# Patient Record
Sex: MALE | Race: WHITE | NOT HISPANIC OR LATINO | Employment: OTHER | URBAN - METROPOLITAN AREA
[De-identification: names, ages, dates, MRNs, and addresses within clinical notes are randomized per-mention and may not be internally consistent; named-entity substitution may affect disease eponyms.]

---

## 2017-01-03 ENCOUNTER — TELEPHONE (OUTPATIENT)
Dept: FAMILY MEDICINE | Facility: CLINIC | Age: 62
End: 2017-01-03

## 2017-01-03 RX ORDER — AMOXICILLIN AND CLAVULANATE POTASSIUM 875; 125 MG/1; MG/1
1 TABLET, FILM COATED ORAL 2 TIMES DAILY
Qty: 20 TABLET | Refills: 0 | Status: SHIPPED | OUTPATIENT
Start: 2017-01-03 | End: 2017-07-31 | Stop reason: SDUPTHER

## 2017-01-03 NOTE — TELEPHONE ENCOUNTER
----- Message from Marilee Zaragoza sent at 1/3/2017  9:09 AM CST -----  Contact: self  Patient called regarding an appt he had on 12/29/16. Stating was diagnosed with bronchitis. Was told to buy over the counter meds and to f/u if not better by today to be prescribed antibiotics. Stating cannot sleep and sinus issues. Please contact [phone] (home)       [id number] - [place], [place] - [address] [address]  Phone: [phone] Fax: [phone]

## 2017-02-07 ENCOUNTER — DOCUMENTATION ONLY (OUTPATIENT)
Dept: FAMILY MEDICINE | Facility: CLINIC | Age: 62
End: 2017-02-07

## 2017-02-07 NOTE — PROGRESS NOTES
Health Maintenance Due   Topic Date Due    Hepatitis C Screening  1955    TETANUS VACCINE  10/18/1972    Zoster Vaccine  10/18/2014

## 2017-02-13 ENCOUNTER — CLINICAL SUPPORT (OUTPATIENT)
Dept: FAMILY MEDICINE | Facility: CLINIC | Age: 62
End: 2017-02-13
Payer: MEDICARE

## 2017-02-13 ENCOUNTER — APPOINTMENT (OUTPATIENT)
Dept: LAB | Facility: HOSPITAL | Age: 62
End: 2017-02-13
Attending: INTERNAL MEDICINE
Payer: MEDICARE

## 2017-02-13 DIAGNOSIS — E78.5 HYPERLIPIDEMIA, UNSPECIFIED HYPERLIPIDEMIA TYPE: ICD-10-CM

## 2017-02-13 DIAGNOSIS — Z11.59 NEED FOR HEPATITIS C SCREENING TEST: ICD-10-CM

## 2017-02-13 LAB
ALBUMIN SERPL BCP-MCNC: 3.8 G/DL
ALP SERPL-CCNC: 61 U/L
ALT SERPL W/O P-5'-P-CCNC: 19 U/L
ANION GAP SERPL CALC-SCNC: 8 MMOL/L
AST SERPL-CCNC: 23 U/L
BILIRUB SERPL-MCNC: 0.6 MG/DL
BUN SERPL-MCNC: 19 MG/DL
CALCIUM SERPL-MCNC: 9.2 MG/DL
CHLORIDE SERPL-SCNC: 107 MMOL/L
CHOLEST/HDLC SERPL: 4.6 {RATIO}
CO2 SERPL-SCNC: 25 MMOL/L
CREAT SERPL-MCNC: 1.1 MG/DL
EST. GFR  (AFRICAN AMERICAN): >60 ML/MIN/1.73 M^2
EST. GFR  (NON AFRICAN AMERICAN): >60 ML/MIN/1.73 M^2
GLUCOSE SERPL-MCNC: 88 MG/DL
HDL/CHOLESTEROL RATIO: 21.6 %
HDLC SERPL-MCNC: 167 MG/DL
HDLC SERPL-MCNC: 36 MG/DL
LDLC SERPL CALC-MCNC: 96.4 MG/DL
NONHDLC SERPL-MCNC: 131 MG/DL
POTASSIUM SERPL-SCNC: 3.9 MMOL/L
PROT SERPL-MCNC: 6.7 G/DL
SODIUM SERPL-SCNC: 140 MMOL/L
TRIGL SERPL-MCNC: 173 MG/DL

## 2017-02-13 PROCEDURE — 86803 HEPATITIS C AB TEST: CPT

## 2017-02-13 PROCEDURE — 80053 COMPREHEN METABOLIC PANEL: CPT

## 2017-02-13 PROCEDURE — 80061 LIPID PANEL: CPT

## 2017-02-14 LAB — HCV AB SERPL QL IA: NEGATIVE

## 2017-03-10 RX ORDER — LEVOTHYROXINE SODIUM 100 UG/1
100 TABLET ORAL DAILY
Qty: 30 TABLET | Refills: 11 | Status: SHIPPED | OUTPATIENT
Start: 2017-03-10 | End: 2018-04-16 | Stop reason: SDUPTHER

## 2017-07-31 ENCOUNTER — DOCUMENTATION ONLY (OUTPATIENT)
Dept: FAMILY MEDICINE | Facility: CLINIC | Age: 62
End: 2017-07-31

## 2017-07-31 ENCOUNTER — OFFICE VISIT (OUTPATIENT)
Dept: FAMILY MEDICINE | Facility: CLINIC | Age: 62
End: 2017-07-31
Payer: MEDICARE

## 2017-07-31 VITALS
WEIGHT: 219.56 LBS | OXYGEN SATURATION: 94 % | DIASTOLIC BLOOD PRESSURE: 79 MMHG | HEART RATE: 127 BPM | TEMPERATURE: 102 F | BODY MASS INDEX: 32.52 KG/M2 | HEIGHT: 69 IN | RESPIRATION RATE: 24 BRPM | SYSTOLIC BLOOD PRESSURE: 133 MMHG

## 2017-07-31 DIAGNOSIS — J01.00 ACUTE NON-RECURRENT MAXILLARY SINUSITIS: ICD-10-CM

## 2017-07-31 DIAGNOSIS — J20.9 ACUTE BRONCHITIS, UNSPECIFIED ORGANISM: ICD-10-CM

## 2017-07-31 DIAGNOSIS — R11.2 NAUSEA AND VOMITING, INTRACTABILITY OF VOMITING NOT SPECIFIED, UNSPECIFIED VOMITING TYPE: Primary | ICD-10-CM

## 2017-07-31 DIAGNOSIS — M79.10 MYALGIA: ICD-10-CM

## 2017-07-31 DIAGNOSIS — I48.91 ATRIAL FIBRILLATION WITH RAPID VENTRICULAR RESPONSE: ICD-10-CM

## 2017-07-31 PROCEDURE — 99213 OFFICE O/P EST LOW 20 MIN: CPT | Mod: 25,S$GLB,, | Performed by: NURSE PRACTITIONER

## 2017-07-31 PROCEDURE — 96372 THER/PROPH/DIAG INJ SC/IM: CPT | Mod: S$GLB,,, | Performed by: NURSE PRACTITIONER

## 2017-07-31 RX ORDER — AMOXICILLIN AND CLAVULANATE POTASSIUM 875; 125 MG/1; MG/1
1 TABLET, FILM COATED ORAL 2 TIMES DAILY
Qty: 20 TABLET | Refills: 0 | Status: SHIPPED | OUTPATIENT
Start: 2017-07-31 | End: 2017-08-21 | Stop reason: ALTCHOICE

## 2017-07-31 RX ORDER — BENZONATATE 200 MG/1
200 CAPSULE ORAL 3 TIMES DAILY PRN
Qty: 30 CAPSULE | Refills: 0 | Status: SHIPPED | OUTPATIENT
Start: 2017-07-31 | End: 2017-10-13

## 2017-07-31 RX ORDER — ONDANSETRON 4 MG/1
4 TABLET, ORALLY DISINTEGRATING ORAL EVERY 8 HOURS PRN
Qty: 30 TABLET | Refills: 0 | Status: SHIPPED | OUTPATIENT
Start: 2017-07-31 | End: 2018-05-28

## 2017-07-31 RX ORDER — PREDNISONE 5 MG/1
TABLET ORAL
Qty: 21 TABLET | Refills: 0 | Status: SHIPPED | OUTPATIENT
Start: 2017-07-31 | End: 2017-08-21 | Stop reason: ALTCHOICE

## 2017-07-31 RX ORDER — PROMETHAZINE HYDROCHLORIDE AND DEXTROMETHORPHAN HYDROBROMIDE 6.25; 15 MG/5ML; MG/5ML
5 SYRUP ORAL EVERY 6 HOURS PRN
Qty: 118 ML | Refills: 0 | Status: SHIPPED | OUTPATIENT
Start: 2017-07-31 | End: 2017-08-10

## 2017-07-31 RX ORDER — METOPROLOL SUCCINATE 50 MG/1
50 TABLET, EXTENDED RELEASE ORAL DAILY
Qty: 30 TABLET | Refills: 11 | Status: SHIPPED | OUTPATIENT
Start: 2017-07-31 | End: 2018-10-30 | Stop reason: SDUPTHER

## 2017-07-31 RX ORDER — DEXAMETHASONE SODIUM PHOSPHATE 4 MG/ML
4 INJECTION, SOLUTION INTRA-ARTICULAR; INTRALESIONAL; INTRAMUSCULAR; INTRAVENOUS; SOFT TISSUE
Status: COMPLETED | OUTPATIENT
Start: 2017-07-31 | End: 2017-07-31

## 2017-07-31 RX ADMIN — DEXAMETHASONE SODIUM PHOSPHATE 4 MG: 4 INJECTION, SOLUTION INTRA-ARTICULAR; INTRALESIONAL; INTRAMUSCULAR; INTRAVENOUS; SOFT TISSUE at 09:07

## 2017-07-31 NOTE — PROGRESS NOTES
Subjective:       Patient ID: Albin Fernandez is a 61 y.o. male.    Chief Complaint: Cough and Fever    Cough   This is a new problem. The current episode started in the past 7 days (started about 4 days ago). The problem has been rapidly worsening. The cough is productive of sputum (thick yellow). Associated symptoms include chills, ear congestion, a fever, myalgias, nasal congestion, shortness of breath and sweats. Pertinent negatives include no ear pain. Associated symptoms comments: Cheeks are very puffy. Vomiting. . The symptoms are aggravated by lying down (Has not been able to sleep for several nights due to coughing. ). Treatments tried: tylenol, mucinex D, vicks formula 44 cough medicine.  The treatment provided no relief.     Has A fib, chronic,  Dr. Santo manages his warfarin. He will have it checked next week. A fib is chronic and much worse, now has rapid ventricular response with tachycardia. Denies chest pain or leg edema.   Review of Systems   Constitutional: Positive for chills and fever.   HENT: Negative for ear pain.    Respiratory: Positive for cough and shortness of breath.    Musculoskeletal: Positive for myalgias.       Objective:      Physical Exam   Constitutional: He appears well-developed and well-nourished. No distress.   HENT:   Head: Normocephalic and atraumatic.   Right Ear: External ear normal.   Left Ear: External ear normal.   Nose: Nose normal.   Mouth/Throat: Oropharynx is clear and moist. No oropharyngeal exudate.   Eyes: Conjunctivae are normal. Right eye exhibits no discharge. Left eye exhibits no discharge. No scleral icterus.   Neck: Normal range of motion. Neck supple.   Cardiovascular: Normal rate, regular rhythm and normal heart sounds.  Exam reveals no gallop and no friction rub.    No murmur heard.  Pulmonary/Chest: Effort normal and breath sounds normal. No respiratory distress. He has no wheezes. He has no rales.   Lymphadenopathy:     He has no cervical adenopathy.    Skin: Skin is warm and dry. He is not diaphoretic.   Psychiatric: He has a normal mood and affect. His behavior is normal.       Assessment:       1. Nausea and vomiting, intractability of vomiting not specified, unspecified vomiting type    2. Atrial fibrillation with rapid ventricular response    3. Acute non-recurrent maxillary sinusitis    4. Acute bronchitis, unspecified organism    5. Myalgia        Plan:     Nausea and vomiting, intractability of vomiting not specified, unspecified vomiting type  -     ondansetron (ZOFRAN-ODT) 4 MG TbDL; Take 1 tablet (4 mg total) by mouth every 8 (eight) hours as needed.  Dispense: 30 tablet; Refill: 0    Atrial fibrillation with rapid ventricular response  -     metoprolol succinate (TOPROL-XL) 50 MG 24 hr tablet; Take 1 tablet (50 mg total) by mouth once daily.  Dispense: 30 tablet; Refill: 11    Acute non-recurrent maxillary sinusitis  -     amoxicillin-clavulanate 875-125mg (AUGMENTIN) 875-125 mg per tablet; Take 1 tablet by mouth 2 (two) times daily.  Dispense: 20 tablet; Refill: 0    Acute bronchitis, unspecified organism  -     benzonatate (TESSALON) 200 MG capsule; Take 1 capsule (200 mg total) by mouth 3 (three) times daily as needed for Cough.  Dispense: 30 capsule; Refill: 0  -     predniSONE (DELTASONE) 5 MG tablet; 6 tabs the first day, then 5 the next, then 4, 3, 2, 1  Dispense: 21 tablet; Refill: 0  -     promethazine-dextromethorphan (PROMETHAZINE-DM) 6.25-15 mg/5 mL Syrp; Take 5 mLs by mouth every 6 (six) hours as needed.  Dispense: 118 mL; Refill: 0    Myalgia  -     dexamethasone injection 4 mg; Inject 1 mL (4 mg total) into the muscle one time.        Cut jantoven back by 1 mg. A day while on augmentin (antibiotic) and contact Dr. Santo and tell him you are on an antibiotic and ask him if that is a sufficient reduction in your  Jantoven dose.    Follow up in 2-3 days.  2 weeks if not better

## 2017-07-31 NOTE — PROGRESS NOTES
Health Maintenance Due   Topic Date Due    TETANUS VACCINE  10/18/1972    Zoster Vaccine  10/18/2014

## 2017-07-31 NOTE — PATIENT INSTRUCTIONS
Cut jantoven back by 1 mg. A day while on augmentin (antibiotid) and contact Dr. Santo and tell him you are on an antibiotic and ask him if that is a sufficient reduction in your  Jantoven dose. Take 1 tylenol every 4 hours around the clock for the fever. Take the antibiotic with food.

## 2017-08-03 ENCOUNTER — OFFICE VISIT (OUTPATIENT)
Dept: FAMILY MEDICINE | Facility: CLINIC | Age: 62
End: 2017-08-03
Payer: MEDICARE

## 2017-08-03 ENCOUNTER — HOSPITAL ENCOUNTER (OUTPATIENT)
Dept: RADIOLOGY | Facility: HOSPITAL | Age: 62
Discharge: HOME | End: 2017-08-03
Attending: NURSE PRACTITIONER
Payer: MEDICARE

## 2017-08-03 ENCOUNTER — DOCUMENTATION ONLY (OUTPATIENT)
Dept: FAMILY MEDICINE | Facility: CLINIC | Age: 62
End: 2017-08-03

## 2017-08-03 VITALS
BODY MASS INDEX: 32.09 KG/M2 | DIASTOLIC BLOOD PRESSURE: 87 MMHG | HEART RATE: 92 BPM | OXYGEN SATURATION: 97 % | HEIGHT: 69 IN | SYSTOLIC BLOOD PRESSURE: 138 MMHG | WEIGHT: 216.69 LBS | TEMPERATURE: 98 F

## 2017-08-03 DIAGNOSIS — R50.9 FEVER, UNSPECIFIED FEVER CAUSE: ICD-10-CM

## 2017-08-03 DIAGNOSIS — R05.9 COUGH: Primary | ICD-10-CM

## 2017-08-03 DIAGNOSIS — R05.9 COUGH: ICD-10-CM

## 2017-08-03 PROCEDURE — 3008F BODY MASS INDEX DOCD: CPT | Mod: S$GLB,,, | Performed by: NURSE PRACTITIONER

## 2017-08-03 PROCEDURE — 71020 XR CHEST PA AND LATERAL: CPT | Mod: TC

## 2017-08-03 PROCEDURE — 99213 OFFICE O/P EST LOW 20 MIN: CPT | Mod: S$GLB,,, | Performed by: NURSE PRACTITIONER

## 2017-08-03 PROCEDURE — 71020 XR CHEST PA AND LATERAL: CPT | Mod: 26,,, | Performed by: RADIOLOGY

## 2017-08-03 NOTE — PROGRESS NOTES
Subjective:       Patient ID: Albin Fernandez is a 61 y.o. male.    Chief Complaint: Cough and Chest Congestion    Cough   This is a new problem. The current episode started 1 to 4 weeks ago. Progression since onset: slightly improved, but still can't lay down with out coughing so much he has to get up.  The problem occurs every few minutes. The cough is productive of sputum (green ). Associated symptoms include shortness of breath. He has tried oral steroids and prescription cough suppressant (antibiotic) for the symptoms. His past medical history is significant for bronchitis.     Review of Systems   Respiratory: Positive for cough and shortness of breath.        Objective:      Physical Exam   Constitutional: He is oriented to person, place, and time. He appears well-developed and well-nourished. No distress.   HENT:   Head: Normocephalic and atraumatic.   Eyes: Conjunctivae are normal. Right eye exhibits no discharge. Left eye exhibits no discharge. No scleral icterus.   Cardiovascular: Normal rate, regular rhythm and normal heart sounds.  Exam reveals no gallop and no friction rub.    No murmur heard.  Pulmonary/Chest: Effort normal and breath sounds normal. No respiratory distress. He has no wheezes. He has no rales.   Neurological: He is alert and oriented to person, place, and time.   Skin: Skin is warm and dry. He is not diaphoretic.   Psychiatric: He has a normal mood and affect. His behavior is normal.   Nursing note and vitals reviewed.      Assessment:       1. Cough    2. Fever, unspecified fever cause        Plan:       Cough  -     X-Ray Chest PA And Lateral; Future; Expected date: 08/03/2017    Fever, unspecified fever cause  -     X-Ray Chest PA And Lateral; Future; Expected date: 08/03/2017  -     CBC auto differential; Future; Expected date: 08/03/2017  -     Comprehensive metabolic panel; Future; Expected date: 08/03/2017         Not improved enough with 3 days of antibiotics, ?pneumonia, will  send for stat labs and C xray.

## 2017-08-03 NOTE — PROGRESS NOTES
Health Maintenance Due   Topic Date Due    TETANUS VACCINE  10/18/1972    Zoster Vaccine  10/18/2014    Influenza Vaccine  08/01/2017

## 2017-08-16 DIAGNOSIS — E78.5 HYPERLIPIDEMIA, UNSPECIFIED HYPERLIPIDEMIA TYPE: ICD-10-CM

## 2017-08-16 RX ORDER — SIMVASTATIN 40 MG/1
40 TABLET, FILM COATED ORAL NIGHTLY
Qty: 90 TABLET | Refills: 2 | Status: SHIPPED | OUTPATIENT
Start: 2017-08-16 | End: 2018-07-02 | Stop reason: SDUPTHER

## 2017-08-21 ENCOUNTER — DOCUMENTATION ONLY (OUTPATIENT)
Dept: FAMILY MEDICINE | Facility: CLINIC | Age: 62
End: 2017-08-21

## 2017-08-21 ENCOUNTER — OFFICE VISIT (OUTPATIENT)
Dept: FAMILY MEDICINE | Facility: CLINIC | Age: 62
End: 2017-08-21
Payer: MEDICARE

## 2017-08-21 VITALS
BODY MASS INDEX: 32.78 KG/M2 | DIASTOLIC BLOOD PRESSURE: 68 MMHG | HEIGHT: 69 IN | WEIGHT: 221.31 LBS | SYSTOLIC BLOOD PRESSURE: 117 MMHG | HEART RATE: 98 BPM | TEMPERATURE: 98 F

## 2017-08-21 DIAGNOSIS — L73.9 FOLLICULITIS: ICD-10-CM

## 2017-08-21 DIAGNOSIS — K21.9 LARYNGOPHARYNGEAL REFLUX (LPR): ICD-10-CM

## 2017-08-21 DIAGNOSIS — J30.1 ACUTE SEASONAL ALLERGIC RHINITIS DUE TO POLLEN: Primary | ICD-10-CM

## 2017-08-21 DIAGNOSIS — R05.3 CHRONIC COUGH: ICD-10-CM

## 2017-08-21 DIAGNOSIS — B35.0 TINEA CAPITIS: ICD-10-CM

## 2017-08-21 PROCEDURE — 3008F BODY MASS INDEX DOCD: CPT | Mod: S$GLB,,, | Performed by: NURSE PRACTITIONER

## 2017-08-21 PROCEDURE — 99213 OFFICE O/P EST LOW 20 MIN: CPT | Mod: S$GLB,,, | Performed by: NURSE PRACTITIONER

## 2017-08-21 RX ORDER — KETOCONAZOLE 20 MG/ML
SHAMPOO, SUSPENSION TOPICAL
Qty: 120 ML | Refills: 1 | Status: SHIPPED | OUTPATIENT
Start: 2017-08-21 | End: 2018-10-15

## 2017-08-21 RX ORDER — CEPHALEXIN 500 MG/1
500 CAPSULE ORAL EVERY 6 HOURS
Qty: 28 CAPSULE | Refills: 0 | Status: SHIPPED | OUTPATIENT
Start: 2017-08-21 | End: 2017-08-28

## 2017-08-21 RX ORDER — OMEPRAZOLE 40 MG/1
40 CAPSULE, DELAYED RELEASE ORAL
Qty: 60 CAPSULE | Refills: 2 | Status: SHIPPED | OUTPATIENT
Start: 2017-08-21 | End: 2017-09-05

## 2017-08-21 RX ORDER — FLUTICASONE PROPIONATE 50 MCG
2 SPRAY, SUSPENSION (ML) NASAL DAILY
Qty: 16 G | Refills: 11 | Status: ON HOLD | OUTPATIENT
Start: 2017-08-21 | End: 2020-11-23

## 2017-08-21 NOTE — PROGRESS NOTES
Pre-Visit Chart Review  For Appointment Scheduled on 8-    Health Maintenance Due   Topic Date Due    TETANUS VACCINE  10/18/1972    Zoster Vaccine  10/18/2014    Influenza Vaccine  08/01/2017

## 2017-08-21 NOTE — PATIENT INSTRUCTIONS
Weight Management: Getting Started  Healthy bodies come in all shapes and sizes. Not all bodies are made to be thin. For some people, a healthy weight is higher than the average weight listed on weight charts. Your healthcare provider can help you decide on a healthy weight for you.    Reasons to lose weight  Losing weight can help with some health problems, such as high blood pressure, heart disease, diabetes, sleep apnea, and arthritis. You may also feel more energy.  Set your long-term goal  Your goal doesn't even have to be a specific weight. You may decide on a fitness goal (such as being able to walk 10 miles a week), or a health goal (such as lowering your blood pressure). Choose a goal that is measurable and reasonable, so you know when you've reached it. A goal of reaching a BMI of less than 25 is not always reasonable (or possible).   Make an action plan  Habits dont change overnight. Setting your goals too high can leave you feeling discouraged if you cant reach them. Be realistic. Choose one or two small changes you can make now. Set an action plan for how you are going to make these changes. When you can stick to this plan, keep making a few more small changes. Taking small steps will help you stay on the path to success.  Track your progress  Write down your goals. Then, keep a daily record of your progress. Write down what you eat and how active you are. This record lets you look back on how much youve done. It may also help when youre feeling frustrated. Reward yourself for success. Even if you dont reach every goal, give yourself credit for what you do get done.  Get support  Encouragement from others can help make losing weight easier. Ask your family members and friends for support. They may even want to join you. Also look to your healthcare provider, registered dietitian, and  for help. Your local hospital can give you more information about nutrition, exercise, and  weight loss.  Date Last Reviewed: 1/31/2016 © 2000-2016 [employer]. [address] [place] [address], [place] [address]. All rights reserved. This information is not intended as a substitute for professional medical care. Always follow your healthcare professional's instructions.        Walking for Fitness  Fitness walking has something for everyone, even people who are already fit. Walking is one of the safest ways to condition your body aerobically. It can boost energy, help you lose weight, and reduce stress.    Physical benefits  · Walking strengthens your heart and lungs, and tones your muscles.  · When walking, your feet land with less impact than in other sports. This reduces chances of muscle, bone, and joint injury.  · Regular walking improves your cholesterol levels and lowers your risk of heart disease. And it helps you control your blood sugar if you have diabetes.  · Walking is a weight-bearing activity, which helps maintain bone density. This can help prevent osteoporosis.  Personal rewards  · Taking walks can help you relax and manage stress. And fitness walking may make you feel better about yourself.  · Walking can help you sleep better at night and make you less likely to be depressed.  · Regular walking may help maintain your memory as you get older.  · Walking is a great way to spend extra time with friends and family members. Be sure to invite your dog along!  Q&A about fitness walking  Q: Will walking keep me fit?  A: Yes. Regular walking at the right pace gives you all the benefits of other aerobic activities, such as jogging and swimming.  Q: Will walking help me lose weight and keep it off?  A: Yes. Per mile, walking can burn as many calories as jogging. Your health care provider can help work walking into your weight-loss plan.  Q: Is walking safe for my health?  A: Yes. Walking is safe if you have high blood pressure, diabetes, heart disease, or other conditions. Talk to your health  care provider before you start.  Date Last Reviewed: 5/9/2015  © 8247-3612 [employer]. [address] [place] [address], [place] [address]. All rights reserved. This information is not intended as a substitute for professional medical care. Always follow your healthcare professional's instructions.         Call Dr. Santo and let him know you are starting keflex for 1 week and see if he wants your Jantoven dose decreased.

## 2017-08-21 NOTE — PROGRESS NOTES
Subjective:       Patient ID: Albin Fernandez is a 61 y.o. male.    Chief Complaint: URI    URI    This is a recurrent problem. The current episode started more than 1 month ago. The problem has been waxing and waning (felt like it was getting a little better, then got worse again. ). There has been no fever. Associated symptoms include coughing, rhinorrhea and a sore throat. Treatments tried: had a course of antibiotics, steroids and rx cough meds. Improvement on treatment: improved for a little while, then cough got worse again.    Noticed he has bumps on his head which are painful, saw a show about people with head wounds caused by fly bites and is concerned.     Has Afib, taking Jantovan 4 mg. Everyday except 2 mg. On Wednesday and Friday.       Review of Systems   HENT: Positive for rhinorrhea and sore throat.    Respiratory: Positive for cough.        Objective:      Physical Exam   Constitutional: He appears well-developed and well-nourished. No distress.   HENT:   Head: Normocephalic and atraumatic.   Right Ear: External ear normal.   Left Ear: External ear normal.   Nose: Nose normal.   Mouth/Throat: Oropharynx is clear and moist. No oropharyngeal exudate.   Eyes: Conjunctivae are normal. Right eye exhibits no discharge. Left eye exhibits no discharge. No scleral icterus.   Neck: Normal range of motion. Neck supple.   Cardiovascular: Normal rate, regular rhythm and normal heart sounds.  Exam reveals no gallop and no friction rub.    No murmur heard.  Pulmonary/Chest: Effort normal and breath sounds normal. No respiratory distress. He has no wheezes. He has no rales.   Lymphadenopathy:     He has no cervical adenopathy.   Skin: Skin is warm and dry. He is not diaphoretic.   Psychiatric: He has a normal mood and affect. His behavior is normal.   Nursing note and vitals reviewed.      Assessment:       1. Acute seasonal allergic rhinitis due to pollen    2. Chronic cough    3. Laryngopharyngeal reflux (LPR)     4. Folliculitis    5. Tinea capitis        Plan:       Acute seasonal allergic rhinitis due to pollen  -     fluticasone (FLONASE) 50 mcg/actuation nasal spray; 2 sprays by Each Nare route once daily.  Dispense: 16 g; Refill: 11    Chronic cough  -     Ambulatory consult to ENT    Laryngopharyngeal reflux (LPR)  -     omeprazole (PRILOSEC) 40 MG capsule; Take 1 capsule (40 mg total) by mouth 2 (two) times daily before meals.  Dispense: 60 capsule; Refill: 2    Folliculitis  -     cephALEXin (KEFLEX) 500 MG capsule; Take 1 capsule (500 mg total) by mouth every 6 (six) hours.  Dispense: 28 capsule; Refill: 0    Tinea capitis  -     ketoconazole (NIZORAL) 2 % shampoo; Apply topically twice a week.  Dispense: 120 mL; Refill: 1         Call Dr. Santo and let him know you are starting keflex for 1 week and see if he wants your Jantoven dose decreased.

## 2017-08-28 ENCOUNTER — TELEPHONE (OUTPATIENT)
Dept: FAMILY MEDICINE | Facility: CLINIC | Age: 62
End: 2017-08-28

## 2017-08-28 NOTE — TELEPHONE ENCOUNTER
----- Message from Erica Nam sent at 8/25/2017  2:46 PM CDT -----  [employer] / [place] at  [phone]  / ext [phone] about a request sent

## 2017-09-05 ENCOUNTER — TELEPHONE (OUTPATIENT)
Dept: FAMILY MEDICINE | Facility: CLINIC | Age: 62
End: 2017-09-05

## 2017-09-05 DIAGNOSIS — K21.9 GASTROESOPHAGEAL REFLUX DISEASE, ESOPHAGITIS PRESENCE NOT SPECIFIED: Primary | ICD-10-CM

## 2017-09-05 RX ORDER — PANTOPRAZOLE SODIUM 40 MG/1
40 TABLET, DELAYED RELEASE ORAL DAILY
Qty: 30 TABLET | Refills: 11 | Status: SHIPPED | OUTPATIENT
Start: 2017-09-05 | End: 2018-10-15

## 2017-09-11 ENCOUNTER — DOCUMENTATION ONLY (OUTPATIENT)
Dept: FAMILY MEDICINE | Facility: CLINIC | Age: 62
End: 2017-09-11

## 2017-09-12 ENCOUNTER — TELEPHONE (OUTPATIENT)
Dept: FAMILY MEDICINE | Facility: CLINIC | Age: 62
End: 2017-09-12

## 2017-09-12 DIAGNOSIS — Z00.8 EVALUATION BY PSYCHIATRIC SERVICE REQUIRED: Primary | ICD-10-CM

## 2017-09-14 ENCOUNTER — DOCUMENTATION ONLY (OUTPATIENT)
Dept: FAMILY MEDICINE | Facility: CLINIC | Age: 62
End: 2017-09-14

## 2017-09-15 ENCOUNTER — OFFICE VISIT (OUTPATIENT)
Dept: FAMILY MEDICINE | Facility: CLINIC | Age: 62
End: 2017-09-15
Payer: MEDICARE

## 2017-09-15 VITALS
SYSTOLIC BLOOD PRESSURE: 140 MMHG | OXYGEN SATURATION: 97 % | WEIGHT: 217.81 LBS | BODY MASS INDEX: 32.26 KG/M2 | RESPIRATION RATE: 16 BRPM | DIASTOLIC BLOOD PRESSURE: 80 MMHG | HEIGHT: 69 IN | TEMPERATURE: 98 F | HEART RATE: 120 BPM

## 2017-09-15 DIAGNOSIS — Z23 NEEDS FLU SHOT: ICD-10-CM

## 2017-09-15 DIAGNOSIS — J41.0 SIMPLE CHRONIC BRONCHITIS: Primary | ICD-10-CM

## 2017-09-15 DIAGNOSIS — I48.19 PERSISTENT ATRIAL FIBRILLATION: ICD-10-CM

## 2017-09-15 PROCEDURE — 90686 IIV4 VACC NO PRSV 0.5 ML IM: CPT | Mod: S$GLB,,, | Performed by: INTERNAL MEDICINE

## 2017-09-15 PROCEDURE — G0008 ADMIN INFLUENZA VIRUS VAC: HCPCS | Mod: S$GLB,,, | Performed by: INTERNAL MEDICINE

## 2017-09-15 PROCEDURE — 99213 OFFICE O/P EST LOW 20 MIN: CPT | Mod: S$GLB,,, | Performed by: INTERNAL MEDICINE

## 2017-09-15 PROCEDURE — 3008F BODY MASS INDEX DOCD: CPT | Mod: S$GLB,,, | Performed by: INTERNAL MEDICINE

## 2017-09-15 PROCEDURE — 99499 UNLISTED E&M SERVICE: CPT | Mod: S$GLB,,, | Performed by: INTERNAL MEDICINE

## 2017-09-15 NOTE — PROGRESS NOTES
"Subjective:       Patient ID: Albin Fernandez is a 61 y.o. male.    Chief Complaint: Follow-up (paper work for conceal carry)    HPI     Coumadin monitoring:  No results found for: INR, PROTIME    ANTICOAGULATION DX: (The following diagnoses are positive if BOLD, negative otherwise.)  Atrial_fibirillation.  . DVT. Pulmonary_embolism.  Aortic_valve_replacement.  TIA/CVA.         Current Dosage: . 4 except 2     on  Th, Fri  Compliance with medication - good  Diet changes: no.     watches diet: yes.  .  Pending medical/dental procedure: no  Concomitant medication changes (including over the counter/herbs): no  Alcohol use:  no        The following symptoms are positive if BOLD, negative otherwise.    Bleeding episodes:    Gingival_bleeding., epistaxis ., ecchymoses, hematuria . , melena, blood_per_rectum  Thrombotic event:    shortness_of_breath .  pain/swelling_of_extremity . , numbness  , tingling . , headache .   Intolerance of drug:  nausea/vomiting/diarrhea . , rash . , skin necrosis .       Cough is better since he stopped using an over-the-counter nasal spray.  The rash in his scalp has resolved.  Review of Systems    Objective:      Vitals:    09/15/17 0804 09/15/17 0829   BP: (!) 157/92 (!) 140/80   Pulse: (!) 120    Resp: 16    Temp: 98 °F (36.7 °C)    TempSrc: Oral    SpO2: 97%    Weight: 98.8 kg (217 lb 13 oz)    Height: 5' 9" (1.753 m)    PainSc: 0-No pain      Physical Exam   Constitutional: He appears well-developed and well-nourished.   Eyes: Pupils are equal, round, and reactive to light.   Cardiovascular: Normal rate and normal heart sounds.    Irregularly irregular   Pulmonary/Chest: Effort normal and breath sounds normal.   Abdominal: Soft. There is no tenderness.   Neurological: He is alert.   Psychiatric: He has a normal mood and affect. His behavior is normal. Thought content normal.   Nursing note and vitals reviewed.        Assessment:       1. Simple chronic bronchitis    2. Persistent atrial " fibrillation          Plan:   Paperwork for conceal carry was done.  I stated that I could not comment on any psychiatric issues as we have never cover this.  Simple chronic bronchitis    Persistent atrial fibrillation      Return in about 1 month (around 10/15/2017).

## 2017-10-13 ENCOUNTER — OFFICE VISIT (OUTPATIENT)
Dept: FAMILY MEDICINE | Facility: CLINIC | Age: 62
End: 2017-10-13
Payer: MEDICARE

## 2017-10-13 VITALS
DIASTOLIC BLOOD PRESSURE: 72 MMHG | OXYGEN SATURATION: 96 % | HEART RATE: 98 BPM | HEIGHT: 69 IN | SYSTOLIC BLOOD PRESSURE: 118 MMHG | RESPIRATION RATE: 16 BRPM | TEMPERATURE: 98 F | BODY MASS INDEX: 32.95 KG/M2 | WEIGHT: 222.44 LBS

## 2017-10-13 DIAGNOSIS — Z51.81 ENCOUNTER FOR MONITORING COUMADIN THERAPY: ICD-10-CM

## 2017-10-13 DIAGNOSIS — R35.1 NOCTURIA: ICD-10-CM

## 2017-10-13 DIAGNOSIS — N40.1 BENIGN LOCALIZED PROSTATIC HYPERPLASIA WITH LOWER URINARY TRACT SYMPTOMS (LUTS): ICD-10-CM

## 2017-10-13 DIAGNOSIS — I48.91 ATRIAL FIBRILLATION, UNSPECIFIED TYPE: Primary | ICD-10-CM

## 2017-10-13 DIAGNOSIS — Z79.01 ENCOUNTER FOR MONITORING COUMADIN THERAPY: ICD-10-CM

## 2017-10-13 LAB — INR PPP: 3.1 (ref 2–3)

## 2017-10-13 PROCEDURE — 99499 UNLISTED E&M SERVICE: CPT | Mod: S$GLB,,, | Performed by: INTERNAL MEDICINE

## 2017-10-13 PROCEDURE — 85610 PROTHROMBIN TIME: CPT | Mod: ,,, | Performed by: INTERNAL MEDICINE

## 2017-10-13 PROCEDURE — 81002 URINALYSIS NONAUTO W/O SCOPE: CPT | Mod: S$GLB,,, | Performed by: INTERNAL MEDICINE

## 2017-10-13 PROCEDURE — 99213 OFFICE O/P EST LOW 20 MIN: CPT | Mod: S$GLB,,, | Performed by: INTERNAL MEDICINE

## 2017-10-13 RX ORDER — TAMSULOSIN HYDROCHLORIDE 0.4 MG/1
0.8 CAPSULE ORAL NIGHTLY
Qty: 180 CAPSULE | Refills: 1 | Status: SHIPPED | OUTPATIENT
Start: 2017-10-13 | End: 2019-02-20 | Stop reason: SDUPTHER

## 2017-10-13 NOTE — PATIENT INSTRUCTIONS
Eat a salad once a week, continue present coumadin. 4  except    2  on  Wed, Fri      No water except for meds for 2 hrs before bedtime.

## 2017-10-13 NOTE — PROGRESS NOTES
"Subjective:       Patient ID: Albin Fernandez is a 61 y.o. male.    Chief Complaint: coumadin monitoring    HPI         Coumadin monitoring:  Lab Results   Component Value Date    INR 3.1 10/13/2017       ANTICOAGULATION DX: (The following diagnoses are positive if BOLD, negative otherwise.)  Atrial_fibirillation.  . DVT. Pulmonary_embolism.  Aortic_valve_replacement.  TIA/CVA.         Current Dosage: 4  except    2  on  Wed, Fri  Compliance with medication - good  Diet changes: no.     watches diet: yes.  .  Pending medical/dental procedure: no  Concomitant medication changes (including over the counter/herbs): no  Alcohol use:  no        The following symptoms are positive if BOLD, negative otherwise.    Bleeding episodes:    Gingival_bleeding., epistaxis ., ecchymoses, hematuria . , melena, blood_per_rectum  Thrombotic event:    shortness_of_breath .  pain/swelling_of_extremity . , numbness  , tingling . , headache .   Intolerance of drug:  nausea/vomiting/diarrhea . , rash . , skin necrosis .     Nocturia 4x/night.     Review of Systems    Objective:      Vitals:    10/13/17 0830   BP: (!) 135/91   Pulse: 98   Resp: 16   Temp: 97.7 °F (36.5 °C)   TempSrc: Oral   SpO2: 96%   Weight: 100.9 kg (222 lb 7.1 oz)   Height: 5' 9" (1.753 m)   PainSc:   7   PainLoc: Back     Physical Exam   Constitutional: He appears well-developed and well-nourished.   Eyes: Pupils are equal, round, and reactive to light.   Cardiovascular: Normal rate, regular rhythm and normal heart sounds.    Pulmonary/Chest: Effort normal and breath sounds normal.   Abdominal: Soft. There is no tenderness.   Neurological: He is alert.   Psychiatric: He has a normal mood and affect. His behavior is normal. Thought content normal.   Nursing note and vitals reviewed.  u/a trace blood and trace leucocytes.       Assessment:       1. Atrial fibrillation, unspecified type    2. Encounter for monitoring coumadin therapy    3. Nocturia    4. Benign localized " prostatic hyperplasia with lower urinary tract symptoms (LUTS)          Plan:   Has appt with urologist later this month.   Atrial fibrillation, unspecified type  -     POCT INR    Encounter for monitoring coumadin therapy    Nocturia  -     POCT urine dipstick without microscope    Benign localized prostatic hyperplasia with lower urinary tract symptoms (LUTS)    Other orders  -     tamsulosin (FLOMAX) 0.4 mg Cp24; Take 2 capsules (0.8 mg total) by mouth every evening.  Dispense: 180 capsule; Refill: 1      Return in about 1 month (around 11/13/2017).

## 2017-10-16 LAB
BILIRUB SERPL-MCNC: NORMAL MG/DL
BLOOD URINE, POC: NORMAL
COLOR, POC UA: YELLOW
GLUCOSE UR QL STRIP: NORMAL
KETONES UR QL STRIP: NORMAL
LEUKOCYTE ESTERASE URINE, POC: NORMAL
NITRITE, POC UA: NORMAL
PH, POC UA: 5
PROTEIN, POC: NORMAL
SPECIFIC GRAVITY, POC UA: 1.02
UROBILINOGEN, POC UA: NORMAL

## 2017-10-31 ENCOUNTER — TELEPHONE (OUTPATIENT)
Dept: FAMILY MEDICINE | Facility: CLINIC | Age: 62
End: 2017-10-31

## 2017-10-31 NOTE — TELEPHONE ENCOUNTER
----- Message from Torrie Ceballos sent at 10/31/2017  9:42 AM CDT -----  Contact: Albin Russo is calling as needs a copy of list of medication for pre-op/hospital stay at [facility]. Asking to pickup today. Please call [phone] when ready. Thanks!

## 2017-11-13 ENCOUNTER — DOCUMENTATION ONLY (OUTPATIENT)
Dept: FAMILY MEDICINE | Facility: CLINIC | Age: 62
End: 2017-11-13

## 2017-11-13 ENCOUNTER — OFFICE VISIT (OUTPATIENT)
Dept: FAMILY MEDICINE | Facility: CLINIC | Age: 62
End: 2017-11-13
Payer: MEDICARE

## 2017-11-13 VITALS
BODY MASS INDEX: 31.12 KG/M2 | DIASTOLIC BLOOD PRESSURE: 83 MMHG | TEMPERATURE: 98 F | WEIGHT: 210.13 LBS | RESPIRATION RATE: 16 BRPM | HEART RATE: 98 BPM | HEIGHT: 69 IN | SYSTOLIC BLOOD PRESSURE: 114 MMHG | OXYGEN SATURATION: 99 %

## 2017-11-13 DIAGNOSIS — Z23 NEED FOR VACCINATION WITH COMBINED DIPHTHERIA-TETANUS-PERTUSSIS (DTAP): ICD-10-CM

## 2017-11-13 DIAGNOSIS — Z90.79 S/P TURP (STATUS POST TRANSURETHRAL RESECTION OF PROSTATE): ICD-10-CM

## 2017-11-13 DIAGNOSIS — K52.9 COLITIS: ICD-10-CM

## 2017-11-13 DIAGNOSIS — N30.91 HEMORRHAGIC CYSTITIS: Primary | ICD-10-CM

## 2017-11-13 PROCEDURE — 81002 URINALYSIS NONAUTO W/O SCOPE: CPT | Mod: S$GLB,,, | Performed by: INTERNAL MEDICINE

## 2017-11-13 PROCEDURE — 99214 OFFICE O/P EST MOD 30 MIN: CPT | Mod: 25,S$GLB,, | Performed by: INTERNAL MEDICINE

## 2017-11-13 PROCEDURE — 90471 IMMUNIZATION ADMIN: CPT | Mod: S$GLB,,, | Performed by: INTERNAL MEDICINE

## 2017-11-13 PROCEDURE — 87086 URINE CULTURE/COLONY COUNT: CPT

## 2017-11-13 PROCEDURE — 90715 TDAP VACCINE 7 YRS/> IM: CPT | Mod: S$GLB,,, | Performed by: INTERNAL MEDICINE

## 2017-11-13 RX ORDER — METRONIDAZOLE 500 MG/1
TABLET ORAL
COMMUNITY
Start: 2017-11-08 | End: 2018-10-15

## 2017-11-13 RX ORDER — SULFAMETHOXAZOLE AND TRIMETHOPRIM 800; 160 MG/1; MG/1
1 TABLET ORAL 2 TIMES DAILY
Qty: 20 TABLET | Refills: 0 | Status: SHIPPED | OUTPATIENT
Start: 2017-11-13 | End: 2017-11-23

## 2017-11-13 RX ORDER — LEVOFLOXACIN 500 MG/1
TABLET, FILM COATED ORAL
COMMUNITY
Start: 2017-11-08 | End: 2017-11-13

## 2017-11-13 NOTE — PROGRESS NOTES
Subjective:       Patient ID: Albin Fernandez is a 62 y.o. male.    Chief Complaint: Hospital Follow Up (discuss medications)    HPI       Abdominal Pain.   2 weeks ago the patient had a TURP.  He went home and had severe abdominal pain and vomiting and came back.  He was readmitted with colitis and possible enteritis after presenting with left upper and left lower abdominal pain.  He areas of colitis or more on the right side of the colon however.  Since being discharged from the hospital week ago the patient continues to have hematuria.  He is off Coumadin for atrial fibrillation.  His urine is not clearing.  He's been on a mostly clear liquid diet for a week.   ONSET:   10 d  ago.    DURATION:  10 days    QUALITY/COURSE: .  Improving    LOCATION:   luq and llq  .--Radiation:      INTENSITY/SEVERITY: .Severity is #   3   (10 point scale).  Character:    CONTEXT/WHEN: .--Similar problems: no. Trauma: no.    The following symptoms/statements  are positive if BOLD, negative otherwise.    AGGRAVATING FACTORS: food . medications. alcohol. movement. position . bowel movements . emotional stress .  RELIEF WITH: food or milk, antacids . medications .  position . bowel movements . Eructation.  passing gas .  PAST TREATMENT OR EVALUATION: barium+_enema . Upper_gastrointestinal_series . CT_scans . sonograms . Endoscopic_procedures .  ASSOCIATED SYMPTOMS:      Weight_loss . jaundice .     HISTORY OF: Diabetes. CAD. prior abdomina surgery. Kidney_stones.  Gallbladder_disease. Hiatal_hernia. Peptic_ulcer. colitis. Liver_disease.  FH  Colitis . . enteritis .     Last labs:  Lab Results   Component Value Date    WBC 4.40 08/03/2017    HGB 14.0 08/03/2017    HCT 41.0 08/03/2017     08/03/2017    CHOL 167 02/13/2017    TRIG 173 (H) 02/13/2017    HDL 36 (L) 02/13/2017    ALT 26 08/03/2017    AST 25 08/03/2017     08/03/2017    K 4.4 08/03/2017     08/03/2017    CREATININE 1.0 08/03/2017    BUN 22 08/03/2017    CO2  "27 08/03/2017    TSH 3.749 11/02/2016    INR 3.1 10/13/2017                     Review of Systems    Objective:      Vitals:    11/13/17 1301   BP: 114/83   Pulse: 98   Resp: 16   Temp: 97.6 °F (36.4 °C)   TempSrc: Oral   SpO2: 99%   Weight: 95.3 kg (210 lb 1.6 oz)   Height: 5' 9" (1.753 m)   PainSc:   5   PainLoc: Neck    12 pounds  Physical Exam   Constitutional: He appears well-developed and well-nourished.   Eyes: Pupils are equal, round, and reactive to light.   Cardiovascular: Normal rate, regular rhythm and normal heart sounds.    Pulmonary/Chest: Effort normal and breath sounds normal.   Abdominal: Soft. There is tenderness (left lower quadrant).   Neurological: He is alert.   Psychiatric: He has a normal mood and affect. His behavior is normal. Thought content normal.   Nursing note and vitals reviewed.      UA shows positive nitrites, 2+ blood and is grossly bloody without clots  Assessment:       1. Hemorrhagic cystitis    2. Colitis    3. S/P TURP (status post transurethral resection of prostate)    4. Need for vaccination with combined diphtheria-tetanus-pertussis (DTaP)          Plan:     Hemorrhagic cystitis  -     POCT urine dipstick without microscope  -     Urine culture  -     sulfamethoxazole-trimethoprim 800-160mg (BACTRIM DS) 800-160 mg Tab; Take 1 tablet by mouth 2 (two) times daily.  Dispense: 20 tablet; Refill: 0    Colitis    S/P TURP (status post transurethral resection of prostate)    Need for vaccination with combined diphtheria-tetanus-pertussis (DTaP)  -     Tdap Vaccine      Return in about 2 weeks (around 11/27/2017) for if you are not better return in one week.      "

## 2017-11-14 LAB
BILIRUB SERPL-MCNC: NORMAL MG/DL
BLOOD URINE, POC: 250
COLOR, POC UA: NORMAL
GLUCOSE UR QL STRIP: NORMAL
KETONES UR QL STRIP: NORMAL
LEUKOCYTE ESTERASE URINE, POC: NORMAL
NITRITE, POC UA: NORMAL
PH, POC UA: 5
PROTEIN, POC: NORMAL
SPECIFIC GRAVITY, POC UA: 1.02
UROBILINOGEN, POC UA: NORMAL

## 2017-11-15 LAB — BACTERIA UR CULT: NO GROWTH

## 2017-12-15 ENCOUNTER — TELEPHONE (OUTPATIENT)
Dept: FAMILY MEDICINE | Facility: CLINIC | Age: 62
End: 2017-12-15

## 2017-12-15 NOTE — TELEPHONE ENCOUNTER
He can start the Coumadin immediately after the bleeding from his bladder stops.  Left a message on his answering machine

## 2017-12-15 NOTE — TELEPHONE ENCOUNTER
----- Message from Karen Campbell sent at 12/15/2017 10:19 AM CST -----  Contact patient to advise when he should start taking his coumadin again at [phone].       Thank you

## 2017-12-20 ENCOUNTER — TELEPHONE (OUTPATIENT)
Dept: FAMILY MEDICINE | Facility: CLINIC | Age: 62
End: 2017-12-20

## 2017-12-20 NOTE — TELEPHONE ENCOUNTER
Patient said he got Dr Spicer message and he started his coumadin after the bleeding had stopped.He is out of town and will contact us when he comes back.

## 2018-02-19 ENCOUNTER — TELEPHONE (OUTPATIENT)
Dept: FAMILY MEDICINE | Facility: CLINIC | Age: 63
End: 2018-02-19

## 2018-02-19 DIAGNOSIS — E03.9 HYPOTHYROIDISM, UNSPECIFIED TYPE: ICD-10-CM

## 2018-02-19 DIAGNOSIS — E78.5 HYPERLIPIDEMIA, UNSPECIFIED HYPERLIPIDEMIA TYPE: Primary | ICD-10-CM

## 2018-02-19 NOTE — TELEPHONE ENCOUNTER
----- Message from David Sullivan sent at 2/19/2018  9:11 AM CST -----  Contact: same  Patient called in and stated he is overdue to come in for his lab work?  Patient stated he missed last month due to death in family.  Patient call back number is [phone] (no orders in system for us to book).

## 2018-02-26 ENCOUNTER — OFFICE VISIT (OUTPATIENT)
Dept: FAMILY MEDICINE | Facility: CLINIC | Age: 63
End: 2018-02-26
Payer: MEDICARE

## 2018-02-26 ENCOUNTER — DOCUMENTATION ONLY (OUTPATIENT)
Dept: FAMILY MEDICINE | Facility: CLINIC | Age: 63
End: 2018-02-26

## 2018-02-26 VITALS
TEMPERATURE: 99 F | RESPIRATION RATE: 16 BRPM | SYSTOLIC BLOOD PRESSURE: 128 MMHG | OXYGEN SATURATION: 98 % | BODY MASS INDEX: 32 KG/M2 | HEART RATE: 99 BPM | HEIGHT: 69 IN | WEIGHT: 216.06 LBS | DIASTOLIC BLOOD PRESSURE: 78 MMHG

## 2018-02-26 DIAGNOSIS — Z77.110 EXACERBATION OF COPD ASSOCIATED WITH VOLCANIC SMOG EXPOSURE: Primary | ICD-10-CM

## 2018-02-26 DIAGNOSIS — Z79.01 ENCOUNTER FOR MONITORING COUMADIN THERAPY: ICD-10-CM

## 2018-02-26 DIAGNOSIS — E78.5 HYPERLIPIDEMIA, UNSPECIFIED HYPERLIPIDEMIA TYPE: ICD-10-CM

## 2018-02-26 DIAGNOSIS — I48.91 ATRIAL FIBRILLATION, UNSPECIFIED TYPE: ICD-10-CM

## 2018-02-26 DIAGNOSIS — J44.1 EXACERBATION OF COPD ASSOCIATED WITH VOLCANIC SMOG EXPOSURE: Primary | ICD-10-CM

## 2018-02-26 DIAGNOSIS — Z51.81 ENCOUNTER FOR MONITORING COUMADIN THERAPY: ICD-10-CM

## 2018-02-26 PROCEDURE — 99499 UNLISTED E&M SERVICE: CPT | Mod: S$GLB,,, | Performed by: INTERNAL MEDICINE

## 2018-02-26 PROCEDURE — 85610 PROTHROMBIN TIME: CPT | Mod: QW,,, | Performed by: INTERNAL MEDICINE

## 2018-02-26 PROCEDURE — 3008F BODY MASS INDEX DOCD: CPT | Mod: S$GLB,,, | Performed by: INTERNAL MEDICINE

## 2018-02-26 PROCEDURE — 99214 OFFICE O/P EST MOD 30 MIN: CPT | Mod: S$GLB,,, | Performed by: INTERNAL MEDICINE

## 2018-02-26 RX ORDER — DOXYCYCLINE 100 MG/1
100 CAPSULE ORAL EVERY 12 HOURS
Qty: 10 CAPSULE | Refills: 0 | Status: SHIPPED | OUTPATIENT
Start: 2018-02-26 | End: 2018-03-03

## 2018-02-26 RX ORDER — VALACYCLOVIR HYDROCHLORIDE 500 MG/1
TABLET, FILM COATED ORAL
Refills: 3 | COMMUNITY
Start: 2017-12-04 | End: 2019-12-02 | Stop reason: SDUPTHER

## 2018-02-26 RX ORDER — BENZONATATE 200 MG/1
200 CAPSULE ORAL 3 TIMES DAILY PRN
Qty: 30 CAPSULE | Refills: 1 | Status: SHIPPED | OUTPATIENT
Start: 2018-02-26 | End: 2018-03-08

## 2018-02-26 RX ORDER — PREDNISONE 20 MG/1
40 TABLET ORAL DAILY
Qty: 10 TABLET | Refills: 0 | Status: SHIPPED | OUTPATIENT
Start: 2018-02-26 | End: 2018-03-08
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
## 2018-02-26 NOTE — PATIENT INSTRUCTIONS
Decrease Coumadin to 4 mg Monday Wednesday Friday and 2 mg rest the week.  Restart your present regimen after you're off the doxycycline.    Cool mist vaporizor.  Hot fluids. Hot tea with lemon and honey.    For best results take both the Tessalon Perles and Robitussin-DM

## 2018-02-26 NOTE — PROGRESS NOTES
Health Maintenance Due   Topic Date Due    Zoster Vaccine  10/18/2014    Lipid Panel  02/13/2018

## 2018-04-16 RX ORDER — LEVOTHYROXINE SODIUM 100 UG/1
TABLET ORAL
Qty: 30 TABLET | Refills: 0 | Status: SHIPPED | OUTPATIENT
Start: 2018-04-16 | End: 2018-06-21 | Stop reason: SDUPTHER

## 2018-05-21 ENCOUNTER — CLINICAL SUPPORT (OUTPATIENT)
Dept: FAMILY MEDICINE | Facility: CLINIC | Age: 63
End: 2018-05-21
Payer: MEDICARE

## 2018-05-21 DIAGNOSIS — E78.5 HYPERLIPIDEMIA, UNSPECIFIED HYPERLIPIDEMIA TYPE: ICD-10-CM

## 2018-05-21 LAB
ALBUMIN SERPL BCP-MCNC: 3.9 G/DL
ALP SERPL-CCNC: 69 U/L
ALT SERPL W/O P-5'-P-CCNC: 21 U/L
ANION GAP SERPL CALC-SCNC: 8 MMOL/L
AST SERPL-CCNC: 21 U/L
BILIRUB SERPL-MCNC: 0.7 MG/DL
BUN SERPL-MCNC: 19 MG/DL
CALCIUM SERPL-MCNC: 9.5 MG/DL
CHLORIDE SERPL-SCNC: 105 MMOL/L
CHOLEST SERPL-MCNC: 194 MG/DL
CHOLEST/HDLC SERPL: 4.9 {RATIO}
CO2 SERPL-SCNC: 26 MMOL/L
CREAT SERPL-MCNC: 1.1 MG/DL
EST. GFR  (AFRICAN AMERICAN): >60 ML/MIN/1.73 M^2
EST. GFR  (NON AFRICAN AMERICAN): >60 ML/MIN/1.73 M^2
GLUCOSE SERPL-MCNC: 89 MG/DL
HDLC SERPL-MCNC: 40 MG/DL
HDLC SERPL: 20.6 %
INR PPP: 1.9 (ref 2–3)
LDLC SERPL CALC-MCNC: 129.8 MG/DL
NONHDLC SERPL-MCNC: 154 MG/DL
POTASSIUM SERPL-SCNC: 4.1 MMOL/L
PROT SERPL-MCNC: 7.1 G/DL
SODIUM SERPL-SCNC: 139 MMOL/L
TRIGL SERPL-MCNC: 121 MG/DL

## 2018-05-21 PROCEDURE — 80053 COMPREHEN METABOLIC PANEL: CPT

## 2018-05-21 PROCEDURE — 36415 COLL VENOUS BLD VENIPUNCTURE: CPT

## 2018-05-21 PROCEDURE — 80061 LIPID PANEL: CPT

## 2018-05-28 ENCOUNTER — OFFICE VISIT (OUTPATIENT)
Dept: FAMILY MEDICINE | Facility: CLINIC | Age: 63
End: 2018-05-28
Payer: MEDICARE

## 2018-05-28 VITALS
TEMPERATURE: 98 F | DIASTOLIC BLOOD PRESSURE: 96 MMHG | HEIGHT: 69 IN | OXYGEN SATURATION: 98 % | WEIGHT: 219.38 LBS | HEART RATE: 87 BPM | SYSTOLIC BLOOD PRESSURE: 130 MMHG | RESPIRATION RATE: 16 BRPM | BODY MASS INDEX: 32.49 KG/M2

## 2018-05-28 DIAGNOSIS — Z79.01 ENCOUNTER FOR MONITORING COUMADIN THERAPY: Primary | ICD-10-CM

## 2018-05-28 DIAGNOSIS — Z51.81 ENCOUNTER FOR MONITORING COUMADIN THERAPY: Primary | ICD-10-CM

## 2018-05-28 DIAGNOSIS — I48.20 CHRONIC ATRIAL FIBRILLATION: ICD-10-CM

## 2018-05-28 DIAGNOSIS — M54.2 NECK PAIN: ICD-10-CM

## 2018-05-28 DIAGNOSIS — R60.0 LEG EDEMA: ICD-10-CM

## 2018-05-28 LAB — BNP SERPL-MCNC: 174 PG/ML

## 2018-05-28 PROCEDURE — 3008F BODY MASS INDEX DOCD: CPT | Mod: CPTII,S$GLB,, | Performed by: INTERNAL MEDICINE

## 2018-05-28 PROCEDURE — 83880 ASSAY OF NATRIURETIC PEPTIDE: CPT

## 2018-05-28 PROCEDURE — 99499 UNLISTED E&M SERVICE: CPT | Mod: S$GLB,,, | Performed by: INTERNAL MEDICINE

## 2018-05-28 PROCEDURE — 99213 OFFICE O/P EST LOW 20 MIN: CPT | Mod: S$GLB,,, | Performed by: INTERNAL MEDICINE

## 2018-05-28 RX ORDER — POTASSIUM CHLORIDE 750 MG/1
10 TABLET, EXTENDED RELEASE ORAL DAILY PRN
Qty: 1 TABLET | Refills: 11 | Status: SHIPPED | OUTPATIENT
Start: 2018-05-28 | End: 2018-10-15

## 2018-05-28 RX ORDER — FUROSEMIDE 20 MG/1
20 TABLET ORAL DAILY PRN
Qty: 30 TABLET | Refills: 11 | Status: SHIPPED | OUTPATIENT
Start: 2018-05-28 | End: 2018-10-15

## 2018-05-28 RX ORDER — HYDROCODONE BITARTRATE AND ACETAMINOPHEN 10; 325 MG/1; MG/1
1 TABLET ORAL 2 TIMES DAILY
COMMUNITY

## 2018-05-28 NOTE — PROGRESS NOTES
Subjective:       Patient ID: Albin Fernandez is a 62 y.o. male.    Chief Complaint: Hyperlipidemia (labs)    HPI         Coumadin monitoring:  Lab Results   Component Value Date    INR 1.9 05/21/2018    INR 3.1 10/13/2017       ANTICOAGULATION DX: (The following diagnoses are positive if BOLD, negative otherwise.)  Atrial_fibirillation.  . DVT. Pulmonary_embolism.  Aortic_valve_replacement.  TIA/CVA.         Current Dosage: . 2  except   4   on Wed, Fri   Compliance with medication - good  Diet changes: no.     watches diet: yes.  .  Pending medical/dental procedure: no  Concomitant medication changes (including over the counter/herbs): no  Alcohol use:  no        The following symptoms are positive if BOLD, negative otherwise.    Bleeding episodes:    Gingival_bleeding., epistaxis ., ecchymoses, hematuria . , melena, blood_per_rectum  Thrombotic event:    shortness_of_breath .  pain/swelling_of_extremity . , numbness  , tingling . , headache .   Intolerance of drug:  nausea/vomiting/diarrhea . , rash . , skin necrosis .              CHIEF COMPLAINT: Hyperlipidemia. cholesterol screening: no.   HPI:     ONSET:    MODIFIERS/TREATMENTS: . Taking medications: yes. . Non-compliance with following diet: no. .     SYMPTOMS/RELATED:Possible medication side effects include:   Myalgia: no.  .     REVIEW OF SYMPTOMS: past weights:   Wt Readings from Last 1 Encounters:   05/28/18 0852 99.5 kg (219 lb 5.7 oz)                                                     Last lipids: total   Lab Results   Component Value Date    CHOL 194 05/21/2018    CHOL 167 02/13/2017    CHOL 228 (H) 11/02/2016                                                                     HDL   Lab Results   Component Value Date    HDL 40 05/21/2018    HDL 36 (L) 02/13/2017    HDL 36 (L) 11/02/2016                                                                     LDL   Lab Results   Component Value Date    LDLCALC 129.8 05/21/2018    LDLCALC 96.4 02/13/2017     "LDLCALC 158.6 11/02/2016                                                                     TRIG   Lab Results   Component Value Date    TRIG 121 05/21/2018    TRIG 173 (H) 02/13/2017    TRIG 167 (H) 11/02/2016                                                                         CHIEF COMPLAINT: Neck Pain(+).  HPI:     ONSET/TIMING: Trauma: no. . Onset    3 y     ago. . Work related: no .    Similar problems  in past: no .    DURATION:      QUALITY/COURSE:    unchanged .       LOCATION:   bilat . Radiation: no.     INTENSITY/SEVERITY: Severity is #6 (10 point scale).      SYMPTOMS/RELATED: .-Possible medication side effects include:     The following symptoms are positive if BOLD, negative otherwise.      CONTEXT/WHEN: --Activity. Coughing. Sneeze.. Working_[employer].  . Repetitive_work . Hx of CA. history of IV drug abuse.. Similar_problems.     MODIFIERS/TREATMENTS: . Taking medications.    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 c-spine_x-rays. CT. MRI. Surgery.     REVIEW OF SYMPTOMS:  . Arm_Pain_to_below _elbow . .Weight_loss.            Review of Systems    Objective:      Vitals:    05/28/18 0852   BP: (!) 130/96   Pulse: 87   Resp: 16   Temp: 97.7 °F (36.5 °C)   TempSrc: Oral   SpO2: 98%   Weight: 99.5 kg (219 lb 5.7 oz)   Height: 5' 9" (1.753 m)   PainSc:   6   PainLoc: Neck     Physical Exam   Constitutional: He appears well-developed and well-nourished.   Cardiovascular: Normal rate, regular rhythm and normal heart sounds.    Pulmonary/Chest: Effort normal and breath sounds normal.   Abdominal: Soft. There is no tenderness.   Musculoskeletal: He exhibits edema (2+ bilaterally).   Neurological: He is alert.   Psychiatric: He has a normal mood and affect. His behavior is normal. Thought content normal.   Nursing note and vitals reviewed.        Assessment:       1. Encounter for monitoring coumadin therapy    2. Leg edema    3. Chronic atrial fibrillation    4. Neck pain          Plan:     Encounter for " monitoring coumadin therapy    Leg edema  -     Brain natriuretic peptide; Future; Expected date: 05/28/2018  -     furosemide (LASIX) 20 MG tablet; Take 1 tablet (20 mg total) by mouth daily as needed.  Dispense: 30 tablet; Refill: 11  -     potassium chloride (KLOR-CON) 10 MEQ TbSR; Take 1 tablet (10 mEq total) by mouth daily as needed.  Dispense: 1 tablet; Refill: 11    Chronic atrial fibrillation    Neck pain      Follow-up in about 6 months (around 11/28/2018).

## 2018-05-28 NOTE — PATIENT INSTRUCTIONS
Coumadin 3 mg daily except 2 on Wed, Fri     Take Lasix after you're done being outside.  Only take it if you are swollen    NECK PAIN EXERCISES:  Walk for 5 mins.  The 'clean the window' in a 7 or reverse 7 pattern till hit  Hurts, then do 20 more.  Next use elastic band around a table leg to pull backward, again do it till it hurts then 20 more.   Then stretch the neck gently. Finally, apply a cold pack to neck.  Do this daily till pain is gone.     Get a back buddy Mj        What to Know When Taking Warfarin  Warfarin (brand name Coumadin) is medicine that controls how your blood clots. It is used to help prevent blood clots that may cause serious health problems. These problems include heart attack (myocardial infarction), stroke, a blockage in an artery or vein (thrombus), or a blood clot that travels to the lungs (pulmonary embolism).    Before you start taking this medicine, be sure your doctor knows if you have any of these conditions:  · Stomach ulcer now or in the past  · Vomited blood or had bloody stools (black or red color)  · Aneurysm, pericarditis, or pericardial effusion  · Blood disorder  · Recent surgery, stroke, mini-stroke, or spinal puncture  · Kidney or liver disease, uncontrolled high blood pressure, diabetes, vasculitis, heart failure, lupus or other collagen-vascular disease, or high cholesterol  · You are pregnant or breastfeeding  · You are younger than 18 years old  · Recent or planned dental procedure  Although warfarin reduces the risk for blood clots, it increases your risk of bleeding. Because of this, you must take the medicine exactly as you are told by your healthcare provider.   You will have blood tests often to check the level of warfarin in your blood. It is important to have just the right amount to balance preventing blood clots and causing excessive bleeding. If the level is too low, you are at risk for developing a blood clot. If it's too high, you are at risk for  bleeding. Make sure you keep all scheduled appointments for blood testing. If you dont, you will be at risk for bleeding problems. You also need to protect yourself from injury that may cause bleeding such as a fall or hitting your head.  Follow these tips  · Take this medicine at the same time each day. Take it with a full glass of water, with or without food.  · Make sure you know what to do if you miss a dose. If you are not sure what to do, call your healthcare provider or pharmacist. Do not take more warfarin than you were told to.  · Be sure to tell all of your providers including your dentist that you take warfarin. If you will be taking warfarin for quite a while, carry an ID card or get a Medic-Alert bracelet. This will alert medical staff in case you arent able to do so yourself. Also carry with you the name and number of the person to contact in case of an emergency.  · Keep your appointments for regular blood tests to measure the effects of warfarin. Your healthcare provider may need to change your dose based on the results of your blood test. Follow your doctors advice exactly about how to take this medicine.  · Do not stop taking the medicine without talking with your doctor.     Monitoring your PT/INR blood levels  You will need to have a blood test called a PT/INR regularly. PT stands for pro thrombin. INR stands for international normalized ratio. The PT/INR blood test is done to make sure you are getting the right dose of this medicine. The PT/INR test tells your healthcare provider how your blood is clotting. Prothrombin time, commonly referred to as pro time or PT, measures the time it takes for blood to clot. International normalized ratio or INR is a way to compare results of the PT tests done at different labs.  · Go for your blood (PT/INR) tests as often as directed. Note that diet and medicines can affect your PT/INR level.  · Your INR was between _____ and _____ .  · Ask your healthcare  provider what your goal INR is. Your goal INR is between _____ and _____.  · Your next PT/INR blood draw is due on _________________ (date) at ________________ (time) by ____________________ (name of healthcare provider or clinic).  · The name of the healthcare provider who is monitoring your anticoagulation therapy is ______________________ and the phone number is ___________________.  · Follow up with your healthcare provider or as advised by his or her staff. It usually takes a few hours for your doctor to get the results of your clotting tests. Call to get your lab results and find out if your doctor needs to make further changes to your warfarin dose.  · If your labs (PT/INR) are drawn at a location other than your doctor's office, remember to tell your doctor as soon as you get your lab results.      What to do at home  1. Adjust your warfarin dose as directed by your healthcare provider, ____________________ (name of healthcare provider).  2. Have your blood clotting test done every _______ days at _____________________ (name of clinic) by ____________________ (name of healthcare provider).  3. Prevent injuries and bleeding:  ¨ Do not go barefoot. Always wear shoes.  ¨ Do not trim corns or calluses yourself.  ¨ Use an electric razor to shave instead of a manual one.  ¨ Use a soft-bristled toothbrush and waxed dental floss.  4. Some medicines can affect your blood clotting. Always talk with your healthcare provider before stopping, starting, or changing any prescription or over-the-counter (OTC) medicines. This includes herbal medicines and vitamins. Talk with your healthcare provider about the following medicines:  ¨ Some antibiotics. This is critical because some common antibiotics can cause severe bleeding if you take them along with warfarin. These antibiotics include ciprofloxacin and trimethoprim-sulfamethoxazole.   ¨ Some heart medicines  ¨ Cimetidine  ¨ Aspirin or other anti-inflammatory drugs, such as  ibuprofen, naproxen, ketoprofen, or other arthritis medicines  ¨ Some drugs for depression, cancer, HIV (protease inhibitors), diabetes, seizures, gout, high cholesterol, or thyroid replacement  ¨ Vitamins containing Vitamin K  ¨ Herbal products such as ginkgo, Q10, garlic, or Janet's wort  5. Avoid major changes in your diet. Consuming high amounts of foods containing vitamin K can reduce the effectiveness of your warfarin.      Keep your diet steady  Keep your diet pretty much the same each day. Thats because many foods contain vitamin K. Vitamin K helps your blood clot. So eating disproportionately high amounts of foods that contain vitamin K can affect the way warfarin works. You dont need to avoid foods that have vitamin K. But you do need to keep the amount of them you eat steady (about the same day to day). If you change your diet for any reason, such as for illness or to lose weight, be sure to tell your doctor.  · Examples of foods containing vitamin K are asparagus, avocado, broccoli, cabbage, kale, spinach, and some other leafy green vegetables. Oils such as soybean, canola, and olive oils also contain vitamin K.  · Other food products can affect the way warfarin works in your body:  ¨ Food products that may affect blood clotting include cranberries and cranberry juice, fish oil supplements, garlic, momo, licorice, and turmeric.  ¨ Herbs used in herbal teas or supplements can also affect blood clotting. Keep the amount of herbal teas and supplements you use steady.  ¨ Alcohol can increase the effect of warfarin in your body.  Talk with your healthcare provider if you have concerns about these or other food products and their effects on warfarin.  When to call your healthcare provider  Warfarin increases your risk of bleeding. Call your healthcare provider right away before you take your next dose of warfarin if you have any of these problems:  · Bleeding that doesnt stop in 10 minutes. This might  be from a bloody nose or a cut, for example.  · A heavier-than-normal period or bleeding between periods  · Coughing or throwing up blood or something that looks like coffee grounds  · Nausea, bloating, or diarrhea  · Bleeding hemorrhoids  · Dark red or brown urine  · Red or black tarry stools  · Red or black-and-blue marks on the skin that get larger  · A fever or an illness that gets worse  · Dizziness, headache, weakness, or fatigue  · Chest pain or trouble breathing  · A serious fall or a blow to the head  · Swelling or pain after an injury or at an injection site  · Bleeding gums after brushing your teeth  What to watch for  If you have any of the following allergic reactions, call your doctor right away or go to the hospital.  · Rash  · Itching  · Swelling  · Trouble swallowing or breathing  [NOTE: This information topic may not include all directions, precautions, medical conditions, drug/food interactions, and warnings for this drug. Check with your doctor, nurse, or pharmacist for any questions that you may have.]  Date Last Reviewed: 6/1/2016 © 2000-2017 [employer]. [address] [place] [address]. All rights reserved. This information is not intended as a substitute for professional medical care. Always follow your healthcare professional's instructions.

## 2018-05-29 ENCOUNTER — TELEPHONE (OUTPATIENT)
Dept: FAMILY MEDICINE | Facility: CLINIC | Age: 63
End: 2018-05-29

## 2018-05-29 NOTE — TELEPHONE ENCOUNTER
----- Message from Shante Cook sent at 5/29/2018  9:41 AM CDT -----  Contact: wife- Bonny- [phone]  Type: Needs Medical Advice    Who Called:  Wife-Bonny  Symptoms (please be specific):  NA   How long has patient had these symptoms:  NA   Pharmacy name and phone #:  NA   Best Call Back Number: Bonny [phone]  Additional Information: Patient's wife called stating the patient did not hear exactly what it was the doctor was explaining to the patient regarding heart disease.  The wife asking to discuss with the nurse.

## 2018-06-01 ENCOUNTER — TELEPHONE (OUTPATIENT)
Dept: FAMILY MEDICINE | Facility: CLINIC | Age: 63
End: 2018-06-01
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
## 2018-06-01 NOTE — TELEPHONE ENCOUNTER
----- Message from Kassandra Fofana sent at 6/1/2018 10:09 AM CDT -----  Contact: patient   Patient returning a missed call. Please advise. Call to pod. No answer. He also states that he gives his wife permission to receive information on his behalf.   Call back   Thanks!

## 2018-06-05 ENCOUNTER — TELEPHONE (OUTPATIENT)
Dept: FAMILY MEDICINE | Facility: CLINIC | Age: 63
End: 2018-06-05

## 2018-06-05 NOTE — TELEPHONE ENCOUNTER
----- Message from Srinath Castro sent at 6/5/2018 10:38 AM CDT -----  Contact: WIFE  PT IS HAVING ISSUE WITH HIS HEARING AIDS AND IS STILL WAITING FOR A RESPONSE TO THE QUESTION CAN HE TAKE LASAIK EVEN THOUGH HE HAS JUST ONE KIDNEY.    PLEASE CALL [phone] TO ADVISE.

## 2018-06-21 DIAGNOSIS — E03.9 ACQUIRED HYPOTHYROIDISM: Primary | ICD-10-CM

## 2018-06-21 RX ORDER — LEVOTHYROXINE SODIUM 100 UG/1
TABLET ORAL
Qty: 30 TABLET | Refills: 0 | Status: SHIPPED | OUTPATIENT
Start: 2018-06-21 | End: 2018-07-26 | Stop reason: DRUGHIGH

## 2018-07-02 DIAGNOSIS — E78.5 HYPERLIPIDEMIA, UNSPECIFIED HYPERLIPIDEMIA TYPE: ICD-10-CM

## 2018-07-02 RX ORDER — SIMVASTATIN 40 MG/1
TABLET, FILM COATED ORAL
Qty: 90 TABLET | Refills: 0 | Status: SHIPPED | OUTPATIENT
Start: 2018-07-02 | End: 2018-10-15 | Stop reason: SDUPTHER

## 2018-07-24 RX ORDER — LEVOTHYROXINE SODIUM 100 UG/1
TABLET ORAL
Qty: 30 TABLET | Refills: 0 | OUTPATIENT
Start: 2018-07-24

## 2018-07-25 RX ORDER — LEVOTHYROXINE SODIUM 100 UG/1
TABLET ORAL
Qty: 30 TABLET | Refills: 0 | OUTPATIENT
Start: 2018-07-25

## 2018-07-26 ENCOUNTER — CLINICAL SUPPORT (OUTPATIENT)
Dept: FAMILY MEDICINE | Facility: CLINIC | Age: 63
End: 2018-07-26
Payer: MEDICARE

## 2018-07-26 DIAGNOSIS — E03.9 ACQUIRED HYPOTHYROIDISM: ICD-10-CM

## 2018-07-26 DIAGNOSIS — E03.9 HYPOTHYROIDISM, UNSPECIFIED TYPE: ICD-10-CM

## 2018-07-26 DIAGNOSIS — E03.9 ACQUIRED HYPOTHYROIDISM: Primary | ICD-10-CM

## 2018-07-26 LAB
ALBUMIN SERPL BCP-MCNC: 3.7 G/DL
ALP SERPL-CCNC: 68 U/L
ALT SERPL W/O P-5'-P-CCNC: 15 U/L
ANION GAP SERPL CALC-SCNC: 10 MMOL/L
AST SERPL-CCNC: 21 U/L
BILIRUB SERPL-MCNC: 0.4 MG/DL
BUN SERPL-MCNC: 16 MG/DL
CALCIUM SERPL-MCNC: 9.1 MG/DL
CHLORIDE SERPL-SCNC: 106 MMOL/L
CHOLEST SERPL-MCNC: 181 MG/DL
CHOLEST/HDLC SERPL: 4.8 {RATIO}
CO2 SERPL-SCNC: 25 MMOL/L
CREAT SERPL-MCNC: 1 MG/DL
EST. GFR  (AFRICAN AMERICAN): >60 ML/MIN/1.73 M^2
EST. GFR  (NON AFRICAN AMERICAN): >60 ML/MIN/1.73 M^2
GLUCOSE SERPL-MCNC: 83 MG/DL
HDLC SERPL-MCNC: 38 MG/DL
HDLC SERPL: 21 %
LDLC SERPL CALC-MCNC: 102.8 MG/DL
NONHDLC SERPL-MCNC: 143 MG/DL
POTASSIUM SERPL-SCNC: 3.9 MMOL/L
PROT SERPL-MCNC: 6.6 G/DL
SODIUM SERPL-SCNC: 141 MMOL/L
T4 FREE SERPL-MCNC: 1.02 NG/DL
TRIGL SERPL-MCNC: 201 MG/DL
TSH SERPL DL<=0.005 MIU/L-ACNC: 5.98 UIU/ML

## 2018-07-26 PROCEDURE — 84443 ASSAY THYROID STIM HORMONE: CPT

## 2018-07-26 PROCEDURE — 84439 ASSAY OF FREE THYROXINE: CPT

## 2018-07-26 PROCEDURE — 80053 COMPREHEN METABOLIC PANEL: CPT

## 2018-07-26 PROCEDURE — 80061 LIPID PANEL: CPT

## 2018-07-26 RX ORDER — LEVOTHYROXINE SODIUM 112 UG/1
112 TABLET ORAL DAILY
Qty: 30 TABLET | Refills: 1 | Status: SHIPPED | OUTPATIENT
Start: 2018-07-26 | End: 2018-10-15 | Stop reason: SDUPTHER

## 2018-07-31 ENCOUNTER — OFFICE VISIT (OUTPATIENT)
Dept: FAMILY MEDICINE | Facility: CLINIC | Age: 63
End: 2018-07-31
Payer: MEDICARE

## 2018-07-31 VITALS
SYSTOLIC BLOOD PRESSURE: 148 MMHG | HEART RATE: 86 BPM | TEMPERATURE: 97 F | DIASTOLIC BLOOD PRESSURE: 88 MMHG | WEIGHT: 212.75 LBS | HEIGHT: 69 IN | OXYGEN SATURATION: 99 % | RESPIRATION RATE: 16 BRPM | BODY MASS INDEX: 31.51 KG/M2

## 2018-07-31 DIAGNOSIS — T15.91XA FOREIGN BODY OF RIGHT EYE, INITIAL ENCOUNTER: ICD-10-CM

## 2018-07-31 DIAGNOSIS — S05.01XA ABRASION OF RIGHT CORNEA, INITIAL ENCOUNTER: Primary | ICD-10-CM

## 2018-07-31 PROCEDURE — 99213 OFFICE O/P EST LOW 20 MIN: CPT | Mod: S$GLB,,, | Performed by: INTERNAL MEDICINE

## 2018-07-31 PROCEDURE — 3008F BODY MASS INDEX DOCD: CPT | Mod: CPTII,S$GLB,, | Performed by: INTERNAL MEDICINE

## 2018-07-31 NOTE — PROGRESS NOTES
"Subjective:       Patient ID: Albin Fernandez is a 62 y.o. male.    Chief Complaint: something in eye (right)    HPI           CHIEF COMPLAINT:   Eye problem:  Eye pain.      HPI: was mowing and something got in the eye    ONSET/TIMING: . Trauma: ? . Sudden: no . .     1 d  ago.    Similar problems in past: no .    DURATION:  intermittant      QUALITY/COURSE:  . worse    LOCATION: right .   Radiation: no .     INTENSITY/SEVERITY:  .# 5 / 10 (on 1 to 10 scale)      MODIFIERS/TREATMENTS:  .. Known_Irritants: no  .  Taking medications: no Compliant with taking prescribed medications yes. Medication not effective now: no .     SYMPTOMS/RELATED:  .. Possible medication side effects include .     The following symptoms are positive if BOLD, negative otherwise.        CONTEXT/WHEN:  .Similar_problems . Past_treatments . Clinic_visits . Tobacco_use . Smokers_in_home . Seasonal_pattern . Allergies/Hayfever . Irritant_Exposure(smoke/dust/fumes) . Exposure_to_others_with_similar_symptoms .     REVIEW OF SYMPTOMS:  . Eye_Irritation . Clear_Eye_Discharge .Sharp_pain . Dull_pain . Burning_pain .  Discolored_Eye_Discharge . . Diplopia . Vision_loss .    Review of Systems      Objective:      Vitals:    07/31/18 1040   BP: (!) 148/88   Pulse: 86   Resp: 16   Temp: 97.3 °F (36.3 °C)   TempSrc: Oral   SpO2: 99%   Weight: 96.5 kg (212 lb 11.9 oz)   Height: 5' 9" (1.753 m)   PainSc:   8   PainLoc: Eye     Physical Exam   Eyes:        Visual acuity 20/50 on the left in 20/25 on the right    eyelid was everted and a small foreign body was removed but without relief of pain.  Assessment:       1. Abrasion of right cornea, initial encounter    2. Foreign body of right eye, initial encounter          Plan:       Abrasion of right cornea, initial encounter  -     Cancel: Ambulatory referral to Optometry  -     Ambulatory consult to Optometry    Foreign body of right eye, initial encounter    Other orders  -     tobramycin-dexamethasone " 0.3-0.1% (TOBRADEX) 0.3-0.1 % Oint; Place into the right eye 4 (four) times daily.  Dispense: 3.5 g; Refill: 0      Follow-up for if you are not better return in one week.

## 2018-08-01 ENCOUNTER — OFFICE VISIT (OUTPATIENT)
Dept: FAMILY MEDICINE | Facility: CLINIC | Age: 63
End: 2018-08-01
Payer: MEDICARE

## 2018-08-01 ENCOUNTER — DOCUMENTATION ONLY (OUTPATIENT)
Dept: FAMILY MEDICINE | Facility: CLINIC | Age: 63
End: 2018-08-01

## 2018-08-01 VITALS
BODY MASS INDEX: 32.26 KG/M2 | WEIGHT: 217.81 LBS | HEIGHT: 69 IN | RESPIRATION RATE: 16 BRPM | SYSTOLIC BLOOD PRESSURE: 104 MMHG | OXYGEN SATURATION: 97 % | TEMPERATURE: 98 F | HEART RATE: 84 BPM | DIASTOLIC BLOOD PRESSURE: 74 MMHG

## 2018-08-01 DIAGNOSIS — Z79.01 ENCOUNTER FOR MONITORING COUMADIN THERAPY: Primary | ICD-10-CM

## 2018-08-01 DIAGNOSIS — Z51.81 ENCOUNTER FOR MONITORING COUMADIN THERAPY: Primary | ICD-10-CM

## 2018-08-01 DIAGNOSIS — E78.5 HYPERLIPIDEMIA, UNSPECIFIED HYPERLIPIDEMIA TYPE: ICD-10-CM

## 2018-08-01 DIAGNOSIS — I48.91 ATRIAL FIBRILLATION, UNSPECIFIED TYPE: ICD-10-CM

## 2018-08-01 DIAGNOSIS — S05.01XD ABRASION OF RIGHT CORNEA, SUBSEQUENT ENCOUNTER: ICD-10-CM

## 2018-08-01 PROCEDURE — 99214 OFFICE O/P EST MOD 30 MIN: CPT | Mod: S$GLB,,, | Performed by: INTERNAL MEDICINE

## 2018-08-01 PROCEDURE — 3008F BODY MASS INDEX DOCD: CPT | Mod: CPTII,S$GLB,, | Performed by: INTERNAL MEDICINE

## 2018-08-01 PROCEDURE — 99499 UNLISTED E&M SERVICE: CPT | Mod: S$GLB,,, | Performed by: INTERNAL MEDICINE

## 2018-08-01 NOTE — PROGRESS NOTES
Subjective:       Patient ID: Albin Fernandez is a 62 y.o. male.    Chief Complaint: coumadin monitoring and Hyperlipidemia (labs)    HPI     R eye is much better, had corneal abrasion. 6/10 pain      CHIEF COMPLAINT: Hyperlipidemia. cholesterol screening: no.   HPI:     ONSET:    MODIFIERS/TREATMENTS: . Taking medications: yes. . Non-compliance with following diet: no. .     SYMPTOMS/RELATED:Possible medication side effects include:   Myalgia: no.  .     REVIEW OF SYMPTOMS: past weights:   Wt Readings from Last 1 Encounters:   08/01/18 1001 98.8 kg (217 lb 13 oz)                                                     Last lipids: total   Lab Results   Component Value Date    CHOL 181 07/26/2018    CHOL 194 05/21/2018    CHOL 167 02/13/2017                                                                     HDL   Lab Results   Component Value Date    HDL 38 (L) 07/26/2018    HDL 40 05/21/2018    HDL 36 (L) 02/13/2017                                                                     LDL   Lab Results   Component Value Date    LDLCALC 102.8 07/26/2018    LDLCALC 129.8 05/21/2018    LDLCALC 96.4 02/13/2017                TRIG   Lab Results   Component Value Date    TRIG 201 (H) 07/26/2018    TRIG 121 05/21/2018    TRIG 173 (H) 02/13/2017                   Coumadin monitoring:  Lab Results   Component Value Date    INR 1.9 05/21/2018    INR 3.1 10/13/2017       ANTICOAGULATION DX: (The following diagnoses are positive if BOLD, negative otherwise.)  Atrial_fibirillation.  . DVT. Pulmonary_embolism.  Aortic_valve_replacement.  TIA/CVA.         Current Dosage:  4 except  2    on W, F.  Compliance with medication - good  Diet changes: no.     watches diet: yes.  .  Pending medical/dental procedure: no  Concomitant medication changes (including over the counter/herbs): no  Alcohol use:  no        The following symptoms are positive if BOLD, negative otherwise.    Bleeding episodes:    Gingival_bleeding., epistaxis .,  "ecchymoses, hematuria . , melena, blood_per_rectum  Thrombotic event:    shortness_of_breath .  pain/swelling_of_extremity . , numbness  , tingling . , headache .   Intolerance of drug:  nausea/vomiting/diarrhea . , rash . , skin necrosis .                                                               Review of Systems   Eyes: Positive for pain. Negative for visual disturbance.   Respiratory: Negative for chest tightness and shortness of breath.    Cardiovascular: Negative for chest pain and leg swelling.   Neurological: Negative for dizziness, syncope, weakness and headaches.   Psychiatric/Behavioral: Negative for dysphoric mood. The patient is not nervous/anxious.          Objective:      Vitals:    08/01/18 1001   BP: 104/74   Pulse: 84   Resp: 16   Temp: 98.1 °F (36.7 °C)   TempSrc: Oral   SpO2: 97%   Weight: 98.8 kg (217 lb 13 oz)   Height: 5' 9" (1.753 m)   PainSc: 0-No pain     Physical Exam   Constitutional: He appears well-developed and well-nourished.   Eyes:   Still has conjunctivitis on the right eye.  Fluorescein exam not repeated.   Cardiovascular: Normal rate, regular rhythm and normal heart sounds.    Pulmonary/Chest: Effort normal and breath sounds normal.   Abdominal: Soft. There is no tenderness.   Neurological: He is alert.   Psychiatric: He has a normal mood and affect. His behavior is normal. Thought content normal.   Nursing note and vitals reviewed.  inr 5.2      Assessment:       1. Encounter for monitoring coumadin therapy    2. Atrial fibrillation, unspecified type    3. Hyperlipidemia, unspecified hyperlipidemia type    4. Abrasion of right cornea, subsequent encounter          Plan:       Encounter for monitoring coumadin therapy  -     POCT INR    Atrial fibrillation, unspecified type  -     POCT INR    Hyperlipidemia, unspecified hyperlipidemia type    Abrasion of right cornea, subsequent encounter      Follow-up in about 2 weeks (around 8/15/2018).      "

## 2018-08-01 NOTE — PATIENT INSTRUCTIONS
No Coumadin for 2 days.  Then dropped Coumadin dosage to 4 mg daily except 2 mg on Monday Wednesday Friday

## 2018-08-01 NOTE — PROGRESS NOTES
Health Maintenance Due   Topic Date Due    Zoster Vaccine  10/18/2014    Influenza Vaccine  08/01/2018

## 2018-08-01 NOTE — PROGRESS NOTES
"Subjective:       Patient ID: Albin Fernandez is a 62 y.o. male.    Chief Complaint: coumadin monitoring and Hyperlipidemia (labs)    HPI  Review of Systems      Objective:      Vitals:    08/01/18 1001   BP: 104/74   Pulse: 84   Resp: 16   Temp: 98.1 °F (36.7 °C)   TempSrc: Oral   SpO2: 97%   Weight: 98.8 kg (217 lb 13 oz)   Height: 5' 9" (1.753 m)   PainSc: 0-No pain     Physical Exam      Assessment:       No diagnosis found.      Plan:       There are no diagnoses linked to this encounter.  No Follow-up on file.      "

## 2018-08-14 ENCOUNTER — PATIENT OUTREACH (OUTPATIENT)
Dept: ADMINISTRATIVE | Facility: HOSPITAL | Age: 63
End: 2018-08-14

## 2018-08-15 ENCOUNTER — DOCUMENTATION ONLY (OUTPATIENT)
Dept: FAMILY MEDICINE | Facility: CLINIC | Age: 63
End: 2018-08-15

## 2018-08-27 ENCOUNTER — TELEPHONE (OUTPATIENT)
Dept: FAMILY MEDICINE | Facility: CLINIC | Age: 63
End: 2018-08-27

## 2018-08-27 NOTE — TELEPHONE ENCOUNTER
----- Message from Kirstin Funk sent at 8/27/2018  8:36 AM CDT -----  Type: Needs Medical Advice    Who Called:  Patient  Best Call Back Number: [phone]  Additional Information: Patient wants to get his Coumadin rechecked since it was high last time he came. Please call to advise.

## 2018-08-28 ENCOUNTER — DOCUMENTATION ONLY (OUTPATIENT)
Dept: FAMILY MEDICINE | Facility: CLINIC | Age: 63
End: 2018-08-28

## 2018-08-28 ENCOUNTER — OFFICE VISIT (OUTPATIENT)
Dept: FAMILY MEDICINE | Facility: CLINIC | Age: 63
End: 2018-08-28
Payer: MEDICARE

## 2018-08-28 VITALS
DIASTOLIC BLOOD PRESSURE: 80 MMHG | HEART RATE: 86 BPM | WEIGHT: 220.69 LBS | OXYGEN SATURATION: 100 % | SYSTOLIC BLOOD PRESSURE: 126 MMHG | TEMPERATURE: 98 F | HEIGHT: 69 IN | BODY MASS INDEX: 32.69 KG/M2 | RESPIRATION RATE: 16 BRPM

## 2018-08-28 DIAGNOSIS — I48.20 CHRONIC ATRIAL FIBRILLATION: ICD-10-CM

## 2018-08-28 DIAGNOSIS — Z51.81 ENCOUNTER FOR MONITORING COUMADIN THERAPY: Primary | ICD-10-CM

## 2018-08-28 DIAGNOSIS — Z79.01 ENCOUNTER FOR MONITORING COUMADIN THERAPY: Primary | ICD-10-CM

## 2018-08-28 PROCEDURE — 99499 UNLISTED E&M SERVICE: CPT | Mod: S$GLB,,, | Performed by: INTERNAL MEDICINE

## 2018-08-28 PROCEDURE — 3008F BODY MASS INDEX DOCD: CPT | Mod: CPTII,S$GLB,, | Performed by: INTERNAL MEDICINE

## 2018-08-28 PROCEDURE — 99213 OFFICE O/P EST LOW 20 MIN: CPT | Mod: S$GLB,,, | Performed by: INTERNAL MEDICINE

## 2018-08-28 NOTE — PROGRESS NOTES
"Subjective:       Patient ID: Albin Fernandez is a 62 y.o. male.    Chief Complaint: coumadin monitoring    HPI         Coumadin monitoring:  Lab Results   Component Value Date    INR 1.9 05/21/2018    INR 3.1 10/13/2017       ANTICOAGULATION DX: (The following diagnoses are positive if BOLD, negative otherwise.)  Atrial_fibirillation.  . DVT. Pulmonary_embolism.  Aortic_valve_replacement.  TIA/CVA.         Current Dosage:   4 except  2   on WF.  Compliance with medication - good  Diet changes: no.     watches diet: yes.  .  Pending medical/dental procedure: no  Concomitant medication changes (including over the counter/herbs): no  Alcohol use:  no        The following symptoms are positive if BOLD, negative otherwise.    Bleeding episodes:    Gingival_bleeding., epistaxis ., ecchymoses, hematuria . , melena, blood_per_rectum  Thrombotic event:    shortness_of_breath .  pain/swelling_of_extremity . , numbness  , tingling . , headache .   Intolerance of drug:  nausea/vomiting/diarrhea . , rash . , skin necrosis .     Review of Systems      Objective:      Vitals:    08/28/18 1304   BP: 126/80   Pulse: 86   Resp: 16   Temp: 97.6 °F (36.4 °C)   TempSrc: Oral   SpO2: 100%   Weight: 100.1 kg (220 lb 10.9 oz)   Height: 5' 9" (1.753 m)   PainSc:   7   PainLoc: Neck     Physical Exam   Constitutional: He appears well-developed and well-nourished.   Cardiovascular: Normal rate and normal heart sounds.   Irregularly irregular rhythm   Pulmonary/Chest: Effort normal and breath sounds normal.   Abdominal: Soft. There is no tenderness.   Neurological: He is alert.   Psychiatric: He has a normal mood and affect. His behavior is normal. Thought content normal.   Nursing note and vitals reviewed.      Inr 3.8  Assessment:       1. Encounter for monitoring coumadin therapy    2. Chronic atrial fibrillation          Plan:     Coumadin 4 mg but 2 on MWF  Encounter for monitoring coumadin therapy    Chronic atrial " fibrillation      Follow-up in about 2 weeks (around 9/11/2018).

## 2018-09-11 ENCOUNTER — TELEPHONE (OUTPATIENT)
Dept: FAMILY MEDICINE | Facility: CLINIC | Age: 63
End: 2018-09-11

## 2018-09-11 NOTE — TELEPHONE ENCOUNTER
----- Message from Giabrain Grovesjames sent at 9/11/2018  2:23 PM CDT -----  Contact: Self  Type:  Same Day Appointment Request    Caller is requesting a same day appointment.  Caller declined first available appointment listed below.      Name of Caller:  patient  When is the first available appointment?  9/21  Symptoms:  Severe head cold, coughing for much can't sleep  Best Call Back Number:  [phone] (home)  Additional Information:  Needs something to stop coughing and being able to breath.  Feels like heart races when coughing so much.

## 2018-09-12 ENCOUNTER — DOCUMENTATION ONLY (OUTPATIENT)
Dept: FAMILY MEDICINE | Facility: CLINIC | Age: 63
End: 2018-09-12

## 2018-09-12 ENCOUNTER — OFFICE VISIT (OUTPATIENT)
Dept: FAMILY MEDICINE | Facility: CLINIC | Age: 63
End: 2018-09-12
Payer: MEDICARE

## 2018-09-12 VITALS
HEIGHT: 69 IN | HEART RATE: 100 BPM | SYSTOLIC BLOOD PRESSURE: 132 MMHG | TEMPERATURE: 98 F | WEIGHT: 220.88 LBS | DIASTOLIC BLOOD PRESSURE: 84 MMHG | OXYGEN SATURATION: 99 % | BODY MASS INDEX: 32.72 KG/M2 | RESPIRATION RATE: 16 BRPM

## 2018-09-12 DIAGNOSIS — I48.0 PAROXYSMAL ATRIAL FIBRILLATION: ICD-10-CM

## 2018-09-12 DIAGNOSIS — Z51.81 ENCOUNTER FOR MONITORING COUMADIN THERAPY: Primary | ICD-10-CM

## 2018-09-12 DIAGNOSIS — Z23 NEEDS FLU SHOT: ICD-10-CM

## 2018-09-12 DIAGNOSIS — J20.9 ACUTE BRONCHITIS, UNSPECIFIED ORGANISM: ICD-10-CM

## 2018-09-12 DIAGNOSIS — Z79.01 ENCOUNTER FOR MONITORING COUMADIN THERAPY: Primary | ICD-10-CM

## 2018-09-12 LAB — INR PPP: 2.6 (ref 2–3)

## 2018-09-12 PROCEDURE — 90686 IIV4 VACC NO PRSV 0.5 ML IM: CPT | Mod: S$GLB,,, | Performed by: INTERNAL MEDICINE

## 2018-09-12 PROCEDURE — 99214 OFFICE O/P EST MOD 30 MIN: CPT | Mod: 25,S$GLB,, | Performed by: INTERNAL MEDICINE

## 2018-09-12 PROCEDURE — G0008 ADMIN INFLUENZA VIRUS VAC: HCPCS | Mod: S$GLB,,, | Performed by: INTERNAL MEDICINE

## 2018-09-12 PROCEDURE — 85610 PROTHROMBIN TIME: CPT | Mod: QW,,, | Performed by: INTERNAL MEDICINE

## 2018-09-12 PROCEDURE — 99499 UNLISTED E&M SERVICE: CPT | Mod: S$GLB,,, | Performed by: INTERNAL MEDICINE

## 2018-09-12 PROCEDURE — 3008F BODY MASS INDEX DOCD: CPT | Mod: CPTII,S$GLB,, | Performed by: INTERNAL MEDICINE

## 2018-09-12 RX ORDER — PREDNISONE 20 MG/1
40 TABLET ORAL DAILY
Qty: 10 TABLET | Refills: 0 | Status: SHIPPED | OUTPATIENT
Start: 2018-09-12 | End: 2018-09-17

## 2018-09-12 RX ORDER — BENZONATATE 200 MG/1
200 CAPSULE ORAL 3 TIMES DAILY PRN
Qty: 30 CAPSULE | Refills: 1 | Status: SHIPPED | OUTPATIENT
Start: 2018-09-12 | End: 2018-09-22

## 2018-09-12 NOTE — PROGRESS NOTES
Subjective:       Patient ID: Albin Fernandez is a 62 y.o. male.    Chief Complaint: Cough; Sore Throat; and Sinus Problem    HPI         Coumadin monitoring:  Lab Results   Component Value Date    INR 1.9 2018    INR 3.1 10/13/2017       ANTICOAGULATION DX: (The following diagnoses are positive if BOLD, negative otherwise.)  Atrial_fibirillation.  . DVT. Pulmonary_embolism.  Aortic_valve_replacement.  TIA/CVA.         Current Dosage:   4 mg alternating with 2 mg  Compliance with medication - good  Diet changes: no.     watches diet: yes.  .  Pending medical/dental procedure: no  Concomitant medication changes (including over the counter/herbs): no  Alcohol use:  no        The following symptoms are positive if BOLD, negative otherwise.    Bleeding episodes:    Gingival_bleeding., epistaxis ., ecchymoses, hematuria . , melena, blood_per_rectum  Thrombotic event:    shortness_of_breath .  pain/swelling_of_extremity . , numbness  , tingling . , headache .   Intolerance of drug:  nausea/vomiting/diarrhea . , rash . , skin necrosis .         CHIEF COMPLAINT: Cough(+).  HPI:     ONSET/TIMIN days ago    DURATION:               Paroxysmal: no .    QUALITY/COURSE:.  Worse    INTENSITY/SEVERITY: Severity is #  8 (10 point scale)      The following symptoms/statements are positive if BOLD, negative otherwise.      CONTEXT/WHEN:  Tobacco_use. Smokers_in_home. Seasonal_pattern. Allergies/Hayfever. Sinusitis. Irritant_Exposure(smoke/dust/fumes). Exposure_to_others_with_similar_symptoms.        Similar_problems_in_past.   PAST TREATMENT OR EVALUATION:   previous PPD. Recent_previous_chest_x-ray. Recent_antibiotics.  Associated Symptoms:     sputum production: scant. copious. Hemoptysis.  Medical History: Past_pulmonary_infections.  Cardiovascular_disease.chronic_lung_disease.  tuberculosis. Asthma. AIDS. Gastroesophageal_reflux_disease .      Review of Systems   Constitutional: Positive for chills and fever. Negative  "for diaphoresis and unexpected weight change.   HENT: Positive for rhinorrhea, sinus pressure, sinus pain and sore throat.    Respiratory: Positive for cough. Negative for shortness of breath and wheezing.    Cardiovascular: Negative for chest pain.   Musculoskeletal: Negative for myalgias.         Objective:      Vitals:    09/12/18 1503   BP: 132/84   Pulse: 100   Resp: 16   Temp: 98 °F (36.7 °C)   TempSrc: Oral   SpO2: 99%   Weight: 100.2 kg (220 lb 14.4 oz)   Height: 5' 9" (1.753 m)   PainSc:   7   PainLoc: Back     Physical Exam   Constitutional: He appears well-developed and well-nourished.   HENT:   Right Ear: External ear normal.   Left Ear: External ear normal.   Mouth/Throat: Oropharynx is clear and moist. No oropharyngeal exudate.   Bilateral maxillary sinus tenderness   Eyes: Pupils are equal, round, and reactive to light.   Cardiovascular: Normal rate, regular rhythm and normal heart sounds. Exam reveals no gallop.   No murmur heard.  Pulmonary/Chest: Effort normal and breath sounds normal. He has no wheezes. He has no rales. He exhibits no tenderness.   Abdominal: Soft. There is no tenderness.   Musculoskeletal: He exhibits no edema.   Lymphadenopathy:     He has no cervical adenopathy.   Vitals reviewed.   The flu swab negative, INR 2.6      Assessment:       1. Encounter for monitoring coumadin therapy    2. Paroxysmal atrial fibrillation    3. Acute bronchitis, unspecified organism    4. Needs flu shot          Plan:       Encounter for monitoring coumadin therapy  -     POCT INR    Paroxysmal atrial fibrillation  -     POCT INR    Acute bronchitis, unspecified organism  -     POCT Influenza A/B  -     predniSONE (DELTASONE) 20 MG tablet; Take 2 tablets (40 mg total) by mouth once daily. for 5 days  Dispense: 10 tablet; Refill: 0  -     benzonatate (TESSALON) 200 MG capsule; Take 1 capsule (200 mg total) by mouth 3 (three) times daily as needed for Cough.  Dispense: 30 capsule; Refill: 1    Needs " flu shot  -     Influenza - Quadrivalent (3 years & older) (PF)      Follow-up in about 1 month (around 10/12/2018).

## 2018-09-12 NOTE — PATIENT INSTRUCTIONS
Continue Coumadin 4 mg alternating with 2 mg.    Cool mist vaporizor.  Hot fluids. Hot tea with lemon and honey.    For best results take both the Tessalon Perles and Robitussin-DM

## 2018-09-28 DIAGNOSIS — E03.9 ACQUIRED HYPOTHYROIDISM: ICD-10-CM

## 2018-09-28 RX ORDER — LEVOTHYROXINE SODIUM 112 UG/1
TABLET ORAL
Qty: 30 TABLET | Refills: 1 | OUTPATIENT
Start: 2018-09-28

## 2018-09-28 NOTE — TELEPHONE ENCOUNTER
Patient needs to have TSH rechecked before we refill meds, TSH was too high (he never followed up with the labs). If he has appt. For lab, will give enough to get to lab appt.

## 2018-10-01 NOTE — TELEPHONE ENCOUNTER
Spoke with patient, I let him know that he needed to have his labs done. He states he was not aware of his labs or his follow up appt. I made him aware of both. He states he will go to [facility] one day and get his labs done.

## 2018-10-08 ENCOUNTER — TELEPHONE (OUTPATIENT)
Dept: FAMILY MEDICINE | Facility: CLINIC | Age: 63
End: 2018-10-08

## 2018-10-08 NOTE — TELEPHONE ENCOUNTER
----- Message from RT Marielos sent at 10/8/2018  2:17 PM CDT -----  Contact: Bonny,wife,[phone]   Bonny,wife,[phone], requesting a call back soon concerning the proper paper work for the pt's labs, thanks.

## 2018-10-09 ENCOUNTER — LAB VISIT (OUTPATIENT)
Dept: LAB | Facility: HOSPITAL | Age: 63
End: 2018-10-09
Attending: NURSE PRACTITIONER
Payer: MEDICARE

## 2018-10-09 DIAGNOSIS — E03.9 ACQUIRED HYPOTHYROIDISM: ICD-10-CM

## 2018-10-09 LAB
T4 FREE SERPL-MCNC: 0.88 NG/DL
TSH SERPL DL<=0.005 MIU/L-ACNC: 6.02 UIU/ML

## 2018-10-09 PROCEDURE — 84443 ASSAY THYROID STIM HORMONE: CPT

## 2018-10-09 PROCEDURE — 36415 COLL VENOUS BLD VENIPUNCTURE: CPT | Mod: PO

## 2018-10-09 PROCEDURE — 84439 ASSAY OF FREE THYROXINE: CPT

## 2018-10-15 ENCOUNTER — OFFICE VISIT (OUTPATIENT)
Dept: FAMILY MEDICINE | Facility: CLINIC | Age: 63
End: 2018-10-15
Payer: MEDICARE

## 2018-10-15 VITALS
WEIGHT: 210.13 LBS | OXYGEN SATURATION: 98 % | BODY MASS INDEX: 31.12 KG/M2 | SYSTOLIC BLOOD PRESSURE: 124 MMHG | DIASTOLIC BLOOD PRESSURE: 70 MMHG | TEMPERATURE: 98 F | HEIGHT: 69 IN | HEART RATE: 99 BPM

## 2018-10-15 DIAGNOSIS — E78.5 HYPERLIPIDEMIA, UNSPECIFIED HYPERLIPIDEMIA TYPE: ICD-10-CM

## 2018-10-15 DIAGNOSIS — E03.9 ACQUIRED HYPOTHYROIDISM: ICD-10-CM

## 2018-10-15 DIAGNOSIS — I48.0 PAROXYSMAL ATRIAL FIBRILLATION: Primary | ICD-10-CM

## 2018-10-15 DIAGNOSIS — Z79.01 ANTICOAGULATED ON COUMADIN: ICD-10-CM

## 2018-10-15 LAB — INR PPP: 1.8 (ref 2–3)

## 2018-10-15 PROCEDURE — 99214 OFFICE O/P EST MOD 30 MIN: CPT | Mod: S$GLB,,, | Performed by: NURSE PRACTITIONER

## 2018-10-15 PROCEDURE — 85610 PROTHROMBIN TIME: CPT | Mod: QW,,, | Performed by: NURSE PRACTITIONER

## 2018-10-15 RX ORDER — LEVOTHYROXINE SODIUM 112 UG/1
112 TABLET ORAL DAILY
Qty: 30 TABLET | Refills: 1 | Status: SHIPPED | OUTPATIENT
Start: 2018-10-15 | End: 2018-11-26 | Stop reason: SINTOL

## 2018-10-15 RX ORDER — WARFARIN 1 MG/1
1 TABLET ORAL DAILY
Qty: 30 TABLET | Refills: 11 | Status: SHIPPED | OUTPATIENT
Start: 2018-10-15 | End: 2019-10-08 | Stop reason: SDUPTHER

## 2018-10-15 RX ORDER — SIMVASTATIN 40 MG/1
TABLET, FILM COATED ORAL
Qty: 30 TABLET | Refills: 11 | Status: SHIPPED | OUTPATIENT
Start: 2018-10-15 | End: 2019-11-04 | Stop reason: SDUPTHER

## 2018-10-15 NOTE — PROGRESS NOTES
Subjective:       Patient ID: Albin Fernandez is a 62 y.o. male.    Chief Complaint: Coagulation Disorder    Coumadin monitoring:  Lab Results   Component Value Date    INR 1.8 10/15/2018    INR 2.6 09/12/2018    INR 1.9 05/21/2018       ANTICOAGULATION DX:   Atrial_fibirillation  SUBJECTIVE:  Current Dosage: 2 mg. Alternating with 4 mg. Every other day.   Compliance with medication - good Yes  Diet changes: No watches diet: Yes  Pending medical/dental procedure: no  Concomitant medication changes (including over the counter/herbs): No   Alcohol use: No     Bleeding episodes:    gingival bleeding (-), epistaxis (-), ecchymoses (-), hematuria (-) , melena (-) , rectal bleeding(-)     Thrombotic event:    Shortness of breath (-) ,   Pain/swelling of extremity (-) , numbness  and/or tingling (-) , severe, new onset headache (-).   Intolerance of drug:  Nausea/vomiting (-), diarrhea (-) , rash (-) , skin necrosis (-)     Thyroid Problem   Presents for follow-up visit. Patient reports no cold intolerance, fatigue or heat intolerance. Symptom course: ran out of levothroxine, but was not taking it by itself in the morning.  His past medical history is significant for hyperlipidemia.   Hyperlipidemia   This is a chronic problem. The current episode started more than 1 year ago. The problem is controlled. Recent lipid tests were reviewed and are normal. Exacerbating diseases include hypothyroidism. Factors aggravating his hyperlipidemia include beta blockers. Pertinent negatives include no chest pain or myalgias. Current antihyperlipidemic treatment includes statins. The current treatment provides significant improvement of lipids.     Review of Systems   Constitutional: Negative for fatigue.   Cardiovascular: Negative for chest pain.   Endocrine: Negative for cold intolerance and heat intolerance.   Musculoskeletal: Negative for myalgias.       Objective:       Lab Results   Component Value Date    TSH 6.023 (H) 10/09/2018      Lab Results   Component Value Date    CHOL 181 07/26/2018    CHOL 194 05/21/2018    CHOL 167 02/13/2017     Lab Results   Component Value Date    HDL 38 (L) 07/26/2018    HDL 40 05/21/2018    HDL 36 (L) 02/13/2017     Lab Results   Component Value Date    LDLCALC 102.8 07/26/2018    LDLCALC 129.8 05/21/2018    LDLCALC 96.4 02/13/2017     Lab Results   Component Value Date    TRIG 201 (H) 07/26/2018    TRIG 121 05/21/2018    TRIG 173 (H) 02/13/2017     Lab Results   Component Value Date    CHOLHDL 21.0 07/26/2018    CHOLHDL 20.6 05/21/2018    CHOLHDL 21.6 02/13/2017       Physical Exam   Constitutional: He is oriented to person, place, and time. He appears well-developed and well-nourished. No distress.   HENT:   Head: Normocephalic and atraumatic.   Eyes: Conjunctivae are normal. Right eye exhibits no discharge. Left eye exhibits no discharge. No scleral icterus.   Cardiovascular: Normal rate, regular rhythm and normal heart sounds. Exam reveals no gallop and no friction rub.   No murmur heard.  Pulmonary/Chest: Effort normal and breath sounds normal. No respiratory distress. He has no wheezes. He has no rales.   Neurological: He is alert and oriented to person, place, and time.   Skin: Skin is warm and dry. He is not diaphoretic.   Psychiatric: He has a normal mood and affect. His behavior is normal.   Nursing note and vitals reviewed.      Assessment:       1. Paroxysmal atrial fibrillation    2. Anticoagulated on Coumadin    3. Acquired hypothyroidism    4. Hyperlipidemia, unspecified hyperlipidemia type        Plan:     Paroxysmal atrial fibrillation    Anticoagulated on Coumadin  -     POCT INR  -     warfarin (COUMADIN) 1 MG tablet; Take 1 tablet (1 mg total) by mouth Daily. Take with 2 mg. Tab daily.  Dispense: 30 tablet; Refill: 11    Acquired hypothyroidism  -     levothyroxine (SYNTHROID) 112 MCG tablet; Take 1 tablet (112 mcg total) by mouth once daily.  Dispense: 30 tablet; Refill:  1    Hyperlipidemia, unspecified hyperlipidemia type  -     simvastatin (ZOCOR) 40 MG tablet; TAKE ONE TABLET BY MOUTH ONCE DAILY IN THE EVENING  Dispense: 30 tablet; Refill: 11      Take 3 mg. (1 mg. And 2 mg. ) daily in the evening and continue your diet.    1 month with labs prior

## 2018-10-15 NOTE — PATIENT INSTRUCTIONS
Take 3 mg. (1 mg. And 2 mg. ) daily in the evening and continue your diet. Take your levothyroxine first thing in the morning with water and then wait 1/2 hour before eating, drinking or taking medication or anything else.

## 2018-10-30 ENCOUNTER — OFFICE VISIT (OUTPATIENT)
Dept: FAMILY MEDICINE | Facility: CLINIC | Age: 63
End: 2018-10-30
Payer: MEDICARE

## 2018-10-30 ENCOUNTER — DOCUMENTATION ONLY (OUTPATIENT)
Dept: FAMILY MEDICINE | Facility: CLINIC | Age: 63
End: 2018-10-30

## 2018-10-30 VITALS
SYSTOLIC BLOOD PRESSURE: 116 MMHG | WEIGHT: 211 LBS | OXYGEN SATURATION: 97 % | HEART RATE: 87 BPM | BODY MASS INDEX: 31.25 KG/M2 | DIASTOLIC BLOOD PRESSURE: 74 MMHG | RESPIRATION RATE: 16 BRPM | TEMPERATURE: 99 F | HEIGHT: 69 IN

## 2018-10-30 DIAGNOSIS — I48.91 ATRIAL FIBRILLATION WITH RAPID VENTRICULAR RESPONSE: ICD-10-CM

## 2018-10-30 DIAGNOSIS — Z79.01 ANTICOAGULATED ON COUMADIN: Primary | ICD-10-CM

## 2018-10-30 LAB — INR PPP: 2.3 (ref 2–3)

## 2018-10-30 PROCEDURE — 99499 UNLISTED E&M SERVICE: CPT | Mod: S$GLB,,, | Performed by: INTERNAL MEDICINE

## 2018-10-30 PROCEDURE — 99213 OFFICE O/P EST LOW 20 MIN: CPT | Mod: S$GLB,,, | Performed by: INTERNAL MEDICINE

## 2018-10-30 PROCEDURE — 85610 PROTHROMBIN TIME: CPT | Mod: QW,,, | Performed by: INTERNAL MEDICINE

## 2018-10-30 PROCEDURE — 3008F BODY MASS INDEX DOCD: CPT | Mod: CPTII,S$GLB,, | Performed by: INTERNAL MEDICINE

## 2018-10-30 RX ORDER — METOPROLOL SUCCINATE 50 MG/1
50 TABLET, EXTENDED RELEASE ORAL DAILY
Qty: 30 TABLET | Refills: 11 | Status: SHIPPED | OUTPATIENT
Start: 2018-10-30 | End: 2020-02-24 | Stop reason: SDUPTHER

## 2018-10-30 NOTE — PROGRESS NOTES
"Subjective:       Patient ID: Albin Fernandez is a 63 y.o. male.    Chief Complaint: coumadin monitoring    HPI         Coumadin monitoring:  Lab Results   Component Value Date    INR 2.3 10/30/2018    INR 1.8 10/15/2018    INR 2.6 09/12/2018       ANTICOAGULATION DX: (The following diagnoses are positive if BOLD, negative otherwise.)  Atrial_fibirillation.  . DVT. Pulmonary_embolism.  Aortic_valve_replacement.  TIA/CVA.         Current Dosage: 3     Compliance with medication - good  Diet changes: no.     watches diet: yes.  .  Pending medical/dental procedure: no  Concomitant medication changes (including over the counter/herbs): no  Alcohol use:  no        The following symptoms are positive if BOLD, negative otherwise.    Bleeding episodes:    Gingival_bleeding., epistaxis ., ecchymoses, hematuria . , melena, blood_per_rectum  Thrombotic event:    shortness_of_breath .  pain/swelling_of_extremity . , numbness  , tingling . , headache .   Intolerance of drug:  nausea/vomiting/diarrhea . , rash . , skin necrosis .     Review of Systems      Objective:      Vitals:    10/30/18 1110   BP: 116/74   Pulse: 87   Resp: 16   Temp: 98.7 °F (37.1 °C)   TempSrc: Oral   SpO2: 97%   Weight: 95.7 kg (210 lb 15.7 oz)   Height: 5' 9" (1.753 m)   PainSc:   7   PainLoc: Back     Physical Exam   Constitutional: He appears well-developed and well-nourished.   Cardiovascular: Normal rate, regular rhythm and normal heart sounds.   Pulmonary/Chest: Effort normal and breath sounds normal.   Abdominal: Soft. There is no tenderness.   Neurological: He is alert.   Psychiatric: He has a normal mood and affect. His behavior is normal. Thought content normal.   Nursing note and vitals reviewed.        Assessment:       1. Anticoagulated on Coumadin    2. Atrial fibrillation with rapid ventricular response          Plan:       Anticoagulated on Coumadin  -     POCT INR    Atrial fibrillation with rapid ventricular response      No Follow-up on " file.

## 2018-11-23 ENCOUNTER — DOCUMENTATION ONLY (OUTPATIENT)
Dept: FAMILY MEDICINE | Facility: CLINIC | Age: 63
End: 2018-11-23

## 2018-11-26 ENCOUNTER — OFFICE VISIT (OUTPATIENT)
Dept: FAMILY MEDICINE | Facility: CLINIC | Age: 63
End: 2018-11-26
Payer: MEDICARE

## 2018-11-26 VITALS
WEIGHT: 221.63 LBS | HEIGHT: 69 IN | DIASTOLIC BLOOD PRESSURE: 80 MMHG | BODY MASS INDEX: 32.83 KG/M2 | RESPIRATION RATE: 16 BRPM | HEART RATE: 99 BPM | OXYGEN SATURATION: 97 % | SYSTOLIC BLOOD PRESSURE: 126 MMHG

## 2018-11-26 DIAGNOSIS — E03.8 SUBCLINICAL HYPOTHYROIDISM: ICD-10-CM

## 2018-11-26 DIAGNOSIS — Z79.01 ENCOUNTER FOR MONITORING COUMADIN THERAPY: ICD-10-CM

## 2018-11-26 DIAGNOSIS — K59.03 DRUG-INDUCED CONSTIPATION: ICD-10-CM

## 2018-11-26 DIAGNOSIS — R60.0 BILATERAL LEG EDEMA: ICD-10-CM

## 2018-11-26 DIAGNOSIS — R35.1 NOCTURIA MORE THAN TWICE PER NIGHT: Primary | ICD-10-CM

## 2018-11-26 DIAGNOSIS — I48.0 PAROXYSMAL ATRIAL FIBRILLATION: ICD-10-CM

## 2018-11-26 DIAGNOSIS — Z51.81 ENCOUNTER FOR MONITORING COUMADIN THERAPY: ICD-10-CM

## 2018-11-26 PROCEDURE — 3008F BODY MASS INDEX DOCD: CPT | Mod: CPTII,S$GLB,, | Performed by: INTERNAL MEDICINE

## 2018-11-26 PROCEDURE — 99499 UNLISTED E&M SERVICE: CPT | Mod: S$GLB,,, | Performed by: INTERNAL MEDICINE

## 2018-11-26 PROCEDURE — 99214 OFFICE O/P EST MOD 30 MIN: CPT | Mod: S$GLB,,, | Performed by: INTERNAL MEDICINE

## 2018-11-26 RX ORDER — FUROSEMIDE 20 MG/1
20 TABLET ORAL DAILY PRN
Qty: 90 TABLET | Refills: 3 | Status: SHIPPED | OUTPATIENT
Start: 2018-11-26 | End: 2023-09-25

## 2018-11-26 RX ORDER — AMOXICILLIN 250 MG
2 CAPSULE ORAL 2 TIMES DAILY
Qty: 120 TABLET | Refills: 5 | COMMUNITY
Start: 2018-11-26

## 2018-11-26 RX ORDER — POTASSIUM CHLORIDE 750 MG/1
10 TABLET, EXTENDED RELEASE ORAL DAILY
Qty: 90 TABLET | Refills: 1 | Status: SHIPPED | OUTPATIENT
Start: 2018-11-26 | End: 2019-02-24

## 2018-11-26 NOTE — PROGRESS NOTES
"Subjective:       Patient ID: Albin Fernandez is a 63 y.o. male.    Chief Complaint: coumadin monitoring (discuss thyroid medication)    HPI     Nocturia 6x/night. Saw urologist and put on daily cialis. No uti.    Severe constipation this week. On norco.     The patient was put on levothyroxine for a TSH of 6.  He did not feel like it was helping and?  Making his constipation were so he stopped it.  No change in his energy levels.    Coumadin monitoring:  Lab Results   Component Value Date    INR 2.3 10/30/2018    INR 1.8 10/15/2018    INR 2.6 09/12/2018       ANTICOAGULATION DX: (The following diagnoses are positive if BOLD, negative otherwise.)  Atrial_fibirillation.  . DVT. Pulmonary_embolism.  Aortic_valve_replacement.  TIA/CVA.         Current Dosage:   3 qd  Compliance with medication - good  Diet changes: no.     watches diet: yes.  .  Pending medical/dental procedure: no  Concomitant medication changes (including over the counter/herbs): no  Alcohol use:  no        The following symptoms are positive if BOLD, negative otherwise.    Bleeding episodes:    Gingival_bleeding., epistaxis ., ecchymoses, hematuria . , melena, blood_per_rectum  Thrombotic event:    shortness_of_breath .  Pain/swelling_of_extremity old . , numbness  , tingling . , headache .   Intolerance of drug:  nausea/vomiting/diarrhea . , rash . , skin necrosis .     Review of Systems      Objective:      Vitals:    11/26/18 0859   BP: 126/80   Pulse: 99   Resp: 16   SpO2: 97%   Weight: 100.5 kg (221 lb 9.6 oz)   Height: 5' 9" (1.753 m)   PainSc:   7   PainLoc: Neck     Physical Exam   Constitutional: He appears well-developed and well-nourished.   Cardiovascular: Normal rate, regular rhythm and normal heart sounds.   Pulmonary/Chest: Effort normal and breath sounds normal.   Abdominal: Soft. There is no tenderness.   Neurological: He is alert.   Psychiatric: He has a normal mood and affect. His behavior is normal. Thought content normal. "   Nursing note and vitals reviewed.        Assessment:       1. Nocturia more than twice per night    2. Bilateral leg edema    3. Encounter for monitoring Coumadin therapy    4. Paroxysmal atrial fibrillation    5. Drug-induced constipation    6. Urinary retention          Plan:     Coumadin 3 mg a day.   Nocturia more than twice per night    Bilateral leg edema  -     Comprehensive metabolic panel; Future; Expected date: 11/26/2018    Encounter for monitoring Coumadin therapy  -     POCT INR    Paroxysmal atrial fibrillation  -     POCT INR    Drug-induced constipation  -     senna-docusate 8.6-50 mg (SENNA WITH DOCUSATE SODIUM) 8.6-50 mg per tablet; Take 2 tablets by mouth 2 (two) times daily.  Dispense: 120 tablet; Refill: 5    Urinary retention    Other orders  -     furosemide (LASIX) 20 MG tablet; Take 1 tablet (20 mg total) by mouth daily as needed.  Dispense: 90 tablet; Refill: 3  -     potassium chloride (KLOR-CON) 10 MEQ TbSR; Take 1 tablet (10 mEq total) by mouth once daily.  Dispense: 90 tablet; Refill: 1      No Follow-up on file.

## 2019-02-05 ENCOUNTER — OFFICE VISIT (OUTPATIENT)
Dept: FAMILY MEDICINE | Facility: CLINIC | Age: 64
End: 2019-02-05
Payer: MEDICARE

## 2019-02-05 ENCOUNTER — DOCUMENTATION ONLY (OUTPATIENT)
Dept: FAMILY MEDICINE | Facility: CLINIC | Age: 64
End: 2019-02-05

## 2019-02-05 VITALS
WEIGHT: 222.44 LBS | OXYGEN SATURATION: 97 % | DIASTOLIC BLOOD PRESSURE: 70 MMHG | TEMPERATURE: 98 F | SYSTOLIC BLOOD PRESSURE: 124 MMHG | BODY MASS INDEX: 32.95 KG/M2 | HEART RATE: 87 BPM | HEIGHT: 69 IN

## 2019-02-05 DIAGNOSIS — I48.91 ATRIAL FIBRILLATION, UNSPECIFIED TYPE: Primary | ICD-10-CM

## 2019-02-05 DIAGNOSIS — N40.1 BENIGN PROSTATIC HYPERPLASIA WITH NOCTURIA: ICD-10-CM

## 2019-02-05 DIAGNOSIS — Z79.01 ANTICOAGULATED ON COUMADIN: ICD-10-CM

## 2019-02-05 DIAGNOSIS — R35.1 BENIGN PROSTATIC HYPERPLASIA WITH NOCTURIA: ICD-10-CM

## 2019-02-05 LAB — INR PPP: 2.3 (ref 2–3)

## 2019-02-05 PROCEDURE — 99499 UNLISTED E&M SERVICE: CPT | Mod: S$GLB,,, | Performed by: NURSE PRACTITIONER

## 2019-02-05 PROCEDURE — 85610 POCT INR: ICD-10-PCS | Mod: QW,,, | Performed by: NURSE PRACTITIONER

## 2019-02-05 PROCEDURE — 99213 OFFICE O/P EST LOW 20 MIN: CPT | Mod: S$GLB,,, | Performed by: NURSE PRACTITIONER

## 2019-02-05 PROCEDURE — 85610 PROTHROMBIN TIME: CPT | Mod: QW,,, | Performed by: NURSE PRACTITIONER

## 2019-02-05 PROCEDURE — 99499 RISK ADDL DX/OHS AUDIT: ICD-10-PCS | Mod: S$GLB,,, | Performed by: NURSE PRACTITIONER

## 2019-02-05 PROCEDURE — 99213 PR OFFICE/OUTPT VISIT, EST, LEVL III, 20-29 MIN: ICD-10-PCS | Mod: S$GLB,,, | Performed by: NURSE PRACTITIONER

## 2019-02-05 RX ORDER — FINASTERIDE 5 MG/1
5 TABLET, FILM COATED ORAL NIGHTLY
Qty: 30 TABLET | Refills: 1 | Status: SHIPPED | OUTPATIENT
Start: 2019-02-05 | End: 2019-06-25 | Stop reason: ALTCHOICE

## 2019-02-05 NOTE — PROGRESS NOTES
Subjective:       Patient ID: Albin Fernandez is a 63 y.o. male.    Chief Complaint: Coagulation Disorder and Urinary Frequency    Coumadin monitoring:  Lab Results   Component Value Date    INR 2.3 02/05/2019    INR 2.3 10/30/2018    INR 1.8 10/15/2018       ANTICOAGULATION DX:   Atrial_fibirillation   SUBJECTIVE:  Current Dosage: 3.0 mg.  daily  Compliance with medication - good Yes  Diet changes: No watches diet: Yes  Pending medical/dental procedure: no  Concomitant medication changes (including over the counter/herbs): No   Alcohol use: No     Bleeding episodes:    gingival bleeding (-), epistaxis (-), ecchymoses (-), hematuria (-) , melena (-) , rectal bleeding(-)     Thrombotic event:    Shortness of breath (-) ,   Pain/swelling of extremity (-) , numbness  and/or tingling (-) , severe, new onset headache (-).   Intolerance of drug:  Nausea/vomiting (-), diarrhea (-) , rash (-) , skin necrosis (-)     Has BPH, sees Dr. Meek, had TURP, but has been up urinating several times a night. Is taking flomax in the afternoon. He stops drinking fluids around 6-7 pm.     Has gained weight, 12 pounds in the past 3 months. Thinks it is because he is not as active in the winter. Has mild lower extremity edema, denies any shortness of breath or coughing.   HPI  Review of Systems    Objective:     INR 2.3  Physical Exam   Constitutional: He is oriented to person, place, and time. He appears well-developed and well-nourished. No distress.   HENT:   Head: Normocephalic and atraumatic.   Eyes: Conjunctivae are normal. Right eye exhibits no discharge. Left eye exhibits no discharge. No scleral icterus.   Cardiovascular: Normal rate, regular rhythm and normal heart sounds. Exam reveals no gallop and no friction rub.   No murmur heard.  Pulmonary/Chest: Effort normal and breath sounds normal. No stridor. No respiratory distress. He has no wheezes. He has no rales.   Musculoskeletal: He exhibits edema.   1+ non pitting lower  extremity edema.    Neurological: He is alert and oriented to person, place, and time.   Skin: Skin is warm and dry. No rash noted. He is not diaphoretic. No pallor.   Psychiatric: He has a normal mood and affect. His behavior is normal.   Nursing note and vitals reviewed.      Assessment:       1. Atrial fibrillation, unspecified type    2. Anticoagulated on Coumadin    3. Benign prostatic hyperplasia with nocturia        Plan:       Atrial fibrillation, unspecified type    Anticoagulated on Coumadin  -     POCT INR    Benign prostatic hyperplasia with nocturia  -     finasteride (PROSCAR) 5 mg tablet; Take 1 tablet (5 mg total) by mouth every evening.  Dispense: 30 tablet; Refill: 1    No change in coumadin dose, continue 3 mg. A day.  1 month with INR at office visit.

## 2019-02-08 ENCOUNTER — LAB VISIT (OUTPATIENT)
Dept: LAB | Facility: HOSPITAL | Age: 64
End: 2019-02-08
Attending: SPECIALIST
Payer: MEDICARE

## 2019-02-08 DIAGNOSIS — E03.9 MYXEDEMA HEART DISEASE: ICD-10-CM

## 2019-02-08 DIAGNOSIS — I51.9 MYXEDEMA HEART DISEASE: ICD-10-CM

## 2019-02-08 DIAGNOSIS — I48.20 CHRONIC ATRIAL FIBRILLATION: Primary | ICD-10-CM

## 2019-02-08 LAB
T4 FREE SERPL-MCNC: 0.9 NG/DL
TSH SERPL DL<=0.005 MIU/L-ACNC: 8.01 UIU/ML

## 2019-02-08 PROCEDURE — 84439 ASSAY OF FREE THYROXINE: CPT

## 2019-02-08 PROCEDURE — 84443 ASSAY THYROID STIM HORMONE: CPT

## 2019-02-08 PROCEDURE — 36415 COLL VENOUS BLD VENIPUNCTURE: CPT | Mod: PO

## 2019-02-20 RX ORDER — TAMSULOSIN HYDROCHLORIDE 0.4 MG/1
CAPSULE ORAL
Qty: 180 CAPSULE | Refills: 1 | Status: SHIPPED | OUTPATIENT
Start: 2019-02-20 | End: 2019-09-04 | Stop reason: SDUPTHER

## 2019-03-04 ENCOUNTER — DOCUMENTATION ONLY (OUTPATIENT)
Dept: FAMILY MEDICINE | Facility: CLINIC | Age: 64
End: 2019-03-04

## 2019-03-07 ENCOUNTER — TELEPHONE (OUTPATIENT)
Dept: FAMILY MEDICINE | Facility: CLINIC | Age: 64
End: 2019-03-07

## 2019-03-07 DIAGNOSIS — E03.9 HYPOTHYROIDISM, UNSPECIFIED TYPE: Primary | ICD-10-CM

## 2019-03-07 NOTE — TELEPHONE ENCOUNTER
----- Message from Adeola Gerardo sent at 3/7/2019  2:39 PM CST -----  The patient needs a refill of levothyroxin 112mg tab take 1 tablet by mouth once daily first thing in th morning on an empty stomach with a full glass of water. Please call it into [facility] on [facility]. Please call the patient at [phone] if there are any questions.

## 2019-03-09 NOTE — TELEPHONE ENCOUNTER
8 this medication was discontinued in the past because the patient stopped it on his own was noncompliant home.  He when he was taking he did not feel like it helped.  Before restarting it I would like to get a TSH.  I will reorder it.

## 2019-03-11 ENCOUNTER — OFFICE VISIT (OUTPATIENT)
Dept: FAMILY MEDICINE | Facility: CLINIC | Age: 64
End: 2019-03-11
Payer: MEDICARE

## 2019-03-11 VITALS
HEIGHT: 69 IN | DIASTOLIC BLOOD PRESSURE: 70 MMHG | OXYGEN SATURATION: 96 % | RESPIRATION RATE: 16 BRPM | HEART RATE: 79 BPM | WEIGHT: 221.13 LBS | SYSTOLIC BLOOD PRESSURE: 114 MMHG | BODY MASS INDEX: 32.75 KG/M2

## 2019-03-11 DIAGNOSIS — E03.9 HYPOTHYROIDISM, UNSPECIFIED TYPE: ICD-10-CM

## 2019-03-11 DIAGNOSIS — N40.1 BENIGN PROSTATIC HYPERPLASIA WITH URINARY FREQUENCY: ICD-10-CM

## 2019-03-11 DIAGNOSIS — R35.0 BENIGN PROSTATIC HYPERPLASIA WITH URINARY FREQUENCY: ICD-10-CM

## 2019-03-11 DIAGNOSIS — I48.20 CHRONIC ATRIAL FIBRILLATION: ICD-10-CM

## 2019-03-11 DIAGNOSIS — Z79.01 ANTICOAGULATED ON COUMADIN: Primary | ICD-10-CM

## 2019-03-11 LAB — INR PPP: 2.3 (ref 2–3)

## 2019-03-11 PROCEDURE — 85610 PROTHROMBIN TIME: CPT | Mod: QW,,, | Performed by: INTERNAL MEDICINE

## 2019-03-11 PROCEDURE — 99213 PR OFFICE/OUTPT VISIT, EST, LEVL III, 20-29 MIN: ICD-10-PCS | Mod: S$GLB,,, | Performed by: INTERNAL MEDICINE

## 2019-03-11 PROCEDURE — 99499 UNLISTED E&M SERVICE: CPT | Mod: S$GLB,,, | Performed by: INTERNAL MEDICINE

## 2019-03-11 PROCEDURE — 3008F BODY MASS INDEX DOCD: CPT | Mod: CPTII,S$GLB,, | Performed by: INTERNAL MEDICINE

## 2019-03-11 PROCEDURE — 3008F PR BODY MASS INDEX (BMI) DOCUMENTED: ICD-10-PCS | Mod: CPTII,S$GLB,, | Performed by: INTERNAL MEDICINE

## 2019-03-11 PROCEDURE — 99213 OFFICE O/P EST LOW 20 MIN: CPT | Mod: S$GLB,,, | Performed by: INTERNAL MEDICINE

## 2019-03-11 PROCEDURE — 99499 RISK ADDL DX/OHS AUDIT: ICD-10-PCS | Mod: S$GLB,,, | Performed by: INTERNAL MEDICINE

## 2019-03-11 PROCEDURE — 85610 POCT INR: ICD-10-PCS | Mod: QW,,, | Performed by: INTERNAL MEDICINE

## 2019-03-11 RX ORDER — LEVOTHYROXINE SODIUM 112 UG/1
112 TABLET ORAL DAILY
Qty: 90 TABLET | Refills: 1 | Status: SHIPPED | OUTPATIENT
Start: 2019-03-11 | End: 2019-09-07 | Stop reason: SDUPTHER

## 2019-03-11 RX ORDER — LEVOTHYROXINE SODIUM 112 UG/1
112 TABLET ORAL DAILY
COMMUNITY
End: 2019-03-11 | Stop reason: SDUPTHER

## 2019-03-11 RX ORDER — TADALAFIL 5 MG/1
5 TABLET ORAL DAILY PRN
Qty: 90 TABLET | Refills: 1 | Status: SHIPPED | OUTPATIENT
Start: 2019-03-11 | End: 2019-06-25 | Stop reason: ALTCHOICE

## 2019-03-11 NOTE — PROGRESS NOTES
"Subjective:       Patient ID: Albin Fernandez is a 63 y.o. male.    Chief Complaint: coumadin monitoring    HPI     CHIEF :COMPLAINT: hypothyroidism    HPI:   Was off levothyroxine but restarted it for last 2 wks after not taking it 3 wks.   ONSET:           ago.   DURATION: . continuous  QUALITY/COURSE: .  unchanged  INTENSITY/SEVERITY: # 1   /10 (on 1 to 10 scale)  MODIFIERS/TREATMENTS: Taking Medications: yes.     Prior Labs:   Lab Results   Component Value Date    TSH 8.009 (H) 02/08/2019     Thyroid scans:  no}  Ultrasound: .no      The below section is positive for the patient ONLY if BOLDED:      SYMPTOMS/RELATED:  Decrease in energy, diarrhea, constipation,  heat_intolerance,  Cold_intolerance, Nervousness, Palpitations. Hair_loss. Myalgias. Weight_loss, Weight_gain .            Coumadin monitoring:  Lab Results   Component Value Date    INR 2.3 03/11/2019    INR 2.3 02/05/2019    INR 2.3 10/30/2018       ANTICOAGULATION DX: (The following diagnoses are positive if BOLD, negative otherwise.)  Atrial_fibirillation.  . DVT. Pulmonary_embolism.  Aortic_valve_replacement.  TIA/CVA.         Current Dosage:  3 mg   Compliance with medication - good  Diet changes: no.     watches diet: yes.  .  Pending medical/dental procedure: no  Concomitant medication changes (including over the counter/herbs): no  Alcohol use:  no        The following symptoms are positive if BOLD, negative otherwise.    Bleeding episodes:    Gingival_bleeding., epistaxis ., ecchymoses, hematuria . , melena, blood_per_rectum  Thrombotic event:    shortness_of_breath .  pain/swelling_of_extremity . , numbness  , tingling . , headache .   Intolerance of drug:  nausea/vomiting/diarrhea . , rash . , skin necrosis .     Review of Systems      Objective:      Vitals:    03/11/19 1021   BP: 114/70   Pulse: 79   Resp: 16   SpO2: 96%   Weight: 100.3 kg (221 lb 1.9 oz)   Height: 5' 9" (1.753 m)   PainSc: 0-No pain     Physical Exam   Constitutional: He " appears well-developed and well-nourished.   Cardiovascular: Normal rate, regular rhythm and normal heart sounds.   Pulmonary/Chest: Effort normal and breath sounds normal.   Abdominal: Soft. There is no tenderness.   Neurological: He is alert.   Psychiatric: He has a normal mood and affect. His behavior is normal. Thought content normal.   Nursing note and vitals reviewed.      inr 2.3  Assessment:       1. Anticoagulated on Coumadin    2. Chronic atrial fibrillation    3. Hypothyroidism, unspecified type    4. Benign prostatic hyperplasia with urinary frequency          Plan:       Anticoagulated on Coumadin  -     POCT INR  -     POCT INR    Chronic atrial fibrillation  -     POCT INR    Hypothyroidism, unspecified type  -     levothyroxine (SYNTHROID) 112 MCG tablet; Take 1 tablet (112 mcg total) by mouth once daily.  Dispense: 90 tablet; Refill: 1  -     TSH; Future; Expected date: 03/11/2019    Benign prostatic hyperplasia with urinary frequency  -     tadalafil (CIALIS) 5 MG tablet; Take 1 tablet (5 mg total) by mouth daily as needed for Erectile Dysfunction.  Dispense: 90 tablet; Refill: 1      Follow-up in about 1 month (around 4/11/2019).

## 2019-03-11 NOTE — PATIENT INSTRUCTIONS
Lose 5 pounds.  Suggest [facility]    Take the levothyroxine on an empty stomach

## 2019-03-28 ENCOUNTER — PATIENT OUTREACH (OUTPATIENT)
Dept: ADMINISTRATIVE | Facility: HOSPITAL | Age: 64
End: 2019-03-28

## 2019-04-04 ENCOUNTER — CLINICAL SUPPORT (OUTPATIENT)
Dept: FAMILY MEDICINE | Facility: CLINIC | Age: 64
End: 2019-04-04
Payer: MEDICARE

## 2019-04-04 DIAGNOSIS — E03.9 HYPOTHYROIDISM, UNSPECIFIED TYPE: ICD-10-CM

## 2019-04-04 DIAGNOSIS — R60.0 BILATERAL LEG EDEMA: ICD-10-CM

## 2019-04-04 LAB
ALBUMIN SERPL BCP-MCNC: 4 G/DL (ref 3.5–5.2)
ALP SERPL-CCNC: 69 U/L (ref 55–135)
ALT SERPL W/O P-5'-P-CCNC: 21 U/L (ref 10–44)
ANION GAP SERPL CALC-SCNC: 7 MMOL/L (ref 8–16)
AST SERPL-CCNC: 26 U/L (ref 10–40)
BILIRUB SERPL-MCNC: 0.7 MG/DL (ref 0.1–1)
BUN SERPL-MCNC: 22 MG/DL (ref 8–23)
CALCIUM SERPL-MCNC: 9.8 MG/DL (ref 8.7–10.5)
CHLORIDE SERPL-SCNC: 104 MMOL/L (ref 95–110)
CO2 SERPL-SCNC: 29 MMOL/L (ref 23–29)
CREAT SERPL-MCNC: 1.1 MG/DL (ref 0.5–1.4)
EST. GFR  (AFRICAN AMERICAN): >60 ML/MIN/1.73 M^2
EST. GFR  (NON AFRICAN AMERICAN): >60 ML/MIN/1.73 M^2
GLUCOSE SERPL-MCNC: 118 MG/DL (ref 70–110)
POTASSIUM SERPL-SCNC: 4.3 MMOL/L (ref 3.5–5.1)
PROT SERPL-MCNC: 6.8 G/DL (ref 6–8.4)
SODIUM SERPL-SCNC: 140 MMOL/L (ref 136–145)
TSH SERPL DL<=0.005 MIU/L-ACNC: 2.79 UIU/ML (ref 0.4–4)

## 2019-04-04 PROCEDURE — 84443 ASSAY THYROID STIM HORMONE: CPT

## 2019-04-04 PROCEDURE — 80053 COMPREHEN METABOLIC PANEL: CPT

## 2019-04-04 NOTE — PROGRESS NOTES
ID patient by name and date of birth.  Specimen collected with 21g using aseptic technique. Patient tolerated well.

## 2019-04-11 ENCOUNTER — OFFICE VISIT (OUTPATIENT)
Dept: FAMILY MEDICINE | Facility: CLINIC | Age: 64
End: 2019-04-11
Payer: MEDICARE

## 2019-04-11 ENCOUNTER — DOCUMENTATION ONLY (OUTPATIENT)
Dept: FAMILY MEDICINE | Facility: CLINIC | Age: 64
End: 2019-04-11

## 2019-04-11 VITALS
HEIGHT: 69 IN | RESPIRATION RATE: 16 BRPM | BODY MASS INDEX: 32.62 KG/M2 | HEART RATE: 85 BPM | WEIGHT: 220.25 LBS | DIASTOLIC BLOOD PRESSURE: 82 MMHG | SYSTOLIC BLOOD PRESSURE: 130 MMHG | OXYGEN SATURATION: 97 %

## 2019-04-11 DIAGNOSIS — I10 ESSENTIAL HYPERTENSION: ICD-10-CM

## 2019-04-11 DIAGNOSIS — I48.20 CHRONIC ATRIAL FIBRILLATION: Primary | ICD-10-CM

## 2019-04-11 DIAGNOSIS — Z79.01 ENCOUNTER FOR MONITORING COUMADIN THERAPY: ICD-10-CM

## 2019-04-11 DIAGNOSIS — Z51.81 ENCOUNTER FOR MONITORING COUMADIN THERAPY: ICD-10-CM

## 2019-04-11 DIAGNOSIS — E78.5 HYPERLIPIDEMIA, UNSPECIFIED HYPERLIPIDEMIA TYPE: ICD-10-CM

## 2019-04-11 LAB — INR PPP: 1.8 (ref 2–3)

## 2019-04-11 PROCEDURE — 99213 PR OFFICE/OUTPT VISIT, EST, LEVL III, 20-29 MIN: ICD-10-PCS | Mod: S$GLB,,, | Performed by: INTERNAL MEDICINE

## 2019-04-11 PROCEDURE — 99499 UNLISTED E&M SERVICE: CPT | Mod: S$GLB,,, | Performed by: INTERNAL MEDICINE

## 2019-04-11 PROCEDURE — 3008F PR BODY MASS INDEX (BMI) DOCUMENTED: ICD-10-PCS | Mod: CPTII,S$GLB,, | Performed by: INTERNAL MEDICINE

## 2019-04-11 PROCEDURE — 3075F PR MOST RECENT SYSTOLIC BLOOD PRESS GE 130-139MM HG: ICD-10-PCS | Mod: CPTII,S$GLB,, | Performed by: INTERNAL MEDICINE

## 2019-04-11 PROCEDURE — 85610 PROTHROMBIN TIME: CPT | Mod: QW,,, | Performed by: INTERNAL MEDICINE

## 2019-04-11 PROCEDURE — 99499 RISK ADDL DX/OHS AUDIT: ICD-10-PCS | Mod: S$GLB,,, | Performed by: INTERNAL MEDICINE

## 2019-04-11 PROCEDURE — 3079F DIAST BP 80-89 MM HG: CPT | Mod: CPTII,S$GLB,, | Performed by: INTERNAL MEDICINE

## 2019-04-11 PROCEDURE — 3008F BODY MASS INDEX DOCD: CPT | Mod: CPTII,S$GLB,, | Performed by: INTERNAL MEDICINE

## 2019-04-11 PROCEDURE — 3079F PR MOST RECENT DIASTOLIC BLOOD PRESSURE 80-89 MM HG: ICD-10-PCS | Mod: CPTII,S$GLB,, | Performed by: INTERNAL MEDICINE

## 2019-04-11 PROCEDURE — 85610 POCT INR: ICD-10-PCS | Mod: QW,,, | Performed by: INTERNAL MEDICINE

## 2019-04-11 PROCEDURE — 3075F SYST BP GE 130 - 139MM HG: CPT | Mod: CPTII,S$GLB,, | Performed by: INTERNAL MEDICINE

## 2019-04-11 PROCEDURE — 99213 OFFICE O/P EST LOW 20 MIN: CPT | Mod: S$GLB,,, | Performed by: INTERNAL MEDICINE

## 2019-04-11 NOTE — PATIENT INSTRUCTIONS
Coumadin 3 mg a day except 4 mg on Sunday.      Lose 5 pounds.  Suggest [employer]    Warfarin is a blood-thinning medication that helps treat and prevent blood clots. There is no specific warfarin diet. However, certain foods and beverages can make warfarin less effective in preventing blood clots. It's important to pay attention to what you eat while taking warfarin.    One nutrient that can lessen warfarin's effectiveness is vitamin K. It's important to be consistent in how much vitamin K you get daily. The adequate intake level of vitamin K for adult men is 120 micrograms (mcg). For adult women, it's 90 mcg. While eating small amounts of foods that are rich in vitamin K shouldn't cause a problem, avoid consuming large amounts of certain foods or drinks, including:    Kale  Spinach  Hibernia sprouts  Collards  Mustard greens  Chard  Broccoli  Asparagus  Green tea  Certain drinks can increase the effect of warfarin, leading to bleeding problems. Avoid or consume only small amounts of these drinks when taking warfarin:    Cranberry juice  Alcohol

## 2019-04-11 NOTE — PROGRESS NOTES
Subjective:       Patient ID: Albin Fernandez is a 63 y.o. male.    Chief Complaint: Hypothyroidism (labs)    HPI       Coumadin monitoring:  Lab Results   Component Value Date    INR 2.3 03/11/2019    INR 2.3 02/05/2019    INR 2.3 10/30/2018       ANTICOAGULATION DX: (The following diagnoses are positive if BOLD, negative otherwise.)  Atrial_fibirillation.  . DVT. Pulmonary_embolism.  Aortic_valve_replacement.  TIA/CVA.     Ran out of omega-3 fatty acids supplements Shira was taking    Current Dosage:  3 mg    Compliance with medication - good  Diet changes: no.     watches diet: yes.  .  Pending medical/dental procedure: no  Concomitant medication changes (including over the counter/herbs): no  Alcohol use:  no        The following symptoms are positive if BOLD, negative otherwise.    Bleeding episodes:    Gingival_bleeding., epistaxis ., ecchymoses, hematuria . , melena, blood_per_rectum  Thrombotic event:    shortness_of_breath .  pain/swelling_of_extremity . , numbness  , tingling . , headache .   Intolerance of drug:  nausea/vomiting/diarrhea . , rash . , skin necrosis .           CHIEF COMPLAINT: Hyperlipidemia. cholesterol screening: no.   HPI:     ONSET:    MODIFIERS/TREATMENTS: . Taking medications: yes Zocor. . Non-compliance with following diet: no. .     SYMPTOMS/RELATED:Possible medication side effects include:   Myalgia: no.  .     REVIEW OF SYMPTOMS: past weights:   Wt Readings from Last 1 Encounters:   04/11/19 0840 99.9 kg (220 lb 3.8 oz)                                                     Last lipids: total   Lab Results   Component Value Date    CHOL 181 07/26/2018    CHOL 194 05/21/2018    CHOL 167 02/13/2017                                                                     HDL   Lab Results   Component Value Date    HDL 38 (L) 07/26/2018    HDL 40 05/21/2018    HDL 36 (L) 02/13/2017                                                                     LDL   Lab Results   Component Value Date     "LDLCALC 102.8 07/26/2018    LDLCALC 129.8 05/21/2018    LDLCALC 96.4 02/13/2017                                                                     TRIG   Lab Results   Component Value Date    TRIG 201 (H) 07/26/2018    TRIG 121 05/21/2018    TRIG 173 (H) 02/13/2017                                                                         CHIEF :COMPLAINT: hypothyroidism    HPI:     ONSET:           ago.   DURATION: . continuous  QUALITY/COURSE: .  unchanged  INTENSITY/SEVERITY: # 1   /10 (on 1 to 10 scale)  MODIFIERS/TREATMENTS: Taking Medications: yes.     Prior Labs:   Lab Results   Component Value Date    TSH 2.788 04/04/2019     Thyroid scans:  no}  Ultrasound: .no      The below section is positive for the patient ONLY if BOLDED:      SYMPTOMS/RELATED:  Decrease in energy, diarrhea, constipation,  heat_intolerance,  Cold_intolerance, Nervousness, Palpitations. Hair_loss. Myalgias. Weight_loss, Weight_gain .          Review of Systems      Objective:      Vitals:    04/11/19 0840   BP: 130/82   Pulse: 85   Resp: 16   SpO2: 97%   Weight: 99.9 kg (220 lb 3.8 oz)   Height: 5' 9" (1.753 m)   PainSc:   7   PainLoc: Neck     Physical Exam   Constitutional: He appears well-developed and well-nourished.   Cardiovascular: Normal rate, regular rhythm and normal heart sounds.   Pulmonary/Chest: Effort normal and breath sounds normal.   Abdominal: Soft. There is no tenderness.   Neurological: He is alert.   Psychiatric: He has a normal mood and affect. His behavior is normal. Thought content normal.   Nursing note and vitals reviewed.      inr 1.8  Assessment:       1. Chronic atrial fibrillation    2. Encounter for monitoring Coumadin therapy    3. Hyperlipidemia, unspecified hyperlipidemia type    4. Essential hypertension          Plan:   The patient is to restart the omega-3 fatty acids.  He is also to increase the Coumadin to 3 mg except 4 mg on Sunday    Chronic atrial fibrillation  -     POCT INR    Encounter for " monitoring Coumadin therapy  -     POCT INR    Hyperlipidemia, unspecified hyperlipidemia type  -     Lipid panel; Future; Expected date: 04/11/2019    Essential hypertension  -     Comprehensive metabolic panel; Future; Expected date: 04/11/2019      Follow up in about 1 month (around 5/11/2019).

## 2019-04-26 ENCOUNTER — PATIENT OUTREACH (OUTPATIENT)
Dept: ADMINISTRATIVE | Facility: HOSPITAL | Age: 64
End: 2019-04-26

## 2019-05-02 ENCOUNTER — CLINICAL SUPPORT (OUTPATIENT)
Dept: FAMILY MEDICINE | Facility: CLINIC | Age: 64
End: 2019-05-02
Payer: MEDICARE

## 2019-05-02 DIAGNOSIS — E78.5 HYPERLIPIDEMIA, UNSPECIFIED HYPERLIPIDEMIA TYPE: ICD-10-CM

## 2019-05-02 DIAGNOSIS — I10 ESSENTIAL HYPERTENSION: ICD-10-CM

## 2019-05-02 LAB
ALBUMIN SERPL BCP-MCNC: 3.7 G/DL (ref 3.5–5.2)
ALP SERPL-CCNC: 70 U/L (ref 55–135)
ALT SERPL W/O P-5'-P-CCNC: 16 U/L (ref 10–44)
ANION GAP SERPL CALC-SCNC: 7 MMOL/L (ref 8–16)
AST SERPL-CCNC: 18 U/L (ref 10–40)
BILIRUB SERPL-MCNC: 0.4 MG/DL (ref 0.1–1)
BUN SERPL-MCNC: 20 MG/DL (ref 8–23)
CALCIUM SERPL-MCNC: 9.3 MG/DL (ref 8.7–10.5)
CHLORIDE SERPL-SCNC: 106 MMOL/L (ref 95–110)
CHOLEST SERPL-MCNC: 161 MG/DL (ref 120–199)
CHOLEST/HDLC SERPL: 4.6 {RATIO} (ref 2–5)
CO2 SERPL-SCNC: 28 MMOL/L (ref 23–29)
CREAT SERPL-MCNC: 1 MG/DL (ref 0.5–1.4)
EST. GFR  (AFRICAN AMERICAN): >60 ML/MIN/1.73 M^2
EST. GFR  (NON AFRICAN AMERICAN): >60 ML/MIN/1.73 M^2
GLUCOSE SERPL-MCNC: 92 MG/DL (ref 70–110)
HDLC SERPL-MCNC: 35 MG/DL (ref 40–75)
HDLC SERPL: 21.7 % (ref 20–50)
LDLC SERPL CALC-MCNC: 102.4 MG/DL (ref 63–159)
NONHDLC SERPL-MCNC: 126 MG/DL
POTASSIUM SERPL-SCNC: 4.4 MMOL/L (ref 3.5–5.1)
PROT SERPL-MCNC: 6.5 G/DL (ref 6–8.4)
SODIUM SERPL-SCNC: 141 MMOL/L (ref 136–145)
TRIGL SERPL-MCNC: 118 MG/DL (ref 30–150)

## 2019-05-02 PROCEDURE — 80061 LIPID PANEL: CPT

## 2019-05-02 PROCEDURE — 80053 COMPREHEN METABOLIC PANEL: CPT

## 2019-05-10 ENCOUNTER — OFFICE VISIT (OUTPATIENT)
Dept: FAMILY MEDICINE | Facility: CLINIC | Age: 64
End: 2019-05-10
Payer: MEDICARE

## 2019-05-10 VITALS
DIASTOLIC BLOOD PRESSURE: 70 MMHG | WEIGHT: 220 LBS | SYSTOLIC BLOOD PRESSURE: 128 MMHG | RESPIRATION RATE: 17 BRPM | TEMPERATURE: 98 F | HEIGHT: 69 IN | BODY MASS INDEX: 32.58 KG/M2 | HEART RATE: 84 BPM | OXYGEN SATURATION: 98 %

## 2019-05-10 DIAGNOSIS — H69.92 DYSFUNCTION OF LEFT EUSTACHIAN TUBE: ICD-10-CM

## 2019-05-10 DIAGNOSIS — Z51.81 ENCOUNTER FOR MONITORING COUMADIN THERAPY: ICD-10-CM

## 2019-05-10 DIAGNOSIS — H91.92 HEARING LOSS OF LEFT EAR, UNSPECIFIED HEARING LOSS TYPE: ICD-10-CM

## 2019-05-10 DIAGNOSIS — G89.29 CHRONIC NECK PAIN: ICD-10-CM

## 2019-05-10 DIAGNOSIS — I48.91 ATRIAL FIBRILLATION, UNSPECIFIED TYPE: ICD-10-CM

## 2019-05-10 DIAGNOSIS — Z79.01 ENCOUNTER FOR MONITORING COUMADIN THERAPY: ICD-10-CM

## 2019-05-10 DIAGNOSIS — M54.2 CHRONIC NECK PAIN: ICD-10-CM

## 2019-05-10 DIAGNOSIS — E78.5 HYPERLIPIDEMIA, UNSPECIFIED HYPERLIPIDEMIA TYPE: Primary | ICD-10-CM

## 2019-05-10 LAB — INR PPP: 2.5 (ref 2–3)

## 2019-05-10 PROCEDURE — 85610 POCT INR: ICD-10-PCS | Mod: QW,,, | Performed by: INTERNAL MEDICINE

## 2019-05-10 PROCEDURE — 3074F PR MOST RECENT SYSTOLIC BLOOD PRESSURE < 130 MM HG: ICD-10-PCS | Mod: CPTII,S$GLB,, | Performed by: INTERNAL MEDICINE

## 2019-05-10 PROCEDURE — 3078F PR MOST RECENT DIASTOLIC BLOOD PRESSURE < 80 MM HG: ICD-10-PCS | Mod: CPTII,S$GLB,, | Performed by: INTERNAL MEDICINE

## 2019-05-10 PROCEDURE — 99499 RISK ADDL DX/OHS AUDIT: ICD-10-PCS | Mod: S$GLB,,, | Performed by: INTERNAL MEDICINE

## 2019-05-10 PROCEDURE — 99499 UNLISTED E&M SERVICE: CPT | Mod: S$GLB,,, | Performed by: INTERNAL MEDICINE

## 2019-05-10 PROCEDURE — 3008F BODY MASS INDEX DOCD: CPT | Mod: CPTII,S$GLB,, | Performed by: INTERNAL MEDICINE

## 2019-05-10 PROCEDURE — 85610 PROTHROMBIN TIME: CPT | Mod: QW,,, | Performed by: INTERNAL MEDICINE

## 2019-05-10 PROCEDURE — 99214 PR OFFICE/OUTPT VISIT, EST, LEVL IV, 30-39 MIN: ICD-10-PCS | Mod: S$GLB,,, | Performed by: INTERNAL MEDICINE

## 2019-05-10 PROCEDURE — 3008F PR BODY MASS INDEX (BMI) DOCUMENTED: ICD-10-PCS | Mod: CPTII,S$GLB,, | Performed by: INTERNAL MEDICINE

## 2019-05-10 PROCEDURE — 3078F DIAST BP <80 MM HG: CPT | Mod: CPTII,S$GLB,, | Performed by: INTERNAL MEDICINE

## 2019-05-10 PROCEDURE — 99214 OFFICE O/P EST MOD 30 MIN: CPT | Mod: S$GLB,,, | Performed by: INTERNAL MEDICINE

## 2019-05-10 PROCEDURE — 3074F SYST BP LT 130 MM HG: CPT | Mod: CPTII,S$GLB,, | Performed by: INTERNAL MEDICINE

## 2019-05-10 NOTE — PATIENT INSTRUCTIONS
Coumadin 3 mg a day except 4 mg on Sunday.    Afrin nasal spray for maximum of 3 days.    Steam treatments, sterile saline nasal spray

## 2019-05-10 NOTE — PROGRESS NOTES
Subjective:       Patient ID: Albin Fernandez is a 63 y.o. male.    Chief Complaint: Test results (No refills needed. )    HPI       Coumadin monitoring:  Lab Results   Component Value Date    INR 2.5 05/10/2019    INR 1.8 04/11/2019    INR 2.3 03/11/2019       ANTICOAGULATION DX: (The following diagnoses are positive if BOLD, negative otherwise.)  Atrial_fibirillation.  . DVT. Pulmonary_embolism.  Aortic_valve_replacement.  TIA/CVA.         Current Dosage:    3 mg a day except 4 mg on Sunday.  Compliance with medication - good  Diet changes: no.     watches diet: yes.  .  Pending medical/dental procedure: no  Concomitant medication changes (including over the counter/herbs): no  Alcohol use:  no        The following symptoms are positive if BOLD, negative otherwise.    Bleeding episodes:    Gingival_bleeding., epistaxis ., ecchymoses, hematuria . , melena, blood_per_rectum  Thrombotic event:    shortness_of_breath .  pain/swelling_of_extremity . , numbness  , tingling . , headache .   Intolerance of drug:  nausea/vomiting/diarrhea . , rash . , skin necrosis .         CHIEF COMPLAINT: Hyperlipidemia. cholesterol screening: no.   HPI:     ONSET:    MODIFIERS/TREATMENTS: . Taking medications: yes. . Non-compliance with following diet: no. .     SYMPTOMS/RELATED:Possible medication side effects include:   Myalgia: no.  .     REVIEW OF SYMPTOMS: past weights:   Wt Readings from Last 1 Encounters:   05/10/19 0825 99.8 kg (220 lb 0.3 oz)                                                     Last lipids: total   Lab Results   Component Value Date    CHOL 161 05/02/2019    CHOL 181 07/26/2018    CHOL 194 05/21/2018                                                                     HDL   Lab Results   Component Value Date    HDL 35 (L) 05/02/2019    HDL 38 (L) 07/26/2018    HDL 40 05/21/2018                                                                     LDL   Lab Results   Component Value Date    LDLCALC 102.4 05/02/2019  "   LDLCALC 102.8 07/26/2018    LDLCALC 129.8 05/21/2018                                                                     TRIG   Lab Results   Component Value Date    TRIG 118 05/02/2019    TRIG 201 (H) 07/26/2018    TRIG 121 05/21/2018                                                                         CHIEF COMPLAINT: Neck Pain(+).  HPI:     ONSET/TIMING: Trauma: no. . Onset   10 years     ago. . Work related: no .    Similar problems  in past: no .    DURATION:      QUALITY/COURSE:    unchanged .       LOCATION:   Bilateral. Radiation: no.     INTENSITY/SEVERITY: Severity is # 8 (10 point scale).      SYMPTOMS/RELATED: .-Possible medication side effects include:     The following symptoms are positive if BOLD, negative otherwise.      CONTEXT/WHEN: --Activity. Coughing. Sneeze.. Working_[employer].  . Repetitive_work . Hx of CA. history of IV drug abuse.. Similar_problems.     MODIFIERS/TREATMENTS: . Taking medications.    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 c-spine_x-rays. CT. MRI. Surgery.     REVIEW OF SYMPTOMS:  . Arm_Pain_to_below _elbow . .Weight_loss.      The patient is had decreased hearing from left ear for 2 weeks.  He tried to decongestant which helped but caused him urinary retention.      Review of Systems      Objective:      Vitals:    05/10/19 0825   BP: 128/70   Pulse: 84   Resp: 17   Temp: 97.8 °F (36.6 °C)   TempSrc: Oral   SpO2: 98%   Weight: 99.8 kg (220 lb 0.3 oz)   Height: 5' 9" (1.753 m)   PainSc:   8   PainLoc: Neck     Physical Exam   Constitutional: He appears well-developed and well-nourished.   HENT:   Right Ear: External ear normal.   Left Ear: External ear normal.   Tympanic membranes normal   Cardiovascular: Normal rate, regular rhythm and normal heart sounds.   Pulmonary/Chest: Effort normal and breath sounds normal.   Abdominal: Soft. There is no tenderness.   Neurological: He is alert.   Psychiatric: He has a normal mood and affect. His behavior is normal. Thought content " normal.   Nursing note and vitals reviewed.      inr 2.5 c-spine x-ray show multilevel arthritic changes  Assessment:       1. Hyperlipidemia, unspecified hyperlipidemia type    2. Encounter for monitoring Coumadin therapy    3. Atrial fibrillation, unspecified type    4. Hearing loss of left ear, unspecified hearing loss type    5. Dysfunction of left eustachian tube    6. Chronic neck pain          Plan:   Patient refusing physical therapy or dry needling    Hyperlipidemia, unspecified hyperlipidemia type    Encounter for monitoring Coumadin therapy  -     POCT INR    Atrial fibrillation, unspecified type    Hearing loss of left ear, unspecified hearing loss type  -     Ambulatory referral to ENT    Dysfunction of left eustachian tube    Chronic neck pain      Follow up in about 1 month (around 6/10/2019).

## 2019-06-01 ENCOUNTER — HOSPITAL ENCOUNTER (EMERGENCY)
Facility: HOSPITAL | Age: 64
Discharge: HOME | End: 2019-06-01
Attending: EMERGENCY MEDICINE
Payer: MEDICARE

## 2019-06-01 VITALS
DIASTOLIC BLOOD PRESSURE: 84 MMHG | RESPIRATION RATE: 20 BRPM | HEART RATE: 98 BPM | TEMPERATURE: 98 F | BODY MASS INDEX: 32.58 KG/M2 | WEIGHT: 220 LBS | HEIGHT: 69 IN | OXYGEN SATURATION: 96 % | SYSTOLIC BLOOD PRESSURE: 140 MMHG

## 2019-06-01 DIAGNOSIS — S42.402A CLOSED FRACTURE OF LEFT ELBOW, INITIAL ENCOUNTER: Primary | ICD-10-CM
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
## 2019-06-01 PROCEDURE — 25000003 PHARM REV CODE 250: Performed by: EMERGENCY MEDICINE

## 2019-06-01 PROCEDURE — 99284 EMERGENCY DEPT VISIT MOD MDM: CPT | Mod: 25

## 2019-06-01 PROCEDURE — 29105 APPLICATION LONG ARM SPLINT: CPT | Mod: LT

## 2019-06-01 PROCEDURE — 96372 THER/PROPH/DIAG INJ SC/IM: CPT | Mod: 59

## 2019-06-01 PROCEDURE — 63600175 PHARM REV CODE 636 W HCPCS: Performed by: EMERGENCY MEDICINE

## 2019-06-01 RX ORDER — KETOROLAC TROMETHAMINE 30 MG/ML
15 INJECTION, SOLUTION INTRAMUSCULAR; INTRAVENOUS
Status: COMPLETED | OUTPATIENT
Start: 2019-06-01 | End: 2019-06-01

## 2019-06-01 RX ORDER — HYDROCODONE BITARTRATE AND ACETAMINOPHEN 5; 325 MG/1; MG/1
1 TABLET ORAL
Status: COMPLETED | OUTPATIENT
Start: 2019-06-01 | End: 2019-06-01

## 2019-06-01 RX ORDER — ONDANSETRON 4 MG/1
4 TABLET, FILM COATED ORAL EVERY 6 HOURS
Qty: 12 TABLET | Refills: 0 | Status: SHIPPED | OUTPATIENT
Start: 2019-06-01 | End: 2019-06-01 | Stop reason: SDUPTHER

## 2019-06-01 RX ORDER — ONDANSETRON 4 MG/1
4 TABLET, FILM COATED ORAL EVERY 6 HOURS
Qty: 12 TABLET | Refills: 0 | Status: SHIPPED | OUTPATIENT
Start: 2019-06-01 | End: 2019-07-16

## 2019-06-01 RX ORDER — OXYCODONE HYDROCHLORIDE 5 MG/1
5 TABLET ORAL EVERY 4 HOURS PRN
Qty: 10 TABLET | Refills: 0 | Status: SHIPPED | OUTPATIENT
Start: 2019-06-01 | End: 2019-07-30

## 2019-06-01 RX ADMIN — KETOROLAC TROMETHAMINE 15 MG: 30 INJECTION, SOLUTION INTRAMUSCULAR at 05:06

## 2019-06-01 RX ADMIN — HYDROCODONE BITARTRATE AND ACETAMINOPHEN 1 TABLET: 5; 325 TABLET ORAL at 07:06
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
## 2019-06-02 NOTE — ED NOTES
Discharge instructions, diagnosis, medications, and follow up discussed with patient. Patient verbalized understanding. All questions and concerns answered. No needs expressed at the time. Pt is awake, alert and oriented with no acute distress noted. Respirations even and unlabored. Ambulatory out of ed with spouse.

## 2019-06-03 ENCOUNTER — OFFICE VISIT (OUTPATIENT)
Dept: ORTHOPEDICS | Facility: CLINIC | Age: 64
End: 2019-06-03
Payer: MEDICARE

## 2019-06-03 VITALS
BODY MASS INDEX: 32.58 KG/M2 | WEIGHT: 220 LBS | HEART RATE: 79 BPM | HEIGHT: 69 IN | SYSTOLIC BLOOD PRESSURE: 127 MMHG | DIASTOLIC BLOOD PRESSURE: 76 MMHG

## 2019-06-03 DIAGNOSIS — S52.042A: Primary | ICD-10-CM

## 2019-06-03 PROCEDURE — 99204 OFFICE O/P NEW MOD 45 MIN: CPT | Mod: 57,S$GLB,, | Performed by: ORTHOPAEDIC SURGERY

## 2019-06-03 PROCEDURE — 3008F BODY MASS INDEX DOCD: CPT | Mod: CPTII,S$GLB,, | Performed by: ORTHOPAEDIC SURGERY

## 2019-06-03 PROCEDURE — 99999 PR PBB SHADOW E&M-EST. PATIENT-LVL III: CPT | Mod: PBBFAC,,, | Performed by: ORTHOPAEDIC SURGERY

## 2019-06-03 PROCEDURE — 3008F PR BODY MASS INDEX (BMI) DOCUMENTED: ICD-10-PCS | Mod: CPTII,S$GLB,, | Performed by: ORTHOPAEDIC SURGERY

## 2019-06-03 PROCEDURE — 3074F SYST BP LT 130 MM HG: CPT | Mod: CPTII,S$GLB,, | Performed by: ORTHOPAEDIC SURGERY

## 2019-06-03 PROCEDURE — 3078F PR MOST RECENT DIASTOLIC BLOOD PRESSURE < 80 MM HG: ICD-10-PCS | Mod: CPTII,S$GLB,, | Performed by: ORTHOPAEDIC SURGERY

## 2019-06-03 PROCEDURE — 3078F DIAST BP <80 MM HG: CPT | Mod: CPTII,S$GLB,, | Performed by: ORTHOPAEDIC SURGERY

## 2019-06-03 PROCEDURE — 24670 PR CLOSED TX ULNAR FRACTURE PROX END W/O MANIPULATE: ICD-10-PCS | Mod: LT,S$GLB,, | Performed by: ORTHOPAEDIC SURGERY

## 2019-06-03 PROCEDURE — 3074F PR MOST RECENT SYSTOLIC BLOOD PRESSURE < 130 MM HG: ICD-10-PCS | Mod: CPTII,S$GLB,, | Performed by: ORTHOPAEDIC SURGERY

## 2019-06-03 PROCEDURE — 99999 PR PBB SHADOW E&M-EST. PATIENT-LVL III: ICD-10-PCS | Mod: PBBFAC,,, | Performed by: ORTHOPAEDIC SURGERY

## 2019-06-03 PROCEDURE — 99204 PR OFFICE/OUTPT VISIT, NEW, LEVL IV, 45-59 MIN: ICD-10-PCS | Mod: 57,S$GLB,, | Performed by: ORTHOPAEDIC SURGERY

## 2019-06-03 PROCEDURE — 24670 CLTX ULNAR FX PROX W/O MNPJ: CPT | Mod: LT,S$GLB,, | Performed by: ORTHOPAEDIC SURGERY

## 2019-06-03 NOTE — LETTER
Belkys 3, 2019      Mckinley Barbosa MD  [address] [place] [address] Dr Raj [place] [id number]           [facility] [place] [address]  [place] [address]  Phone: [phone]          Patient: Albin Fernandez   MR Number: [id number]   YOB: 1955   Date of Visit: 6/3/2019       Dear Dr. Mckinley Barbosa:    Thank you for referring Albin Fernandez to me for evaluation. Attached you will find relevant portions of my assessment and plan of care.    If you have questions, please do not hesitate to call me. I look forward to following Albin Fernandez along with you.    Sincerely,    Giovany Marquez II, MD    Enclosure  CC:  No Recipients    If you would like to receive this communication electronically, please contact [email or web address] or [phone] to request more information on [facility] access.    For providers and/or their staff who would like to refer a patient to [facility], please contact us through our one-stop-shop provider referral line, [facility], at [phone].    If you feel you have received this communication in error or would no longer like to receive these types of communications, please e-mail [email or web address]
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
## 2019-06-04 ENCOUNTER — TELEPHONE (OUTPATIENT)
Dept: ORTHOPEDICS | Facility: CLINIC | Age: 64
End: 2019-06-04

## 2019-06-04 NOTE — TELEPHONE ENCOUNTER
----- Message from Vinayak Moulton sent at 6/4/2019 10:14 AM CDT -----  Contact: Bonny wynne wife  Type: Needs Medical Advice    Patient needs to provide you with [employer] # [id number], Medical team phone number  ext [phone]  Best Call Back Number: [phone]  Additional Information: please acknowledge info

## 2019-06-07 ENCOUNTER — DOCUMENTATION ONLY (OUTPATIENT)
Dept: FAMILY MEDICINE | Facility: CLINIC | Age: 64
End: 2019-06-07

## 2019-06-07 ENCOUNTER — OFFICE VISIT (OUTPATIENT)
Dept: FAMILY MEDICINE | Facility: CLINIC | Age: 64
End: 2019-06-07
Payer: MEDICARE

## 2019-06-07 VITALS
DIASTOLIC BLOOD PRESSURE: 84 MMHG | BODY MASS INDEX: 32.62 KG/M2 | RESPIRATION RATE: 16 BRPM | SYSTOLIC BLOOD PRESSURE: 124 MMHG | HEART RATE: 85 BPM | WEIGHT: 220.25 LBS | TEMPERATURE: 98 F | HEIGHT: 69 IN | OXYGEN SATURATION: 97 %

## 2019-06-07 DIAGNOSIS — R39.198 DIFFICULTY URINATING: ICD-10-CM

## 2019-06-07 DIAGNOSIS — Z79.01 ENCOUNTER FOR MONITORING COUMADIN THERAPY: Primary | ICD-10-CM

## 2019-06-07 DIAGNOSIS — Z51.81 ENCOUNTER FOR MONITORING COUMADIN THERAPY: Primary | ICD-10-CM

## 2019-06-07 DIAGNOSIS — I48.91 ATRIAL FIBRILLATION, UNSPECIFIED TYPE: ICD-10-CM

## 2019-06-07 LAB — INR PPP: 3.1 (ref 2–3)

## 2019-06-07 PROCEDURE — 99499 RISK ADDL DX/OHS AUDIT: ICD-10-PCS | Mod: S$GLB,,, | Performed by: INTERNAL MEDICINE

## 2019-06-07 PROCEDURE — 81002 POCT URINE DIPSTICK WITHOUT MICROSCOPE: ICD-10-PCS | Mod: S$GLB,,, | Performed by: INTERNAL MEDICINE

## 2019-06-07 PROCEDURE — 3079F PR MOST RECENT DIASTOLIC BLOOD PRESSURE 80-89 MM HG: ICD-10-PCS | Mod: CPTII,S$GLB,, | Performed by: INTERNAL MEDICINE

## 2019-06-07 PROCEDURE — 99214 PR OFFICE/OUTPT VISIT, EST, LEVL IV, 30-39 MIN: ICD-10-PCS | Mod: 25,S$GLB,, | Performed by: INTERNAL MEDICINE

## 2019-06-07 PROCEDURE — 3008F PR BODY MASS INDEX (BMI) DOCUMENTED: ICD-10-PCS | Mod: CPTII,S$GLB,, | Performed by: INTERNAL MEDICINE

## 2019-06-07 PROCEDURE — 3074F PR MOST RECENT SYSTOLIC BLOOD PRESSURE < 130 MM HG: ICD-10-PCS | Mod: CPTII,S$GLB,, | Performed by: INTERNAL MEDICINE

## 2019-06-07 PROCEDURE — 3079F DIAST BP 80-89 MM HG: CPT | Mod: CPTII,S$GLB,, | Performed by: INTERNAL MEDICINE

## 2019-06-07 PROCEDURE — 3074F SYST BP LT 130 MM HG: CPT | Mod: CPTII,S$GLB,, | Performed by: INTERNAL MEDICINE

## 2019-06-07 PROCEDURE — 99499 UNLISTED E&M SERVICE: CPT | Mod: S$GLB,,, | Performed by: INTERNAL MEDICINE

## 2019-06-07 PROCEDURE — 85610 POCT INR: ICD-10-PCS | Mod: QW,,, | Performed by: INTERNAL MEDICINE

## 2019-06-07 PROCEDURE — 99214 OFFICE O/P EST MOD 30 MIN: CPT | Mod: 25,S$GLB,, | Performed by: INTERNAL MEDICINE

## 2019-06-07 PROCEDURE — 3008F BODY MASS INDEX DOCD: CPT | Mod: CPTII,S$GLB,, | Performed by: INTERNAL MEDICINE

## 2019-06-07 PROCEDURE — 81002 URINALYSIS NONAUTO W/O SCOPE: CPT | Mod: S$GLB,,, | Performed by: INTERNAL MEDICINE

## 2019-06-07 PROCEDURE — 85610 PROTHROMBIN TIME: CPT | Mod: QW,,, | Performed by: INTERNAL MEDICINE
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
## 2019-06-10 ENCOUNTER — HOSPITAL ENCOUNTER (OUTPATIENT)
Dept: RADIOLOGY | Facility: CLINIC | Age: 64
Discharge: HOME | End: 2019-06-10
Attending: INTERNAL MEDICINE
Payer: MEDICARE

## 2019-06-10 DIAGNOSIS — R39.198 DIFFICULTY URINATING: ICD-10-CM

## 2019-06-10 PROCEDURE — 76857 US BLADDER POST VOID RESIDUAL: ICD-10-PCS | Mod: 26,,, | Performed by: RADIOLOGY

## 2019-06-10 PROCEDURE — 76857 US EXAM PELVIC LIMITED: CPT | Mod: TC,PO

## 2019-06-10 PROCEDURE — 76857 US EXAM PELVIC LIMITED: CPT | Mod: 26,,, | Performed by: RADIOLOGY

## 2019-06-12 ENCOUNTER — PATIENT MESSAGE (OUTPATIENT)
Dept: FAMILY MEDICINE | Facility: CLINIC | Age: 64
End: 2019-06-12

## 2019-06-12 DIAGNOSIS — S52.042A: Primary | ICD-10-CM

## 2019-06-18 ENCOUNTER — OFFICE VISIT (OUTPATIENT)
Dept: ORTHOPEDICS | Facility: CLINIC | Age: 64
End: 2019-06-18
Payer: MEDICARE

## 2019-06-18 ENCOUNTER — HOSPITAL ENCOUNTER (OUTPATIENT)
Dept: RADIOLOGY | Facility: HOSPITAL | Age: 64
Discharge: HOME | End: 2019-06-18
Attending: ORTHOPAEDIC SURGERY
Payer: MEDICARE

## 2019-06-18 VITALS
WEIGHT: 220 LBS | SYSTOLIC BLOOD PRESSURE: 122 MMHG | DIASTOLIC BLOOD PRESSURE: 82 MMHG | BODY MASS INDEX: 32.58 KG/M2 | HEART RATE: 97 BPM | HEIGHT: 69 IN

## 2019-06-18 DIAGNOSIS — S52.042A: ICD-10-CM

## 2019-06-18 DIAGNOSIS — S52.042D CLOSED DISPLACED FRACTURE OF CORONOID PROCESS OF LEFT ULNA WITH ROUTINE HEALING, SUBSEQUENT ENCOUNTER: Primary | ICD-10-CM

## 2019-06-18 PROCEDURE — 99999 PR PBB SHADOW E&M-EST. PATIENT-LVL III: ICD-10-PCS | Mod: PBBFAC,,, | Performed by: ORTHOPAEDIC SURGERY

## 2019-06-18 PROCEDURE — 73080 XR ELBOW COMPLETE 3 VIEW LEFT: ICD-10-PCS | Mod: 26,LT,, | Performed by: RADIOLOGY

## 2019-06-18 PROCEDURE — 99024 PR POST-OP FOLLOW-UP VISIT: ICD-10-PCS | Mod: S$GLB,,, | Performed by: ORTHOPAEDIC SURGERY

## 2019-06-18 PROCEDURE — 73080 X-RAY EXAM OF ELBOW: CPT | Mod: TC,PN,LT

## 2019-06-18 PROCEDURE — 73080 X-RAY EXAM OF ELBOW: CPT | Mod: 26,LT,, | Performed by: RADIOLOGY

## 2019-06-18 PROCEDURE — 99999 PR PBB SHADOW E&M-EST. PATIENT-LVL III: CPT | Mod: PBBFAC,,, | Performed by: ORTHOPAEDIC SURGERY

## 2019-06-18 PROCEDURE — 99024 POSTOP FOLLOW-UP VISIT: CPT | Mod: S$GLB,,, | Performed by: ORTHOPAEDIC SURGERY
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
## 2019-06-25 ENCOUNTER — TELEPHONE (OUTPATIENT)
Dept: FAMILY MEDICINE | Facility: CLINIC | Age: 64
End: 2019-06-25

## 2019-06-25 ENCOUNTER — OFFICE VISIT (OUTPATIENT)
Dept: UROLOGY | Facility: CLINIC | Age: 64
End: 2019-06-25
Payer: MEDICARE

## 2019-06-25 ENCOUNTER — PATIENT OUTREACH (OUTPATIENT)
Dept: ADMINISTRATIVE | Facility: HOSPITAL | Age: 64
End: 2019-06-25

## 2019-06-25 VITALS
BODY MASS INDEX: 32.56 KG/M2 | HEART RATE: 64 BPM | HEIGHT: 69 IN | WEIGHT: 219.81 LBS | DIASTOLIC BLOOD PRESSURE: 85 MMHG | SYSTOLIC BLOOD PRESSURE: 135 MMHG

## 2019-06-25 DIAGNOSIS — N52.9 ERECTILE DYSFUNCTION, UNSPECIFIED ERECTILE DYSFUNCTION TYPE: ICD-10-CM

## 2019-06-25 DIAGNOSIS — N13.8 BPH WITH OBSTRUCTION/LOWER URINARY TRACT SYMPTOMS: ICD-10-CM

## 2019-06-25 DIAGNOSIS — R35.1 NOCTURIA: ICD-10-CM

## 2019-06-25 DIAGNOSIS — Z76.89 ENCOUNTER TO ESTABLISH CARE: Primary | ICD-10-CM

## 2019-06-25 DIAGNOSIS — N40.1 BPH WITH OBSTRUCTION/LOWER URINARY TRACT SYMPTOMS: ICD-10-CM

## 2019-06-25 LAB
BILIRUB SERPL-MCNC: ABNORMAL MG/DL
BLOOD URINE, POC: ABNORMAL
COLOR, POC UA: YELLOW
GLUCOSE UR QL STRIP: ABNORMAL
KETONES UR QL STRIP: ABNORMAL
LEUKOCYTE ESTERASE URINE, POC: ABNORMAL
NITRITE, POC UA: ABNORMAL
PH, POC UA: 5.5
PROTEIN, POC: ABNORMAL
SPECIFIC GRAVITY, POC UA: 1.03
UROBILINOGEN, POC UA: ABNORMAL

## 2019-06-25 PROCEDURE — 99205 PR OFFICE/OUTPT VISIT, NEW, LEVL V, 60-74 MIN: ICD-10-PCS | Mod: 25,S$GLB,, | Performed by: UROLOGY

## 2019-06-25 PROCEDURE — 99499 RISK ADDL DX/OHS AUDIT: ICD-10-PCS | Mod: S$GLB,,, | Performed by: UROLOGY

## 2019-06-25 PROCEDURE — 99205 OFFICE O/P NEW HI 60 MIN: CPT | Mod: 25,S$GLB,, | Performed by: UROLOGY

## 2019-06-25 PROCEDURE — 81002 POCT URINE DIPSTICK WITHOUT MICROSCOPE: ICD-10-PCS | Mod: S$GLB,,, | Performed by: UROLOGY

## 2019-06-25 PROCEDURE — 99999 PR PBB SHADOW E&M-EST. PATIENT-LVL IV: CPT | Mod: PBBFAC,,, | Performed by: UROLOGY

## 2019-06-25 PROCEDURE — 3079F PR MOST RECENT DIASTOLIC BLOOD PRESSURE 80-89 MM HG: ICD-10-PCS | Mod: CPTII,S$GLB,, | Performed by: UROLOGY

## 2019-06-25 PROCEDURE — 3008F BODY MASS INDEX DOCD: CPT | Mod: CPTII,S$GLB,, | Performed by: UROLOGY

## 2019-06-25 PROCEDURE — 3075F SYST BP GE 130 - 139MM HG: CPT | Mod: CPTII,S$GLB,, | Performed by: UROLOGY

## 2019-06-25 PROCEDURE — 81002 URINALYSIS NONAUTO W/O SCOPE: CPT | Mod: S$GLB,,, | Performed by: UROLOGY

## 2019-06-25 PROCEDURE — 99499 UNLISTED E&M SERVICE: CPT | Mod: S$GLB,,, | Performed by: UROLOGY

## 2019-06-25 PROCEDURE — 3008F PR BODY MASS INDEX (BMI) DOCUMENTED: ICD-10-PCS | Mod: CPTII,S$GLB,, | Performed by: UROLOGY

## 2019-06-25 PROCEDURE — 3079F DIAST BP 80-89 MM HG: CPT | Mod: CPTII,S$GLB,, | Performed by: UROLOGY

## 2019-06-25 PROCEDURE — 3075F PR MOST RECENT SYSTOLIC BLOOD PRESS GE 130-139MM HG: ICD-10-PCS | Mod: CPTII,S$GLB,, | Performed by: UROLOGY

## 2019-06-25 PROCEDURE — 51701 PR INSERTION OF NON-INDWELLING BLADDER CATHETERIZATION FOR RESIDUAL UR: ICD-10-PCS | Mod: S$GLB,,, | Performed by: UROLOGY

## 2019-06-25 PROCEDURE — 51701 INSERT BLADDER CATHETER: CPT | Mod: S$GLB,,, | Performed by: UROLOGY

## 2019-06-25 PROCEDURE — 99999 PR PBB SHADOW E&M-EST. PATIENT-LVL IV: ICD-10-PCS | Mod: PBBFAC,,, | Performed by: UROLOGY

## 2019-06-25 RX ORDER — TADALAFIL 5 MG/1
5 TABLET ORAL DAILY
Qty: 30 TABLET | Refills: 3 | Status: SHIPPED | OUTPATIENT
Start: 2019-06-25 | End: 2019-09-05 | Stop reason: SDUPTHER

## 2019-06-25 NOTE — TELEPHONE ENCOUNTER
Would like to discuss with the patient about sleep apnea and see if has a history suggestive of it

## 2019-06-25 NOTE — Clinical Note
He may have nocturia from undiagnosed obstructive sleep apnea.  Do you want me to refer him to a pulmonologist or do you just send him for sleep study and if if he is found have obstructive sleep apnea you start him on CPAP.  Just let me know which when you would like to proceed

## 2019-06-25 NOTE — PATIENT INSTRUCTIONS
Discontinue finasteride/Proscar which helps shrink the prostate.  His exam today shows only a 30 g prostate.  He has already had a TURP.  At this point I do not think he needs to continue this.  I would like him to get a PSA however soon within the next week     Continue Flomax/tamsulosin 2 pills at night.  This helps relax the prostate to allow the urine to drain.  We will continue this for now and then discuss stopping this altogether or at least decreasing once I have him return for uroflow and PVR.    Return with a FULL BLADDER to measure how fast you urinate and what is left in your bladder after you urinate. If you feel like you are going to have a lot or do not have the urge to urinate, do not do the test. It will not be useful. Also you can bring water to drink here. Let the nurse know when you are ready to do the test.     Call the pharmacy then I am going to give information for and give the credit card for these to the tadalafil (like cialis) 5mg daily  [facility]  [address] [place] [address], [address]  [place] [address]  Call [phone] with credit card and they should ship for free    Tadalafil 5mg, 30 tablets $29.50  Tadalafil 20mg, 10 tablets $23.00  Sidlenafil 25mg/50mg/100mg, 6 tablets for $16.27, 10 tablets for $19.53, 35 tablets for 35.80    Call us and let us know if you are on finasteride or not    Call us in a month after trying this to tadalafil 5 mg daily from the new place and if it does not work we can try the 20 mg dose    You may need a sleep study and he will either get a referral to lung doctor or send for sleep study from  but if you have not been given information about this in 2 weeks give us a call.  This is to help figure out why year waking up so much    Return to clinic in 3 months          bph/enlarged prostate and nocturia/waking up at night to urinate  I a.m. still not convinced that his nocturia is completely from his BPH.  I think that he may have some  undiagnosed obstructive sleep apnea.  I have sent a message to his PCP to see if he wants to send him for sleep study or for me for PCP to refer him to a lung doctor for further evaluation for sleep study.  If his nocturia is due to obstructive sleep apnea and his enlarged prostate then treatment of his obstructive sleep apnea with a CPAP machine may help improve some.    We discussed further workup again for his BPH.  He stated when he stopped medications previously he had difficulty urinating despite his TURP.  Today his in-and out catheterization with a 16 French revealed no urethral stricture.  He also empties his bladder. At this point I would recommend stopping the finasteride (his prostate exam revealed less than a 40 g prostate) for now.  We will get the PSA soon so that we can get a good idea what this is before he stop the finasteride since the PSA will double once he is off the finasteride.  We will get a copy of his PSAs from Dr. gonzalez office    And he can continue Flomax 0.8 mg.  Will have him return for a uroflow.  If his uroflow shows he has got a good flow then I will decrease the Flomax to 0.4.  Then ultimately would like to take him off of this.  At some point if he wants to come off of this altogether we will need to evaluate with another cystoscopy and transrectal ultrasound.    Erectile dysfunction  For ED he previously was on Cialis 5 mg which worked then changed to a [facility] from [facility] which no longer works.  We will go ahead and  try another 1 from a different pharmacy since compound informs can be different and he may have a better response from the other place and it is cheaper.  [place]  If he continues to have ED despite using this we can try the 20 mg dose as needed instead of the daily dosing again it would be a compound informed however.  I have given him instructions on how to use both.  And he will notify us after a month if the daily dosing is not working for him  from the new place.    Follow-up in 3 months

## 2019-06-25 NOTE — LETTER
June 25, 2019      West Chang MD  [address]  [place] [address]           [place] [facility]  [address] [place] [address] [address]  [place] [address]  Phone: [phone]  Fax: [phone]          Patient: Albin Fernandez   MR Number: [id number]   YOB: 1955   Date of Visit: 6/25/2019       Dear Dr. West Chang:    Thank you for referring Albin Fernandez to me for evaluation. Attached you will find relevant portions of my assessment and plan of care.    If you have questions, please do not hesitate to call me. I look forward to following Albin Fernandez along with you.    Sincerely,    Roselyn Shrestha MD    Enclosure  CC:  No Recipients    If you would like to receive this communication electronically, please contact [email or web address] or [phone] to request more information on [employer] access.    For providers and/or their staff who would like to refer a patient to [facility], please contact us through our one-stop-shop provider referral line, [facility], at [phone].    If you feel you have received this communication in error or would no longer like to receive these types of communications, please e-mail [email or web address]

## 2019-07-01 ENCOUNTER — CLINICAL SUPPORT (OUTPATIENT)
Dept: UROLOGY | Facility: CLINIC | Age: 64
End: 2019-07-01
Payer: MEDICARE

## 2019-07-01 ENCOUNTER — LAB VISIT (OUTPATIENT)
Dept: LAB | Facility: HOSPITAL | Age: 64
End: 2019-07-01
Attending: UROLOGY
Payer: MEDICARE

## 2019-07-01 DIAGNOSIS — N52.9 ERECTILE DYSFUNCTION, UNSPECIFIED ERECTILE DYSFUNCTION TYPE: ICD-10-CM

## 2019-07-01 DIAGNOSIS — N40.1 ENLARGED PROSTATE WITH URINARY OBSTRUCTION: Primary | ICD-10-CM

## 2019-07-01 DIAGNOSIS — R35.1 NOCTURIA: ICD-10-CM

## 2019-07-01 DIAGNOSIS — Z76.89 ENCOUNTER TO ESTABLISH CARE: ICD-10-CM

## 2019-07-01 DIAGNOSIS — N13.8 ENLARGED PROSTATE WITH URINARY OBSTRUCTION: Primary | ICD-10-CM

## 2019-07-01 LAB
PROSTATE SPECIFIC ANTIGEN, TOTAL: 1.3 NG/ML (ref 0–4)
PSA FREE MFR SERPL: 27.69 %
PSA FREE SERPL-MCNC: 0.36 NG/ML (ref 0.01–1.5)

## 2019-07-01 PROCEDURE — 99499 UNLISTED E&M SERVICE: CPT | Mod: S$GLB,,, | Performed by: UROLOGY

## 2019-07-01 PROCEDURE — 84154 ASSAY OF PSA FREE: CPT

## 2019-07-01 PROCEDURE — 51736 URINE FLOW MEASUREMENT: CPT | Mod: S$GLB,,, | Performed by: UROLOGY

## 2019-07-01 PROCEDURE — 51736 PR SIMPLE UROFLOWMETRY: ICD-10-PCS | Mod: S$GLB,,, | Performed by: UROLOGY

## 2019-07-01 PROCEDURE — 36415 COLL VENOUS BLD VENIPUNCTURE: CPT

## 2019-07-01 PROCEDURE — 99499 NO LOS: ICD-10-PCS | Mod: S$GLB,,, | Performed by: UROLOGY

## 2019-07-01 NOTE — PROGRESS NOTES
Let patient know his PSA is 1.3  This is low  At this point I want him to return for another PVR since it was so elevated on the recent uroflow and PVR  Also find out if he is on finasteride in addition to the Flomax 0.8 mg nightly  If his PVR remains elevated then I am going to schedule him for a cystoscopy and transrectal ultrasound because he may need a procedure for his prostate so that he can empty his bladder better

## 2019-07-01 NOTE — PROGRESS NOTES
Per  patient in clinic today for uroflow and pvr.    Uroflow results (date: 07/01/2019) on  :   Voiding time: 40.7s,   Flow time: 37.4s,   TTP flow: 4.1s,   Peak flowrate: 10.0 mL/s,   Average flowrate: 3.8mL/s,   Intervals: 2,    Voided volume: 142 mL,    Pvr by bladder scan: 103.   Pattern of curve:slow rise and slow empty      Uroflow results reviewed and interpreted by me ()

## 2019-07-08 ENCOUNTER — TELEPHONE (OUTPATIENT)
Dept: UROLOGY | Facility: CLINIC | Age: 64
End: 2019-07-08

## 2019-07-09 NOTE — TELEPHONE ENCOUNTER
----- Message from Kirstin Funk sent at 7/9/2019 10:11 AM CDT -----  Type:  Patient Returning Call    Who Called:  Wife/Bonny  Who Left Message for Patient:  n/a  Does the patient know what this is regarding?:  no  Best Call Back Number:  [phone]

## 2019-07-09 NOTE — TELEPHONE ENCOUNTER
Patient already informed results and recommendations on lab. Please review all questions asked to patient on Friday 7/5

## 2019-07-15 ENCOUNTER — CLINICAL SUPPORT (OUTPATIENT)
Dept: UROLOGY | Facility: CLINIC | Age: 64
End: 2019-07-15
Payer: MEDICARE

## 2019-07-15 DIAGNOSIS — N13.8 BPH WITH URINARY OBSTRUCTION: Primary | ICD-10-CM

## 2019-07-15 DIAGNOSIS — N40.1 BPH WITH URINARY OBSTRUCTION: Primary | ICD-10-CM

## 2019-07-15 LAB — POC RESIDUAL URINE VOLUME: 117 ML (ref 0–100)

## 2019-07-15 PROCEDURE — 51798 POCT BLADDER SCAN: ICD-10-PCS | Mod: S$GLB,,, | Performed by: UROLOGY

## 2019-07-15 PROCEDURE — 99499 UNLISTED E&M SERVICE: CPT | Mod: S$GLB,,, | Performed by: UROLOGY

## 2019-07-15 PROCEDURE — 99499 NO LOS: ICD-10-PCS | Mod: S$GLB,,, | Performed by: UROLOGY

## 2019-07-15 PROCEDURE — 51798 US URINE CAPACITY MEASURE: CPT | Mod: S$GLB,,, | Performed by: UROLOGY

## 2019-07-15 NOTE — PROGRESS NOTES
Per  patient in clinic for a pvr. Patient ambulated to restroom to empty bladder. PVR by bladder scan showed 117 residual.

## 2019-07-15 NOTE — PROGRESS NOTES
Pt pvr by in and out cath at [facility] was 40cc.  Will continue to b[place] and see him back in sept as scheduled

## 2019-07-16 ENCOUNTER — DOCUMENTATION ONLY (OUTPATIENT)
Dept: FAMILY MEDICINE | Facility: CLINIC | Age: 64
End: 2019-07-16

## 2019-07-16 ENCOUNTER — OFFICE VISIT (OUTPATIENT)
Dept: FAMILY MEDICINE | Facility: CLINIC | Age: 64
End: 2019-07-16
Payer: MEDICARE

## 2019-07-16 VITALS
WEIGHT: 219.13 LBS | RESPIRATION RATE: 16 BRPM | DIASTOLIC BLOOD PRESSURE: 76 MMHG | HEIGHT: 69 IN | BODY MASS INDEX: 32.46 KG/M2 | OXYGEN SATURATION: 97 % | HEART RATE: 102 BPM | SYSTOLIC BLOOD PRESSURE: 114 MMHG | TEMPERATURE: 98 F

## 2019-07-16 DIAGNOSIS — Z51.81 ENCOUNTER FOR MONITORING COUMADIN THERAPY: Primary | ICD-10-CM

## 2019-07-16 DIAGNOSIS — I48.91 ATRIAL FIBRILLATION, UNSPECIFIED TYPE: ICD-10-CM

## 2019-07-16 DIAGNOSIS — Z79.01 ENCOUNTER FOR MONITORING COUMADIN THERAPY: Primary | ICD-10-CM

## 2019-07-16 LAB — INR PPP: 2.5 (ref 2–3)

## 2019-07-16 PROCEDURE — 99499 RISK ADDL DX/OHS AUDIT: ICD-10-PCS | Mod: S$GLB,,, | Performed by: INTERNAL MEDICINE

## 2019-07-16 PROCEDURE — 3074F SYST BP LT 130 MM HG: CPT | Mod: CPTII,S$GLB,, | Performed by: INTERNAL MEDICINE

## 2019-07-16 PROCEDURE — 99213 OFFICE O/P EST LOW 20 MIN: CPT | Mod: S$GLB,,, | Performed by: INTERNAL MEDICINE

## 2019-07-16 PROCEDURE — 3078F DIAST BP <80 MM HG: CPT | Mod: CPTII,S$GLB,, | Performed by: INTERNAL MEDICINE

## 2019-07-16 PROCEDURE — 3008F PR BODY MASS INDEX (BMI) DOCUMENTED: ICD-10-PCS | Mod: CPTII,S$GLB,, | Performed by: INTERNAL MEDICINE

## 2019-07-16 PROCEDURE — 3078F PR MOST RECENT DIASTOLIC BLOOD PRESSURE < 80 MM HG: ICD-10-PCS | Mod: CPTII,S$GLB,, | Performed by: INTERNAL MEDICINE

## 2019-07-16 PROCEDURE — 3008F BODY MASS INDEX DOCD: CPT | Mod: CPTII,S$GLB,, | Performed by: INTERNAL MEDICINE

## 2019-07-16 PROCEDURE — 99213 PR OFFICE/OUTPT VISIT, EST, LEVL III, 20-29 MIN: ICD-10-PCS | Mod: S$GLB,,, | Performed by: INTERNAL MEDICINE

## 2019-07-16 PROCEDURE — 99499 UNLISTED E&M SERVICE: CPT | Mod: S$GLB,,, | Performed by: INTERNAL MEDICINE

## 2019-07-16 PROCEDURE — 3074F PR MOST RECENT SYSTOLIC BLOOD PRESSURE < 130 MM HG: ICD-10-PCS | Mod: CPTII,S$GLB,, | Performed by: INTERNAL MEDICINE

## 2019-07-16 NOTE — PROGRESS NOTES
Subjective:       Patient ID: Albin Fernandez is a 63 y.o. male.    Chief Complaint: coumadin monitoring (discuss sleep apnea)    HPI       Coumadin monitoring:  Lab Results   Component Value Date    INR 2.5 07/16/2019    INR 3.1 06/07/2019    INR 2.5 05/10/2019       ANTICOAGULATION DX: (The following diagnoses are positive if BOLD, negative otherwise.)  Atrial_fibirillation.  . DVT. Pulmonary_embolism.  Aortic_valve_replacement.  TIA/CVA.         Current Dosage:  3 mg daily     Compliance with medication - good  Diet changes: no.     watches diet: yes.  .  Pending medical/dental procedure: no  Concomitant medication changes (including over the counter/herbs): no  Alcohol use:  no        The following symptoms are positive if BOLD, negative otherwise.    Bleeding episodes:    Gingival_bleeding., epistaxis ., ecchymoses, hematuria . , melena, blood_per_rectum  Thrombotic event:    shortness_of_breath .  pain/swelling_of_extremity . , numbness  , tingling . , headache .   Intolerance of drug:  nausea/vomiting/diarrhea . , rash . , skin necrosis .     Review of Systems   Constitutional: Negative for activity change and unexpected weight change.   HENT: Positive for hearing loss. Negative for rhinorrhea and trouble swallowing.    Eyes: Negative for discharge and visual disturbance.   Respiratory: Negative for chest tightness and wheezing.    Cardiovascular: Negative for chest pain and palpitations.   Gastrointestinal: Negative for blood in stool, constipation, diarrhea and vomiting.   Endocrine: Negative for polydipsia and polyuria.   Genitourinary: Positive for difficulty urinating. Negative for hematuria and urgency.   Musculoskeletal: Negative for arthralgias, joint swelling and neck pain.   Neurological: Negative for weakness and headaches.   Psychiatric/Behavioral: Negative for confusion and dysphoric mood.         Objective:      Vitals:    07/16/19 1445   BP: 114/76   Pulse: 102   Resp: 16   Temp: 98.2 °F (36.8  "°C)   TempSrc: Oral   SpO2: 97%   Weight: 99.4 kg (219 lb 2.2 oz)   Height: 5' 9" (1.753 m)   PainSc:   7   PainLoc: Neck     Physical Exam   Constitutional: He appears well-developed and well-nourished.   Cardiovascular: Normal rate and normal heart sounds.   Irregularly irregular   Pulmonary/Chest: Effort normal and breath sounds normal.   Abdominal: Soft. There is no tenderness.   Neurological: He is alert.   Psychiatric: He has a normal mood and affect. His behavior is normal. Thought content normal.   Nursing note and vitals reviewed.      inr 2.5  Assessment:       1. Encounter for monitoring Coumadin therapy    2. Atrial fibrillation, unspecified type          Plan:       Encounter for monitoring Coumadin therapy  -     POCT INR    Atrial fibrillation, unspecified type  -     POCT INR      Follow up in about 1 month (around 8/16/2019).      "

## 2019-07-17 DIAGNOSIS — S52.042D CLOSED DISPLACED FRACTURE OF CORONOID PROCESS OF LEFT ULNA WITH ROUTINE HEALING, SUBSEQUENT ENCOUNTER: Primary | ICD-10-CM

## 2019-07-18 ENCOUNTER — PATIENT MESSAGE (OUTPATIENT)
Dept: ORTHOPEDICS | Facility: CLINIC | Age: 64
End: 2019-07-18

## 2019-07-18 ENCOUNTER — OFFICE VISIT (OUTPATIENT)
Dept: ORTHOPEDICS | Facility: CLINIC | Age: 64
End: 2019-07-18
Payer: MEDICARE

## 2019-07-18 ENCOUNTER — HOSPITAL ENCOUNTER (OUTPATIENT)
Dept: RADIOLOGY | Facility: HOSPITAL | Age: 64
Discharge: HOME | End: 2019-07-18
Attending: ORTHOPAEDIC SURGERY
Payer: MEDICARE

## 2019-07-18 VITALS
WEIGHT: 219 LBS | HEART RATE: 95 BPM | DIASTOLIC BLOOD PRESSURE: 82 MMHG | SYSTOLIC BLOOD PRESSURE: 128 MMHG | BODY MASS INDEX: 32.44 KG/M2 | HEIGHT: 69 IN

## 2019-07-18 DIAGNOSIS — S52.042D CLOSED DISPLACED FRACTURE OF CORONOID PROCESS OF LEFT ULNA WITH ROUTINE HEALING, SUBSEQUENT ENCOUNTER: ICD-10-CM

## 2019-07-18 DIAGNOSIS — S52.042D CLOSED DISPLACED FRACTURE OF CORONOID PROCESS OF LEFT ULNA WITH ROUTINE HEALING, SUBSEQUENT ENCOUNTER: Primary | ICD-10-CM

## 2019-07-18 PROCEDURE — 99999 PR PBB SHADOW E&M-EST. PATIENT-LVL III: CPT | Mod: PBBFAC,,, | Performed by: ORTHOPAEDIC SURGERY

## 2019-07-18 PROCEDURE — 99999 PR PBB SHADOW E&M-EST. PATIENT-LVL III: ICD-10-PCS | Mod: PBBFAC,,, | Performed by: ORTHOPAEDIC SURGERY

## 2019-07-18 PROCEDURE — 99024 PR POST-OP FOLLOW-UP VISIT: ICD-10-PCS | Mod: S$GLB,,, | Performed by: ORTHOPAEDIC SURGERY

## 2019-07-18 PROCEDURE — 73080 X-RAY EXAM OF ELBOW: CPT | Mod: TC,PN,LT

## 2019-07-18 PROCEDURE — 73080 X-RAY EXAM OF ELBOW: CPT | Mod: 26,LT,, | Performed by: RADIOLOGY

## 2019-07-18 PROCEDURE — 73080 XR ELBOW COMPLETE 3 VIEW LEFT: ICD-10-PCS | Mod: 26,LT,, | Performed by: RADIOLOGY

## 2019-07-18 PROCEDURE — 99024 POSTOP FOLLOW-UP VISIT: CPT | Mod: S$GLB,,, | Performed by: ORTHOPAEDIC SURGERY

## 2019-07-18 NOTE — PROGRESS NOTES
CC:  63-year-old male follows up with a coronoid fracture of the left ulna.  Date of injury was 06/01/2019.  The patient complains of continued pain in the left elbow.  He states as he ranges the elbow he will intermittently have sharp pains and feel a catch in the elbow.  Most of the pain centers around the antecubital fossa.    ROS:    Constitution: Denies chills, fever, and sweats.  HENT: Denies headaches or blurry vision.  Cardiovascular: Denies chest pain or irregular heart beat.  Respiratory: Denies cough or shortness of breath.  Gastrointestinal: Denies abdominal pain, nausea, or vomiting.  Genitourinary:  Denies urinary incontinence, bladder and kidney issues  Musculoskeletal:  Denies muscle cramps.  Neurological: Denies dizziness or focal weakness.  Psychiatric/Behavioral: Normal mental status.  Hematologic/Lymphatic: Denies bleeding problem or easy bruising/bleeding.  Skin: Denies rash or suspicious lesions.    Physical examination     Gen - No acute distress   Eyes - Extraoccular motions intact, pupils equally round and reactive to light and accommodation   ENT - normocephalic, atruamtic, oropharynx clear   Neck - Supple, no abnormal masses   Cardiovascular - regular rate and rhythm   Pulmonary - clear to auscultation bilaterally   Abdomen - soft, non-tender, non-distended, positive bowel sounds   Psych - The patient is alert and oriented x3 with normal mood and affect    Left Upper Extremity Examination     Skin is intact throughout   Motor is intact distally radial, median, ulnar, AIN, PIN   +2 radial and ulnar pulses   Sensation to light touch is intact distally radial, median, and ulnar     Left Elbow Exam:     ROM - 0-150   Medial tenderness - negative  Lateral tenderness - negative  Distal biceps tenderness - positive  Triceps tenderness - negative   Instability on exam - negative  Tinell's at the elbow - negative  Swelling - positive  Ecchymosis - negative    X-rays were examined and personally  reviewed by me.  There is a fracture of the coronoid process of the left ulna that is slightly displaced resulted in a nonunion.    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  My recommendation is to go in arthroscopically through the elbow and remove the fragment.  Risks and benefits were explained and the patient verbalized understanding.  He does wish to proceed. We will schedule that at the next available date that is convenient for him.    Answers for HPI/ROS submitted by the patient on 7/17/2019   Arm pain  unexpected weight change: No  appetite change : No  sleep disturbance: No  IMMUNOCOMPROMISED: No  nervous/ anxious: No  dysphoric mood: No  rash: No  visual disturbance: No  eye redness: No  eye pain: No  ear pain: No  tinnitus: No  hearing loss: No  sinus pressure : No  nosebleeds: No  enviro allergies: No  food allergies: No  cough: No  shortness of breath: No  sweating: No  frequency: Yes  difficulty urinating: Yes  hematuria: No  chest pain: No  palpitations: No  nausea: No  vomiting: No  diarrhea: No  blood in stool: No  constipation: No  headaches: No  dizziness: No  numbness: No  seizures: No  joint swelling: No  myalgia: No  weakness: No  back pain: No  Pain Chronicity: recurrent  History of trauma: Yes  Onset: more than 1 month ago  Frequency: intermittently  Progression since onset: waxing and waning  Injury mechanism: collision/contact  injury location: at home  pain- numeric: 8/10  pain location: left elbow  pain quality: sharp  Radiating Pain: Yes  If your pain is radiating, to what part of the body?: left forearm  Aggravating factors: activity, bending, bearing weight, extension  fever: No  inability to bear weight: Yes  itching: No  joint locking: Yes  limited range of motion: Yes  stiffness: No  tingling: Yes  Treatments tried: cold,  NSAIDs  physical therapy: not tried  Improvement on treatment: mild

## 2019-07-19 ENCOUNTER — PATIENT MESSAGE (OUTPATIENT)
Dept: ORTHOPEDICS | Facility: CLINIC | Age: 64
End: 2019-07-19

## 2019-07-19 DIAGNOSIS — S52.042D CLOSED DISPLACED FRACTURE OF CORONOID PROCESS OF LEFT ULNA WITH ROUTINE HEALING: ICD-10-CM

## 2019-07-19 DIAGNOSIS — Z01.818 PREOPERATIVE TESTING: Primary | ICD-10-CM

## 2019-07-19 DIAGNOSIS — S52.042D CLOSED DISPLACED FRACTURE OF CORONOID PROCESS OF LEFT ULNA WITH ROUTINE HEALING, SUBSEQUENT ENCOUNTER: ICD-10-CM

## 2019-07-19 RX ORDER — MUPIROCIN 20 MG/G
OINTMENT TOPICAL
Status: CANCELLED | OUTPATIENT
Start: 2019-07-19

## 2019-07-19 RX ORDER — CEFAZOLIN SODIUM 2 G/50ML
2 SOLUTION INTRAVENOUS
Status: CANCELLED | OUTPATIENT
Start: 2019-07-19

## 2019-07-30 ENCOUNTER — HOSPITAL ENCOUNTER (OUTPATIENT)
Dept: PREADMISSION TESTING | Facility: HOSPITAL | Age: 64
Discharge: HOME | End: 2019-07-30
Attending: ORTHOPAEDIC SURGERY
Payer: MEDICARE

## 2019-07-30 DIAGNOSIS — I48.0 PAROXYSMAL ATRIAL FIBRILLATION: ICD-10-CM

## 2019-07-30 DIAGNOSIS — S52.042D CLOSED DISPLACED FRACTURE OF CORONOID PROCESS OF LEFT ULNA WITH ROUTINE HEALING, SUBSEQUENT ENCOUNTER: Primary | ICD-10-CM

## 2019-07-30 DIAGNOSIS — Z01.818 PREOPERATIVE TESTING: ICD-10-CM

## 2019-07-30 LAB
ANION GAP SERPL CALC-SCNC: 6 MMOL/L (ref 8–16)
BASOPHILS # BLD AUTO: 0.03 K/UL (ref 0–0.2)
BASOPHILS NFR BLD: 0.6 % (ref 0–1.9)
BUN SERPL-MCNC: 17 MG/DL (ref 8–23)
CALCIUM SERPL-MCNC: 9.3 MG/DL (ref 8.7–10.5)
CHLORIDE SERPL-SCNC: 105 MMOL/L (ref 95–110)
CO2 SERPL-SCNC: 29 MMOL/L (ref 23–29)
CREAT SERPL-MCNC: 1.1 MG/DL (ref 0.5–1.4)
DIFFERENTIAL METHOD: ABNORMAL
EOSINOPHIL # BLD AUTO: 0.2 K/UL (ref 0–0.5)
EOSINOPHIL NFR BLD: 3.1 % (ref 0–8)
ERYTHROCYTE [DISTWIDTH] IN BLOOD BY AUTOMATED COUNT: 13.1 % (ref 11.5–14.5)
EST. GFR  (AFRICAN AMERICAN): >60 ML/MIN/1.73 M^2
EST. GFR  (NON AFRICAN AMERICAN): >60 ML/MIN/1.73 M^2
GLUCOSE SERPL-MCNC: 96 MG/DL (ref 70–110)
HCT VFR BLD AUTO: 41.5 % (ref 40–54)
HGB BLD-MCNC: 13.9 G/DL (ref 14–18)
IMM GRANULOCYTES # BLD AUTO: 0.03 K/UL (ref 0–0.04)
LYMPHOCYTES # BLD AUTO: 1.1 K/UL (ref 1–4.8)
LYMPHOCYTES NFR BLD: 22 % (ref 18–48)
MCH RBC QN AUTO: 31 PG (ref 27–31)
MCHC RBC AUTO-ENTMCNC: 33.5 G/DL (ref 32–36)
MCV RBC AUTO: 93 FL (ref 82–98)
MONOCYTES # BLD AUTO: 0.7 K/UL (ref 0.3–1)
MONOCYTES NFR BLD: 13.4 % (ref 4–15)
NEUTROPHILS # BLD AUTO: 2.9 K/UL (ref 1.8–7.7)
NEUTROPHILS NFR BLD: 60.3 % (ref 38–73)
NRBC BLD-RTO: 0 /100 WBC
PLATELET # BLD AUTO: 174 K/UL (ref 150–350)
PMV BLD AUTO: 9.5 FL (ref 9.2–12.9)
POTASSIUM SERPL-SCNC: 4 MMOL/L (ref 3.5–5.1)
RBC # BLD AUTO: 4.48 M/UL (ref 4.6–6.2)
SODIUM SERPL-SCNC: 140 MMOL/L (ref 136–145)
WBC # BLD AUTO: 4.86 K/UL (ref 3.9–12.7)

## 2019-07-30 PROCEDURE — 85025 COMPLETE CBC W/AUTO DIFF WBC: CPT

## 2019-07-30 PROCEDURE — 99900103 DSU ONLY-NO CHARGE-INITIAL HR (STAT)

## 2019-07-30 PROCEDURE — 93010 ELECTROCARDIOGRAM REPORT: CPT | Mod: ,,, | Performed by: INTERNAL MEDICINE

## 2019-07-30 PROCEDURE — 93005 ELECTROCARDIOGRAM TRACING: CPT

## 2019-07-30 PROCEDURE — 93010 EKG 12-LEAD: ICD-10-PCS | Mod: ,,, | Performed by: INTERNAL MEDICINE

## 2019-07-30 PROCEDURE — 36415 COLL VENOUS BLD VENIPUNCTURE: CPT

## 2019-07-30 PROCEDURE — 99900104 DSU ONLY-NO CHARGE-EA ADD'L HR (STAT)

## 2019-07-30 PROCEDURE — 80048 BASIC METABOLIC PNL TOTAL CA: CPT

## 2019-08-08 ENCOUNTER — ANESTHESIA EVENT (OUTPATIENT)
Dept: SURGERY | Facility: HOSPITAL | Age: 64
End: 2019-08-08
Payer: MEDICARE

## 2019-08-08 RX ORDER — DIPHENHYDRAMINE HYDROCHLORIDE 50 MG/ML
25 INJECTION INTRAMUSCULAR; INTRAVENOUS EVERY 6 HOURS PRN
Status: CANCELLED | OUTPATIENT
Start: 2019-08-08

## 2019-08-08 RX ORDER — SODIUM CHLORIDE 0.9 % (FLUSH) 0.9 %
3 SYRINGE (ML) INJECTION
Status: CANCELLED | OUTPATIENT
Start: 2019-08-08

## 2019-08-08 RX ORDER — FENTANYL CITRATE 50 UG/ML
25 INJECTION, SOLUTION INTRAMUSCULAR; INTRAVENOUS EVERY 5 MIN PRN
Status: CANCELLED | OUTPATIENT
Start: 2019-08-08

## 2019-08-08 RX ORDER — HYDROMORPHONE HYDROCHLORIDE 2 MG/ML
0.2 INJECTION, SOLUTION INTRAMUSCULAR; INTRAVENOUS; SUBCUTANEOUS EVERY 5 MIN PRN
Status: CANCELLED | OUTPATIENT
Start: 2019-08-08

## 2019-08-08 RX ORDER — MEPERIDINE HYDROCHLORIDE 50 MG/ML
12.5 INJECTION INTRAMUSCULAR; INTRAVENOUS; SUBCUTANEOUS ONCE AS NEEDED
Status: CANCELLED | OUTPATIENT
Start: 2019-08-08 | End: 2019-08-09

## 2019-08-08 RX ORDER — SODIUM CHLORIDE, SODIUM LACTATE, POTASSIUM CHLORIDE, CALCIUM CHLORIDE 600; 310; 30; 20 MG/100ML; MG/100ML; MG/100ML; MG/100ML
75 INJECTION, SOLUTION INTRAVENOUS CONTINUOUS
Status: CANCELLED | OUTPATIENT
Start: 2019-08-08

## 2019-08-08 RX ORDER — OXYCODONE HYDROCHLORIDE 5 MG/1
5 TABLET ORAL
Status: CANCELLED | OUTPATIENT
Start: 2019-08-08

## 2019-08-08 RX ORDER — ONDANSETRON 2 MG/ML
4 INJECTION INTRAMUSCULAR; INTRAVENOUS ONCE
Status: CANCELLED | OUTPATIENT
Start: 2019-08-08 | End: 2019-08-08

## 2019-08-09 ENCOUNTER — HOSPITAL ENCOUNTER (OUTPATIENT)
Facility: HOSPITAL | Age: 64
Discharge: HOME | End: 2019-08-09
Attending: ORTHOPAEDIC SURGERY | Admitting: ORTHOPAEDIC SURGERY
Payer: MEDICARE

## 2019-08-09 ENCOUNTER — ANESTHESIA (OUTPATIENT)
Dept: SURGERY | Facility: HOSPITAL | Age: 64
End: 2019-08-09
Payer: MEDICARE

## 2019-08-09 DIAGNOSIS — Z79.01 ANTICOAGULATED ON COUMADIN: ICD-10-CM

## 2019-08-09 DIAGNOSIS — S52.042D CLOSED DISPLACED FRACTURE OF CORONOID PROCESS OF LEFT ULNA WITH ROUTINE HEALING, SUBSEQUENT ENCOUNTER: Primary | ICD-10-CM

## 2019-08-09 DIAGNOSIS — S52.042D CLOSED DISPLACED FRACTURE OF CORONOID PROCESS OF LEFT ULNA WITH ROUTINE HEALING, SUBSEQUENT ENCOUNTER: ICD-10-CM

## 2019-08-09 DIAGNOSIS — S52.042D CLOSED DISPLACED FRACTURE OF CORONOID PROCESS OF LEFT ULNA WITH ROUTINE HEALING: Primary | ICD-10-CM

## 2019-08-09 DIAGNOSIS — Z01.818 PREOPERATIVE TESTING: ICD-10-CM

## 2019-08-09 DIAGNOSIS — I48.20 CHRONIC ATRIAL FIBRILLATION: ICD-10-CM

## 2019-08-09 LAB
APTT BLDCRRT: 28.5 SEC (ref 21–32)
INR PPP: 1 (ref 0.8–1.2)
PROTHROMBIN TIME: 10.2 SEC (ref 9–12.5)

## 2019-08-09 PROCEDURE — D9220A PRA ANESTHESIA: ICD-10-PCS | Mod: CRNA,,, | Performed by: NURSE ANESTHETIST, CERTIFIED REGISTERED

## 2019-08-09 PROCEDURE — 36000710: Performed by: ORTHOPAEDIC SURGERY

## 2019-08-09 PROCEDURE — D9220A PRA ANESTHESIA: Mod: ANES,,, | Performed by: ANESTHESIOLOGY

## 2019-08-09 PROCEDURE — 25000003 PHARM REV CODE 250: Performed by: ANESTHESIOLOGY

## 2019-08-09 PROCEDURE — D9220A PRA ANESTHESIA: Mod: CRNA,,, | Performed by: NURSE ANESTHETIST, CERTIFIED REGISTERED

## 2019-08-09 PROCEDURE — 29838 PR ELBOW ARTHROSCOP,EXTEN DEBRIDE: ICD-10-PCS | Mod: 78,LT,, | Performed by: ORTHOPAEDIC SURGERY

## 2019-08-09 PROCEDURE — 25000003 PHARM REV CODE 250: Performed by: ORTHOPAEDIC SURGERY

## 2019-08-09 PROCEDURE — 99900104 DSU ONLY-NO CHARGE-EA ADD'L HR (STAT): Performed by: ORTHOPAEDIC SURGERY

## 2019-08-09 PROCEDURE — 29838 ELBOW ARTHROSCOPY/SURGERY: CPT | Mod: 78,LT,, | Performed by: ORTHOPAEDIC SURGERY

## 2019-08-09 PROCEDURE — 63600175 PHARM REV CODE 636 W HCPCS: Performed by: ORTHOPAEDIC SURGERY

## 2019-08-09 PROCEDURE — D9220A PRA ANESTHESIA: ICD-10-PCS | Mod: ANES,,, | Performed by: ANESTHESIOLOGY

## 2019-08-09 PROCEDURE — 25000003 PHARM REV CODE 250: Performed by: NURSE ANESTHETIST, CERTIFIED REGISTERED

## 2019-08-09 PROCEDURE — 99900103 DSU ONLY-NO CHARGE-INITIAL HR (STAT): Performed by: ORTHOPAEDIC SURGERY

## 2019-08-09 PROCEDURE — 71000033 HC RECOVERY, INTIAL HOUR: Performed by: ORTHOPAEDIC SURGERY

## 2019-08-09 PROCEDURE — 27201423 OPTIME MED/SURG SUP & DEVICES STERILE SUPPLY: Performed by: ORTHOPAEDIC SURGERY

## 2019-08-09 PROCEDURE — 63600175 PHARM REV CODE 636 W HCPCS: Performed by: ANESTHESIOLOGY

## 2019-08-09 PROCEDURE — 85730 THROMBOPLASTIN TIME PARTIAL: CPT

## 2019-08-09 PROCEDURE — 71000015 HC POSTOP RECOV 1ST HR: Performed by: ORTHOPAEDIC SURGERY

## 2019-08-09 PROCEDURE — 37000008 HC ANESTHESIA 1ST 15 MINUTES: Performed by: ORTHOPAEDIC SURGERY

## 2019-08-09 PROCEDURE — 36000711: Performed by: ORTHOPAEDIC SURGERY

## 2019-08-09 PROCEDURE — 63600175 PHARM REV CODE 636 W HCPCS: Performed by: NURSE ANESTHETIST, CERTIFIED REGISTERED

## 2019-08-09 PROCEDURE — 85610 PROTHROMBIN TIME: CPT

## 2019-08-09 PROCEDURE — 36415 COLL VENOUS BLD VENIPUNCTURE: CPT

## 2019-08-09 PROCEDURE — 71000039 HC RECOVERY, EACH ADD'L HOUR: Performed by: ORTHOPAEDIC SURGERY

## 2019-08-09 PROCEDURE — 37000009 HC ANESTHESIA EA ADD 15 MINS: Performed by: ORTHOPAEDIC SURGERY

## 2019-08-09 RX ORDER — PROPOFOL 10 MG/ML
VIAL (ML) INTRAVENOUS
Status: DISCONTINUED | OUTPATIENT
Start: 2019-08-09 | End: 2019-08-09

## 2019-08-09 RX ORDER — ONDANSETRON 2 MG/ML
4 INJECTION INTRAMUSCULAR; INTRAVENOUS ONCE AS NEEDED
Status: DISCONTINUED | OUTPATIENT
Start: 2019-08-09 | End: 2019-08-09 | Stop reason: HOSPADM

## 2019-08-09 RX ORDER — ONDANSETRON HYDROCHLORIDE 2 MG/ML
INJECTION, SOLUTION INTRAMUSCULAR; INTRAVENOUS
Status: DISCONTINUED | OUTPATIENT
Start: 2019-08-09 | End: 2019-08-09

## 2019-08-09 RX ORDER — PHENYLEPHRINE HYDROCHLORIDE 10 MG/ML
INJECTION INTRAVENOUS
Status: DISCONTINUED | OUTPATIENT
Start: 2019-08-09 | End: 2019-08-09

## 2019-08-09 RX ORDER — OXYCODONE HYDROCHLORIDE 5 MG/1
5 TABLET ORAL ONCE AS NEEDED
Status: COMPLETED | OUTPATIENT
Start: 2019-08-09 | End: 2019-08-09

## 2019-08-09 RX ORDER — DEXAMETHASONE SODIUM PHOSPHATE 4 MG/ML
INJECTION, SOLUTION INTRA-ARTICULAR; INTRALESIONAL; INTRAMUSCULAR; INTRAVENOUS; SOFT TISSUE
Status: DISCONTINUED | OUTPATIENT
Start: 2019-08-09 | End: 2019-08-09

## 2019-08-09 RX ORDER — LIDOCAINE HCL/PF 100 MG/5ML
SYRINGE (ML) INTRAVENOUS
Status: DISCONTINUED | OUTPATIENT
Start: 2019-08-09 | End: 2019-08-09

## 2019-08-09 RX ORDER — ROCURONIUM BROMIDE 10 MG/ML
INJECTION, SOLUTION INTRAVENOUS
Status: DISCONTINUED | OUTPATIENT
Start: 2019-08-09 | End: 2019-08-09

## 2019-08-09 RX ORDER — BUPIVACAINE HYDROCHLORIDE AND EPINEPHRINE 2.5; 5 MG/ML; UG/ML
INJECTION, SOLUTION EPIDURAL; INFILTRATION; INTRACAUDAL; PERINEURAL
Status: DISCONTINUED | OUTPATIENT
Start: 2019-08-09 | End: 2019-08-09 | Stop reason: HOSPADM

## 2019-08-09 RX ORDER — EPINEPHRINE 1 MG/ML
INJECTION, SOLUTION INTRACARDIAC; INTRAMUSCULAR; INTRAVENOUS; SUBCUTANEOUS
Status: DISCONTINUED | OUTPATIENT
Start: 2019-08-09 | End: 2019-08-09 | Stop reason: HOSPADM

## 2019-08-09 RX ORDER — MIDAZOLAM HYDROCHLORIDE 1 MG/ML
INJECTION, SOLUTION INTRAMUSCULAR; INTRAVENOUS
Status: DISCONTINUED | OUTPATIENT
Start: 2019-08-09 | End: 2019-08-09

## 2019-08-09 RX ORDER — SODIUM CHLORIDE, SODIUM LACTATE, POTASSIUM CHLORIDE, CALCIUM CHLORIDE 600; 310; 30; 20 MG/100ML; MG/100ML; MG/100ML; MG/100ML
INJECTION, SOLUTION INTRAVENOUS CONTINUOUS
Status: DISCONTINUED | OUTPATIENT
Start: 2019-08-09 | End: 2019-08-09 | Stop reason: HOSPADM

## 2019-08-09 RX ORDER — FENTANYL CITRATE 50 UG/ML
25 INJECTION, SOLUTION INTRAMUSCULAR; INTRAVENOUS EVERY 5 MIN PRN
Status: COMPLETED | OUTPATIENT
Start: 2019-08-09 | End: 2019-08-09

## 2019-08-09 RX ORDER — MUPIROCIN 20 MG/G
OINTMENT TOPICAL
Status: DISCONTINUED | OUTPATIENT
Start: 2019-08-09 | End: 2019-08-09 | Stop reason: HOSPADM

## 2019-08-09 RX ORDER — SUCCINYLCHOLINE CHLORIDE 20 MG/ML
INJECTION INTRAMUSCULAR; INTRAVENOUS
Status: DISCONTINUED | OUTPATIENT
Start: 2019-08-09 | End: 2019-08-09

## 2019-08-09 RX ORDER — SODIUM CHLORIDE, SODIUM LACTATE, POTASSIUM CHLORIDE, CALCIUM CHLORIDE 600; 310; 30; 20 MG/100ML; MG/100ML; MG/100ML; MG/100ML
125 INJECTION, SOLUTION INTRAVENOUS CONTINUOUS
Status: DISCONTINUED | OUTPATIENT
Start: 2019-08-09 | End: 2019-08-09 | Stop reason: HOSPADM

## 2019-08-09 RX ORDER — LIDOCAINE HYDROCHLORIDE 10 MG/ML
1 INJECTION, SOLUTION EPIDURAL; INFILTRATION; INTRACAUDAL; PERINEURAL ONCE
Status: COMPLETED | OUTPATIENT
Start: 2019-08-09 | End: 2019-08-09

## 2019-08-09 RX ORDER — CEFAZOLIN SODIUM 2 G/50ML
2 SOLUTION INTRAVENOUS
Status: COMPLETED | OUTPATIENT
Start: 2019-08-09 | End: 2019-08-09

## 2019-08-09 RX ORDER — FENTANYL CITRATE 50 UG/ML
INJECTION, SOLUTION INTRAMUSCULAR; INTRAVENOUS
Status: DISCONTINUED | OUTPATIENT
Start: 2019-08-09 | End: 2019-08-09

## 2019-08-09 RX ORDER — HYDROCODONE BITARTRATE AND ACETAMINOPHEN 7.5; 325 MG/1; MG/1
1 TABLET ORAL EVERY 6 HOURS PRN
Qty: 28 TABLET | Refills: 0 | Status: SHIPPED | OUTPATIENT
Start: 2019-08-09 | End: 2019-08-16

## 2019-08-09 RX ADMIN — FENTANYL CITRATE 100 MCG: 50 INJECTION, SOLUTION INTRAMUSCULAR; INTRAVENOUS at 07:08

## 2019-08-09 RX ADMIN — SUCCINYLCHOLINE CHLORIDE 120 MG: 20 INJECTION, SOLUTION INTRAMUSCULAR; INTRAVENOUS at 07:08

## 2019-08-09 RX ADMIN — LIDOCAINE HYDROCHLORIDE 10 MG: 10 INJECTION, SOLUTION EPIDURAL; INFILTRATION; INTRACAUDAL; PERINEURAL at 06:08

## 2019-08-09 RX ADMIN — MIDAZOLAM 2 MG: 1 INJECTION INTRAMUSCULAR; INTRAVENOUS at 07:08

## 2019-08-09 RX ADMIN — PROPOFOL 200 MG: 10 INJECTION, EMULSION INTRAVENOUS at 07:08

## 2019-08-09 RX ADMIN — LIDOCAINE HYDROCHLORIDE 50 MG: 20 INJECTION, SOLUTION INTRAVENOUS at 07:08

## 2019-08-09 RX ADMIN — DEXAMETHASONE SODIUM PHOSPHATE 8 MG: 4 INJECTION, SOLUTION INTRAMUSCULAR; INTRAVENOUS at 07:08

## 2019-08-09 RX ADMIN — FENTANYL CITRATE 25 MCG: 0.05 INJECTION, SOLUTION INTRAMUSCULAR; INTRAVENOUS at 09:08

## 2019-08-09 RX ADMIN — ROCURONIUM BROMIDE 10 MG: 10 INJECTION, SOLUTION INTRAVENOUS at 07:08

## 2019-08-09 RX ADMIN — OXYCODONE HYDROCHLORIDE 5 MG: 5 TABLET ORAL at 08:08

## 2019-08-09 RX ADMIN — SODIUM CHLORIDE, SODIUM LACTATE, POTASSIUM CHLORIDE, AND CALCIUM CHLORIDE: .6; .31; .03; .02 INJECTION, SOLUTION INTRAVENOUS at 06:08

## 2019-08-09 RX ADMIN — PHENYLEPHRINE HYDROCHLORIDE 200 MCG: 10 INJECTION INTRAVENOUS at 08:08

## 2019-08-09 RX ADMIN — PHENYLEPHRINE HYDROCHLORIDE 100 MCG: 10 INJECTION INTRAVENOUS at 07:08

## 2019-08-09 RX ADMIN — FENTANYL CITRATE 25 MCG: 0.05 INJECTION, SOLUTION INTRAMUSCULAR; INTRAVENOUS at 08:08

## 2019-08-09 RX ADMIN — SODIUM CHLORIDE, SODIUM LACTATE, POTASSIUM CHLORIDE, AND CALCIUM CHLORIDE: .6; .31; .03; .02 INJECTION, SOLUTION INTRAVENOUS at 07:08

## 2019-08-09 RX ADMIN — MUPIROCIN: 20 OINTMENT TOPICAL at 06:08

## 2019-08-09 RX ADMIN — ONDANSETRON 4 MG: 2 INJECTION, SOLUTION INTRAMUSCULAR; INTRAVENOUS at 07:08

## 2019-08-09 RX ADMIN — CEFAZOLIN SODIUM 2 G: 2 SOLUTION INTRAVENOUS at 07:08

## 2019-08-09 NOTE — INTERVAL H&P NOTE
The patient has been examined and the H&P has been reviewed:    I concur with the findings and no changes have occurred since H&P was written.    Anesthesia/Surgery risks, benefits and alternative options discussed and understood by patient/family.          Active Hospital Problems    Diagnosis  POA    Closed displaced fracture of coronoid process of left ulna with routine healing [S52.042D]  Not Applicable      Resolved Hospital Problems   No resolved problems to display.

## 2019-08-09 NOTE — DISCHARGE SUMMARY
[facility]  Department of Orthopedic Surgery  Discharge Note  Short Stay    Admit Date: 8/9/2019    Discharge Date and Time: No discharge date for patient encounter.     Attending Physician: Giovany Marquez II, MD     Discharge Provider: Giovany Marquez II    Diagnoses:  Active Hospital Problems    Diagnosis  POA    Closed displaced fracture of coronoid process of left ulna with routine healing [S52.042D]  Not Applicable      Resolved Hospital Problems   No resolved problems to display.       Discharged Condition: good    Hospital Course: Patient was admitted for an outpatient procedure and tolerated the procedure well with no complications.    Final Diagnoses: Same as principal problem.    Disposition: Home or Self Care    Follow up/Patient Instructions:    Medications:  Reconciled Home Medications:      Medication List      CHANGE how you take these medications    * HYDROcodone-acetaminophen  mg per tablet  Commonly known as:  NORCO  Take 1 tablet by mouth 2 (two) times daily.  What changed:  Another medication with the same name was added. Make sure you understand how and when to take each.         * This list has 1 medication(s) that are the same as other medications prescribed for you. Read the directions carefully, and ask your doctor or other care provider to review them with you.            CONTINUE taking these medications    co-enzyme Q-10 30 mg capsule  Take 30 mg by mouth once daily.     fish oil-omega-3 fatty acids 300-1,000 mg capsule  Take 2 g by mouth once daily.     fluticasone propionate 50 mcg/actuation nasal spray  Commonly known as:  FLONASE  2 sprays by Each Nare route once daily.     furosemide 20 MG tablet  Commonly known as:  LASIX  Take 1 tablet (20 mg total) by mouth daily as needed.     levothyroxine 112 MCG tablet  Commonly known as:  SYNTHROID  Take 1 tablet (112 mcg total) by mouth once daily.     metoprolol succinate 50 MG 24 hr tablet  Commonly known as:   TOPROL-XL  Take 1 tablet (50 mg total) by mouth once daily.     senna-docusate 8.6-50 mg 8.6-50 mg per tablet  Commonly known as:  SENNA WITH DOCUSATE SODIUM  Take 2 tablets by mouth 2 (two) times daily.     simvastatin 40 MG tablet  Commonly known as:  ZOCOR  TAKE ONE TABLET BY MOUTH ONCE DAILY IN THE EVENING     tadalafil 5 MG tablet  Commonly known as:  CIALIS  Take 1 tablet (5 mg total) by mouth once daily.     tamsulosin 0.4 mg Cap  Commonly known as:  FLOMAX  TAKE TWO CAPSULES BY MOUTH ONCE DAILY IN THE EVENING     valACYclovir 500 MG tablet  Commonly known as:  VALTREX     VITAMIN B-12 ORAL  Take 1 mg by mouth once daily.     warfarin 1 MG tablet  Commonly known as:  COUMADIN  Take 1 tablet (1 mg total) by mouth Daily. Take with 2 mg. Tab daily.        ASK your doctor about these medications    * HYDROcodone-acetaminophen 7.5-325 mg per tablet  Commonly known as:  NORCO  Take 1 tablet by mouth every 6 (six) hours as needed.         * This list has 1 medication(s) that are the same as other medications prescribed for you. Read the directions carefully, and ask your doctor or other care provider to review them with you.              Discharge Procedure Orders   Diet Adult Regular     Ice to affected area     Notify your health care provider if you experience any of the following:  temperature >100.4     Notify your health care provider if you experience any of the following:  persistent nausea and vomiting or diarrhea     Notify your health care provider if you experience any of the following:  severe uncontrolled pain     Notify your health care provider if you experience any of the following:  redness, tenderness, or signs of infection (pain, swelling, redness, odor or green/yellow discharge around incision site)     Notify your health care provider if you experience any of the following:  difficulty breathing or increased cough     Notify your health care provider if you experience any of the following:   severe persistent headache     Notify your health care provider if you experience any of the following:  worsening rash     Notify your health care provider if you experience any of the following:  persistent dizziness, light-headedness, or visual disturbances     Notify your health care provider if you experience any of the following:  increased confusion or weakness     Notify your health care provider if you experience any of the following:     Change dressing (specify)   Order Comments: Dressing change: One time per day beginning 72 hours post op.     Follow-up Information     Giovany Marquez II, MD In 1 week.    Specialty:  Orthopedic Surgery  Contact information:  [address] [place] [address] DR Raj [place]ORSE [id number]  [phone]                   Discharge Procedure Orders (must include Diet, Follow-up, Activity):   Discharge Procedure Orders (must include Diet, Follow-up, Activity)   Diet Adult Regular     Ice to affected area     Notify your health care provider if you experience any of the following:  temperature >100.4     Notify your health care provider if you experience any of the following:  persistent nausea and vomiting or diarrhea     Notify your health care provider if you experience any of the following:  severe uncontrolled pain     Notify your health care provider if you experience any of the following:  redness, tenderness, or signs of infection (pain, swelling, redness, odor or green/yellow discharge around incision site)     Notify your health care provider if you experience any of the following:  difficulty breathing or increased cough     Notify your health care provider if you experience any of the following:  severe persistent headache     Notify your health care provider if you experience any of the following:  worsening rash     Notify your health care provider if you experience any of the following:  persistent dizziness, light-headedness, or visual disturbances     Notify your health care  provider if you experience any of the following:  increased confusion or weakness     Notify your health care provider if you experience any of the following:     Change dressing (specify)   Order Comments: Dressing change: One time per day beginning 72 hours post op.

## 2019-08-09 NOTE — ANESTHESIA PREPROCEDURE EVALUATION
08/09/2019  Albin Fernandez is a 63 y.o., male.    Anesthesia Evaluation    I have reviewed the Patient Summary Reports.    I have reviewed the Nursing Notes.   I have reviewed the Medications.     Review of Systems  Anesthesia Hx:  No problems with previous Anesthesia    Social:  Non-Smoker    EENT/Dental:EENT/Dental Normal   Cardiovascular:   Hypertension hyperlipidemia    Pulmonary:  Pulmonary Normal    Renal/:   Chronic Renal Disease BPH    Neurological:   Neuromuscular Disease,    Endocrine:   Hypothyroidism        Physical Exam  General:  Obesity    Airway/Jaw/Neck:  Airway Findings: Mouth Opening: Normal Tongue: Normal  General Airway Assessment: Adult, Possible difficult intubation  Mallampati: III  TM Distance: Normal, at least 6 cm  Jaw/Neck Findings:  Micrognathia  Neck Findings: Normal    Eyes/Ears/Nose:  EYES/EARS/NOSE FINDINGS: Normal   Dental:  DENTAL FINDINGS: Normal   Chest/Lungs:  Chest/Lungs Findings: Clear to auscultation, Normal Respiratory Rate     Heart/Vascular:  Heart Findings: Rate: Normal  Rhythm: Regular Rhythm        Mental Status:  Mental Status Findings:  Cooperative, Alert and Oriented         Anesthesia Plan  Type of Anesthesia, risks & benefits discussed:  Anesthesia Type:  general  Patient's Preference:   Intra-op Monitoring Plan: standard ASA monitors  Intra-op Monitoring Plan Comments:   Post Op Pain Control Plan: IV/PO Opioids PRN and multimodal analgesia  Post Op Pain Control Plan Comments:   Induction:   IV  Beta Blocker:  Patient is on a Beta-Blocker and has received one dose within the past 24 hours (No further documentation required).       Informed Consent: Patient understands risks and agrees with Anesthesia plan.  Questions answered. Anesthesia consent signed with patient.  ASA Score: 3     Day of Surgery Review of History & Physical: I have interviewed and  examined the patient. I have reviewed the patient's H&P dated:  There are no significant changes.  H&P update referred to the surgeon.         Ready For Surgery From Anesthesia Perspective.

## 2019-08-09 NOTE — TRANSFER OF CARE
"Anesthesia Transfer of Care Note    Patient: Albin Fernandez    Procedure(s) Performed: Procedure(s) (LRB):  ARTHROSCOPY, ELBOW (Left)    Patient location: PACU    Anesthesia Type: general    Transport from OR: Transported from OR on 2-3 L/min O2 by NC with adequate spontaneous ventilation    Post pain: adequate analgesia    Post assessment: no apparent anesthetic complications and tolerated procedure well    Post vital signs: stable    Level of consciousness: awake, alert and oriented    Nausea/Vomiting: no nausea/vomiting    Complications: none    Transfer of care protocol was followed      Last vitals:   Visit Vitals  /79 (BP Location: Right arm, Patient Position: Sitting)   Pulse 90   Temp 36.8 °C (98.2 °F) (Skin)   Resp 18   Ht 5' 9" (1.753 m)   Wt 99.8 kg (220 lb)   SpO2 99%   BMI 32.49 kg/m²     "

## 2019-08-09 NOTE — DISCHARGE INSTRUCTIONS
"Discharge Instructions: After Your Surgery/Procedure  Youve just had surgery. During surgery you were given medicine called anesthesia to keep you relaxed and free of pain. After surgery you may have some pain or nausea. This is common. Here are some tips for feeling better and getting well after surgery.     Stay on schedule with your medication.   Going home  Your doctor or nurse will show you how to take care of yourself when you go home. He or she will also answer your questions. Have an adult family member or friend drive you home.      For your safety we recommend these precaution for the first 24 hours after your procedure:  · Do not drive or use heavy equipment.  · Do not make important decisions or sign legal papers.  · Do not drink alcohol.  · Have someone stay with you, if needed. He or she can watch for problems and help keep you safe.  · Your concentration, balance, coordination, and judgement may be impaired for many hours after anesthesia.  Use caution when ambulating or standing up.     · You may feel weak and "washed out" after anesthesia and surgery.      Subtle residual effects of general anesthesia or sedation with regional / local anesthesia can last more than 24 hours.  Rest for the remainder of the day or longer if your Doctor/Surgeon has advised you to do so.  Although you may feel normal within the first 24 hours, your reflexes and mental ability may be impaired without you realizing it.  You may feel dizzy, lightheaded or sleepy for 24 hours or longer.      Be sure to go to all follow-up visits with your doctor. And rest after your surgery for as long as your doctor tells you to.  Coping with pain  If you have pain after surgery, pain medicine will help you feel better. Take it as told, before pain becomes severe. Also, ask your doctor or pharmacist about other ways to control pain. This might be with heat, ice, or relaxation. And follow any other instructions your surgeon or nurse gives " you.  Tips for taking pain medicine  To get the best relief possible, remember these points:  · Pain medicines can upset your stomach. Taking them with a little food may help.  · Most pain relievers taken by mouth need at least 20 to 30 minutes to start to work.  · Taking medicine on a schedule can help you remember to take it. Try to time your medicine so that you can take it before starting an activity. This might be before you get dressed, go for a walk, or sit down for dinner.  · Constipation is a common side effect of pain medicines. Call your doctor before taking any medicines such as laxatives or stool softeners to help ease constipation. Also ask if you should skip any foods. Drinking lots of fluids and eating foods such as fruits and vegetables that are high in fiber can also help. Remember, do not take laxatives unless your surgeon has prescribed them.  · Drinking alcohol and taking pain medicine can cause dizziness and slow your breathing. It can even be deadly. Do not drink alcohol while taking pain medicine.  · Pain medicine can make you react more slowly to things. Do not drive or run machinery while taking pain medicine.  Your health care provider may tell you to take acetaminophen to help ease your pain. Ask him or her how much you are supposed to take each day. Acetaminophen or other pain relievers may interact with your prescription medicines or other over-the-counter (OTC) drugs. Some prescription medicines have acetaminophen and other ingredients. Using both prescription and OTC acetaminophen for pain can cause you to overdose. Read the labels on your OTC medicines with care. This will help you to clearly know the list of ingredients, how much to take, and any warnings. It may also help you not take too much acetaminophen. If you have questions or do not understand the information, ask your pharmacist or health care provider to explain it to you before you take the OTC medicine.  Managing  nausea  Some people have an upset stomach after surgery. This is often because of anesthesia, pain, or pain medicine, or the stress of surgery. These tips will help you handle nausea and eat healthy foods as you get better. If you were on a special food plan before surgery, ask your doctor if you should follow it while you get better. These tips may help:  · Do not push yourself to eat. Your body will tell you when to eat and how much.  · Start off with clear liquids and soup. They are easier to digest.  · Next try semi-solid foods, such as mashed potatoes, applesauce, and gelatin, as you feel ready.  · Slowly move to solid foods. Dont eat fatty, rich, or spicy foods at first.  · Do not force yourself to have 3 large meals a day. Instead eat smaller amounts more often.  · Take pain medicines with a small amount of solid food, such as crackers or toast, to avoid nausea.     Call your surgeon if  · You still have pain an hour after taking medicine. The medicine may not be strong enough.  · You feel too sleepy, dizzy, or groggy. The medicine may be too strong.  · You have side effects like nausea, vomiting, or skin changes, such as rash, itching, or hives.       If you have obstructive sleep apnea  You were given anesthesia medicine during surgery to keep you comfortable and free of pain. After surgery, you may have more apnea spells because of this medicine and other medicines you were given. The spells may last longer than usual.   At home:  · Keep using the continuous positive airway pressure (CPAP) device when you sleep. Unless your health care provider tells you not to, use it when you sleep, day or night. CPAP is a common device used to treat obstructive sleep apnea.  · Talk with your provider before taking any pain medicine, muscle relaxants, or sedatives. Your provider will tell you about the possible dangers of taking these medicines.  © 5556-3016 [employer]. [address] [place] [address]  [place] [address]. All rights reserved. This information is not intended as a substitute for professional medical care. Always follow your healthcare professional's instructions.  Post op instructions for prevention of DVT  What is deep vein thrombosis?  Deep vein thrombosis (DVT) is the medical term for blood clots in the deep veins of the leg.  These blood clots can be dangerous.  A DVT can block a blood vessel and keep blood from getting where it needs to go.  Another problem is that the clot can travel to other parts of the body such as the lungs.  A clot that travels to the lungs is called a pulmonary embolus (PE) and can cause serious problems with breathing which can lead to death.  Am I at risk for DVT/PE?  If you are not very active, you are at risk of DVT.  Anyone confined to bed, sitting for long periods of time, recovering from surgery, etc. increases the risk of DVT.  Other risk factors are cancer diagnosis, certain medications, estrogen replacement in any form,older age, obesity, pregnancy, smoking, history of clotting disorders, and dehydration.  How will I know if I have a DVT?   Swelling in the lower leg   Pain   Warmth, redness, hardness or bulging of the vein  If you have any of these symptoms, call your doctors office right away.  Some people will not have any symptoms until the clot moves to the lungs.  What are the symptoms of a PE?   Panting, shortness of breath, or trouble breathing   Sharp, knife-like chest pain when you breathe   Coughing or coughing up blood   Rapid heartbeat  If you have any of these symptoms or get worse quickly, call 911 for emergency treatment.  How can I prevent a DVT?   Avoid long periods of inactivity and dont cross your legs--get up and walk around every hour or so.   Stay active--walking after surgery is highly encouraged.  This means you should get out of the house and walk in the neighborhood.  Going up and down stairs will not impair healing (unless advised  against such activity by your doctor).     Drink plenty of noncaffeinated, nonalcoholic fluids each day to prevent dehydration.   Wear special support stockings, if they have been advised by your doctor.   If you travel, stop at least once an hour and walk around.   Avoid smoking (assistance with stopping is available through your healthcare provider)  Always notify your doctor if you are not able to follow the post operative instructions that are given to you at the time of discharge.  It may be necessary to prescribe one of the medications available to prevent DVT.To confirm, Your doctor has instructed you that surgery is scheduled for: 8/9/19 Janet  [phone]    Please report to [facility], Registration the morning of surgery. You must check-in and receive a wristband before going to your procedure.    Pre-Op will call the afternoon prior to surgery between 1:00 and 6:00 PM with the final arrival time.  Phone number: [phone]    PLEASE NOTE:  The surgery schedule has many variables which may affect the time of your surgery case.  Family members should be available if your surgery time changes.  Plan to be here the day of your procedure between 4-6 hours.    MEDICATIONS:  TAKE ONLY THESE MEDICATIONS WITH A SMALL SIP OF WATER THE MORNING OF YOUR PROCEDURE:     flonase, pain pill-if needed, levothyroxine    DO NOT TAKE THESE MEDICATIONS 5-7 DAYS PRIOR to your procedure or per your surgeon's request: ASPIRIN, ALEVE, ADVIL, IBUPROFEN, FISH OIL VITAMIN E, HERBALS   COUMADIN LAST DOSE - 8/3/19                            NO LASIX AM OF SURGERY    (May take Tylenol)    ONLY if you are prescribed any types of blood thinners such as:  Aspirin, Coumadin, Plavix, Pradaxa, Xarelto, Aggrenox, Effient, Eliquis, Savasya, Brilinta, or any other, ask your surgeon whether you should stop taking them and how long before surgery you should stop.  You may also need to verify with the prescribing  physician if it is ok to stop your medication.      INSTRUCTIONS IMPORTANT!!  · Do not eat or drink anything between midnight and the time of your procedure- this includes gum, mints, and candy.  · Do not smoke or drink alcoholic beverages 24 hours prior to your procedure.  · Shower the night before AND the morning of your procedure with a Chlorhexidine wash such as Hibiclens or Dial antibacterial soap from the neck down.  Do not get it on your face or in your eyes.  You may use your own shampoo and face wash. This helps your skin to be as bacteria free as possible.    · If you wear contact lenses, dentures, hearing aids or glasses, bring a container to put them in during surgery and give to a family member for safe keeping.  Please leave all jewelry, piercing's and valuables at home.   · DO NOT remove hair from the surgery site.  Do not shave the incision site unless you are given specific instructions to do so.    · ONLY if you have been diagnosed with sleep apnea please bring your C-PAP machine.  · ONLY if you wear home oxygen please bring your portable oxygen tank the day of your procedure.  · ONLY if you have a history of OPEN HEART SURGERY you will need a clearance from your Cardiologist per Anesthesia.      · ONLY for patients requiring bowel prep, written instructions will be given by your doctor's office.  · ONLY if you have a neuro stimulator, please bring the controller with you the morning of surgery  · ONLY if a type and screen test is needed before surgery, please return:  · If your doctor has scheduled you for an overnight stay, bring a small overnight bag with any personal items you need.  · Make arrangements in advance for transportation home by a responsible adult.  It is not safe to drive a vehicle during the 24 hours after anesthesia.      · Visiting hours are 10:00AM to 8:30PM.  For the safety of all patients, visitors under the age of 12 are not allowed above the first floor of the hospital.     · All [facility] facilities and properties are tobacco free.  Smoking is NOT allowed.       If you have any questions about these instructions, call Pre-Op Admit  Nursing at [phone] or the Pre-Op Day Surgery Unit at [phone].

## 2019-08-09 NOTE — ANESTHESIA POSTPROCEDURE EVALUATION
Anesthesia Post Evaluation    Patient: Albin Fernandez    Procedure(s) Performed: Procedure(s) (LRB):  ARTHROSCOPY, ELBOW (Left)    Final Anesthesia Type: general  Patient location during evaluation: PACU  Patient participation: Yes- Able to Participate  Level of consciousness: awake and alert  Post-procedure vital signs: reviewed and stable  Pain management: adequate  Airway patency: patent  PONV status at discharge: No PONV  Anesthetic complications: no      Cardiovascular status: hemodynamically stable  Respiratory status: unassisted and room air  Hydration status: euvolemic  Follow-up not needed.          Vitals Value Taken Time   /90 8/9/2019  9:50 AM   Temp 36.2 °C (97.2 °F) 8/9/2019  9:30 AM   Pulse 86 8/9/2019  9:50 AM   Resp 16 8/9/2019  9:50 AM   SpO2 97 % 8/9/2019  9:50 AM         Event Time     Out of Recovery 09:35:00          Pain/Varghese Score: Pain Rating Prior to Med Admin: 7 (8/9/2019  9:09 AM)  Pain Rating Post Med Admin: 4 (8/9/2019  9:35 AM)  Varghese Score: 10 (8/9/2019  9:35 AM)

## 2019-08-09 NOTE — OP NOTE
[facility]  Orthopedic Surgery Department  Operative Note    SUMMARY     Date of Procedure: 8/9/2019     Procedure: Procedure(s) (LRB):  ARTHROSCOPY, ELBOW (Left)     Surgeon(s) and Role:     * Giovany Marquez II, MD - Primary    Assisting Surgeon: None    First Assist:  NIYAH Pena    Pre-Operative Diagnosis: Preoperative testing [Z01.818]  Closed displaced fracture of coronoid process of left ulna with routine healing, subsequent encounter [S52.042D]    Post-Operative Diagnosis: Post-Op Diagnosis Codes:     * Preoperative testing [Z01.818]     * Closed displaced fracture of coronoid process of left ulna with routine healing, subsequent encounter [S52.042D]    Anesthesia: General    Technical Procedures Used:  Left elbow arthroscopy with debridement    Description of the Findings of the Procedure:  Dictated    Significant Surgical Tasks Conducted by the Assistant(s), if Applicable:  Portal site closure and bandage application    Complications: No    Estimated Blood Loss (EBL): 10 mL           Implants: * No implants in log *    Specimens:   Specimen (12h ago, onward)    None                  Condition: Good    Disposition: PACU - hemodynamically stable.    Attestation: I was present and scrubbed for the entire procedure.    Procedure In Detail:  The patient was brought to the operating room and intubated on the transport gurney.  The patient was then rolled into the prone position on the operative table.  Tourniquet was placed on the left upper extremity and was prepped and draped in the normal sterile fashion. An Esmarch was used to exsanguinate the limb and the tourniquet was taken up to 250 mm of mercury.  A lateral portal to the elbow was created after distending the elbow joint with 10 mL of saline. Upon inserting arthroscopy portal and removing the inner trocar there was a gush of fluid that we previously injected into the joint indicating we were in the elbow joint.  The camera  was introduced.  The distal humerus and coronoid of the proximal ulna were easily identified along with the medial joint capsule.  There was diffuse synovitis and inflammatory reaction.  Under direct arthroscopic visualization the medial portal was created and a shaver was introduced.  Part of the synovium was resected.  We then began debriding along the tip of the coronoid where there was noted to be a small loose bony fragment.  An extensive debridement of the elbow joint was performed.  We resected that fragment and debrided the area. 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 The radial head was identified inspected and noted to be relatively normal. The scope was then removed from the elbow and the portal sites were closed with 3 O nylon.  A sterile intraoperative dressing was placed, and the patient was awakened from anesthesia, and taken recovery where he was noted to be stable postoperatively.  Needle and lap counts were correct at the end of the case.

## 2019-08-09 NOTE — PLAN OF CARE
ptin phaseIIrec. Wife at bedside  Hasl;eft arm wrappedin ace wrapover bandage  Dry and intact  explaineed dc instructions to pt and wife  Both verb understanding willwheel to car viawheelchair when ablre

## 2019-08-12 ENCOUNTER — TELEPHONE (OUTPATIENT)
Dept: FAMILY MEDICINE | Facility: CLINIC | Age: 64
End: 2019-08-12

## 2019-08-12 VITALS
HEART RATE: 86 BPM | DIASTOLIC BLOOD PRESSURE: 90 MMHG | RESPIRATION RATE: 16 BRPM | HEIGHT: 69 IN | OXYGEN SATURATION: 97 % | SYSTOLIC BLOOD PRESSURE: 129 MMHG | WEIGHT: 220 LBS | BODY MASS INDEX: 32.58 KG/M2 | TEMPERATURE: 97 F

## 2019-08-12 NOTE — PHYSICIAN QUERY
PT Name: Albin Fernandez  MR #: [id number]     Physician Query Form - Documentation Clarification      CDS/: Tricia Gill               Contact information: [email or web address]    This form is a permanent document in the medical record.     Query Date: August 12, 2019    By submitting this query, we are merely seeking further clarification of documentation. Please utilize your independent clinical judgment when addressing the question(s) below.    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     Provider Use Only      Painful non-union of a type I coronoid fracture left ulna                                                                                                                           [  ] Clinically Undetermined

## 2019-08-12 NOTE — TELEPHONE ENCOUNTER
I offered patient an appointment with Mr Pedro in [place] since we have nothing available here and Dr Chang is out til Thursday.He said he will call back if he doesn't feel better tomorrow.

## 2019-08-12 NOTE — TELEPHONE ENCOUNTER
----- Message from Kirstin Funk sent at 8/12/2019  2:35 PM CDT -----  Type: Needs Medical Advice    Who Called:  Patient  Symptoms (please be specific):  Congestion, draining, sore throat, cannot sleep  How long has patient had these symptoms:  1 week  Pharmacy name and phone #:    [id number] - [place], [place] - [address]  [address] [address]  Phone: [phone] Fax: [phone]    Best Call Back Number: [phone] (home)     Additional Information: Had surgery on 7/9/19 and thinks it could be from something they put down his throat. Or could have caught something while in the hospital.

## 2019-08-13 DIAGNOSIS — S52.042D CLOSED DISPLACED FRACTURE OF CORONOID PROCESS OF LEFT ULNA WITH ROUTINE HEALING, SUBSEQUENT ENCOUNTER: Primary | ICD-10-CM

## 2019-08-15 ENCOUNTER — OFFICE VISIT (OUTPATIENT)
Dept: ORTHOPEDICS | Facility: CLINIC | Age: 64
End: 2019-08-15
Payer: MEDICARE

## 2019-08-15 ENCOUNTER — HOSPITAL ENCOUNTER (OUTPATIENT)
Dept: RADIOLOGY | Facility: HOSPITAL | Age: 64
Discharge: HOME | End: 2019-08-15
Attending: ORTHOPAEDIC SURGERY
Payer: MEDICARE

## 2019-08-15 VITALS — WEIGHT: 220 LBS | BODY MASS INDEX: 32.58 KG/M2 | HEIGHT: 69 IN

## 2019-08-15 DIAGNOSIS — S52.042D CLOSED DISPLACED FRACTURE OF CORONOID PROCESS OF LEFT ULNA WITH ROUTINE HEALING, SUBSEQUENT ENCOUNTER: ICD-10-CM

## 2019-08-15 DIAGNOSIS — S52.042D CLOSED DISPLACED FRACTURE OF CORONOID PROCESS OF LEFT ULNA WITH ROUTINE HEALING, SUBSEQUENT ENCOUNTER: Primary | ICD-10-CM

## 2019-08-15 PROCEDURE — 73080 XR ELBOW COMPLETE 3 VIEW LEFT: ICD-10-PCS | Mod: 26,LT,, | Performed by: RADIOLOGY

## 2019-08-15 PROCEDURE — 99024 PR POST-OP FOLLOW-UP VISIT: ICD-10-PCS | Mod: S$GLB,,, | Performed by: ORTHOPAEDIC SURGERY

## 2019-08-15 PROCEDURE — 99999 PR PBB SHADOW E&M-EST. PATIENT-LVL III: CPT | Mod: PBBFAC,,, | Performed by: ORTHOPAEDIC SURGERY

## 2019-08-15 PROCEDURE — 99999 PR PBB SHADOW E&M-EST. PATIENT-LVL III: ICD-10-PCS | Mod: PBBFAC,,, | Performed by: ORTHOPAEDIC SURGERY

## 2019-08-15 PROCEDURE — 73080 X-RAY EXAM OF ELBOW: CPT | Mod: TC,PN,LT

## 2019-08-15 PROCEDURE — 73080 X-RAY EXAM OF ELBOW: CPT | Mod: 26,LT,, | Performed by: RADIOLOGY

## 2019-08-15 PROCEDURE — 99024 POSTOP FOLLOW-UP VISIT: CPT | Mod: S$GLB,,, | Performed by: ORTHOPAEDIC SURGERY

## 2019-08-15 NOTE — PROGRESS NOTES
CC:  63-year-old male follows up status post left elbow arthroscopy with excision of a fragment off of his coronoid process. The patient states he is feeling much better.  He states that his elbow grinds but is no longer painful.  The popping sensation and the acute pain associated that he was having has completely resolved since the scope was done.    LUE:   Portal sites are clean, dry, intact with no signs of infection.  Grossly intact motor and sensory function in the left upper extremity.  Range of motion is 0-150, pain-free.    Sutures removed and Steri-Strips applied.    I showed him his arthroscopy pictures when went over the findings.    Progress activity as tolerated.    Follow-up p.r.n.

## 2019-09-04 RX ORDER — TAMSULOSIN HYDROCHLORIDE 0.4 MG/1
CAPSULE ORAL
Qty: 180 CAPSULE | Refills: 1 | Status: SHIPPED | OUTPATIENT
Start: 2019-09-04 | End: 2019-09-26 | Stop reason: ALTCHOICE

## 2019-09-05 RX ORDER — TADALAFIL 5 MG/1
5 TABLET ORAL DAILY
Qty: 30 TABLET | Refills: 3 | Status: SHIPPED | OUTPATIENT
Start: 2019-09-05 | End: 2019-09-26 | Stop reason: SDUPTHER

## 2019-09-07 DIAGNOSIS — E03.9 HYPOTHYROIDISM, UNSPECIFIED TYPE: ICD-10-CM

## 2019-09-09 RX ORDER — LEVOTHYROXINE SODIUM 112 UG/1
112 TABLET ORAL DAILY
Qty: 90 TABLET | Refills: 1 | Status: SHIPPED | OUTPATIENT
Start: 2019-09-09 | End: 2020-03-16

## 2019-09-26 ENCOUNTER — OFFICE VISIT (OUTPATIENT)
Dept: UROLOGY | Facility: CLINIC | Age: 64
End: 2019-09-26
Payer: MEDICARE

## 2019-09-26 VITALS
BODY MASS INDEX: 32.58 KG/M2 | HEART RATE: 81 BPM | WEIGHT: 220 LBS | HEIGHT: 69 IN | DIASTOLIC BLOOD PRESSURE: 72 MMHG | SYSTOLIC BLOOD PRESSURE: 127 MMHG

## 2019-09-26 DIAGNOSIS — N40.1 BPH WITH URINARY OBSTRUCTION: Primary | ICD-10-CM

## 2019-09-26 DIAGNOSIS — N52.9 ERECTILE DYSFUNCTION, UNSPECIFIED ERECTILE DYSFUNCTION TYPE: ICD-10-CM

## 2019-09-26 DIAGNOSIS — N13.8 BPH WITH URINARY OBSTRUCTION: Primary | ICD-10-CM

## 2019-09-26 LAB
BILIRUB SERPL-MCNC: NORMAL MG/DL
BLOOD URINE, POC: NORMAL
COLOR, POC UA: YELLOW
GLUCOSE UR QL STRIP: NORMAL
KETONES UR QL STRIP: NORMAL
LEUKOCYTE ESTERASE URINE, POC: NORMAL
NITRITE, POC UA: NORMAL
PH, POC UA: 5.5
PROTEIN, POC: NORMAL
SPECIFIC GRAVITY, POC UA: 1.03
UROBILINOGEN, POC UA: NORMAL

## 2019-09-26 PROCEDURE — 99214 PR OFFICE/OUTPT VISIT, EST, LEVL IV, 30-39 MIN: ICD-10-PCS | Mod: 25,S$GLB,, | Performed by: UROLOGY

## 2019-09-26 PROCEDURE — 3078F PR MOST RECENT DIASTOLIC BLOOD PRESSURE < 80 MM HG: ICD-10-PCS | Mod: CPTII,S$GLB,, | Performed by: UROLOGY

## 2019-09-26 PROCEDURE — 99499 RISK ADDL DX/OHS AUDIT: ICD-10-PCS | Mod: S$GLB,,, | Performed by: UROLOGY

## 2019-09-26 PROCEDURE — 99214 OFFICE O/P EST MOD 30 MIN: CPT | Mod: 25,S$GLB,, | Performed by: UROLOGY

## 2019-09-26 PROCEDURE — 99499 UNLISTED E&M SERVICE: CPT | Mod: S$GLB,,, | Performed by: UROLOGY

## 2019-09-26 PROCEDURE — 81002 POCT URINE DIPSTICK WITHOUT MICROSCOPE: ICD-10-PCS | Mod: S$GLB,,, | Performed by: UROLOGY

## 2019-09-26 PROCEDURE — 3074F PR MOST RECENT SYSTOLIC BLOOD PRESSURE < 130 MM HG: ICD-10-PCS | Mod: CPTII,S$GLB,, | Performed by: UROLOGY

## 2019-09-26 PROCEDURE — 99999 PR PBB SHADOW E&M-EST. PATIENT-LVL III: ICD-10-PCS | Mod: PBBFAC,,, | Performed by: UROLOGY

## 2019-09-26 PROCEDURE — 3078F DIAST BP <80 MM HG: CPT | Mod: CPTII,S$GLB,, | Performed by: UROLOGY

## 2019-09-26 PROCEDURE — 81002 URINALYSIS NONAUTO W/O SCOPE: CPT | Mod: S$GLB,,, | Performed by: UROLOGY

## 2019-09-26 PROCEDURE — 3008F PR BODY MASS INDEX (BMI) DOCUMENTED: ICD-10-PCS | Mod: CPTII,S$GLB,, | Performed by: UROLOGY

## 2019-09-26 PROCEDURE — 3008F BODY MASS INDEX DOCD: CPT | Mod: CPTII,S$GLB,, | Performed by: UROLOGY

## 2019-09-26 PROCEDURE — 99999 PR PBB SHADOW E&M-EST. PATIENT-LVL III: CPT | Mod: PBBFAC,,, | Performed by: UROLOGY

## 2019-09-26 PROCEDURE — 3074F SYST BP LT 130 MM HG: CPT | Mod: CPTII,S$GLB,, | Performed by: UROLOGY

## 2019-09-26 RX ORDER — TAMSULOSIN HYDROCHLORIDE 0.4 MG/1
0.4 CAPSULE ORAL
Qty: 90 CAPSULE | Refills: 3 | Status: SHIPPED | OUTPATIENT
Start: 2019-09-26 | End: 2020-08-25 | Stop reason: SDUPTHER

## 2019-09-26 RX ORDER — TADALAFIL 5 MG/1
5 TABLET ORAL DAILY
Qty: 30 TABLET | Refills: 11 | Status: SHIPPED | OUTPATIENT
Start: 2019-09-26 | End: 2020-01-13 | Stop reason: SDUPTHER

## 2019-09-26 RX ORDER — TADALAFIL 5 MG/1
5 TABLET ORAL DAILY
Qty: 30 TABLET | Refills: 11 | Status: SHIPPED | OUTPATIENT
Start: 2019-09-26 | End: 2019-09-26 | Stop reason: SDUPTHER

## 2019-09-26 NOTE — PATIENT INSTRUCTIONS
Doing well on tadalafil/cialis 5mg once daily from [facility]. Refill given for a 1 yr. Helping with ED and urination.   Doing well on lower dose of flomax/tamsulosin 0.4mg (instead of 0.8) likely bc of the tadalafil/cialis. Refill given for 1 year.  psa was 2.6 (bc was on finasteride?) . <4 normal. Will trend and recheck psa and PRASANTH in a year.    F/u in 1 year with psa (free and total prior), for PRASANTH and discuss meds (tadalafil and tamsulosin)    Route
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
## 2019-10-08 DIAGNOSIS — Z79.01 ANTICOAGULATED ON COUMADIN: ICD-10-CM

## 2019-10-10 RX ORDER — WARFARIN 1 MG/1
TABLET ORAL
Qty: 30 TABLET | Refills: 0 | Status: SHIPPED | OUTPATIENT
Start: 2019-10-10 | End: 2019-11-05 | Stop reason: SDUPTHER

## 2019-10-18 ENCOUNTER — TELEPHONE (OUTPATIENT)
Dept: FAMILY MEDICINE | Facility: CLINIC | Age: 64
End: 2019-10-18

## 2019-10-18 NOTE — TELEPHONE ENCOUNTER
----- Message from Anahi Hernandez sent at 10/18/2019 10:16 AM CDT -----  Type:  Sooner Apoointment Request    Caller is requesting a sooner appointment.  Caller declined first available appointment listed below.  Caller will not accept being placed on the waitlist and is requesting a message be sent to doctor.    Name of Caller: patient - Albin   When is the first available appointment?  11/5/2019  Symptoms:  INR / flu shot   Best Call Back Number:  [phone]   Additional Information:  Pt would like sooner appt if possible please reach out to him directly to advise of scheduling sooner

## 2019-10-22 ENCOUNTER — OFFICE VISIT (OUTPATIENT)
Dept: FAMILY MEDICINE | Facility: CLINIC | Age: 64
End: 2019-10-22
Payer: MEDICARE

## 2019-10-22 ENCOUNTER — DOCUMENTATION ONLY (OUTPATIENT)
Dept: FAMILY MEDICINE | Facility: CLINIC | Age: 64
End: 2019-10-22

## 2019-10-22 VITALS
HEART RATE: 95 BPM | WEIGHT: 224 LBS | OXYGEN SATURATION: 97 % | RESPIRATION RATE: 16 BRPM | BODY MASS INDEX: 33.18 KG/M2 | SYSTOLIC BLOOD PRESSURE: 122 MMHG | DIASTOLIC BLOOD PRESSURE: 72 MMHG | HEIGHT: 69 IN | TEMPERATURE: 98 F

## 2019-10-22 DIAGNOSIS — Z23 NEED FOR 23-POLYVALENT PNEUMOCOCCAL POLYSACCHARIDE VACCINE: ICD-10-CM

## 2019-10-22 DIAGNOSIS — I48.91 ATRIAL FIBRILLATION, UNSPECIFIED TYPE: ICD-10-CM

## 2019-10-22 DIAGNOSIS — Z79.01 ANTICOAGULATED ON COUMADIN: Primary | ICD-10-CM

## 2019-10-22 DIAGNOSIS — Z79.01 ENCOUNTER FOR MONITORING COUMADIN THERAPY: ICD-10-CM

## 2019-10-22 DIAGNOSIS — Z51.81 ENCOUNTER FOR MONITORING COUMADIN THERAPY: ICD-10-CM

## 2019-10-22 LAB — INR PPP: 3.1 (ref 2–3)

## 2019-10-22 PROCEDURE — 1101F PR PT FALLS ASSESS DOC 0-1 FALLS W/OUT INJ PAST YR: ICD-10-PCS | Mod: CPTII,S$GLB,, | Performed by: INTERNAL MEDICINE

## 2019-10-22 PROCEDURE — 99213 PR OFFICE/OUTPT VISIT, EST, LEVL III, 20-29 MIN: ICD-10-PCS | Mod: S$GLB,,, | Performed by: INTERNAL MEDICINE

## 2019-10-22 PROCEDURE — 1101F PT FALLS ASSESS-DOCD LE1/YR: CPT | Mod: CPTII,S$GLB,, | Performed by: INTERNAL MEDICINE

## 2019-10-22 PROCEDURE — 3008F BODY MASS INDEX DOCD: CPT | Mod: CPTII,S$GLB,, | Performed by: INTERNAL MEDICINE

## 2019-10-22 PROCEDURE — 99499 RISK ADDL DX/OHS AUDIT: ICD-10-PCS | Mod: S$GLB,,, | Performed by: INTERNAL MEDICINE

## 2019-10-22 PROCEDURE — 99499 UNLISTED E&M SERVICE: CPT | Mod: S$GLB,,, | Performed by: INTERNAL MEDICINE

## 2019-10-22 PROCEDURE — 3078F DIAST BP <80 MM HG: CPT | Mod: CPTII,S$GLB,, | Performed by: INTERNAL MEDICINE

## 2019-10-22 PROCEDURE — 99213 OFFICE O/P EST LOW 20 MIN: CPT | Mod: S$GLB,,, | Performed by: INTERNAL MEDICINE

## 2019-10-22 PROCEDURE — 85610 PROTHROMBIN TIME: CPT | Mod: QW,,, | Performed by: INTERNAL MEDICINE

## 2019-10-22 PROCEDURE — 3074F SYST BP LT 130 MM HG: CPT | Mod: CPTII,S$GLB,, | Performed by: INTERNAL MEDICINE

## 2019-10-22 PROCEDURE — 85610 POCT INR: ICD-10-PCS | Mod: QW,,, | Performed by: INTERNAL MEDICINE

## 2019-10-22 PROCEDURE — 3008F PR BODY MASS INDEX (BMI) DOCUMENTED: ICD-10-PCS | Mod: CPTII,S$GLB,, | Performed by: INTERNAL MEDICINE

## 2019-10-22 PROCEDURE — 3078F PR MOST RECENT DIASTOLIC BLOOD PRESSURE < 80 MM HG: ICD-10-PCS | Mod: CPTII,S$GLB,, | Performed by: INTERNAL MEDICINE

## 2019-10-22 PROCEDURE — 3074F PR MOST RECENT SYSTOLIC BLOOD PRESSURE < 130 MM HG: ICD-10-PCS | Mod: CPTII,S$GLB,, | Performed by: INTERNAL MEDICINE

## 2019-10-22 NOTE — PROGRESS NOTES
"Subjective:      10:44 AM     Patient ID: Albin Fernandez is a 64 y.o. male.    Chief Complaint: coumadin monitoring (no refills needed)    HPI       Coumadin monitoring:  Lab Results   Component Value Date    INR 3.1 10/22/2019    INR 1.0 08/09/2019    INR 2.5 07/16/2019       ANTICOAGULATION DX: (The following diagnoses are positive if BOLD, negative otherwise.)  Atrial_fibirillation.  . DVT. Pulmonary_embolism.  Aortic_valve_replacement.  TIA/CVA.         Current Dosage:  3 mg    Compliance with medication - good  Diet changes: no.     watches diet: yes.  .  Pending medical/dental procedure: no  Concomitant medication changes (including over the counter/herbs): no  Alcohol use:  no        The following symptoms are positive if BOLD, negative otherwise.    Bleeding episodes:    Gingival_bleeding., epistaxis ., ecchymoses, hematuria . , melena, blood_per_rectum  Thrombotic event:    shortness_of_breath .  pain/swelling_of_extremity . , numbness  , tingling . , headache .   Intolerance of drug:  nausea/vomiting/diarrhea . , rash . , skin necrosis .     Review of Systems      Objective:      Vitals:    10/22/19 1022   BP: 122/72   Pulse: 95   Resp: 16   Temp: 97.6 °F (36.4 °C)   TempSrc: Oral   SpO2: 97%   Weight: 101.6 kg (223 lb 15.8 oz)   Height: 5' 9" (1.753 m)   PainSc:   7   PainLoc: Neck     Physical Exam   Constitutional: He appears well-developed and well-nourished.   Cardiovascular: Normal rate, regular rhythm and normal heart sounds.   Pulmonary/Chest: Effort normal and breath sounds normal.   Abdominal: Soft. There is no tenderness.   Neurological: He is alert.   Psychiatric: He has a normal mood and affect. His behavior is normal. Thought content normal.   Nursing note and vitals reviewed.      inr 3.1  Assessment:       1. Anticoagulated on Coumadin    2. Encounter for monitoring Coumadin therapy    3. Atrial fibrillation, unspecified type    4. Need for 23-polyvalent pneumococcal polysaccharide vaccine  "         Plan:       Anticoagulated on Coumadin  -     POCT INR    Encounter for monitoring Coumadin therapy    Atrial fibrillation, unspecified type    Need for 23-polyvalent pneumococcal polysaccharide vaccine  -     Pneumococcal Polysaccharide Vaccine (23 Valent) (SQ/IM)    Other orders  -     Influenza - High Dose (65+) (PF) (IM)      Follow up in about 1 month (around 11/22/2019).

## 2019-10-22 NOTE — PROGRESS NOTES
Health Maintenance Due   Topic Date Due    Pneumococcal Vaccine (65+ Low/Medium Risk) (2 of 2 - PPSV23) 10/18/2019

## 2019-10-22 NOTE — PATIENT INSTRUCTIONS
Continue 3 mg per day of Coumadin.  Start centrum Silver.  No Coumadin for 1 day and then you can come back in 2 days for you shots.    Thank you for choosing [facility].     Please fill out the patient experience survey.

## 2019-10-24 ENCOUNTER — CLINICAL SUPPORT (OUTPATIENT)
Dept: FAMILY MEDICINE | Facility: CLINIC | Age: 64
End: 2019-10-24
Payer: MEDICARE

## 2019-10-24 DIAGNOSIS — Z23 NEED FOR IMMUNIZATION AGAINST INFLUENZA: ICD-10-CM

## 2019-10-24 DIAGNOSIS — Z23 NEED FOR 23-POLYVALENT PNEUMOCOCCAL POLYSACCHARIDE VACCINE: Primary | ICD-10-CM

## 2019-10-24 PROCEDURE — 90732 PNEUMOCOCCAL POLYSACCHARIDE VACCINE 23-VALENT =>2YO SQ IM: ICD-10-PCS | Mod: S$GLB,,, | Performed by: INTERNAL MEDICINE

## 2019-10-24 PROCEDURE — 90732 PPSV23 VACC 2 YRS+ SUBQ/IM: CPT | Mod: S$GLB,,, | Performed by: INTERNAL MEDICINE

## 2019-10-24 PROCEDURE — G0008 FLU VACCINE - HIGH DOSE (65+) PRESERVATIVE FREE IM: ICD-10-PCS | Mod: S$GLB,,, | Performed by: INTERNAL MEDICINE

## 2019-10-24 PROCEDURE — G0009 ADMIN PNEUMOCOCCAL VACCINE: HCPCS | Mod: S$GLB,,, | Performed by: INTERNAL MEDICINE

## 2019-10-24 PROCEDURE — 90662 FLU VACCINE - HIGH DOSE (65+) PRESERVATIVE FREE IM: ICD-10-PCS | Mod: S$GLB,,, | Performed by: INTERNAL MEDICINE

## 2019-10-24 PROCEDURE — 90662 IIV NO PRSV INCREASED AG IM: CPT | Mod: S$GLB,,, | Performed by: INTERNAL MEDICINE

## 2019-10-24 PROCEDURE — G0008 ADMIN INFLUENZA VIRUS VAC: HCPCS | Mod: S$GLB,,, | Performed by: INTERNAL MEDICINE

## 2019-10-24 PROCEDURE — G0009 PNEUMOCOCCAL POLYSACCHARIDE VACCINE 23-VALENT =>2YO SQ IM: ICD-10-PCS | Mod: S$GLB,,, | Performed by: INTERNAL MEDICINE

## 2019-10-24 NOTE — PROGRESS NOTES
Administered flu and pneumovax vaccine IM. Patient tolerated well. No bleeding at insertion site noted. Pain scale 0/10 with injection. 2 patient identifiers used (name, ), aseptic technique maintained.

## 2019-11-04 DIAGNOSIS — E78.5 HYPERLIPIDEMIA, UNSPECIFIED HYPERLIPIDEMIA TYPE: ICD-10-CM

## 2019-11-04 RX ORDER — SIMVASTATIN 40 MG/1
TABLET, FILM COATED ORAL
Qty: 30 TABLET | Refills: 11 | Status: SHIPPED | OUTPATIENT
Start: 2019-11-04 | End: 2020-11-06

## 2019-11-05 DIAGNOSIS — Z79.01 ANTICOAGULATED ON COUMADIN: ICD-10-CM

## 2019-11-06 RX ORDER — WARFARIN 1 MG/1
TABLET ORAL
Qty: 30 TABLET | Refills: 0 | Status: SHIPPED | OUTPATIENT
Start: 2019-11-06 | End: 2019-12-18 | Stop reason: SDUPTHER

## 2019-11-07 DIAGNOSIS — Z79.01 ANTICOAGULATED ON COUMADIN: ICD-10-CM

## 2019-11-07 RX ORDER — WARFARIN 1 MG/1
TABLET ORAL
Qty: 30 TABLET | Refills: 0 | OUTPATIENT
Start: 2019-11-07

## 2019-11-08 DIAGNOSIS — Z79.01 ANTICOAGULATED ON COUMADIN: ICD-10-CM

## 2019-11-08 RX ORDER — WARFARIN 1 MG/1
TABLET ORAL
Qty: 30 TABLET | Refills: 0 | OUTPATIENT
Start: 2019-11-08

## 2019-12-02 ENCOUNTER — DOCUMENTATION ONLY (OUTPATIENT)
Dept: FAMILY MEDICINE | Facility: CLINIC | Age: 64
End: 2019-12-02

## 2019-12-02 ENCOUNTER — OFFICE VISIT (OUTPATIENT)
Dept: FAMILY MEDICINE | Facility: CLINIC | Age: 64
End: 2019-12-02
Payer: MEDICARE

## 2019-12-02 VITALS
BODY MASS INDEX: 33.6 KG/M2 | WEIGHT: 226.88 LBS | HEIGHT: 69 IN | OXYGEN SATURATION: 98 % | TEMPERATURE: 98 F | SYSTOLIC BLOOD PRESSURE: 136 MMHG | DIASTOLIC BLOOD PRESSURE: 84 MMHG | HEART RATE: 91 BPM | RESPIRATION RATE: 16 BRPM

## 2019-12-02 DIAGNOSIS — A60.01 HERPES SIMPLEX INFECTION OF PENIS: ICD-10-CM

## 2019-12-02 DIAGNOSIS — Z79.01 ANTICOAGULATED ON COUMADIN: Primary | ICD-10-CM

## 2019-12-02 LAB — INR PPP: 2.2 (ref 2–3)

## 2019-12-02 PROCEDURE — 99213 PR OFFICE/OUTPT VISIT, EST, LEVL III, 20-29 MIN: ICD-10-PCS | Mod: S$GLB,,, | Performed by: INTERNAL MEDICINE

## 2019-12-02 PROCEDURE — 3008F PR BODY MASS INDEX (BMI) DOCUMENTED: ICD-10-PCS | Mod: CPTII,S$GLB,, | Performed by: INTERNAL MEDICINE

## 2019-12-02 PROCEDURE — 3008F BODY MASS INDEX DOCD: CPT | Mod: CPTII,S$GLB,, | Performed by: INTERNAL MEDICINE

## 2019-12-02 PROCEDURE — 99213 OFFICE O/P EST LOW 20 MIN: CPT | Mod: S$GLB,,, | Performed by: INTERNAL MEDICINE

## 2019-12-02 PROCEDURE — 85610 PROTHROMBIN TIME: CPT | Mod: QW,,, | Performed by: INTERNAL MEDICINE

## 2019-12-02 PROCEDURE — 3079F DIAST BP 80-89 MM HG: CPT | Mod: CPTII,S$GLB,, | Performed by: INTERNAL MEDICINE

## 2019-12-02 PROCEDURE — 3079F PR MOST RECENT DIASTOLIC BLOOD PRESSURE 80-89 MM HG: ICD-10-PCS | Mod: CPTII,S$GLB,, | Performed by: INTERNAL MEDICINE

## 2019-12-02 PROCEDURE — 1101F PT FALLS ASSESS-DOCD LE1/YR: CPT | Mod: CPTII,S$GLB,, | Performed by: INTERNAL MEDICINE

## 2019-12-02 PROCEDURE — 3075F SYST BP GE 130 - 139MM HG: CPT | Mod: CPTII,S$GLB,, | Performed by: INTERNAL MEDICINE

## 2019-12-02 PROCEDURE — 85610 POCT INR: ICD-10-PCS | Mod: QW,,, | Performed by: INTERNAL MEDICINE

## 2019-12-02 PROCEDURE — 3075F PR MOST RECENT SYSTOLIC BLOOD PRESS GE 130-139MM HG: ICD-10-PCS | Mod: CPTII,S$GLB,, | Performed by: INTERNAL MEDICINE

## 2019-12-02 PROCEDURE — 1101F PR PT FALLS ASSESS DOC 0-1 FALLS W/OUT INJ PAST YR: ICD-10-PCS | Mod: CPTII,S$GLB,, | Performed by: INTERNAL MEDICINE

## 2019-12-02 RX ORDER — VALACYCLOVIR HYDROCHLORIDE 1 G/1
1000 TABLET, FILM COATED ORAL 3 TIMES DAILY
Qty: 21 TABLET | Refills: 1 | Status: SHIPPED | OUTPATIENT
Start: 2019-12-02 | End: 2021-04-07

## 2019-12-02 NOTE — PATIENT INSTRUCTIONS
Stay on Coumadin 3 mg a day    Warfarin is a blood-thinning medication that helps treat and prevent blood clots. There is no specific warfarin diet. However, certain foods and beverages can make warfarin less effective in preventing blood clots. It's important to pay attention to what you eat while taking warfarin.    One nutrient that can lessen warfarin's effectiveness is vitamin K. It's important to be consistent in how much vitamin K you get daily. The adequate intake level of vitamin K for adult men is 120 micrograms (mcg). For adult women, it's 90 mcg. While eating small amounts of foods that are rich in vitamin K shouldn't cause a problem, avoid consuming large amounts of certain foods or drinks, including:    Kale  Spinach  [place] sprouts  Collards  Mustard greens  Chard  Broccoli  Asparagus  Green tea  Certain drinks can increase the effect of warfarin, leading to bleeding problems. Avoid or consume only small amounts of these drinks when taking warfarin:    Cranberry juice  Alcohol      Thank you for choosing [facility].     Please fill out the patient experience survey.

## 2019-12-02 NOTE — PROGRESS NOTES
"Subjective:      11:34 AM     Patient ID: Albin Fernandez is a 64 y.o. male.    Chief Complaint: coumadin monitoring    HPI         Coumadin monitoring:  Lab Results   Component Value Date    INR 2.2 12/02/2019    INR 3.1 10/22/2019    INR 1.0 08/09/2019       ANTICOAGULATION DX: (The following diagnoses are positive if BOLD, negative otherwise.)  Atrial_fibirillation.  . DVT. Pulmonary_embolism.  Aortic_valve_replacement.  TIA/CVA.         Current Dosage:   3   Compliance with medication - good  Diet changes: no.     watches diet: yes.  .  Pending medical/dental procedure: no  Concomitant medication changes (including over the counter/herbs): no  Alcohol use:  no        The following symptoms are positive if BOLD, negative otherwise.    Bleeding episodes:    Gingival_bleeding., epistaxis ., ecchymoses, hematuria . , melena, blood_per_rectum  Thrombotic event:    shortness_of_breath .  pain/swelling_of_extremity . , numbness  , tingling . , headache .   Intolerance of drug:  nausea/vomiting/diarrhea . , rash . , skin necrosis .       2 weeks ago had an outbreak of genital herpes.  Now is resolved.  Previous episode was many years ago.  Would like to have his supply of Valtrex renewed.  Review of Systems   Constitutional: Negative for activity change and unexpected weight change.   Eyes: Negative for discharge.   Respiratory: Negative for wheezing.    Cardiovascular: Negative for chest pain and palpitations.   Gastrointestinal: Positive for constipation. Negative for diarrhea and vomiting.   Genitourinary: Positive for difficulty urinating. Negative for hematuria.   Neurological: Negative for headaches.   Psychiatric/Behavioral: Negative for dysphoric mood.         Objective:      Vitals:    12/02/19 1115   BP: 136/84   Pulse: 91   Resp: 16   Temp: 97.8 °F (36.6 °C)   TempSrc: Oral   SpO2: 98%   Weight: 102.9 kg (226 lb 13.7 oz)   Height: 5' 9" (1.753 m)   PainSc:   6   PainLoc: Back     Physical Exam "   Constitutional: He appears well-developed and well-nourished.   Cardiovascular: Normal rate, regular rhythm and normal heart sounds.   Pulmonary/Chest: Effort normal and breath sounds normal.   Abdominal: Soft. There is no tenderness.   Neurological: He is alert.   Psychiatric: He has a normal mood and affect. His behavior is normal. Thought content normal.   Nursing note and vitals reviewed.      INR 2.2  Assessment:       1. Anticoagulated on Coumadin    2. Herpes simplex infection of penis          Plan:       Anticoagulated on Coumadin  -     POCT INR    Herpes simplex infection of penis  -     valACYclovir (VALTREX) 1000 MG tablet; Take 1 tablet (1,000 mg total) by mouth 3 (three) times daily.  Dispense: 21 tablet; Refill: 1      Follow up in about 6 weeks (around 1/13/2020).

## 2019-12-18 DIAGNOSIS — Z79.01 ANTICOAGULATED ON COUMADIN: ICD-10-CM

## 2019-12-18 RX ORDER — WARFARIN 1 MG/1
TABLET ORAL
Qty: 30 TABLET | Refills: 0 | Status: SHIPPED | OUTPATIENT
Start: 2019-12-18 | End: 2020-01-10

## 2019-12-26 ENCOUNTER — TELEPHONE (OUTPATIENT)
Dept: FAMILY MEDICINE | Facility: CLINIC | Age: 64
End: 2019-12-26

## 2019-12-26 NOTE — TELEPHONE ENCOUNTER
[place] and is having head congestion, cough and sinus issues and he was trying to see if something can be called in to the pharmacy attached.

## 2019-12-26 NOTE — TELEPHONE ENCOUNTER
----- Message from Karen Campbell sent at 12/26/2019  8:19 AM CST -----  Type: Needs Medical Advice    Who Called:  Patient   Symptoms (please be specific):  Head congestion, cough, sinus bleeding  How long has patient had these symptoms:  A few days  Pharmacy name and phone #:    [facility], [place], [place]  [phone]  Best Call Back Number: [phone]  Additional Information:   Patient is reqeusting a prescription for symptoms, patient [place] state

## 2019-12-26 NOTE — TELEPHONE ENCOUNTER
If you're sinuses are bleeding on coumadin you need to be seen by a doctor. even if it is in [place]

## 2020-01-09 DIAGNOSIS — Z79.01 ANTICOAGULATED ON COUMADIN: ICD-10-CM

## 2020-01-10 RX ORDER — WARFARIN 1 MG/1
TABLET ORAL
Qty: 30 TABLET | Refills: 0 | Status: SHIPPED | OUTPATIENT
Start: 2020-01-10 | End: 2020-02-26

## 2020-01-13 ENCOUNTER — OFFICE VISIT (OUTPATIENT)
Dept: FAMILY MEDICINE | Facility: CLINIC | Age: 65
End: 2020-01-13
Payer: MEDICARE

## 2020-01-13 VITALS
HEIGHT: 69 IN | RESPIRATION RATE: 16 BRPM | SYSTOLIC BLOOD PRESSURE: 108 MMHG | WEIGHT: 221.81 LBS | DIASTOLIC BLOOD PRESSURE: 72 MMHG | HEART RATE: 99 BPM | TEMPERATURE: 98 F | OXYGEN SATURATION: 96 % | BODY MASS INDEX: 32.85 KG/M2

## 2020-01-13 DIAGNOSIS — I48.0 PAROXYSMAL ATRIAL FIBRILLATION: ICD-10-CM

## 2020-01-13 DIAGNOSIS — Z79.01 ANTICOAGULATED ON COUMADIN: Primary | ICD-10-CM

## 2020-01-13 DIAGNOSIS — N40.1 BENIGN PROSTATIC HYPERPLASIA WITH URINARY OBSTRUCTION: ICD-10-CM

## 2020-01-13 DIAGNOSIS — N13.8 BENIGN PROSTATIC HYPERPLASIA WITH URINARY OBSTRUCTION: ICD-10-CM

## 2020-01-13 LAB — INR PPP: 2.1 (ref 2–3)

## 2020-01-13 PROCEDURE — 1101F PT FALLS ASSESS-DOCD LE1/YR: CPT | Mod: CPTII,S$GLB,, | Performed by: INTERNAL MEDICINE

## 2020-01-13 PROCEDURE — 3074F SYST BP LT 130 MM HG: CPT | Mod: CPTII,S$GLB,, | Performed by: INTERNAL MEDICINE

## 2020-01-13 PROCEDURE — 99213 OFFICE O/P EST LOW 20 MIN: CPT | Mod: S$GLB,,, | Performed by: INTERNAL MEDICINE

## 2020-01-13 PROCEDURE — 85610 POCT INR: ICD-10-PCS | Mod: QW,,, | Performed by: INTERNAL MEDICINE

## 2020-01-13 PROCEDURE — 3008F BODY MASS INDEX DOCD: CPT | Mod: CPTII,S$GLB,, | Performed by: INTERNAL MEDICINE

## 2020-01-13 PROCEDURE — 99213 PR OFFICE/OUTPT VISIT, EST, LEVL III, 20-29 MIN: ICD-10-PCS | Mod: S$GLB,,, | Performed by: INTERNAL MEDICINE

## 2020-01-13 PROCEDURE — 3008F PR BODY MASS INDEX (BMI) DOCUMENTED: ICD-10-PCS | Mod: CPTII,S$GLB,, | Performed by: INTERNAL MEDICINE

## 2020-01-13 PROCEDURE — 3078F DIAST BP <80 MM HG: CPT | Mod: CPTII,S$GLB,, | Performed by: INTERNAL MEDICINE

## 2020-01-13 PROCEDURE — 3078F PR MOST RECENT DIASTOLIC BLOOD PRESSURE < 80 MM HG: ICD-10-PCS | Mod: CPTII,S$GLB,, | Performed by: INTERNAL MEDICINE

## 2020-01-13 PROCEDURE — 3074F PR MOST RECENT SYSTOLIC BLOOD PRESSURE < 130 MM HG: ICD-10-PCS | Mod: CPTII,S$GLB,, | Performed by: INTERNAL MEDICINE

## 2020-01-13 PROCEDURE — 1101F PR PT FALLS ASSESS DOC 0-1 FALLS W/OUT INJ PAST YR: ICD-10-PCS | Mod: CPTII,S$GLB,, | Performed by: INTERNAL MEDICINE

## 2020-01-13 PROCEDURE — 85610 PROTHROMBIN TIME: CPT | Mod: QW,,, | Performed by: INTERNAL MEDICINE

## 2020-01-13 RX ORDER — TADALAFIL 5 MG/1
5 TABLET ORAL DAILY
Qty: 30 TABLET | Refills: 11 | Status: SHIPPED | OUTPATIENT
Start: 2020-01-13 | End: 2020-08-25 | Stop reason: SDUPTHER

## 2020-01-13 RX ORDER — WARFARIN 2 MG/1
TABLET ORAL
COMMUNITY
Start: 2020-01-09 | End: 2020-03-25 | Stop reason: SDUPTHER

## 2020-01-13 NOTE — PATIENT INSTRUCTIONS
Stay on Coumadin 3 mg a day  Warfarin is a blood-thinning medication that helps treat and prevent blood clots. There is no specific warfarin diet. However, certain foods and beverages can make warfarin less effective in preventing blood clots. It's important to pay attention to what you eat while taking warfarin.    One nutrient that can lessen warfarin's effectiveness is vitamin K. It's important to be consistent in how much vitamin K you get daily. The adequate intake level of vitamin K for adult men is 120 micrograms (mcg). For adult women, it's 90 mcg. While eating small amounts of foods that are rich in vitamin K shouldn't cause a problem, avoid consuming large amounts of certain foods or drinks, including:    Kale  Spinach  [place] sprouts  Collards  Mustard greens  Chard  Broccoli  Asparagus  Green tea  Certain drinks can increase the effect of warfarin, leading to bleeding problems. Avoid or consume only small amounts of these drinks when taking warfarin:    Cranberry juice  Alcohol      Thank you for choosing [facility].     Please fill out the patient experience survey.

## 2020-01-13 NOTE — PROGRESS NOTES
Subjective:      2:16 PM     Patient ID: Albin Fernandez is a 64 y.o. male.    Chief Complaint: coumadin monitoring    HPI         Coumadin monitoring:  Lab Results   Component Value Date    INR 2.1 01/13/2020    INR 2.2 12/02/2019    INR 3.1 10/22/2019       ANTICOAGULATION DX: (The following diagnoses are positive if BOLD, negative otherwise.)  Atrial_fibirillation.  . DVT. Pulmonary_embolism.  Aortic_valve_replacement.  TIA/CVA.         Current Dosage:  3 mg  Compliance with medication - good  Diet changes: no.     watches diet: yes.  .  Pending medical/dental procedure: no  Concomitant medication changes (including over the counter/herbs): no  Alcohol use:  no        The following symptoms are positive if BOLD, negative otherwise.    Bleeding episodes:    Gingival_bleeding., epistaxis ., ecchymoses, hematuria . , melena, blood_per_rectum  Thrombotic event:    shortness_of_breath .  pain/swelling_of_extremity . , numbness  , tingling . , headache .   Intolerance of drug:  nausea/vomiting/diarrhea . , rash . , skin necrosis .     Review of Systems   Constitutional: Positive for activity change. Negative for unexpected weight change.   HENT: Positive for hearing loss. Negative for rhinorrhea and trouble swallowing.    Eyes: Negative for discharge and visual disturbance.   Respiratory: Negative for chest tightness and wheezing.    Cardiovascular: Negative for chest pain and palpitations.   Gastrointestinal: Positive for constipation. Negative for blood in stool, diarrhea and vomiting.   Endocrine: Negative for polydipsia and polyuria.   Genitourinary: Positive for difficulty urinating. Negative for hematuria and urgency.   Musculoskeletal: Positive for arthralgias and neck pain. Negative for joint swelling.   Neurological: Negative for weakness and headaches.   Psychiatric/Behavioral: Negative for confusion and dysphoric mood.         Objective:      Vitals:    01/13/20 1354   BP: 108/72   Pulse: 99   Resp: 16  "  Temp: 97.8 °F (36.6 °C)   TempSrc: Oral   SpO2: 96%   Weight: 100.6 kg (221 lb 12.5 oz)   Height: 5' 9" (1.753 m)   PainSc:   7   PainLoc: Neck     Physical Exam   Constitutional: He appears well-developed and well-nourished.   Cardiovascular: Normal rate, regular rhythm and normal heart sounds.   Pulmonary/Chest: Effort normal and breath sounds normal.   Abdominal: Soft. There is no tenderness.   Neurological: He is alert.   Psychiatric: He has a normal mood and affect. His behavior is normal. Thought content normal.   Nursing note and vitals reviewed.       Assessment:       1. Anticoagulated on Coumadin    2. Paroxysmal atrial fibrillation    3. BPH w urinary obs/LUTS          Plan:       Anticoagulated on Coumadin  -     POCT INR    Paroxysmal atrial fibrillation    BPH w urinary obs/LUTS    Other orders  -     tadalafil (CIALIS) 5 MG tablet; Take 1 tablet (5 mg total) by mouth once daily.  Dispense: 30 tablet; Refill: 11      Follow up in about 1 month (around 2/13/2020).      "

## 2020-02-24 ENCOUNTER — OFFICE VISIT (OUTPATIENT)
Dept: FAMILY MEDICINE | Facility: CLINIC | Age: 65
End: 2020-02-24
Payer: MEDICARE

## 2020-02-24 ENCOUNTER — TELEPHONE (OUTPATIENT)
Dept: FAMILY MEDICINE | Facility: CLINIC | Age: 65
End: 2020-02-24

## 2020-02-24 VITALS
OXYGEN SATURATION: 97 % | HEART RATE: 83 BPM | DIASTOLIC BLOOD PRESSURE: 74 MMHG | TEMPERATURE: 99 F | SYSTOLIC BLOOD PRESSURE: 118 MMHG | HEIGHT: 69 IN | RESPIRATION RATE: 18 BRPM | BODY MASS INDEX: 32 KG/M2 | WEIGHT: 216.06 LBS

## 2020-02-24 DIAGNOSIS — Z79.01 ANTICOAGULATED ON COUMADIN: Primary | ICD-10-CM

## 2020-02-24 DIAGNOSIS — H69.93 DYSFUNCTION OF BOTH EUSTACHIAN TUBES: ICD-10-CM

## 2020-02-24 DIAGNOSIS — I48.0 PAROXYSMAL ATRIAL FIBRILLATION: ICD-10-CM

## 2020-02-24 LAB — INR PPP: 2.6 (ref 2–3)

## 2020-02-24 PROCEDURE — 3078F PR MOST RECENT DIASTOLIC BLOOD PRESSURE < 80 MM HG: ICD-10-PCS | Mod: CPTII,S$GLB,, | Performed by: INTERNAL MEDICINE

## 2020-02-24 PROCEDURE — 99213 OFFICE O/P EST LOW 20 MIN: CPT | Mod: S$GLB,,, | Performed by: INTERNAL MEDICINE

## 2020-02-24 PROCEDURE — 1101F PR PT FALLS ASSESS DOC 0-1 FALLS W/OUT INJ PAST YR: ICD-10-PCS | Mod: CPTII,S$GLB,, | Performed by: INTERNAL MEDICINE

## 2020-02-24 PROCEDURE — 99499 RISK ADDL DX/OHS AUDIT: ICD-10-PCS | Mod: S$GLB,,, | Performed by: INTERNAL MEDICINE

## 2020-02-24 PROCEDURE — 3074F SYST BP LT 130 MM HG: CPT | Mod: CPTII,S$GLB,, | Performed by: INTERNAL MEDICINE

## 2020-02-24 PROCEDURE — 1101F PT FALLS ASSESS-DOCD LE1/YR: CPT | Mod: CPTII,S$GLB,, | Performed by: INTERNAL MEDICINE

## 2020-02-24 PROCEDURE — 3008F BODY MASS INDEX DOCD: CPT | Mod: CPTII,S$GLB,, | Performed by: INTERNAL MEDICINE

## 2020-02-24 PROCEDURE — 85610 POCT INR: ICD-10-PCS | Mod: QW,,, | Performed by: INTERNAL MEDICINE

## 2020-02-24 PROCEDURE — 3078F DIAST BP <80 MM HG: CPT | Mod: CPTII,S$GLB,, | Performed by: INTERNAL MEDICINE

## 2020-02-24 PROCEDURE — 99213 PR OFFICE/OUTPT VISIT, EST, LEVL III, 20-29 MIN: ICD-10-PCS | Mod: S$GLB,,, | Performed by: INTERNAL MEDICINE

## 2020-02-24 PROCEDURE — 3074F PR MOST RECENT SYSTOLIC BLOOD PRESSURE < 130 MM HG: ICD-10-PCS | Mod: CPTII,S$GLB,, | Performed by: INTERNAL MEDICINE

## 2020-02-24 PROCEDURE — 3008F PR BODY MASS INDEX (BMI) DOCUMENTED: ICD-10-PCS | Mod: CPTII,S$GLB,, | Performed by: INTERNAL MEDICINE

## 2020-02-24 PROCEDURE — 85610 PROTHROMBIN TIME: CPT | Mod: QW,,, | Performed by: INTERNAL MEDICINE

## 2020-02-24 PROCEDURE — 99499 UNLISTED E&M SERVICE: CPT | Mod: S$GLB,,, | Performed by: INTERNAL MEDICINE

## 2020-02-24 RX ORDER — METOPROLOL SUCCINATE 50 MG/1
50 TABLET, EXTENDED RELEASE ORAL DAILY
Qty: 30 TABLET | Refills: 11 | Status: SHIPPED | OUTPATIENT
Start: 2020-02-24 | End: 2020-02-26

## 2020-02-24 RX ORDER — GUAIFENESIN, PSEUDOEPHEDRINE HYDROCHLORIDE 600; 60 MG/1; MG/1
1 TABLET, EXTENDED RELEASE ORAL 2 TIMES DAILY
Qty: 12 TABLET | Refills: 0 | Status: SHIPPED | OUTPATIENT
Start: 2020-02-24 | End: 2020-03-05

## 2020-02-24 RX ORDER — FLUTICASONE PROPIONATE 50 MCG
2 SPRAY, SUSPENSION (ML) NASAL DAILY
Qty: 16 G | Refills: 11 | Status: SHIPPED | OUTPATIENT
Start: 2020-02-24 | End: 2021-08-23

## 2020-02-24 NOTE — TELEPHONE ENCOUNTER
----- Message from Princess MARISELA Frausto sent at 2/24/2020  4:02 PM CST -----  Contact: Aleaxndra Chase  Type: Needs Medical Advice    Who Called:  Alexandra Chase  Pharmacy name and phone #:    [id number] - [place], [place] - [address] [address]  Phone: [phone] Fax: [phone]    Best Call Back Number: [phone]  Additional Information: requesting a call regarding rx (metoprolol succinate (TOPROL-XL) 50 MG 24 hr tablet) is able on the prescription as discontinued. Please call to confirm if the rx is to be filled.

## 2020-02-24 NOTE — PATIENT INSTRUCTIONS
Salt water nasal washings using sterile water.  Steam treatments    Stay on Coumadin 3 mg    Thank you for choosing [facility].     Please fill out the patient experience survey.

## 2020-02-24 NOTE — PROGRESS NOTES
Subjective:      3:51 PM     Patient ID: Albin Fernandez is a 64 y.o. male.    Chief Complaint: Follow-up (1 month f/u)    HPI         Coumadin monitoring:  Lab Results   Component Value Date    INR 2.6 02/24/2020    INR 2.1 01/13/2020    INR 2.2 12/02/2019       ANTICOAGULATION DX: (The following diagnoses are positive if BOLD, negative otherwise.)  Atrial_fibirillation.  . DVT. Pulmonary_embolism.  Aortic_valve_replacement.  TIA/CVA.         Current Dosage:  3 mg  Compliance with medication - good  Diet changes: no.     watches diet: yes.  .  Pending medical/dental procedure: no  Concomitant medication changes (including over the counter/herbs): no  Alcohol use:  no        The following symptoms are positive if BOLD, negative otherwise.    Bleeding episodes:    Gingival_bleeding., epistaxis ., ecchymoses, hematuria . , melena, blood_per_rectum  Thrombotic event:    shortness_of_breath .  pain/swelling_of_extremity . , numbness  , tingling . , headache .   Intolerance of drug:  nausea/vomiting/diarrhea . , rash . , skin necrosis .       2 weeks ago the patient had an episode of vertigo.  He had some vomiting.  It went away and but he still having trouble hearing.  He has hearing aids but they are working lately.  Review of Systems   Constitutional: Negative for activity change and unexpected weight change.   HENT: Positive for hearing loss. Negative for rhinorrhea and trouble swallowing.    Eyes: Negative for discharge and visual disturbance.   Respiratory: Negative for chest tightness and wheezing.    Cardiovascular: Negative for chest pain and palpitations.   Gastrointestinal: Negative for blood in stool, constipation, diarrhea and vomiting.   Endocrine: Negative for polydipsia and polyuria.   Genitourinary: Positive for difficulty urinating. Negative for hematuria and urgency.   Musculoskeletal: Positive for arthralgias, joint swelling and neck pain.   Neurological: Negative for weakness and headaches.  "  Psychiatric/Behavioral: Negative for confusion and dysphoric mood.         Objective:      Vitals:    02/24/20 1533   BP: 118/74   Pulse: 83   Resp: 18   Temp: 98.7 °F (37.1 °C)   TempSrc: Oral   SpO2: 97%   Weight: 98 kg (216 lb 0.8 oz)   Height: 5' 9" (1.753 m)   PainSc:   7   PainLoc: Generalized     Physical Exam   Constitutional: He appears well-developed and well-nourished.   HENT:   ears have minimal wax but the ear drums are retracted   Cardiovascular: Normal rate, regular rhythm and normal heart sounds.   Pulmonary/Chest: Effort normal and breath sounds normal.   Abdominal: Soft. There is no tenderness.   Neurological: He is alert.   Psychiatric: He has a normal mood and affect. His behavior is normal. Thought content normal.   Nursing note and vitals reviewed.      INR is 2.6  Assessment:       1. Anticoagulated on Coumadin    2. Paroxysmal atrial fibrillation    3. Dysfunction of both eustachian tubes          Plan:   Stay on Coumadin 3 mg    Anticoagulated on Coumadin  -     POCT INR    Paroxysmal atrial fibrillation  -     POCT INR  -     metoprolol succinate (TOPROL-XL) 50 MG 24 hr tablet; Take 1 tablet (50 mg total) by mouth once daily.  Dispense: 30 tablet; Refill: 11    Dysfunction of both eustachian tubes  -     pseudoephedrine-guaifenesin  mg (MUCINEX D)  mg per tablet; Take 1 tablet by mouth 2 (two) times daily. for 10 days  Dispense: 12 tablet; Refill: 0  -     fluticasone propionate (FLONASE) 50 mcg/actuation nasal spray; 2 sprays (100 mcg total) by Each Nostril route once daily.  Dispense: 16 g; Refill: 11  -     Ambulatory referral/consult to ENT; Future; Expected date: 03/02/2020      Follow up in about 1 month (around 3/24/2020).      "

## 2020-02-26 DIAGNOSIS — I48.0 PAROXYSMAL ATRIAL FIBRILLATION: ICD-10-CM

## 2020-02-26 DIAGNOSIS — Z79.01 ANTICOAGULATED ON COUMADIN: ICD-10-CM

## 2020-02-26 RX ORDER — WARFARIN 1 MG/1
TABLET ORAL
Qty: 30 TABLET | Refills: 0 | Status: SHIPPED | OUTPATIENT
Start: 2020-02-26 | End: 2020-03-25 | Stop reason: SDUPTHER

## 2020-02-26 RX ORDER — METOPROLOL SUCCINATE 50 MG/1
50 TABLET, EXTENDED RELEASE ORAL DAILY
Qty: 30 TABLET | Refills: 11 | Status: SHIPPED | OUTPATIENT
Start: 2020-02-26 | End: 2020-11-17

## 2020-03-16 DIAGNOSIS — E03.9 HYPOTHYROIDISM, UNSPECIFIED TYPE: ICD-10-CM

## 2020-03-16 RX ORDER — LEVOTHYROXINE SODIUM 112 UG/1
TABLET ORAL
Qty: 90 TABLET | Refills: 1 | Status: SHIPPED | OUTPATIENT
Start: 2020-03-16 | End: 2020-08-10

## 2020-03-25 ENCOUNTER — OFFICE VISIT (OUTPATIENT)
Dept: FAMILY MEDICINE | Facility: CLINIC | Age: 65
End: 2020-03-25
Payer: MEDICARE

## 2020-03-25 VITALS
BODY MASS INDEX: 32.24 KG/M2 | SYSTOLIC BLOOD PRESSURE: 120 MMHG | DIASTOLIC BLOOD PRESSURE: 82 MMHG | HEIGHT: 69 IN | WEIGHT: 217.63 LBS | TEMPERATURE: 99 F | OXYGEN SATURATION: 93 % | RESPIRATION RATE: 16 BRPM | HEART RATE: 98 BPM

## 2020-03-25 DIAGNOSIS — I70.0 ATHEROSCLEROSIS OF AORTA: ICD-10-CM

## 2020-03-25 DIAGNOSIS — Z79.01 ANTICOAGULATED ON COUMADIN: Primary | ICD-10-CM

## 2020-03-25 LAB — INR PPP: 2.2 (ref 2–3)

## 2020-03-25 PROCEDURE — 85610 POCT INR: ICD-10-PCS | Mod: QW,,, | Performed by: INTERNAL MEDICINE

## 2020-03-25 PROCEDURE — 85610 PROTHROMBIN TIME: CPT | Mod: QW,,, | Performed by: INTERNAL MEDICINE

## 2020-03-25 PROCEDURE — 3079F PR MOST RECENT DIASTOLIC BLOOD PRESSURE 80-89 MM HG: ICD-10-PCS | Mod: CPTII,S$GLB,, | Performed by: INTERNAL MEDICINE

## 2020-03-25 PROCEDURE — 3074F PR MOST RECENT SYSTOLIC BLOOD PRESSURE < 130 MM HG: ICD-10-PCS | Mod: CPTII,S$GLB,, | Performed by: INTERNAL MEDICINE

## 2020-03-25 PROCEDURE — 1101F PR PT FALLS ASSESS DOC 0-1 FALLS W/OUT INJ PAST YR: ICD-10-PCS | Mod: CPTII,S$GLB,, | Performed by: INTERNAL MEDICINE

## 2020-03-25 PROCEDURE — 99499 RISK ADDL DX/OHS AUDIT: ICD-10-PCS | Mod: S$GLB,,, | Performed by: INTERNAL MEDICINE

## 2020-03-25 PROCEDURE — 99499 UNLISTED E&M SERVICE: CPT | Mod: S$GLB,,, | Performed by: INTERNAL MEDICINE

## 2020-03-25 PROCEDURE — 3008F BODY MASS INDEX DOCD: CPT | Mod: CPTII,S$GLB,, | Performed by: INTERNAL MEDICINE

## 2020-03-25 PROCEDURE — 99213 OFFICE O/P EST LOW 20 MIN: CPT | Mod: S$GLB,,, | Performed by: INTERNAL MEDICINE

## 2020-03-25 PROCEDURE — 1101F PT FALLS ASSESS-DOCD LE1/YR: CPT | Mod: CPTII,S$GLB,, | Performed by: INTERNAL MEDICINE

## 2020-03-25 PROCEDURE — 3079F DIAST BP 80-89 MM HG: CPT | Mod: CPTII,S$GLB,, | Performed by: INTERNAL MEDICINE

## 2020-03-25 PROCEDURE — 3008F PR BODY MASS INDEX (BMI) DOCUMENTED: ICD-10-PCS | Mod: CPTII,S$GLB,, | Performed by: INTERNAL MEDICINE

## 2020-03-25 PROCEDURE — 99213 PR OFFICE/OUTPT VISIT, EST, LEVL III, 20-29 MIN: ICD-10-PCS | Mod: S$GLB,,, | Performed by: INTERNAL MEDICINE

## 2020-03-25 PROCEDURE — 3074F SYST BP LT 130 MM HG: CPT | Mod: CPTII,S$GLB,, | Performed by: INTERNAL MEDICINE

## 2020-03-25 RX ORDER — WARFARIN 1 MG/1
TABLET ORAL
Qty: 90 TABLET | Refills: 1 | Status: SHIPPED | OUTPATIENT
Start: 2020-03-25 | End: 2020-08-10

## 2020-03-25 RX ORDER — WARFARIN 2 MG/1
2 TABLET ORAL DAILY
Qty: 90 TABLET | Refills: 1 | Status: SHIPPED | OUTPATIENT
Start: 2020-03-25 | End: 2021-01-04

## 2020-03-25 NOTE — PROGRESS NOTES
Subjective:      1:20 PM     Patient ID: Albin Fenrandez is a 64 y.o. male.    Chief Complaint: coumadin monitoring (refills)    HPI       Coumadin monitoring:  Lab Results   Component Value Date    INR 2.2 03/25/2020    INR 2.6 02/24/2020    INR 2.1 01/13/2020       ANTICOAGULATION DX: (The following diagnoses are positive if BOLD, negative otherwise.)  Atrial_fibirillation.  . DVT. Pulmonary_embolism.  Aortic_valve_replacement.  TIA/CVA.         Current Dosage:  3    Compliance with medication - good  Diet changes: no.     watches diet: yes.  .  Pending medical/dental procedure: no  Concomitant medication changes (including over the counter/herbs): no  Alcohol use:  no        The following symptoms are positive if BOLD, negative otherwise.    Bleeding episodes:    Gingival_bleeding., epistaxis ., ecchymoses, hematuria . , melena, blood_per_rectum  Thrombotic event:    shortness_of_breath .  pain/swelling_of_extremity . , numbness  , tingling . , headache .   Intolerance of drug:  nausea/vomiting/diarrhea . , rash . , skin necrosis .       X-ray shows  calcifications of the aorta.  Will monitor.    Review of Systems   Constitutional: Negative for activity change and unexpected weight change.   HENT: Positive for hearing loss. Negative for rhinorrhea and trouble swallowing.    Eyes: Negative for discharge and visual disturbance.   Respiratory: Negative for chest tightness and wheezing.    Cardiovascular: Negative for chest pain and palpitations.   Gastrointestinal: Negative for blood in stool, constipation, diarrhea and vomiting.   Endocrine: Negative for polydipsia and polyuria.   Genitourinary: Positive for difficulty urinating. Negative for hematuria and urgency.   Musculoskeletal: Positive for arthralgias and neck pain. Negative for joint swelling.   Neurological: Negative for weakness and headaches.   Psychiatric/Behavioral: Negative for confusion and dysphoric mood.         Objective:      Vitals:    03/25/20  "1310   BP: 120/82   Pulse: 98   Resp: 16   Temp: 98.9 °F (37.2 °C)   TempSrc: Oral   SpO2: (!) 93%   Weight: 98.7 kg (217 lb 9.5 oz)   Height: 5' 9" (1.753 m)   PainSc:   7   PainLoc: Neck     Physical Exam   Constitutional: He appears well-developed and well-nourished.   Cardiovascular: Normal rate, regular rhythm and normal heart sounds.   Pulmonary/Chest: Effort normal and breath sounds normal.   Abdominal: Soft. There is no tenderness.   Neurological: He is alert.   Psychiatric: He has a normal mood and affect. His behavior is normal. Thought content normal.   Nursing note and vitals reviewed.  inr 2.2      .result    Assessment:       1. Anticoagulated on Coumadin    2. Atherosclerosis of aorta          Plan:     Continue Coumadin 3 mg a day  Anticoagulated on Coumadin  -     POCT INR  -     warfarin (COUMADIN) 1 MG tablet; TAKE ONE TABLET BY MOUTH DAILY ALONG WITH THE 2MG TABLET  Dispense: 90 tablet; Refill: 1  -     warfarin (COUMADIN) 2 MG tablet; Take 1 tablet (2 mg total) by mouth Daily.  Dispense: 90 tablet; Refill: 1    Atherosclerosis of aorta      Follow up in about 1 month (around 4/25/2020).      "

## 2020-03-25 NOTE — PATIENT INSTRUCTIONS
Continue Coumadin 3 mg a day    Thank you for choosing [facility].     Please fill out the patient experience survey.

## 2020-04-27 ENCOUNTER — OFFICE VISIT (OUTPATIENT)
Dept: FAMILY MEDICINE | Facility: CLINIC | Age: 65
End: 2020-04-27
Payer: MEDICARE

## 2020-04-27 VITALS
SYSTOLIC BLOOD PRESSURE: 118 MMHG | HEART RATE: 94 BPM | TEMPERATURE: 97 F | OXYGEN SATURATION: 97 % | RESPIRATION RATE: 16 BRPM | BODY MASS INDEX: 32.62 KG/M2 | DIASTOLIC BLOOD PRESSURE: 82 MMHG | WEIGHT: 220.25 LBS | HEIGHT: 69 IN

## 2020-04-27 DIAGNOSIS — H69.91 EUSTACHIAN TUBE DISORDER, RIGHT: ICD-10-CM

## 2020-04-27 DIAGNOSIS — I48.0 PAROXYSMAL ATRIAL FIBRILLATION: ICD-10-CM

## 2020-04-27 DIAGNOSIS — Z79.01 ANTICOAGULATED ON COUMADIN: Primary | ICD-10-CM

## 2020-04-27 LAB — INR PPP: 2.1 (ref 2–3)

## 2020-04-27 PROCEDURE — 3008F PR BODY MASS INDEX (BMI) DOCUMENTED: ICD-10-PCS | Mod: CPTII,S$GLB,, | Performed by: INTERNAL MEDICINE

## 2020-04-27 PROCEDURE — 3079F PR MOST RECENT DIASTOLIC BLOOD PRESSURE 80-89 MM HG: ICD-10-PCS | Mod: CPTII,S$GLB,, | Performed by: INTERNAL MEDICINE

## 2020-04-27 PROCEDURE — 99213 OFFICE O/P EST LOW 20 MIN: CPT | Mod: S$GLB,,, | Performed by: INTERNAL MEDICINE

## 2020-04-27 PROCEDURE — 3074F SYST BP LT 130 MM HG: CPT | Mod: CPTII,S$GLB,, | Performed by: INTERNAL MEDICINE

## 2020-04-27 PROCEDURE — 3074F PR MOST RECENT SYSTOLIC BLOOD PRESSURE < 130 MM HG: ICD-10-PCS | Mod: CPTII,S$GLB,, | Performed by: INTERNAL MEDICINE

## 2020-04-27 PROCEDURE — 85610 POCT INR: ICD-10-PCS | Mod: QW,,, | Performed by: INTERNAL MEDICINE

## 2020-04-27 PROCEDURE — 1101F PT FALLS ASSESS-DOCD LE1/YR: CPT | Mod: CPTII,S$GLB,, | Performed by: INTERNAL MEDICINE

## 2020-04-27 PROCEDURE — 3008F BODY MASS INDEX DOCD: CPT | Mod: CPTII,S$GLB,, | Performed by: INTERNAL MEDICINE

## 2020-04-27 PROCEDURE — 85610 PROTHROMBIN TIME: CPT | Mod: QW,,, | Performed by: INTERNAL MEDICINE

## 2020-04-27 PROCEDURE — 1101F PR PT FALLS ASSESS DOC 0-1 FALLS W/OUT INJ PAST YR: ICD-10-PCS | Mod: CPTII,S$GLB,, | Performed by: INTERNAL MEDICINE

## 2020-04-27 PROCEDURE — 99213 PR OFFICE/OUTPT VISIT, EST, LEVL III, 20-29 MIN: ICD-10-PCS | Mod: S$GLB,,, | Performed by: INTERNAL MEDICINE

## 2020-04-27 PROCEDURE — 3079F DIAST BP 80-89 MM HG: CPT | Mod: CPTII,S$GLB,, | Performed by: INTERNAL MEDICINE

## 2020-04-27 NOTE — PROGRESS NOTES
"Subjective:      10:26 AM     Patient ID: Albin Fernandez is a 64 y.o. male.    Chief Complaint: coumadin monitoring (no refills needed)    HPI       Coumadin monitoring:  Lab Results   Component Value Date    INR 2.1 04/27/2020    INR 2.2 03/25/2020    INR 2.6 02/24/2020       ANTICOAGULATION DX: (The following diagnoses are positive if BOLD, negative otherwise.)  Atrial_fibirillation.  . DVT. Pulmonary_embolism.  Aortic_valve_replacement.  TIA/CVA.         Current Dosage:  3 mg  Compliance with medication - good  Diet changes: no.     watches diet: yes.  .  Pending medical/dental procedure: no  Concomitant medication changes (including over the counter/herbs): no  Alcohol use:  no        The following symptoms are positive if BOLD, negative otherwise.    Bleeding episodes:    Gingival_bleeding., epistaxis ., ecchymoses, hematuria . , melena, blood_per_rectum  Thrombotic event:    shortness_of_breath .  pain/swelling_of_extremity . , numbness  , tingling . , headache .   Intolerance of drug:  nausea/vomiting/diarrhea . , rash . , skin necrosis .       Still having hearing loss on the right ear.  He does have hearing aids.  Review of Systems      Objective:      Vitals:    04/27/20 1017   BP: 118/82   Pulse: 94   Resp: 16   Temp: 97.2 °F (36.2 °C)   TempSrc: Oral   SpO2: 97%   Weight: 99.9 kg (220 lb 3.8 oz)   Height: 5' 9" (1.753 m)   PainSc:   7   PainLoc: Neck     Physical Exam   Constitutional: He appears well-developed and well-nourished.   HENT:   Tympanic membranes are normal.  Right tympanic may membrane does not move with insufflation   Cardiovascular: Normal rate, regular rhythm and normal heart sounds.   Pulmonary/Chest: Effort normal and breath sounds normal.   Abdominal: Soft. There is no tenderness.   Neurological: He is alert.   Psychiatric: He has a normal mood and affect. His behavior is normal. Thought content normal.   Nursing note and vitals reviewed.    Recent Results (from the past 1008 hour(s)) "   POCT INR    Collection Time: 03/25/20  1:25 PM   Result Value Ref Range    INR 2.2    POCT INR    Collection Time: 04/27/20 10:27 AM   Result Value Ref Range    INR 2.1         INR today is 2.1  Assessment:       1. Anticoagulated on Coumadin    2. Paroxysmal atrial fibrillation    3. Eustachian tube disorder, right          Plan:   Patient has an appointment today with audiometry    Anticoagulated on Coumadin  -     POCT INR    Paroxysmal atrial fibrillation    Eustachian tube disorder, right      Follow up in about 1 month (around 5/27/2020).

## 2020-04-27 NOTE — PATIENT INSTRUCTIONS
Steam treatments.  Saline nasal washings or Neti pot.  Afrin nasal spray.  Must stop after 3 days    Thank you for choosing [facility].     Please fill out the patient experience survey.

## 2020-05-05 ENCOUNTER — PATIENT MESSAGE (OUTPATIENT)
Dept: ADMINISTRATIVE | Facility: HOSPITAL | Age: 65
End: 2020-05-05

## 2020-05-06 ENCOUNTER — OFFICE VISIT (OUTPATIENT)
Dept: PRIMARY CARE CLINIC | Facility: CLINIC | Age: 65
End: 2020-05-06
Payer: MEDICARE

## 2020-05-06 ENCOUNTER — NURSE TRIAGE (OUTPATIENT)
Dept: ADMINISTRATIVE | Facility: CLINIC | Age: 65
End: 2020-05-06

## 2020-05-06 ENCOUNTER — PATIENT MESSAGE (OUTPATIENT)
Dept: FAMILY MEDICINE | Facility: CLINIC | Age: 65
End: 2020-05-06

## 2020-05-06 VITALS
RESPIRATION RATE: 20 BRPM | SYSTOLIC BLOOD PRESSURE: 138 MMHG | TEMPERATURE: 98 F | DIASTOLIC BLOOD PRESSURE: 82 MMHG | HEART RATE: 95 BPM | OXYGEN SATURATION: 96 %

## 2020-05-06 DIAGNOSIS — Z20.822 SUSPECTED COVID-19 VIRUS INFECTION: Primary | ICD-10-CM

## 2020-05-06 PROCEDURE — 3075F SYST BP GE 130 - 139MM HG: CPT | Mod: CPTII,S$GLB,, | Performed by: EMERGENCY MEDICINE

## 2020-05-06 PROCEDURE — 1101F PT FALLS ASSESS-DOCD LE1/YR: CPT | Mod: CPTII,S$GLB,, | Performed by: EMERGENCY MEDICINE

## 2020-05-06 PROCEDURE — 99212 OFFICE O/P EST SF 10 MIN: CPT | Mod: S$GLB,,, | Performed by: EMERGENCY MEDICINE

## 2020-05-06 PROCEDURE — U0002 COVID-19 LAB TEST NON-CDC: HCPCS

## 2020-05-06 PROCEDURE — 3075F PR MOST RECENT SYSTOLIC BLOOD PRESS GE 130-139MM HG: ICD-10-PCS | Mod: CPTII,S$GLB,, | Performed by: EMERGENCY MEDICINE

## 2020-05-06 PROCEDURE — 99212 PR OFFICE/OUTPT VISIT, EST, LEVL II, 10-19 MIN: ICD-10-PCS | Mod: S$GLB,,, | Performed by: EMERGENCY MEDICINE

## 2020-05-06 PROCEDURE — 3079F PR MOST RECENT DIASTOLIC BLOOD PRESSURE 80-89 MM HG: ICD-10-PCS | Mod: CPTII,S$GLB,, | Performed by: EMERGENCY MEDICINE

## 2020-05-06 PROCEDURE — 3079F DIAST BP 80-89 MM HG: CPT | Mod: CPTII,S$GLB,, | Performed by: EMERGENCY MEDICINE

## 2020-05-06 PROCEDURE — 1101F PR PT FALLS ASSESS DOC 0-1 FALLS W/OUT INJ PAST YR: ICD-10-PCS | Mod: CPTII,S$GLB,, | Performed by: EMERGENCY MEDICINE

## 2020-05-06 NOTE — PATIENT INSTRUCTIONS
Instructions for Patients with Confirmed or Suspected COVID-19    If you are awaiting your test result, you will either be called or it will be released to the patient portal.  If you have any questions about your test, please visit [email or web address] or call our COVID-19 information line at [phone].       Stay home and stay away from family members and friends. The CDC says, you can leave home after these three things have happened: 1) You have had no fever for at least 72 hours (that is three full days of no fever without the use of medicine that reduces fevers) 2) AND other symptoms have improved (for example, when your cough or shortness of breath have improved) 3) AND at least 7 days have passed since your symptoms first appeared.   Separate yourself from other people and animals in your home.   Call ahead before visiting your doctor.   Wear a facemask.   Cover your coughs and sneezes.   Wash your hands often with soap and water; hand  can be used, too.   Avoid sharing personal household items.   Wipe down surfaces used daily.   Monitor your symptoms. Seek prompt medical attention if your illness is worsening (e.g., difficulty breathing).    Before seeking care, call your healthcare provider.   If you have a medical emergency and need to call 911, notify the dispatch personnel that you have, or are being evaluated for COVID-19. If possible, put on a facemask before emergency medical services arrive.        Recommended precautions for household members, intimate partners, and caregivers in a home setting of a patient with symptomatic laboratory-confirmed COVID-19 or a patient under investigation.  Household members, intimate partners, and caregivers in the home setting awaiting tests results have close contact with a person with symptomatic, laboratory-confirmed COVID-19 or a person under investigation. Close contacts should monitor their health; they should call their  provider right away if they develop symptoms suggestive of COVID-19 (e.g., fever, cough, shortness of breath).    Close contacts should also follow these recommendations:   Make sure that you understand and can help the patient follow their provider's instructions for medication(s) and care. You should help the patient with basic needs in the home and provide support for getting groceries, prescriptions, and other personal needs.   Monitor the patient's symptoms. If the patient is getting sicker, call his or her healthcare provider and tell them that the patient has laboratory-confirmed COVID-19. If the patient has a medical emergency and you need to call 911, notify the dispatch personnel that the patient has, or is being evaluated for COVID-19.   Household members should stay in another room or be  from the patient. Household members should use a separate bedroom and bathroom, if available.   Prohibit visitors.   Household members should care for any pets in the home.   Make sure that shared spaces in the home have good air flow, such as by an air conditioner or an opened window, weather permitting.   Perform hand hygiene frequently. Wash your hands often with soap and water for at least 20 seconds or use an alcohol-based hand  (that contains > 60% alcohol) covering all surfaces of your hands and rubbing them together until they feel dry. Soap and water should be used preferentially.   Avoid touching your eyes, nose, and mouth.   The patient should wear a facemask. If the patient is not able to wear a facemask (for example, because it causes trouble breathing), caregivers should wear a mask when they are in the same room as the patient.   Wear a disposable facemask and gloves when you touch or have contact with the patient's blood, stool, or body fluids, such as saliva, sputum, nasal mucus, vomit, urine.  o Throw out disposable facemasks and gloves after using them. Do not  reuse.  o When removing personal protective equipment, first remove and dispose of gloves. Then, immediately clean your hands with soap and water or alcohol-based hand . Next, remove and dispose of facemask, and immediately clean your hands again with soap and water or alcohol-based hand .   You should not share dishes, drinking glasses, cups, eating utensils, towels, bedding, or other items with the patient. After the patient uses these items, you should wash them thoroughly (see below Wash laundry thoroughly).   Clean all high-touch surfaces, such as counters, tabletops, doorknobs, bathroom fixtures, toilets, phones, keyboards, tablets, and bedside tables, every day. Also, clean any surfaces that may have blood, stool, or body fluids on them.   Use a household cleaning spray or wipe, according to the label instructions. Labels contain instructions for safe and effective use of the cleaning product including precautions you should take when applying the product, such as wearing gloves and making sure you have good ventilation during use of the product.   Wash laundry thoroughly.  o Immediately remove and wash clothes or bedding that have blood, stool, or body fluids on them.  o Wear disposable gloves while handling soiled items and keep soiled items away from your body. Clean your hands (with soap and water or an alcohol-based hand ) immediately after removing your gloves.  o Read and follow directions on labels of laundry or clothing items and detergent. In general, using a normal laundry detergent according to washing machine instructions and dry thoroughly using the warmest temperatures recommended on the clothing label.   Place all used disposable gloves, facemasks, and other contaminated items in a lined container before disposing of them with other household waste. Clean your hands (with soap and water or an alcohol-based hand ) immediately after handling these  items. Soap and water should be used preferentially if hands are visibly dirty.   Discuss any additional questions with your state or local health department or healthcare provider. Check available hours when contacting your local health department.    For more information see CDC link below.      https://www.cdc.gov/coronavirus/2019-ncov/hcp/guidance-prevent-spread.html#precautions        Sources:  [facility], [facility] and \[facility]\""

## 2020-05-06 NOTE — PROGRESS NOTES
Subjective:        Time seen by provider: 11:01 AM on 05/06/2020    Albin Fernandez is a 64 y.o. male with PMHx of A-fib, HTN, and HLD who presents for an evaluation of possible COVID-19. Pt states he was riding to [employer] to with a friend who was recently exposed to a confirmed positive COVID-19 patient. Patient denies any symptoms at this time. No pertinent PMHx or PSHx.     Review of Systems   Constitutional: Negative for activity change, appetite change, fatigue and fever.   HENT: Negative for congestion, rhinorrhea and sore throat.    Respiratory: Negative for cough, chest tightness, shortness of breath and wheezing.    Cardiovascular: Negative for chest pain and palpitations.   Gastrointestinal: Negative for diarrhea, nausea and vomiting.   Musculoskeletal: Negative for arthralgias and myalgias.   Skin: Negative for rash.   Neurological: Negative for weakness, light-headedness and headaches.       Objective:      Physical Exam   Constitutional: He is oriented to person, place, and time. He appears well-developed and well-nourished. No distress.   HENT:   Head: Normocephalic and atraumatic.   Nose: Nose normal.   Mouth/Throat: Oropharynx is clear and moist.   Eyes: Conjunctivae are normal.   Neck: Normal range of motion.   Cardiovascular: Normal rate, regular rhythm, normal heart sounds and intact distal pulses.   No murmur heard.  Pulmonary/Chest: Effort normal and breath sounds normal. No respiratory distress. He has no wheezes.   Musculoskeletal: Normal range of motion.   Neurological: He is alert and oriented to person, place, and time.   Skin: Skin is warm and dry. He is not diaphoretic.   Nursing note and vitals reviewed.      Assessment:       1. Suspected Covid-19 Virus Infection        Plan:       1. Suspected Covid-19 Virus Infection  - COVID-19 Routine Screening  2. Discharge home and await results.   3. Return to clinic or ED for new or worsening symptoms.   4. Follow-up with PCP as needed.        Scribe Attestation:   I, Kailee Pate, am scribing for, and in the presence of, Carlene Hernandez PA-C. I performed the above scribed service and the documentation accurately describes the services I performed. I attest to the accuracy of the note.    I, Carlene Hernandez PA-C, personally performed the services described in this documentation. All medical record entries made by the scribe were at my direction and in my presence.  I have reviewed the chart and agree that the record reflects my personal performance and is accurate and complete. Carlene Hernandez PA-C.  4:12 PM 05/06/2020

## 2020-05-06 NOTE — TELEPHONE ENCOUNTER
Albin was exposed when on a ride to  with a friend last Friday.  Neither he nor the friend had a mask on.  Went to see the friend's family, two of whom tested positive for Covid-19 and are ill now.  He has no symptoms at this time, all questions negative during screening.  He said he is already at the test site [facility], and will request the test there.  He does have atrial fibrillation, is on Coumadin.  He is now wearing a mask.  Teaching done, r/t infection control and need for self- isolation for the next 14 days from the rest of his family, no contact, no sharing of utensils, no going out into public.  States he will.  Explained that he needs order from his provider for assurance that he will be tested.  He is already there, so will request the staff there will do.  Assured him I will message Dr Chang with request for order asap in his chart.  Also placing order for monitoring by text for symptoms for the next 14 days.  Message to Dr Chang.  Please contact caller directly with any additional care advice.      Reason for Disposition   [1] COVID-19 EXPOSURE (Close Contact) AND [2] within last 14 days AND [3] NO cough, fever, or breathing difficulty    Additional Information   Negative: SEVERE difficulty breathing (e.g., struggling for each breath, speak in single words, bluish lips)   Negative: Sounds like a life-threatening emergency to the triager   Negative: [1] Adult has symptoms of COVID-19 (fever, cough, or SOB) AND [2] lab test positive   Negative: [1] Adult has symptoms of COVID-19 (fever, cough or SOB) AND [2] major community spread where patient lives AND [3] testing not being done for mild symptoms   Negative: [1] Difficulty breathing (shortness of breath) occurs AND [2] onset > 14 days after COVID-19 EXPOSURE (Close Contact) AND [3] no major community spread   Negative: [1] Cough occurs AND [2] onset > 14 days after COVID-19 EXPOSURE AND [3] no major community  spread   Negative: [1] Common cold symptoms AND [2] onset > 14 days after COVID-19 EXPOSURE AND [3] no major community spread   Negative: [1] Difficulty breathing occurs AND [2] within 14 days of COVID-19 EXPOSURE (Close Contact)   Negative: Patient sounds very sick or weak to the triager   Negative: [1] Fever (or feeling feverish) OR cough AND [2] within 14 Days of COVID-19 EXPOSURE (Close Contact)   Negative: [1] Fever (or feeling feverish) OR cough occurs AND [2] within 14 days of travel from another country (international travel)   Negative: [1] Fever (or feeling feverish) OR cough occurs AND [2] within 14 days of travel from a city or area with major community spread   Negative: [1] Fever (or feeling feverish) OR cough occurs AND [2] living in area with major community spread AND [3] testing being done in the community for symptoms   Negative: [1] Mild body aches, chills, diarrhea, headache, runny nose, or sore throat AND [2] within 14 days of COVID-19 EXPOSURE   Negative: [1] COVID-19 EXPOSURE within last 14 days AND [2] NO cough, fever, or breathing difficulty AND [3] exposed person is a healthcare worker who was NOT using all recommended personal protective equipment (i.e., a respirator-N95 mask, eye protection, gloves, and gown)    Protocols used: CORONAVIRUS (COVID-19) EXPOSURE-A-[place]

## 2020-05-07 LAB — SARS-COV-2 RNA RESP QL NAA+PROBE: NOT DETECTED

## 2020-06-09 ENCOUNTER — PATIENT MESSAGE (OUTPATIENT)
Dept: FAMILY MEDICINE | Facility: CLINIC | Age: 65
End: 2020-06-09

## 2020-06-12 ENCOUNTER — PATIENT MESSAGE (OUTPATIENT)
Dept: FAMILY MEDICINE | Facility: CLINIC | Age: 65
End: 2020-06-12

## 2020-06-16 ENCOUNTER — DOCUMENTATION ONLY (OUTPATIENT)
Dept: FAMILY MEDICINE | Facility: CLINIC | Age: 65
End: 2020-06-16

## 2020-06-16 ENCOUNTER — OFFICE VISIT (OUTPATIENT)
Dept: FAMILY MEDICINE | Facility: CLINIC | Age: 65
End: 2020-06-16
Payer: MEDICARE

## 2020-06-16 VITALS
RESPIRATION RATE: 16 BRPM | HEIGHT: 69 IN | TEMPERATURE: 98 F | DIASTOLIC BLOOD PRESSURE: 82 MMHG | BODY MASS INDEX: 31.94 KG/M2 | OXYGEN SATURATION: 96 % | SYSTOLIC BLOOD PRESSURE: 128 MMHG | WEIGHT: 215.63 LBS | HEART RATE: 91 BPM

## 2020-06-16 DIAGNOSIS — Z79.01 ANTICOAGULATED ON COUMADIN: Primary | ICD-10-CM

## 2020-06-16 DIAGNOSIS — I10 ESSENTIAL HYPERTENSION: ICD-10-CM

## 2020-06-16 DIAGNOSIS — E03.9 HYPOTHYROIDISM, UNSPECIFIED TYPE: ICD-10-CM

## 2020-06-16 DIAGNOSIS — I48.0 PAROXYSMAL ATRIAL FIBRILLATION: ICD-10-CM

## 2020-06-16 DIAGNOSIS — E78.5 HYPERLIPIDEMIA, UNSPECIFIED HYPERLIPIDEMIA TYPE: ICD-10-CM

## 2020-06-16 PROCEDURE — 3074F SYST BP LT 130 MM HG: CPT | Mod: CPTII,S$GLB,, | Performed by: INTERNAL MEDICINE

## 2020-06-16 PROCEDURE — 3008F BODY MASS INDEX DOCD: CPT | Mod: CPTII,S$GLB,, | Performed by: INTERNAL MEDICINE

## 2020-06-16 PROCEDURE — 99214 OFFICE O/P EST MOD 30 MIN: CPT | Mod: S$GLB,,, | Performed by: INTERNAL MEDICINE

## 2020-06-16 PROCEDURE — 99499 RISK ADDL DX/OHS AUDIT: ICD-10-PCS | Mod: S$GLB,,, | Performed by: INTERNAL MEDICINE

## 2020-06-16 PROCEDURE — 85610 POCT INR: ICD-10-PCS | Mod: QW,,, | Performed by: INTERNAL MEDICINE

## 2020-06-16 PROCEDURE — 3008F PR BODY MASS INDEX (BMI) DOCUMENTED: ICD-10-PCS | Mod: CPTII,S$GLB,, | Performed by: INTERNAL MEDICINE

## 2020-06-16 PROCEDURE — 99214 PR OFFICE/OUTPT VISIT, EST, LEVL IV, 30-39 MIN: ICD-10-PCS | Mod: S$GLB,,, | Performed by: INTERNAL MEDICINE

## 2020-06-16 PROCEDURE — 3079F PR MOST RECENT DIASTOLIC BLOOD PRESSURE 80-89 MM HG: ICD-10-PCS | Mod: CPTII,S$GLB,, | Performed by: INTERNAL MEDICINE

## 2020-06-16 PROCEDURE — 1101F PT FALLS ASSESS-DOCD LE1/YR: CPT | Mod: CPTII,S$GLB,, | Performed by: INTERNAL MEDICINE

## 2020-06-16 PROCEDURE — 3079F DIAST BP 80-89 MM HG: CPT | Mod: CPTII,S$GLB,, | Performed by: INTERNAL MEDICINE

## 2020-06-16 PROCEDURE — 3074F PR MOST RECENT SYSTOLIC BLOOD PRESSURE < 130 MM HG: ICD-10-PCS | Mod: CPTII,S$GLB,, | Performed by: INTERNAL MEDICINE

## 2020-06-16 PROCEDURE — 99499 UNLISTED E&M SERVICE: CPT | Mod: S$GLB,,, | Performed by: INTERNAL MEDICINE

## 2020-06-16 PROCEDURE — 85610 PROTHROMBIN TIME: CPT | Mod: QW,,, | Performed by: INTERNAL MEDICINE

## 2020-06-16 PROCEDURE — 1101F PR PT FALLS ASSESS DOC 0-1 FALLS W/OUT INJ PAST YR: ICD-10-PCS | Mod: CPTII,S$GLB,, | Performed by: INTERNAL MEDICINE

## 2020-06-16 NOTE — PATIENT INSTRUCTIONS
Stay on 3 mg a day of Coumadin    Warfarin is a blood-thinning medication that helps treat and prevent blood clots. There is no specific warfarin diet. However, certain foods and beverages can make warfarin less effective in preventing blood clots. It's important to pay attention to what you eat while taking warfarin.    One nutrient that can lessen warfarin's effectiveness is vitamin K. It's important to be consistent in how much vitamin K you get daily. The adequate intake level of vitamin K for adult men is 120 micrograms (mcg). For adult women, it's 90 mcg. While eating small amounts of foods that are rich in vitamin K shouldn't cause a problem, avoid consuming large amounts of certain foods or drinks, including:    Kale  Spinach  [place] sprouts  Collards  Mustard greens  Chard  Broccoli  Asparagus  Green tea  Certain drinks can increase the effect of warfarin, leading to bleeding problems. Avoid or consume only small amounts of these drinks when taking warfarin:    Cranberry juice  Alcohol      Thank you for choosing [facility].     Please fill out the patient experience survey.

## 2020-06-16 NOTE — PROGRESS NOTES
Subjective:      10:38 AM     Patient ID: Albin Fernandez is a 64 y.o. male.    Chief Complaint: coumadin monitoring (refills)    HPI         Coumadin monitoring:  Lab Results   Component Value Date    INR 2.1 04/27/2020    INR 2.2 03/25/2020    INR 2.6 02/24/2020       ANTICOAGULATION DX: (The following diagnoses are positive if BOLD, negative otherwise.)  Atrial_fibirillation.  . DVT. Pulmonary_embolism.  Aortic_valve_replacement.  TIA/CVA.         Current Dosage:    3 mg  Compliance with medication - good  Diet changes: no.     watches diet: yes.  .  Pending medical/dental procedure: no  Concomitant medication changes (including over the counter/herbs): no  Alcohol use:  no        The following symptoms are positive if BOLD, negative otherwise.    Bleeding episodes:    Gingival_bleeding., epistaxis ., ecchymoses, hematuria . , melena, blood_per_rectum  Thrombotic event:    shortness_of_breath .  pain/swelling_of_extremity . , numbness  , tingling . , headache .   Intolerance of drug:  nausea/vomiting/diarrhea . , rash . , skin necrosis .       CHIEF :COMPLAINT: hypothyroidism    HPI:     ONSET:           ago.   DURATION: . continuous  QUALITY/COURSE: .  unchanged  INTENSITY/SEVERITY: # 1  /10 (on 1 to 10 scale)  MODIFIERS/TREATMENTS: Taking Medications: yes.     Prior Labs:   Lab Results   Component Value Date    TSH 2.788 04/04/2019     Thyroid scans:  no}  Ultrasound: .no      The below section is positive for the patient ONLY if BOLDED:      SYMPTOMS/RELATED:  Decrease in energy, diarrhea, constipation,  heat_intolerance,  Cold_intolerance, Nervousness, Palpitations. Hair_loss. Myalgias. Weight_loss, Weight_gain .            CHIEF COMPLAINT: Hypertension  HPI:     ONSET:      QUALITY/COURSE:   Unchanged.     INTENSITY/SEVERITY:  Average blood pressure is unknown.      MODIFIERS/TREATMENTS:  Taking medications: yes. .High sodium intake: no. alcohol: no      The following symptoms are positive only if BOLDED,  "otherwise are negative.      SYMPTOMS/RELATED: Possible medication side effects include:   Depression..  . Cough. . Constipation.    REVIEW OF SYMPTOMS: . Weight_loss . Weight_gain . Leg_cramps .Potency_problems .    TARGET ORGAN DAMAGE:: angina/ prior myocardial infarction, chronic kidney disease, heart failure, left ventricular hypertrophy, peripheral artery disease, prior coronary revascularization, retinopathy, stroke. transient ischemic attack.        Review of Systems      Objective:      Vitals:    06/16/20 1023   BP: 128/82   Pulse: 91   Resp: 16   Temp: 97.6 °F (36.4 °C)   TempSrc: Oral   SpO2: 96%   Weight: 97.8 kg (215 lb 9.8 oz)   Height: 5' 9" (1.753 m)   PainSc:   6   PainLoc: Back     Physical Exam  Vitals signs and nursing note reviewed.   Constitutional:       Appearance: He is well-developed.   Cardiovascular:      Rate and Rhythm: Normal rate and regular rhythm.      Heart sounds: Normal heart sounds.   Pulmonary:      Effort: Pulmonary effort is normal.      Breath sounds: Normal breath sounds.   Abdominal:      Palpations: Abdomen is soft.      Tenderness: There is no abdominal tenderness.   Neurological:      Mental Status: He is alert.   Psychiatric:         Behavior: Behavior normal.         Thought Content: Thought content normal.       Recent Results (from the past 1008 hour(s))   COVID-19 Routine Screening    Collection Time: 05/06/20 10:55 AM   Result Value Ref Range    SARS-CoV2 (COVID-19) Qualitative PCR Not Detected Not Detected          Assessment:       1. Anticoagulated on Coumadin    2. Paroxysmal atrial fibrillation    3. Essential hypertension    4. Hyperlipidemia, unspecified hyperlipidemia type    5. Hypothyroidism, unspecified type          Plan:       Anticoagulated on Coumadin  -     POCT INR    Paroxysmal atrial fibrillation  -     POCT INR    Essential hypertension  -     Comprehensive metabolic panel; Future; Expected date: 06/16/2020    Hyperlipidemia, unspecified " hyperlipidemia type  -     Lipid Panel; Future; Expected date: 06/16/2020    Hypothyroidism, unspecified type  -     TSH; Future; Expected date: 06/16/2020      No follow-ups on file.

## 2020-07-09 ENCOUNTER — CLINICAL SUPPORT (OUTPATIENT)
Dept: FAMILY MEDICINE | Facility: CLINIC | Age: 65
End: 2020-07-09
Payer: MEDICARE

## 2020-07-09 DIAGNOSIS — E78.5 HYPERLIPIDEMIA, UNSPECIFIED HYPERLIPIDEMIA TYPE: ICD-10-CM

## 2020-07-09 DIAGNOSIS — I10 ESSENTIAL HYPERTENSION: ICD-10-CM

## 2020-07-09 DIAGNOSIS — E03.9 HYPOTHYROIDISM, UNSPECIFIED TYPE: ICD-10-CM

## 2020-07-09 LAB
ALBUMIN SERPL BCP-MCNC: 3.8 G/DL (ref 3.5–5.2)
ALP SERPL-CCNC: 68 U/L (ref 55–135)
ALT SERPL W/O P-5'-P-CCNC: 22 U/L (ref 10–44)
ANION GAP SERPL CALC-SCNC: 8 MMOL/L (ref 8–16)
AST SERPL-CCNC: 21 U/L (ref 10–40)
BILIRUB SERPL-MCNC: 0.3 MG/DL (ref 0.1–1)
BUN SERPL-MCNC: 17 MG/DL (ref 8–23)
CALCIUM SERPL-MCNC: 8.9 MG/DL (ref 8.7–10.5)
CHLORIDE SERPL-SCNC: 106 MMOL/L (ref 95–110)
CHOLEST SERPL-MCNC: 160 MG/DL (ref 120–199)
CHOLEST/HDLC SERPL: 3.9 {RATIO} (ref 2–5)
CO2 SERPL-SCNC: 28 MMOL/L (ref 23–29)
CREAT SERPL-MCNC: 1.1 MG/DL (ref 0.5–1.4)
EST. GFR  (AFRICAN AMERICAN): >60 ML/MIN/1.73 M^2
EST. GFR  (NON AFRICAN AMERICAN): >60 ML/MIN/1.73 M^2
GLUCOSE SERPL-MCNC: 87 MG/DL (ref 70–110)
HDLC SERPL-MCNC: 41 MG/DL (ref 40–75)
HDLC SERPL: 25.6 % (ref 20–50)
LDLC SERPL CALC-MCNC: 96.6 MG/DL (ref 63–159)
NONHDLC SERPL-MCNC: 119 MG/DL
POTASSIUM SERPL-SCNC: 4.1 MMOL/L (ref 3.5–5.1)
PROT SERPL-MCNC: 6.7 G/DL (ref 6–8.4)
SODIUM SERPL-SCNC: 142 MMOL/L (ref 136–145)
TRIGL SERPL-MCNC: 112 MG/DL (ref 30–150)
TSH SERPL DL<=0.005 MIU/L-ACNC: 2.31 UIU/ML (ref 0.4–4)

## 2020-07-09 PROCEDURE — 80053 COMPREHEN METABOLIC PANEL: CPT

## 2020-07-09 PROCEDURE — 84443 ASSAY THYROID STIM HORMONE: CPT

## 2020-07-09 PROCEDURE — 80061 LIPID PANEL: CPT

## 2020-07-09 NOTE — PROGRESS NOTES
ID patient by name and date of birth.  Specimen collected with 23g butterfly to right AC using aseptic technique. Patient tolerated well.

## 2020-07-16 ENCOUNTER — OFFICE VISIT (OUTPATIENT)
Dept: FAMILY MEDICINE | Facility: CLINIC | Age: 65
End: 2020-07-16
Payer: MEDICARE

## 2020-07-16 VITALS
HEIGHT: 69 IN | HEART RATE: 87 BPM | WEIGHT: 224.63 LBS | TEMPERATURE: 98 F | BODY MASS INDEX: 33.27 KG/M2 | SYSTOLIC BLOOD PRESSURE: 124 MMHG | DIASTOLIC BLOOD PRESSURE: 74 MMHG | RESPIRATION RATE: 16 BRPM | OXYGEN SATURATION: 97 %

## 2020-07-16 DIAGNOSIS — E78.5 HYPERLIPIDEMIA, UNSPECIFIED HYPERLIPIDEMIA TYPE: ICD-10-CM

## 2020-07-16 DIAGNOSIS — I48.0 PAROXYSMAL ATRIAL FIBRILLATION: ICD-10-CM

## 2020-07-16 DIAGNOSIS — I10 ESSENTIAL HYPERTENSION: ICD-10-CM

## 2020-07-16 DIAGNOSIS — Z79.01 ANTICOAGULATED ON COUMADIN: Primary | ICD-10-CM

## 2020-07-16 LAB
INR PPP: 2.3 (ref 2–3)
INR PPP: 2.3 (ref 2–3)

## 2020-07-16 PROCEDURE — 99214 OFFICE O/P EST MOD 30 MIN: CPT | Mod: S$GLB,,, | Performed by: INTERNAL MEDICINE

## 2020-07-16 PROCEDURE — 1101F PR PT FALLS ASSESS DOC 0-1 FALLS W/OUT INJ PAST YR: ICD-10-PCS | Mod: CPTII,S$GLB,, | Performed by: INTERNAL MEDICINE

## 2020-07-16 PROCEDURE — 85610 POCT INR: ICD-10-PCS | Mod: QW,,, | Performed by: INTERNAL MEDICINE

## 2020-07-16 PROCEDURE — 3074F PR MOST RECENT SYSTOLIC BLOOD PRESSURE < 130 MM HG: ICD-10-PCS | Mod: CPTII,S$GLB,, | Performed by: INTERNAL MEDICINE

## 2020-07-16 PROCEDURE — 99499 RISK ADDL DX/OHS AUDIT: ICD-10-PCS | Mod: S$GLB,,, | Performed by: INTERNAL MEDICINE

## 2020-07-16 PROCEDURE — 3078F DIAST BP <80 MM HG: CPT | Mod: CPTII,S$GLB,, | Performed by: INTERNAL MEDICINE

## 2020-07-16 PROCEDURE — 1101F PT FALLS ASSESS-DOCD LE1/YR: CPT | Mod: CPTII,S$GLB,, | Performed by: INTERNAL MEDICINE

## 2020-07-16 PROCEDURE — 99499 UNLISTED E&M SERVICE: CPT | Mod: S$GLB,,, | Performed by: INTERNAL MEDICINE

## 2020-07-16 PROCEDURE — 3074F SYST BP LT 130 MM HG: CPT | Mod: CPTII,S$GLB,, | Performed by: INTERNAL MEDICINE

## 2020-07-16 PROCEDURE — 3008F PR BODY MASS INDEX (BMI) DOCUMENTED: ICD-10-PCS | Mod: CPTII,S$GLB,, | Performed by: INTERNAL MEDICINE

## 2020-07-16 PROCEDURE — 3008F BODY MASS INDEX DOCD: CPT | Mod: CPTII,S$GLB,, | Performed by: INTERNAL MEDICINE

## 2020-07-16 PROCEDURE — 99214 PR OFFICE/OUTPT VISIT, EST, LEVL IV, 30-39 MIN: ICD-10-PCS | Mod: S$GLB,,, | Performed by: INTERNAL MEDICINE

## 2020-07-16 PROCEDURE — 85610 PROTHROMBIN TIME: CPT | Mod: QW,,, | Performed by: INTERNAL MEDICINE

## 2020-07-16 PROCEDURE — 3078F PR MOST RECENT DIASTOLIC BLOOD PRESSURE < 80 MM HG: ICD-10-PCS | Mod: CPTII,S$GLB,, | Performed by: INTERNAL MEDICINE

## 2020-07-16 NOTE — PATIENT INSTRUCTIONS
Stay on Coumadin 3 mg a day    Warfarin is a blood-thinning medication that helps treat and prevent blood clots. There is no specific warfarin diet. However, certain foods and beverages can make warfarin less effective in preventing blood clots. It's important to pay attention to what you eat while taking warfarin.    One nutrient that can lessen warfarin's effectiveness is vitamin K. It's important to be consistent in how much vitamin K you get daily. The adequate intake level of vitamin K for adult men is 120 micrograms (mcg). For adult women, it's 90 mcg. While eating small amounts of foods that are rich in vitamin K shouldn't cause a problem, avoid consuming large amounts of certain foods or drinks, including:    Kale  Spinach  [place] sprouts  Collards  Mustard greens  Chard  Broccoli  Asparagus  Green tea  Certain drinks can increase the effect of warfarin, leading to bleeding problems. Avoid or consume only small amounts of these drinks when taking warfarin:    Cranberry juice  Alcohol      Thank you for choosing [facility]  Please fill out the patient experience survey.

## 2020-07-16 NOTE — PROGRESS NOTES
Subjective:      8:28 AM     Patient ID: Albin Fernandez is a 64 y.o. male.    Chief Complaint: coumadin monitoring    HPI       Coumadin monitoring:  Lab Results   Component Value Date    INR 2.3 07/16/2020    INR 2.1 04/27/2020    INR 2.2 03/25/2020       ANTICOAGULATION DX: (The following diagnoses are positive if BOLD, negative otherwise.)  Atrial_fibirillation.  . DVT. Pulmonary_embolism.  Aortic_valve_replacement.  TIA/CVA.         Current Dosage:  3 mg daily    Compliance with medication - good  Diet changes: no.     watches diet: yes.  .  Pending medical/dental procedure: no  Concomitant medication changes (including over the counter/herbs): no  Alcohol use:  no        The following symptoms are positive if BOLD, negative otherwise.    Bleeding episodes:    Gingival_bleeding., epistaxis ., ecchymoses, hematuria . , melena, blood_per_rectum  Thrombotic event:    shortness_of_breath .  pain/swelling_of_extremity . , numbness  , tingling . , headache .   Intolerance of drug:  nausea/vomiting/diarrhea . , rash . , skin necrosis .           CHIEF COMPLAINT: Hyperlipidemia. cholesterol screening: no.   HPI:     ONSET:    MODIFIERS/TREATMENTS: . Taking medications: yes. . Non-compliance with following diet: no. .     SYMPTOMS/RELATED:Possible medication side effects include:   Myalgia: no.  .     REVIEW OF SYMPTOMS: past weights:   Wt Readings from Last 1 Encounters:   07/16/20 0821 101.9 kg (224 lb 10.4 oz)                                                     Last lipids: total   Lab Results   Component Value Date    CHOL 160 07/09/2020    CHOL 161 05/02/2019    CHOL 181 07/26/2018                                                                     HDL   Lab Results   Component Value Date    HDL 41 07/09/2020    HDL 35 (L) 05/02/2019    HDL 38 (L) 07/26/2018                                                                     LDL   Lab Results   Component Value Date    LDLCALC 96.6 07/09/2020    LDLCALC 102.4  "05/02/2019    LDLCALC 102.8 07/26/2018                                                                     TRIG   Lab Results   Component Value Date    TRIG 112 07/09/2020    TRIG 118 05/02/2019    TRIG 201 (H) 07/26/2018                                                                             CHIEF COMPLAINT: Hypertension  HPI:     ONSET:      QUALITY/COURSE:   Unchanged.     INTENSITY/SEVERITY:  Average blood pressure is 120/70.      MODIFIERS/TREATMENTS:  Taking medications: yes. .High sodium intake: no. alcohol: no      The following symptoms are positive only if BOLDED, otherwise are negative.      SYMPTOMS/RELATED: Possible medication side effects include:   Depression..  . Cough. . Constipation.    REVIEW OF SYMPTOMS: . Weight_loss . Weight_gain . Leg_cramps .Potency_problems .    TARGET ORGAN DAMAGE:: angina/ prior myocardial infarction, chronic kidney disease, heart failure, left ventricular hypertrophy, peripheral artery disease, prior coronary revascularization, retinopathy, stroke. transient ischemic attack.      Review of Systems   Eyes: Negative for visual disturbance.   Respiratory: Negative for chest tightness and shortness of breath.    Cardiovascular: Negative for chest pain and leg swelling.   Neurological: Negative for dizziness, syncope, weakness and headaches.   Psychiatric/Behavioral: Negative for dysphoric mood. The patient is not nervous/anxious.          Objective:      Vitals:    07/16/20 0821   BP: 124/74   Pulse: 87   Resp: 16   Temp: 97.9 °F (36.6 °C)   TempSrc: Oral   SpO2: 97%   Weight: 101.9 kg (224 lb 10.4 oz)   Height: 5' 9" (1.753 m)   PainSc:   7   PainLoc: Neck     Physical Exam  Vitals signs and nursing note reviewed.   Constitutional:       Appearance: He is well-developed.   Cardiovascular:      Rate and Rhythm: Normal rate and regular rhythm.      Heart sounds: Normal heart sounds.   Pulmonary:      Effort: Pulmonary effort is normal.      Breath sounds: Normal breath " sounds.   Abdominal:      Palpations: Abdomen is soft.      Tenderness: There is no abdominal tenderness.   Neurological:      Mental Status: He is alert.   Psychiatric:         Behavior: Behavior normal.         Thought Content: Thought content normal.       Recent Results (from the past 1008 hour(s))   Comprehensive metabolic panel    Collection Time: 07/09/20 10:38 AM   Result Value Ref Range    Sodium 142 136 - 145 mmol/L    Potassium 4.1 3.5 - 5.1 mmol/L    Chloride 106 95 - 110 mmol/L    CO2 28 23 - 29 mmol/L    Glucose 87 70 - 110 mg/dL    BUN, Bld 17 8 - 23 mg/dL    Creatinine 1.1 0.5 - 1.4 mg/dL    Calcium 8.9 8.7 - 10.5 mg/dL    Total Protein 6.7 6.0 - 8.4 g/dL    Albumin 3.8 3.5 - 5.2 g/dL    Total Bilirubin 0.3 0.1 - 1.0 mg/dL    Alkaline Phosphatase 68 55 - 135 U/L    AST 21 10 - 40 U/L    ALT 22 10 - 44 U/L    Anion Gap 8 8 - 16 mmol/L    eGFR if African American >60 >60 mL/min/1.73 m^2    eGFR if non African American >60 >60 mL/min/1.73 m^2   Lipid Panel    Collection Time: 07/09/20 10:38 AM   Result Value Ref Range    Cholesterol 160 120 - 199 mg/dL    Triglycerides 112 30 - 150 mg/dL    HDL 41 40 - 75 mg/dL    LDL Cholesterol 96.6 63.0 - 159.0 mg/dL    Hdl/Cholesterol Ratio 25.6 20.0 - 50.0 %    Total Cholesterol/HDL Ratio 3.9 2.0 - 5.0    Non-HDL Cholesterol 119 mg/dL   TSH    Collection Time: 07/09/20 10:38 AM   Result Value Ref Range    TSH 2.310 0.400 - 4.000 uIU/mL   POCT INR    Collection Time: 07/16/20  8:43 AM   Result Value Ref Range    INR 2.3         INR 2.3  Assessment:       1. Anticoagulated on Coumadin    2. Paroxysmal atrial fibrillation    3. Essential hypertension    4. Hyperlipidemia, unspecified hyperlipidemia type          Plan:   Stay on Coumadin 3 mg a day    Anticoagulated on Coumadin  -     POCT INR    Paroxysmal atrial fibrillation    Essential hypertension    Hyperlipidemia, unspecified hyperlipidemia type      Follow up in about 1 month (around 8/16/2020).

## 2020-08-17 ENCOUNTER — OFFICE VISIT (OUTPATIENT)
Dept: FAMILY MEDICINE | Facility: CLINIC | Age: 65
End: 2020-08-17
Payer: MEDICARE

## 2020-08-17 VITALS
SYSTOLIC BLOOD PRESSURE: 110 MMHG | WEIGHT: 220 LBS | HEART RATE: 86 BPM | TEMPERATURE: 98 F | DIASTOLIC BLOOD PRESSURE: 68 MMHG | RESPIRATION RATE: 16 BRPM | BODY MASS INDEX: 32.58 KG/M2 | OXYGEN SATURATION: 97 % | HEIGHT: 69 IN

## 2020-08-17 DIAGNOSIS — H93.11 TINNITUS OF RIGHT EAR: ICD-10-CM

## 2020-08-17 DIAGNOSIS — I48.0 PAROXYSMAL ATRIAL FIBRILLATION: ICD-10-CM

## 2020-08-17 DIAGNOSIS — Z79.01 ANTICOAGULATED ON COUMADIN: Primary | ICD-10-CM

## 2020-08-17 LAB — INR PPP: 2 (ref 2–3)

## 2020-08-17 PROCEDURE — 3078F PR MOST RECENT DIASTOLIC BLOOD PRESSURE < 80 MM HG: ICD-10-PCS | Mod: CPTII,S$GLB,, | Performed by: INTERNAL MEDICINE

## 2020-08-17 PROCEDURE — 99214 OFFICE O/P EST MOD 30 MIN: CPT | Mod: S$GLB,,, | Performed by: INTERNAL MEDICINE

## 2020-08-17 PROCEDURE — 3078F DIAST BP <80 MM HG: CPT | Mod: CPTII,S$GLB,, | Performed by: INTERNAL MEDICINE

## 2020-08-17 PROCEDURE — 99214 PR OFFICE/OUTPT VISIT, EST, LEVL IV, 30-39 MIN: ICD-10-PCS | Mod: S$GLB,,, | Performed by: INTERNAL MEDICINE

## 2020-08-17 PROCEDURE — 85610 PROTHROMBIN TIME: CPT | Mod: QW,,, | Performed by: INTERNAL MEDICINE

## 2020-08-17 PROCEDURE — 3008F PR BODY MASS INDEX (BMI) DOCUMENTED: ICD-10-PCS | Mod: CPTII,S$GLB,, | Performed by: INTERNAL MEDICINE

## 2020-08-17 PROCEDURE — 3074F SYST BP LT 130 MM HG: CPT | Mod: CPTII,S$GLB,, | Performed by: INTERNAL MEDICINE

## 2020-08-17 PROCEDURE — 85610 POCT INR: ICD-10-PCS | Mod: QW,,, | Performed by: INTERNAL MEDICINE

## 2020-08-17 PROCEDURE — 1101F PT FALLS ASSESS-DOCD LE1/YR: CPT | Mod: CPTII,S$GLB,, | Performed by: INTERNAL MEDICINE

## 2020-08-17 PROCEDURE — 1101F PR PT FALLS ASSESS DOC 0-1 FALLS W/OUT INJ PAST YR: ICD-10-PCS | Mod: CPTII,S$GLB,, | Performed by: INTERNAL MEDICINE

## 2020-08-17 PROCEDURE — 3074F PR MOST RECENT SYSTOLIC BLOOD PRESSURE < 130 MM HG: ICD-10-PCS | Mod: CPTII,S$GLB,, | Performed by: INTERNAL MEDICINE

## 2020-08-17 PROCEDURE — 3008F BODY MASS INDEX DOCD: CPT | Mod: CPTII,S$GLB,, | Performed by: INTERNAL MEDICINE

## 2020-08-17 NOTE — PROGRESS NOTES
"Subjective:      11:48 AM     Patient ID: Albin Fernandez is a 64 y.o. male.    Chief Complaint: Anticoagulation (no refills needed)    HPI       Coumadin monitoring:  Lab Results   Component Value Date    INR 2.0 08/17/2020    INR 2.3 07/16/2020    INR 2.3 07/16/2020       ANTICOAGULATION DX: (The following diagnoses are positive if BOLD, negative otherwise.)  Atrial_fibirillation.  . DVT. Pulmonary_embolism.  Aortic_valve_replacement.  TIA/CVA.         Current Dosage:    3 mg Compliance with medication - good  Diet changes: no.     watches diet: yes.  .  Pending medical/dental procedure: no  Concomitant medication changes (including over the counter/herbs): no  Alcohol use:  no        The following symptoms are positive if BOLD, negative otherwise.    Bleeding episodes:    Gingival_bleeding., epistaxis ., ecchymoses, hematuria . , melena, blood_per_rectum  Thrombotic event:    shortness_of_breath .  pain/swelling_of_extremity . , numbness  , tingling . , headache .   Intolerance of drug:  nausea/vomiting/diarrhea . , rash . , skin necrosis .     The patient had hour tinnitus which was very disturbing to him on wakening this morning around 530.  He has new hearing aids but did not have them on.    Review of Systems      Objective:      Vitals:    08/17/20 1129   BP: 110/68   Pulse: 86   Resp: 16   Temp: 98.4 °F (36.9 °C)   TempSrc: Oral   SpO2: 97%   Weight: 99.8 kg (220 lb 0.3 oz)   Height: 5' 9" (1.753 m)   PainSc:   7   PainLoc: Back     Physical Exam  Vitals signs and nursing note reviewed.   Constitutional:       Appearance: He is well-developed.   HENT:      Right Ear: Tympanic membrane and external ear normal.      Left Ear: Tympanic membrane and external ear normal.      Mouth/Throat:      Pharynx: No oropharyngeal exudate.   Eyes:      Pupils: Pupils are equal, round, and reactive to light.   Cardiovascular:      Rate and Rhythm: Normal rate and regular rhythm.      Heart sounds: Normal heart sounds. No " murmur. No gallop.    Pulmonary:      Effort: Pulmonary effort is normal.      Breath sounds: Normal breath sounds. No wheezing or rales.   Chest:      Chest wall: No tenderness.   Abdominal:      Palpations: Abdomen is soft.      Tenderness: There is no abdominal tenderness.   Lymphadenopathy:      Cervical: No cervical adenopathy.   Neurological:      Mental Status: He is alert.   Psychiatric:         Behavior: Behavior normal.         Thought Content: Thought content normal.       Recent Results (from the past 1008 hour(s))   Comprehensive metabolic panel    Collection Time: 07/09/20 10:38 AM   Result Value Ref Range    Sodium 142 136 - 145 mmol/L    Potassium 4.1 3.5 - 5.1 mmol/L    Chloride 106 95 - 110 mmol/L    CO2 28 23 - 29 mmol/L    Glucose 87 70 - 110 mg/dL    BUN, Bld 17 8 - 23 mg/dL    Creatinine 1.1 0.5 - 1.4 mg/dL    Calcium 8.9 8.7 - 10.5 mg/dL    Total Protein 6.7 6.0 - 8.4 g/dL    Albumin 3.8 3.5 - 5.2 g/dL    Total Bilirubin 0.3 0.1 - 1.0 mg/dL    Alkaline Phosphatase 68 55 - 135 U/L    AST 21 10 - 40 U/L    ALT 22 10 - 44 U/L    Anion Gap 8 8 - 16 mmol/L    eGFR if African American >60 >60 mL/min/1.73 m^2    eGFR if non African American >60 >60 mL/min/1.73 m^2   Lipid Panel    Collection Time: 07/09/20 10:38 AM   Result Value Ref Range    Cholesterol 160 120 - 199 mg/dL    Triglycerides 112 30 - 150 mg/dL    HDL 41 40 - 75 mg/dL    LDL Cholesterol 96.6 63.0 - 159.0 mg/dL    Hdl/Cholesterol Ratio 25.6 20.0 - 50.0 %    Total Cholesterol/HDL Ratio 3.9 2.0 - 5.0    Non-HDL Cholesterol 119 mg/dL   TSH    Collection Time: 07/09/20 10:38 AM   Result Value Ref Range    TSH 2.310 0.400 - 4.000 uIU/mL   POCT INR    Collection Time: 07/16/20  8:43 AM   Result Value Ref Range    INR 2.3    POCT INR    Collection Time: 07/16/20 10:17 AM   Result Value Ref Range    INR 2.3    POCT INR    Collection Time: 08/17/20 11:50 AM   Result Value Ref Range    INR 2.0           Assessment:       1. Anticoagulated on  Coumadin    2. Paroxysmal atrial fibrillation    3. Tinnitus, unspecified laterality    4. Tinnitus of right ear          Plan:   Stay on Coumadin 3 mg a day    Anticoagulated on Coumadin  -     POCT INR    Paroxysmal atrial fibrillation    Tinnitus, unspecified laterality    Tinnitus of right ear      No follow-ups on file.

## 2020-08-17 NOTE — PATIENT INSTRUCTIONS
Stay on Coumadin 3 mg a day    Discussed the tinnitus with the hearing aid audiologist.  See if you should be doing anything different.      Warfarin is a blood-thinning medication that helps treat and prevent blood clots. There is no specific warfarin diet. However, certain foods and beverages can make warfarin less effective in preventing blood clots. It's important to pay attention to what you eat while taking warfarin.    One nutrient that can lessen warfarin's effectiveness is vitamin K. It's important to be consistent in how much vitamin K you get daily. The adequate intake level of vitamin K for adult men is 120 micrograms (mcg). For adult women, it's 90 mcg. While eating small amounts of foods that are rich in vitamin K shouldn't cause a problem, avoid consuming large amounts of certain foods or drinks, including:    Kale  Spinach  [place] sprouts  Collards  Mustard greens  Chard  Broccoli  Asparagus  Green tea  Certain drinks can increase the effect of warfarin, leading to bleeding problems. Avoid or consume only small amounts of these drinks when taking warfarin:    Cranberry juice  Alcohol      Thank you for choosing [facility].     Please fill out the patient experience survey.

## 2020-08-25 ENCOUNTER — PATIENT MESSAGE (OUTPATIENT)
Dept: UROLOGY | Facility: CLINIC | Age: 65
End: 2020-08-25

## 2020-08-25 ENCOUNTER — LAB VISIT (OUTPATIENT)
Dept: LAB | Facility: HOSPITAL | Age: 65
End: 2020-08-25
Attending: UROLOGY
Payer: MEDICARE

## 2020-08-25 ENCOUNTER — OFFICE VISIT (OUTPATIENT)
Dept: UROLOGY | Facility: CLINIC | Age: 65
End: 2020-08-25
Payer: MEDICARE

## 2020-08-25 VITALS
HEIGHT: 69 IN | HEART RATE: 94 BPM | WEIGHT: 228.38 LBS | SYSTOLIC BLOOD PRESSURE: 137 MMHG | BODY MASS INDEX: 33.83 KG/M2 | DIASTOLIC BLOOD PRESSURE: 69 MMHG

## 2020-08-25 DIAGNOSIS — N40.1 BPH WITH URINARY OBSTRUCTION: ICD-10-CM

## 2020-08-25 DIAGNOSIS — N13.8 BPH WITH URINARY OBSTRUCTION: ICD-10-CM

## 2020-08-25 DIAGNOSIS — N52.9 ERECTILE DYSFUNCTION, UNSPECIFIED ERECTILE DYSFUNCTION TYPE: ICD-10-CM

## 2020-08-25 DIAGNOSIS — R39.89 ABNORMAL PROSTATE EXAM: ICD-10-CM

## 2020-08-25 DIAGNOSIS — N40.1 BPH WITH URINARY OBSTRUCTION: Primary | ICD-10-CM

## 2020-08-25 DIAGNOSIS — N13.8 BPH WITH URINARY OBSTRUCTION: Primary | ICD-10-CM

## 2020-08-25 LAB
BILIRUB SERPL-MCNC: ABNORMAL MG/DL
BLOOD URINE, POC: ABNORMAL
CLARITY, POC UA: CLEAR
COLOR, POC UA: ABNORMAL
GLUCOSE UR QL STRIP: ABNORMAL
KETONES UR QL STRIP: ABNORMAL
LEUKOCYTE ESTERASE URINE, POC: ABNORMAL
NITRITE, POC UA: ABNORMAL
PH, POC UA: 5.5
PROSTATE SPECIFIC ANTIGEN, TOTAL: 1.5 NG/ML (ref 0–4)
PROTEIN, POC: ABNORMAL
PSA FREE MFR SERPL: 25.33 %
PSA FREE SERPL-MCNC: 0.38 NG/ML (ref 0.01–1.5)
SPECIFIC GRAVITY, POC UA: 1.03
UROBILINOGEN, POC UA: ABNORMAL

## 2020-08-25 PROCEDURE — 81002 URINALYSIS NONAUTO W/O SCOPE: CPT | Mod: S$GLB,,, | Performed by: UROLOGY

## 2020-08-25 PROCEDURE — 99214 OFFICE O/P EST MOD 30 MIN: CPT | Mod: 25,S$GLB,, | Performed by: UROLOGY

## 2020-08-25 PROCEDURE — 3008F BODY MASS INDEX DOCD: CPT | Mod: CPTII,S$GLB,, | Performed by: UROLOGY

## 2020-08-25 PROCEDURE — 84154 ASSAY OF PSA FREE: CPT

## 2020-08-25 PROCEDURE — 99999 PR PBB SHADOW E&M-EST. PATIENT-LVL III: ICD-10-PCS | Mod: PBBFAC,,, | Performed by: UROLOGY

## 2020-08-25 PROCEDURE — 3075F PR MOST RECENT SYSTOLIC BLOOD PRESS GE 130-139MM HG: ICD-10-PCS | Mod: CPTII,S$GLB,, | Performed by: UROLOGY

## 2020-08-25 PROCEDURE — 1101F PT FALLS ASSESS-DOCD LE1/YR: CPT | Mod: CPTII,S$GLB,, | Performed by: UROLOGY

## 2020-08-25 PROCEDURE — 3078F DIAST BP <80 MM HG: CPT | Mod: CPTII,S$GLB,, | Performed by: UROLOGY

## 2020-08-25 PROCEDURE — 3078F PR MOST RECENT DIASTOLIC BLOOD PRESSURE < 80 MM HG: ICD-10-PCS | Mod: CPTII,S$GLB,, | Performed by: UROLOGY

## 2020-08-25 PROCEDURE — 3008F PR BODY MASS INDEX (BMI) DOCUMENTED: ICD-10-PCS | Mod: CPTII,S$GLB,, | Performed by: UROLOGY

## 2020-08-25 PROCEDURE — 99214 PR OFFICE/OUTPT VISIT, EST, LEVL IV, 30-39 MIN: ICD-10-PCS | Mod: 25,S$GLB,, | Performed by: UROLOGY

## 2020-08-25 PROCEDURE — 36415 COLL VENOUS BLD VENIPUNCTURE: CPT

## 2020-08-25 PROCEDURE — 3075F SYST BP GE 130 - 139MM HG: CPT | Mod: CPTII,S$GLB,, | Performed by: UROLOGY

## 2020-08-25 PROCEDURE — 81002 POCT URINE DIPSTICK WITHOUT MICROSCOPE: ICD-10-PCS | Mod: S$GLB,,, | Performed by: UROLOGY

## 2020-08-25 PROCEDURE — 99999 PR PBB SHADOW E&M-EST. PATIENT-LVL III: CPT | Mod: PBBFAC,,, | Performed by: UROLOGY

## 2020-08-25 PROCEDURE — 1101F PR PT FALLS ASSESS DOC 0-1 FALLS W/OUT INJ PAST YR: ICD-10-PCS | Mod: CPTII,S$GLB,, | Performed by: UROLOGY

## 2020-08-25 RX ORDER — TADALAFIL 5 MG/1
5 TABLET ORAL DAILY
Qty: 90 TABLET | Refills: 3 | Status: SHIPPED | OUTPATIENT
Start: 2020-08-25 | End: 2021-07-19 | Stop reason: SDUPTHER

## 2020-08-25 RX ORDER — TADALAFIL 5 MG/1
5 TABLET ORAL DAILY
Qty: 90 TABLET | Refills: 3 | Status: SHIPPED | OUTPATIENT
Start: 2020-08-25 | End: 2020-08-25 | Stop reason: SDUPTHER

## 2020-08-25 RX ORDER — TAMSULOSIN HYDROCHLORIDE 0.4 MG/1
0.8 CAPSULE ORAL NIGHTLY
Qty: 180 CAPSULE | Refills: 3 | Status: SHIPPED | OUTPATIENT
Start: 2020-08-25 | End: 2022-02-16 | Stop reason: ALTCHOICE

## 2020-08-25 NOTE — PATIENT INSTRUCTIONS
Suspect ED related to weight gain, fatigue- recommend weight loss and better diet.     Increased flomax/tamsulosin to 0.8mg nightly, refill sent for bph for slowing urinary stream  [facility]  Return for a uroflow and pvr    Continue tadalafil/cialis 5mg daily, refill sent (good rx) for bph and ED. Discussed tadalafil 20mg as needed but he would rather do the flomax 0.4mg.   -sent to [facility] with good rx coupon. 90 pills for $14.     psa low, repeat  free and total and PRASANTH in 6 months Has this midline staple? Had some kind of anal procedure 5 years ago. Will check again in 6 months.     F/u in 6 months for repeat PRASANTH with psa free and total     Return with a FULL BLADDER to measure how fast you urinate and what is left in your bladder after you urinate. If you feel like you are going to have a lot or do not have the urge to urinate, do not do the test. It will not be useful. Also you can bring water to drink here. Let the nurse know when you are ready to do the test.
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
## 2020-08-26 ENCOUNTER — PATIENT MESSAGE (OUTPATIENT)
Dept: UROLOGY | Facility: CLINIC | Age: 65
End: 2020-08-26

## 2020-09-01 ENCOUNTER — TELEPHONE (OUTPATIENT)
Dept: UROLOGY | Facility: CLINIC | Age: 65
End: 2020-09-01

## 2020-09-01 NOTE — TELEPHONE ENCOUNTER
Spoke with patient nurse visit rescheduled for next Wednesday 9/9. Patient verbally voiced understanding.

## 2020-09-01 NOTE — TELEPHONE ENCOUNTER
----- Message from Michelle Castanon sent at 9/1/2020  3:41 PM CDT -----  Contact: Patient [phone]  Needs to reschedule the nurse visit from 09/02/2020 to next week.    Please call and advise.    Thank You

## 2020-09-09 ENCOUNTER — TELEPHONE (OUTPATIENT)
Dept: UROLOGY | Facility: CLINIC | Age: 65
End: 2020-09-09

## 2020-09-09 NOTE — TELEPHONE ENCOUNTER
----- Message from Kirstin Funk sent at 9/9/2020  8:18 AM CDT -----  Regarding: appointment  Contact: Patient/[phone] (home)  Type: Needs Medical Advice  Who Called:  Patient/[phone] (home)     Additional Information: Needs to reschedule his nurse appointment from this morning. His car broke down. Please call with availability.

## 2020-09-15 ENCOUNTER — PATIENT MESSAGE (OUTPATIENT)
Dept: UROLOGY | Facility: CLINIC | Age: 65
End: 2020-09-15

## 2020-09-17 ENCOUNTER — OFFICE VISIT (OUTPATIENT)
Dept: FAMILY MEDICINE | Facility: CLINIC | Age: 65
End: 2020-09-17
Payer: MEDICARE

## 2020-09-17 VITALS
HEIGHT: 69 IN | BODY MASS INDEX: 34.03 KG/M2 | DIASTOLIC BLOOD PRESSURE: 88 MMHG | OXYGEN SATURATION: 97 % | WEIGHT: 229.75 LBS | TEMPERATURE: 98 F | HEART RATE: 86 BPM | RESPIRATION RATE: 16 BRPM | SYSTOLIC BLOOD PRESSURE: 130 MMHG

## 2020-09-17 DIAGNOSIS — Z79.01 ANTICOAGULATED ON COUMADIN: Primary | ICD-10-CM

## 2020-09-17 DIAGNOSIS — I48.0 PAROXYSMAL ATRIAL FIBRILLATION: ICD-10-CM

## 2020-09-17 DIAGNOSIS — Z12.11 COLON CANCER SCREENING: ICD-10-CM

## 2020-09-17 LAB — INR PPP: 2 (ref 2–3)

## 2020-09-17 PROCEDURE — 1101F PR PT FALLS ASSESS DOC 0-1 FALLS W/OUT INJ PAST YR: ICD-10-PCS | Mod: CPTII,S$GLB,, | Performed by: INTERNAL MEDICINE

## 2020-09-17 PROCEDURE — 3079F PR MOST RECENT DIASTOLIC BLOOD PRESSURE 80-89 MM HG: ICD-10-PCS | Mod: CPTII,S$GLB,, | Performed by: INTERNAL MEDICINE

## 2020-09-17 PROCEDURE — 85610 PROTHROMBIN TIME: CPT | Mod: QW,,, | Performed by: INTERNAL MEDICINE

## 2020-09-17 PROCEDURE — 99213 OFFICE O/P EST LOW 20 MIN: CPT | Mod: S$GLB,,, | Performed by: INTERNAL MEDICINE

## 2020-09-17 PROCEDURE — 85610 POCT INR: ICD-10-PCS | Mod: QW,,, | Performed by: INTERNAL MEDICINE

## 2020-09-17 PROCEDURE — 99213 PR OFFICE/OUTPT VISIT, EST, LEVL III, 20-29 MIN: ICD-10-PCS | Mod: S$GLB,,, | Performed by: INTERNAL MEDICINE

## 2020-09-17 PROCEDURE — 1101F PT FALLS ASSESS-DOCD LE1/YR: CPT | Mod: CPTII,S$GLB,, | Performed by: INTERNAL MEDICINE

## 2020-09-17 PROCEDURE — 3008F PR BODY MASS INDEX (BMI) DOCUMENTED: ICD-10-PCS | Mod: CPTII,S$GLB,, | Performed by: INTERNAL MEDICINE

## 2020-09-17 PROCEDURE — 3075F SYST BP GE 130 - 139MM HG: CPT | Mod: CPTII,S$GLB,, | Performed by: INTERNAL MEDICINE

## 2020-09-17 PROCEDURE — 3075F PR MOST RECENT SYSTOLIC BLOOD PRESS GE 130-139MM HG: ICD-10-PCS | Mod: CPTII,S$GLB,, | Performed by: INTERNAL MEDICINE

## 2020-09-17 PROCEDURE — 3008F BODY MASS INDEX DOCD: CPT | Mod: CPTII,S$GLB,, | Performed by: INTERNAL MEDICINE

## 2020-09-17 PROCEDURE — 3079F DIAST BP 80-89 MM HG: CPT | Mod: CPTII,S$GLB,, | Performed by: INTERNAL MEDICINE

## 2020-09-17 NOTE — PATIENT INSTRUCTIONS
Stay on Coumadin 3 mg a day  Warfarin is a blood-thinning medication that helps treat and prevent blood clots. There is no specific warfarin diet. However, certain foods and beverages can make warfarin less effective in preventing blood clots. It's important to pay attention to what you eat while taking warfarin.    One nutrient that can lessen warfarin's effectiveness is vitamin K. It's important to be consistent in how much vitamin K you get daily. The adequate intake level of vitamin K for adult men is 120 micrograms (mcg). For adult women, it's 90 mcg. While eating small amounts of foods that are rich in vitamin K shouldn't cause a problem, avoid consuming large amounts of certain foods or drinks, including:    Kale  Spinach  [place] sprouts  Collards  Mustard greens  Chard  Broccoli  Asparagus  Green tea  Certain drinks can increase the effect of warfarin, leading to bleeding problems. Avoid or consume only small amounts of these drinks when taking warfarin:    Cranberry juice  Alcohol      Thank you for choosing [facility].     Please fill out the patient experience survey.

## 2020-09-17 NOTE — PROGRESS NOTES
"Subjective:      11:27 AM     Patient ID: Albin Fernandez is a 64 y.o. male.    Chief Complaint: coumadin monitoring    HPI       Coumadin monitoring:  Lab Results   Component Value Date    INR 2.0 08/17/2020    INR 2.3 07/16/2020    INR 2.3 07/16/2020       ANTICOAGULATION DX: (The following diagnoses are positive if BOLD, negative otherwise.)  Atrial_fibirillation.  . DVT. Pulmonary_embolism.  Aortic_valve_replacement.  TIA/CVA.         Current Dosage:   3 mg  Compliance with medication - good  Diet changes: no.     watches diet: yes.  .  Pending medical/dental procedure: no  Concomitant medication changes (including over the counter/herbs): no  Alcohol use:  no the        The following symptoms are positive if BOLD, negative otherwise.    Bleeding episodes:    Gingival_bleeding., epistaxis ., ecchymoses, hematuria . , melena, blood_per_rectum  Thrombotic event:    shortness_of_breath .  pain/swelling_of_extremity . , numbness  , tingling . , headache .   Intolerance of drug:  nausea/vomiting/diarrhea . , rash . , skin necrosis .         Review of Systems      Objective:      Vitals:    09/17/20 1109   BP: 130/88   Pulse: 86   Resp: 16   Temp: 97.8 °F (36.6 °C)   TempSrc: Oral   SpO2: 97%   Weight: 104.2 kg (229 lb 11.5 oz)   Height: 5' 9" (1.753 m)   PainSc:   7   PainLoc: Back     Physical Exam  Vitals signs and nursing note reviewed.   Constitutional:       Appearance: He is well-developed.   Cardiovascular:      Rate and Rhythm: Normal rate and regular rhythm.      Heart sounds: Normal heart sounds.   Pulmonary:      Effort: Pulmonary effort is normal.      Breath sounds: Normal breath sounds.   Abdominal:      Palpations: Abdomen is soft.      Tenderness: There is no abdominal tenderness.   Neurological:      Mental Status: He is alert.   Psychiatric:         Behavior: Behavior normal.         Thought Content: Thought content normal.       Recent Results (from the past 1008 hour(s))   POCT INR    Collection " Time: 08/17/20 11:50 AM   Result Value Ref Range    INR 2.0    PSA, total and free    Collection Time: 08/25/20  7:55 AM   Result Value Ref Range    PSA Total 1.5 0.00 - 4.00 ng/mL    PSA, Free 0.38 0.01 - 1.50 ng/mL    PSA, Free Pct 25.33 Not established %   POCT URINE DIPSTICK WITHOUT MICROSCOPE    Collection Time: 08/25/20  2:02 PM   Result Value Ref Range    Color, UA Sultana     Spec Grav UA 1.030     pH, UA 5.5     WBC, UA n     Nitrite, UA n     Protein trace     Glucose, UA n     Ketones, UA n     Urobilinogen, UA n     Bilirubin n     Blood, UA n     Clarity, UA Clear         INR 2.0  Assessment:       1. Anticoagulated on Coumadin    2. Paroxysmal atrial fibrillation    3. Colon cancer screening          Plan:   Stay on Coumadin 3 mg a day    Anticoagulated on Coumadin    Paroxysmal atrial fibrillation    Colon cancer screening  -     Ambulatory referral/consult to Gastroenterology; Future; Expected date: 09/24/2020      Follow up in about 1 month (around 10/17/2020).

## 2020-10-01 ENCOUNTER — PATIENT MESSAGE (OUTPATIENT)
Dept: UROLOGY | Facility: CLINIC | Age: 65
End: 2020-10-01

## 2020-10-01 ENCOUNTER — PATIENT MESSAGE (OUTPATIENT)
Dept: FAMILY MEDICINE | Facility: CLINIC | Age: 65
End: 2020-10-01

## 2020-10-02 ENCOUNTER — TELEPHONE (OUTPATIENT)
Dept: FAMILY MEDICINE | Facility: CLINIC | Age: 65
End: 2020-10-02

## 2020-10-02 ENCOUNTER — PATIENT MESSAGE (OUTPATIENT)
Dept: UROLOGY | Facility: CLINIC | Age: 65
End: 2020-10-02

## 2020-10-02 DIAGNOSIS — H93.13 TINNITUS OF BOTH EARS: Primary | ICD-10-CM

## 2020-10-05 ENCOUNTER — CLINICAL SUPPORT (OUTPATIENT)
Dept: UROLOGY | Facility: CLINIC | Age: 65
End: 2020-10-05
Payer: MEDICARE

## 2020-10-05 DIAGNOSIS — N40.1 BENIGN PROSTATIC HYPERPLASIA WITH LOWER URINARY TRACT SYMPTOMS, SYMPTOM DETAILS UNSPECIFIED: Primary | ICD-10-CM

## 2020-10-05 PROCEDURE — 99499 UNLISTED E&M SERVICE: CPT | Mod: S$GLB,,, | Performed by: UROLOGY

## 2020-10-05 PROCEDURE — 99499 NO LOS: ICD-10-PCS | Mod: S$GLB,,, | Performed by: UROLOGY

## 2020-10-05 PROCEDURE — 51741 PR UROFLOWMETRY, COMPLEX: ICD-10-PCS | Mod: S$GLB,,, | Performed by: UROLOGY

## 2020-10-05 PROCEDURE — 51741 ELECTRO-UROFLOWMETRY FIRST: CPT | Mod: S$GLB,,, | Performed by: UROLOGY

## 2020-10-05 NOTE — PROGRESS NOTES
Per  patient arrived in clinic today for uroflow and pvr.    Uroflow results (date: 10/05/2020): Voiding time: 185.2s, Flow time: 58.6s, TTP flow: 4.8s, Peak flowrate: 5.7 mL/s, Average flowrate: 1.8mL/s, Intervals: 7, Voided volume: 104 mL, Pvr by bladder scan: 287. Pattern of curve: see uploaded image, reviewed by   Follow up scheduled for: 3/2/2020

## 2020-10-12 ENCOUNTER — TELEPHONE (OUTPATIENT)
Dept: UROLOGY | Facility: CLINIC | Age: 65
End: 2020-10-12

## 2020-10-12 NOTE — PROGRESS NOTES
Uroflow results (date: 10/05/2020): Voiding time: 185.2s, Flow time: 58.6s, TTP flow: 4.8s, Peak flowrate: 5.7 mL/s, Average flowrate: 1.8mL/s, Intervals: 7, Voided volume: 104 mL, Pvr by bladder scan: 287. Pattern of curve: obstructed with incomplete emptying  Follow up scheduled for: 3/2/2020       Uroflow results reviewed and interpreted by me ()

## 2020-10-12 NOTE — TELEPHONE ENCOUNTER
Please let pt know he has a high residual    Ensure he is taking flomax 2 pills anight    Change his f/u to 1-2 months from now with pvr instead of march 2021

## 2020-10-12 NOTE — TELEPHONE ENCOUNTER
Spoke with patient informed him of recommendations. Patient verbally voiced understanding. Follow up scheduled. Patient verbally voiced understanding.

## 2020-10-15 ENCOUNTER — OFFICE VISIT (OUTPATIENT)
Dept: FAMILY MEDICINE | Facility: CLINIC | Age: 65
End: 2020-10-15
Payer: MEDICARE

## 2020-10-15 VITALS
RESPIRATION RATE: 16 BRPM | HEART RATE: 75 BPM | HEIGHT: 69 IN | OXYGEN SATURATION: 97 % | SYSTOLIC BLOOD PRESSURE: 128 MMHG | BODY MASS INDEX: 33.54 KG/M2 | DIASTOLIC BLOOD PRESSURE: 84 MMHG | TEMPERATURE: 98 F | WEIGHT: 226.44 LBS

## 2020-10-15 DIAGNOSIS — I48.0 PAROXYSMAL ATRIAL FIBRILLATION: ICD-10-CM

## 2020-10-15 DIAGNOSIS — Z79.01 ANTICOAGULATED ON COUMADIN: Primary | ICD-10-CM

## 2020-10-15 DIAGNOSIS — Z23 NEED FOR INFLUENZA VACCINATION: ICD-10-CM

## 2020-10-15 LAB — INR PPP: 2.2 (ref 2–3)

## 2020-10-15 PROCEDURE — 1101F PT FALLS ASSESS-DOCD LE1/YR: CPT | Mod: CPTII,S$GLB,, | Performed by: INTERNAL MEDICINE

## 2020-10-15 PROCEDURE — 3079F PR MOST RECENT DIASTOLIC BLOOD PRESSURE 80-89 MM HG: ICD-10-PCS | Mod: CPTII,S$GLB,, | Performed by: INTERNAL MEDICINE

## 2020-10-15 PROCEDURE — 99213 PR OFFICE/OUTPT VISIT, EST, LEVL III, 20-29 MIN: ICD-10-PCS | Mod: 25,S$GLB,, | Performed by: INTERNAL MEDICINE

## 2020-10-15 PROCEDURE — G0008 ADMIN INFLUENZA VIRUS VAC: HCPCS | Mod: S$GLB,,, | Performed by: INTERNAL MEDICINE

## 2020-10-15 PROCEDURE — 3008F BODY MASS INDEX DOCD: CPT | Mod: CPTII,S$GLB,, | Performed by: INTERNAL MEDICINE

## 2020-10-15 PROCEDURE — 3074F PR MOST RECENT SYSTOLIC BLOOD PRESSURE < 130 MM HG: ICD-10-PCS | Mod: CPTII,S$GLB,, | Performed by: INTERNAL MEDICINE

## 2020-10-15 PROCEDURE — 3074F SYST BP LT 130 MM HG: CPT | Mod: CPTII,S$GLB,, | Performed by: INTERNAL MEDICINE

## 2020-10-15 PROCEDURE — 99213 OFFICE O/P EST LOW 20 MIN: CPT | Mod: 25,S$GLB,, | Performed by: INTERNAL MEDICINE

## 2020-10-15 PROCEDURE — 90694 VACC AIIV4 NO PRSRV 0.5ML IM: CPT | Mod: S$GLB,,, | Performed by: INTERNAL MEDICINE

## 2020-10-15 PROCEDURE — 85610 PROTHROMBIN TIME: CPT | Mod: QW,,, | Performed by: INTERNAL MEDICINE

## 2020-10-15 PROCEDURE — G0008 FLU VACCINE - QUADRIVALENT - ADJUVANTED: ICD-10-PCS | Mod: S$GLB,,, | Performed by: INTERNAL MEDICINE

## 2020-10-15 PROCEDURE — 90694 FLU VACCINE - QUADRIVALENT - ADJUVANTED: ICD-10-PCS | Mod: S$GLB,,, | Performed by: INTERNAL MEDICINE

## 2020-10-15 PROCEDURE — 3008F PR BODY MASS INDEX (BMI) DOCUMENTED: ICD-10-PCS | Mod: CPTII,S$GLB,, | Performed by: INTERNAL MEDICINE

## 2020-10-15 PROCEDURE — 3079F DIAST BP 80-89 MM HG: CPT | Mod: CPTII,S$GLB,, | Performed by: INTERNAL MEDICINE

## 2020-10-15 PROCEDURE — 1101F PR PT FALLS ASSESS DOC 0-1 FALLS W/OUT INJ PAST YR: ICD-10-PCS | Mod: CPTII,S$GLB,, | Performed by: INTERNAL MEDICINE

## 2020-10-15 PROCEDURE — 85610 POCT INR: ICD-10-PCS | Mod: QW,,, | Performed by: INTERNAL MEDICINE

## 2020-10-15 NOTE — PROGRESS NOTES
"Subjective:      9:33 AM     Patient ID: Albin Fernandez is a 64 y.o. male.    Chief Complaint: coumadin monitoring    HPI         Coumadin monitoring:  Lab Results   Component Value Date    INR 2.0 09/17/2020    INR 2.0 08/17/2020    INR 2.3 07/16/2020       ANTICOAGULATION DX: (The following diagnoses are positive if BOLD, negative otherwise.)  Atrial_fibirillation.  . DVT. Pulmonary_embolism.  Aortic_valve_replacement.  TIA/CVA.         Current Dosage:   3 mg qd  Compliance with medication - good  Diet changes: no.     watches diet: yes.  .  Pending medical/dental procedure: no  Concomitant medication changes (including over the counter/herbs): no  Alcohol use:  no        The following symptoms are positive if BOLD, negative otherwise.    Bleeding episodes:    Gingival_bleeding., epistaxis ., ecchymoses, hematuria . , melena, blood_per_rectum  Thrombotic event:    shortness_of_breath .  pain/swelling_of_extremity . , numbness  , tingling . , headache .   Intolerance of drug:  nausea/vomiting/diarrhea . , rash . , skin necrosis .     Review of Systems      Objective:      Vitals:    10/15/20 0916   BP: 128/84   Pulse: 75   Resp: 16   Temp: 97.5 °F (36.4 °C)   TempSrc: Oral   SpO2: 97%   Weight: 102.7 kg (226 lb 6.6 oz)   Height: 5' 9" (1.753 m)   PainSc:   7   PainLoc: Back     Physical Exam  Vitals signs and nursing note reviewed.   Constitutional:       Appearance: He is well-developed.   Cardiovascular:      Rate and Rhythm: Normal rate and regular rhythm.      Heart sounds: Normal heart sounds.   Pulmonary:      Effort: Pulmonary effort is normal.      Breath sounds: Normal breath sounds.   Abdominal:      Palpations: Abdomen is soft.      Tenderness: There is no abdominal tenderness.   Neurological:      Mental Status: He is alert.   Psychiatric:         Behavior: Behavior normal.         Thought Content: Thought content normal.       Recent Results (from the past 1008 hour(s))   POCT INR    Collection Time: " 09/17/20 11:49 AM   Result Value Ref Range    INR 2.0         inr 2.2   Assessment:       1. Anticoagulated on Coumadin    2. Paroxysmal atrial fibrillation    3. Need for influenza vaccination          Plan:   Continue Coumadin 3 mg a day        Anticoagulated on Coumadin  -     POCT INR    Paroxysmal atrial fibrillation  -     POCT INR    Need for influenza vaccination  -     Influenza - High Dose (65+) (PF) (IM)      Follow up in about 1 month (around 11/15/2020).

## 2020-10-15 NOTE — PATIENT INSTRUCTIONS
Continue Coumadin 3 mg day    Warfarin is a blood-thinning medication that helps treat and prevent blood clots. There is no specific warfarin diet. However, certain foods and beverages can make warfarin less effective in preventing blood clots. It's important to pay attention to what you eat while taking warfarin.    One nutrient that can lessen warfarin's effectiveness is vitamin K. It's important to be consistent in how much vitamin K you get daily. The adequate intake level of vitamin K for adult men is 120 micrograms (mcg). For adult women, it's 90 mcg. While eating small amounts of foods that are rich in vitamin K shouldn't cause a problem, avoid consuming large amounts of certain foods or drinks, including:    Kale  Spinach  [place] sprouts  Collards  Mustard greens  Chard  Broccoli  Asparagus  Green tea  Certain drinks can increase the effect of warfarin, leading to bleeding problems. Avoid or consume only small amounts of these drinks when taking warfarin:    Cranberry juice  Alcohol  The    Thank you for choosing [facility].     Please fill out the patient experience survey.

## 2020-11-10 ENCOUNTER — OFFICE VISIT (OUTPATIENT)
Dept: UROLOGY | Facility: CLINIC | Age: 65
End: 2020-11-10
Payer: MEDICARE

## 2020-11-10 VITALS
DIASTOLIC BLOOD PRESSURE: 78 MMHG | WEIGHT: 226.5 LBS | SYSTOLIC BLOOD PRESSURE: 138 MMHG | BODY MASS INDEX: 33.55 KG/M2 | HEART RATE: 77 BPM | HEIGHT: 69 IN

## 2020-11-10 DIAGNOSIS — R33.9 INCOMPLETE EMPTYING OF BLADDER: ICD-10-CM

## 2020-11-10 DIAGNOSIS — Z01.818 PREOP EXAMINATION: ICD-10-CM

## 2020-11-10 DIAGNOSIS — R35.1 NOCTURIA: ICD-10-CM

## 2020-11-10 DIAGNOSIS — N40.1 BENIGN PROSTATIC HYPERPLASIA WITH LOWER URINARY TRACT SYMPTOMS, SYMPTOM DETAILS UNSPECIFIED: Primary | ICD-10-CM

## 2020-11-10 LAB
BILIRUB SERPL-MCNC: ABNORMAL MG/DL
BLOOD URINE, POC: ABNORMAL
CLARITY, POC UA: CLEAR
COLOR, POC UA: YELLOW
GLUCOSE UR QL STRIP: ABNORMAL
KETONES UR QL STRIP: ABNORMAL
LEUKOCYTE ESTERASE URINE, POC: ABNORMAL
NITRITE, POC UA: ABNORMAL
PH, POC UA: 5.5
PROTEIN, POC: ABNORMAL
SPECIFIC GRAVITY, POC UA: 1.03
UROBILINOGEN, POC UA: ABNORMAL

## 2020-11-10 PROCEDURE — 3075F SYST BP GE 130 - 139MM HG: CPT | Mod: CPTII,S$GLB,, | Performed by: UROLOGY

## 2020-11-10 PROCEDURE — 1101F PR PT FALLS ASSESS DOC 0-1 FALLS W/OUT INJ PAST YR: ICD-10-PCS | Mod: CPTII,S$GLB,, | Performed by: UROLOGY

## 2020-11-10 PROCEDURE — 99214 PR OFFICE/OUTPT VISIT, EST, LEVL IV, 30-39 MIN: ICD-10-PCS | Mod: 25,S$GLB,, | Performed by: UROLOGY

## 2020-11-10 PROCEDURE — 99499 RISK ADDL DX/OHS AUDIT: ICD-10-PCS | Mod: S$GLB,,, | Performed by: UROLOGY

## 2020-11-10 PROCEDURE — 1159F PR MEDICATION LIST DOCUMENTED IN MEDICAL RECORD: ICD-10-PCS | Mod: S$GLB,,, | Performed by: UROLOGY

## 2020-11-10 PROCEDURE — 3008F PR BODY MASS INDEX (BMI) DOCUMENTED: ICD-10-PCS | Mod: CPTII,S$GLB,, | Performed by: UROLOGY

## 2020-11-10 PROCEDURE — 1101F PT FALLS ASSESS-DOCD LE1/YR: CPT | Mod: CPTII,S$GLB,, | Performed by: UROLOGY

## 2020-11-10 PROCEDURE — 3075F PR MOST RECENT SYSTOLIC BLOOD PRESS GE 130-139MM HG: ICD-10-PCS | Mod: CPTII,S$GLB,, | Performed by: UROLOGY

## 2020-11-10 PROCEDURE — 3078F DIAST BP <80 MM HG: CPT | Mod: CPTII,S$GLB,, | Performed by: UROLOGY

## 2020-11-10 PROCEDURE — 81001 URINALYSIS AUTO W/SCOPE: CPT | Mod: S$GLB,,, | Performed by: UROLOGY

## 2020-11-10 PROCEDURE — 99999 PR PBB SHADOW E&M-EST. PATIENT-LVL V: ICD-10-PCS | Mod: PBBFAC,,, | Performed by: UROLOGY

## 2020-11-10 PROCEDURE — 99999 PR PBB SHADOW E&M-EST. PATIENT-LVL V: CPT | Mod: PBBFAC,,, | Performed by: UROLOGY

## 2020-11-10 PROCEDURE — 81001 POCT URINE DIP WITH MICROSCOPIC: ICD-10-PCS | Mod: S$GLB,,, | Performed by: UROLOGY

## 2020-11-10 PROCEDURE — 3078F PR MOST RECENT DIASTOLIC BLOOD PRESSURE < 80 MM HG: ICD-10-PCS | Mod: CPTII,S$GLB,, | Performed by: UROLOGY

## 2020-11-10 PROCEDURE — 99214 OFFICE O/P EST MOD 30 MIN: CPT | Mod: 25,S$GLB,, | Performed by: UROLOGY

## 2020-11-10 PROCEDURE — 81002 POCT URINE DIPSTICK WITHOUT MICROSCOPE: ICD-10-PCS | Mod: S$GLB,,, | Performed by: UROLOGY

## 2020-11-10 PROCEDURE — 81002 URINALYSIS NONAUTO W/O SCOPE: CPT | Mod: S$GLB,,, | Performed by: UROLOGY

## 2020-11-10 PROCEDURE — 99499 UNLISTED E&M SERVICE: CPT | Mod: S$GLB,,, | Performed by: UROLOGY

## 2020-11-10 PROCEDURE — 1159F MED LIST DOCD IN RCRD: CPT | Mod: S$GLB,,, | Performed by: UROLOGY

## 2020-11-10 PROCEDURE — 3008F BODY MASS INDEX DOCD: CPT | Mod: CPTII,S$GLB,, | Performed by: UROLOGY

## 2020-11-10 RX ORDER — LIDOCAINE HYDROCHLORIDE 20 MG/ML
JELLY TOPICAL ONCE
Status: CANCELLED | OUTPATIENT
Start: 2020-11-10 | End: 2020-11-10
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
## 2020-11-17 ENCOUNTER — CLINICAL SUPPORT (OUTPATIENT)
Dept: UROLOGY | Facility: CLINIC | Age: 65
End: 2020-11-17
Payer: MEDICARE

## 2020-11-17 ENCOUNTER — OFFICE VISIT (OUTPATIENT)
Dept: FAMILY MEDICINE | Facility: CLINIC | Age: 65
End: 2020-11-17
Payer: MEDICARE

## 2020-11-17 VITALS
TEMPERATURE: 98 F | HEART RATE: 74 BPM | HEIGHT: 69 IN | OXYGEN SATURATION: 98 % | DIASTOLIC BLOOD PRESSURE: 84 MMHG | BODY MASS INDEX: 33.76 KG/M2 | WEIGHT: 227.94 LBS | RESPIRATION RATE: 16 BRPM | SYSTOLIC BLOOD PRESSURE: 124 MMHG

## 2020-11-17 DIAGNOSIS — N13.8 BPH WITH OBSTRUCTION/LOWER URINARY TRACT SYMPTOMS: Primary | ICD-10-CM

## 2020-11-17 DIAGNOSIS — N40.1 BPH WITH OBSTRUCTION/LOWER URINARY TRACT SYMPTOMS: Primary | ICD-10-CM

## 2020-11-17 DIAGNOSIS — I48.0 PAROXYSMAL ATRIAL FIBRILLATION: ICD-10-CM

## 2020-11-17 DIAGNOSIS — R35.1 NOCTURIA: ICD-10-CM

## 2020-11-17 DIAGNOSIS — G47.33 OBSTRUCTIVE SLEEP APNEA: ICD-10-CM

## 2020-11-17 DIAGNOSIS — Z79.01 ANTICOAGULATED ON COUMADIN: Primary | ICD-10-CM

## 2020-11-17 PROCEDURE — 99214 OFFICE O/P EST MOD 30 MIN: CPT | Mod: S$GLB,,, | Performed by: INTERNAL MEDICINE

## 2020-11-17 PROCEDURE — 1159F MED LIST DOCD IN RCRD: CPT | Mod: S$GLB,,, | Performed by: INTERNAL MEDICINE

## 2020-11-17 PROCEDURE — 51741 ELECTRO-UROFLOWMETRY FIRST: CPT | Mod: S$GLB,,, | Performed by: UROLOGY

## 2020-11-17 PROCEDURE — 3288F FALL RISK ASSESSMENT DOCD: CPT | Mod: CPTII,S$GLB,, | Performed by: INTERNAL MEDICINE

## 2020-11-17 PROCEDURE — 1125F AMNT PAIN NOTED PAIN PRSNT: CPT | Mod: S$GLB,,, | Performed by: INTERNAL MEDICINE

## 2020-11-17 PROCEDURE — 3008F BODY MASS INDEX DOCD: CPT | Mod: CPTII,S$GLB,, | Performed by: INTERNAL MEDICINE

## 2020-11-17 PROCEDURE — 51741 PR UROFLOWMETRY, COMPLEX: ICD-10-PCS | Mod: S$GLB,,, | Performed by: UROLOGY

## 2020-11-17 PROCEDURE — 85610 POCT INR: ICD-10-PCS | Mod: QW,,, | Performed by: INTERNAL MEDICINE

## 2020-11-17 PROCEDURE — 3288F PR FALLS RISK ASSESSMENT DOCUMENTED: ICD-10-PCS | Mod: CPTII,S$GLB,, | Performed by: INTERNAL MEDICINE

## 2020-11-17 PROCEDURE — 1101F PT FALLS ASSESS-DOCD LE1/YR: CPT | Mod: CPTII,S$GLB,, | Performed by: INTERNAL MEDICINE

## 2020-11-17 PROCEDURE — 3079F PR MOST RECENT DIASTOLIC BLOOD PRESSURE 80-89 MM HG: ICD-10-PCS | Mod: CPTII,S$GLB,, | Performed by: INTERNAL MEDICINE

## 2020-11-17 PROCEDURE — 1159F PR MEDICATION LIST DOCUMENTED IN MEDICAL RECORD: ICD-10-PCS | Mod: S$GLB,,, | Performed by: INTERNAL MEDICINE

## 2020-11-17 PROCEDURE — 99499 UNLISTED E&M SERVICE: CPT | Mod: S$GLB,,, | Performed by: UROLOGY

## 2020-11-17 PROCEDURE — 1125F PR PAIN SEVERITY QUANTIFIED, PAIN PRESENT: ICD-10-PCS | Mod: S$GLB,,, | Performed by: INTERNAL MEDICINE

## 2020-11-17 PROCEDURE — 99214 PR OFFICE/OUTPT VISIT, EST, LEVL IV, 30-39 MIN: ICD-10-PCS | Mod: S$GLB,,, | Performed by: INTERNAL MEDICINE

## 2020-11-17 PROCEDURE — 3008F PR BODY MASS INDEX (BMI) DOCUMENTED: ICD-10-PCS | Mod: CPTII,S$GLB,, | Performed by: INTERNAL MEDICINE

## 2020-11-17 PROCEDURE — 3074F PR MOST RECENT SYSTOLIC BLOOD PRESSURE < 130 MM HG: ICD-10-PCS | Mod: CPTII,S$GLB,, | Performed by: INTERNAL MEDICINE

## 2020-11-17 PROCEDURE — 99499 NO LOS: ICD-10-PCS | Mod: S$GLB,,, | Performed by: UROLOGY

## 2020-11-17 PROCEDURE — 3074F SYST BP LT 130 MM HG: CPT | Mod: CPTII,S$GLB,, | Performed by: INTERNAL MEDICINE

## 2020-11-17 PROCEDURE — 3079F DIAST BP 80-89 MM HG: CPT | Mod: CPTII,S$GLB,, | Performed by: INTERNAL MEDICINE

## 2020-11-17 PROCEDURE — 1101F PR PT FALLS ASSESS DOC 0-1 FALLS W/OUT INJ PAST YR: ICD-10-PCS | Mod: CPTII,S$GLB,, | Performed by: INTERNAL MEDICINE

## 2020-11-17 PROCEDURE — 85610 PROTHROMBIN TIME: CPT | Mod: QW,,, | Performed by: INTERNAL MEDICINE

## 2020-11-17 NOTE — PATIENT INSTRUCTIONS
Lose 5 pounds.  Suggest [place] diet  Warfarin is a blood-thinning medication that helps treat and prevent blood clots. There is no specific warfarin diet. However, certain foods and beverages can make warfarin less effective in preventing blood clots. It's important to pay attention to what you eat while taking warfarin.    One nutrient that can lessen warfarin's effectiveness is vitamin K. It's important to be consistent in how much vitamin K you get daily. The adequate intake level of vitamin K for adult men is 120 micrograms (mcg). For adult women, it's 90 mcg. While eating small amounts of foods that are rich in vitamin K shouldn't cause a problem, avoid consuming large amounts of certain foods or drinks, including:    Kale  Spinach  [place] sprouts  Collards  Mustard greens  Chard  Broccoli  Asparagus  Green tea  Certain drinks can increase the effect of warfarin, leading to bleeding problems. Avoid or consume only small amounts of these drinks when taking warfarin:    Cranberry juice  Alcohol      Thank you for choosing [facility].     Please fill out the patient experience survey.

## 2020-11-17 NOTE — PROGRESS NOTES
"Subjective:      2:33 PM     Patient ID: Albin Fernandez is a 65 y.o. male.    Chief Complaint: coumadin monitoring    HPI     \patient complains of urinary frequency 6 or 7 times a night.  He has some urinary retention but also complains of sleep apnea symptoms.  He snores very badly and wakes up exhausted.  He falls asleep watching television.  His wife is not told him that he has apnea spells      Coumadin monitoring:  Lab Results   Component Value Date    INR 2.3 11/17/2020    INR 2.2 10/15/2020    INR 2.0 09/17/2020       ANTICOAGULATION DX: (The following diagnoses are positive if BOLD, negative otherwise.)  Atrial_fibirillation.  . DVT. Pulmonary_embolism.  Aortic_valve_replacement.  TIA/CVA.         Current Dosage:    3 mg daily  Compliance with medication - good  Diet changes: no.     watches diet: yes.  .  Pending medical/dental procedure: no  Concomitant medication changes (including over the counter/herbs): no  Alcohol use:  no        The following symptoms are positive if BOLD, negative otherwise.    Bleeding episodes:    Gingival_bleeding., epistaxis ., ecchymoses, hematuria . , melena, blood_per_rectum  Thrombotic event:    shortness_of_breath .  pain/swelling_of_extremity . , numbness  , tingling . , headache .   Intolerance of drug:  nausea/vomiting/diarrhea . , rash . , skin necrosis .     Review of Systems      Objective:      Vitals:    11/17/20 1408   BP: 124/84   Pulse: 74   Resp: 16   Temp: 97.6 °F (36.4 °C)   TempSrc: Oral   SpO2: 98%   Weight: 103.4 kg (227 lb 15.3 oz)   Height: 5' 9" (1.753 m)   PainSc:   7   PainLoc: Back     Physical Exam  Vitals signs and nursing note reviewed.   Constitutional:       Appearance: He is well-developed.   Cardiovascular:      Rate and Rhythm: Normal rate and regular rhythm.      Heart sounds: Normal heart sounds.   Pulmonary:      Effort: Pulmonary effort is normal.      Breath sounds: Normal breath sounds.   Abdominal:      Palpations: Abdomen is soft.     "  Tenderness: There is no abdominal tenderness.   Neurological:      Mental Status: He is alert.   Psychiatric:         Behavior: Behavior normal.         Thought Content: Thought content normal.       Recent Results (from the past 1008 hour(s))   POCT URINE DIPSTICK WITHOUT MICROSCOPE    Collection Time: 11/10/20  2:06 PM   Result Value Ref Range    Color, UA Yellow     Spec Grav UA 1.030     pH, UA 5.5     WBC, UA n     Nitrite, UA n     Protein trace     Glucose, UA n     Ketones, UA n     Urobilinogen, UA n     Bilirubin n     Blood, UA n     Clarity, UA Clear    POCT INR    Collection Time: 11/17/20  4:10 PM   Result Value Ref Range    INR 2.3    COVID-19 Routine Screening    Collection Time: 11/20/20 10:30 AM   Result Value Ref Range    SARS-CoV2 (COVID-19) Qualitative PCR Not Detected Not Detected   Urine Dip with micro, POC    Collection Time: 11/23/20  1:55 PM   Result Value Ref Range    Color, UA Yellow     Spec Grav UA 1.020     pH, UA 5     WBC, UA neg     Blood, UA neg     Nitrite, UA neg     Ketones, UA neg     Bilirubin neg     Urobilinogen, UA neg     Protein trace     Glucose, UA neg     Casts      Bacteria      Crystals      White Blood Cells      RBC Cells Counted            Assessment:       1. Anticoagulated on Coumadin    2. Paroxysmal atrial fibrillation    3. Nocturia    4. Obstructive sleep apnea          Plan:       Anticoagulated on Coumadin  -     POCT INR    Paroxysmal atrial fibrillation    Nocturia    Obstructive sleep apnea  -     Ambulatory referral/consult to Pulmonology; Future; Expected date: 11/24/2020      Follow up in about 1 month (around 12/17/2020).

## 2020-11-17 NOTE — PROGRESS NOTES
Per  patient arrived in clinic today for a uroflow and pvr.    Uroflow results (date: 11/17/2020): Voiding time: 175.8s, Flow time: 108.5s, TTP flow: 3.0s, Peak flowrate: 14.5 mL/s, Average flowrate: 3.4mL/s, Intervals: 9, Voided volume: 371 mL, Pvr by bladder scan: 239ml. Pattern of curve: see uploaded image, reviewed by   Follow up scheduled for: 11/23/2020

## 2020-11-18 LAB — INR PPP: 2.3 (ref 2–3)

## 2020-11-20 ENCOUNTER — LAB VISIT (OUTPATIENT)
Dept: PRIMARY CARE CLINIC | Facility: CLINIC | Age: 65
End: 2020-11-20
Payer: MEDICARE

## 2020-11-20 ENCOUNTER — PATIENT MESSAGE (OUTPATIENT)
Dept: SURGERY | Facility: AMBULARY SURGERY CENTER | Age: 65
End: 2020-11-20

## 2020-11-20 DIAGNOSIS — Z01.818 PREOP EXAMINATION: ICD-10-CM

## 2020-11-20 PROCEDURE — U0003 INFECTIOUS AGENT DETECTION BY NUCLEIC ACID (DNA OR RNA); SEVERE ACUTE RESPIRATORY SYNDROME CORONAVIRUS 2 (SARS-COV-2) (CORONAVIRUS DISEASE [COVID-19]), AMPLIFIED PROBE TECHNIQUE, MAKING USE OF HIGH THROUGHPUT TECHNOLOGIES AS DESCRIBED BY CMS-2020-01-R: HCPCS

## 2020-11-22 ENCOUNTER — PATIENT MESSAGE (OUTPATIENT)
Dept: SURGERY | Facility: AMBULARY SURGERY CENTER | Age: 65
End: 2020-11-22

## 2020-11-22 LAB — SARS-COV-2 RNA RESP QL NAA+PROBE: NOT DETECTED

## 2020-11-23 ENCOUNTER — TELEPHONE (OUTPATIENT)
Dept: UROLOGY | Facility: CLINIC | Age: 65
End: 2020-11-23

## 2020-11-23 ENCOUNTER — HOSPITAL ENCOUNTER (OUTPATIENT)
Facility: AMBULARY SURGERY CENTER | Age: 65
Discharge: HOME | End: 2020-11-23
Attending: UROLOGY | Admitting: UROLOGY
Payer: MEDICARE

## 2020-11-23 DIAGNOSIS — R33.9 INCOMPLETE EMPTYING OF BLADDER: ICD-10-CM

## 2020-11-23 DIAGNOSIS — R35.0 BENIGN PROSTATIC HYPERPLASIA WITH URINARY FREQUENCY: Primary | ICD-10-CM

## 2020-11-23 DIAGNOSIS — N40.1 BENIGN PROSTATIC HYPERPLASIA WITH URINARY FREQUENCY: Primary | ICD-10-CM

## 2020-11-23 DIAGNOSIS — N40.1 BENIGN PROSTATIC HYPERPLASIA WITH LOWER URINARY TRACT SYMPTOMS, SYMPTOM DETAILS UNSPECIFIED: ICD-10-CM

## 2020-11-23 LAB
BACTERIA SPEC CULT: NORMAL
BILIRUB SERPL-MCNC: NORMAL MG/DL
BLOOD URINE, POC: NORMAL
CASTS: NORMAL
COLOR, POC UA: YELLOW
CRYSTALS: NORMAL
GLUCOSE UR QL STRIP: NORMAL
KETONES UR QL STRIP: NORMAL
LEUKOCYTE ESTERASE URINE, POC: NORMAL
NITRITE, POC UA: NORMAL
PH, POC UA: 5
PROTEIN, POC: NORMAL
RBC CELLS COUNTED: NORMAL
SPECIFIC GRAVITY, POC UA: 1.02
UROBILINOGEN, POC UA: NORMAL
WHITE BLOOD CELLS: NORMAL

## 2020-11-23 PROCEDURE — 52000 CYSTOURETHROSCOPY: CPT | Performed by: UROLOGY

## 2020-11-23 PROCEDURE — 76872 US TRANSRECTAL: CPT | Performed by: UROLOGY

## 2020-11-23 PROCEDURE — 52000 CYSTOURETHROSCOPY: CPT | Mod: ,,, | Performed by: UROLOGY

## 2020-11-23 PROCEDURE — 76872 US TRANSRECTAL: CPT | Mod: 26,,, | Performed by: UROLOGY

## 2020-11-23 PROCEDURE — 52000 PR CYSTOURETHROSCOPY: ICD-10-PCS | Mod: ,,, | Performed by: UROLOGY

## 2020-11-23 PROCEDURE — 76872 PR US TRANSRECTAL: ICD-10-PCS | Mod: 26,,, | Performed by: UROLOGY

## 2020-11-23 RX ORDER — CIPROFLOXACIN 500 MG/1
500 TABLET ORAL ONCE
Status: COMPLETED | OUTPATIENT
Start: 2020-11-23 | End: 2020-11-23

## 2020-11-23 RX ORDER — LIDOCAINE HYDROCHLORIDE 20 MG/ML
JELLY TOPICAL
Status: DISCONTINUED | OUTPATIENT
Start: 2020-11-23 | End: 2020-11-23 | Stop reason: HOSPADM

## 2020-11-23 RX ORDER — SODIUM CHLORIDE 0.9 G/100ML
IRRIGANT IRRIGATION
Status: DISCONTINUED | OUTPATIENT
Start: 2020-11-23 | End: 2020-11-23 | Stop reason: HOSPADM

## 2020-11-23 RX ADMIN — CIPROFLOXACIN 500 MG: 500 TABLET ORAL at 02:11
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
## 2020-11-23 NOTE — OP NOTE
[facility] Procedure Note[facility]  Date: 11/23/2020      TRANSRECTAL PROSTATIC ULTRASOUND and Cystoscopy    Procedures: Flexible cystourethroscopy and Transrectal Ultrasound    Pre Procedure Diagnosis:bph with slow stream    Post Procedure Diagnosis: same, see below for findings    Surgeon: Roselyn Shrestha MD    Indications: Albin Fernandez is a 65 y.o. male with BPH. Current PSA is 1.5. Urine from today reviewed. H&P reviewed. The risks and benefits of both procedures were explained and consent was obtained.     Cytoscopic Specimen: none        TRANSRECTAL ULTRASOUND was performed   The transrectal ultrasound probe was placed in the rectum. Measurements were taken and recorded and the probe was removed.   The patient tolerated the procedures well without complication.    Cystoscopy was performed   2% lidocaine urojet was used for local analgesia.  The genitalia was prepped and draped in the sterile fashion with betadine.    The flexible scope was advanced into the urethra and into the bladder.  Bilateral ureteral orifice were evaluated and noted to be normal with clear efflux.  The bladder was completely surveyed in a systematic fashion and the cytoscope was retroflexed.  Cystoscopy findings as listed below.     Retroflexed and had her aspirate urine (120cc) to eval the bladder neck and ensure no tumors. No tumors seen .    Findings: (pictures were uploaded into media)  Cysoscopic findings: recent turp in 2017 with regrowth of b lobe tissue and anterior prostate lobe. Prostatitis. Defect inferiorly at veru vs false passage or fused prostate? Also had some resistance initially with passage of scope.     TRUS volume: 22.4g  Prostate Ultrasound findings:  · Seminal vesicles/Ejaculatory Ducts: Normal  · Outline/Symmetry of Prostate: WNL  · Central Gland/Transition Zone: Well demarcated  · Peripheral Zone: WNL    Prostate Measurements:   · Height:  24.9 mm  · Width:  38.0 mm  · Length:  45.3 mm  · Volume:  22.4 cm3  · Intravesical protrusion: none  · PSAD: 0.07    Assessment: Albin Fernandez is a 65 y.o. male with bph/enlarged prostate s/p TURP 2017 and slow flow with primary complaint of nocturia     Plan:   Slow flow could be from regrowth of prostate tissue and obstructing again with possible small amount of stricture? No obvious stricture seen but the scope was tight in the area of membranous urehtra. Could be dysfunctional voiding?    I think we should schedule him for urodynamics in the clinic to see if we can determine his bladder sensation (residual in 200s), detrussor pressure with voiding and his sphincter activity with voiding (ensure no DSD)   If it shows obstruction consider rezum, di dnot appear to have a high riding bladder neck    Continue flomax 0.8mg nightly for now   Try cialis 20mg (4 pills) at once to see if this helps with Ed. If not can try [facility]. Will send info on [facility]. They will let me know if htye need refills on cialis 20 instead of cialis 5mg daily.    cipro x 1 in recovery    Again nocturia multifactorial and treating prostate would not necessarily significantly improve his nighttime urination.        Explained nocturia may not improve significantly with treatment of prostate. Might go from 4x to 3x, would still recommend workup for other factors like sleep apnea  · Sleep study from PCP  · Wear compression stockings every day  · Lift legs 2 hours before bedtime in recliner  · Limit fluids 3 hours before bedtime  · No caffeine 6 hours before bedtime      Roselyn Shrestha MD

## 2020-11-23 NOTE — DISCHARGE INSTRUCTIONS
Prostate Measurements:   · Height:  24.9 mm  · Width:  38.0 mm  · Length:  45.3 mm  · Volume: 22.4 cm3  · Intravesical protrusion: none  · PSAD: 0.07    Assessment: Albin Fernandez is a 65 y.o. male with bph/enlarged prostate s/p TURP 2017 and slow flow with primary complaint of nocturia     Plan:   Slow flow could be from regrowth of prostate tissue and obstructing again with possible small amount of stricture? No obvious stricture seen but the scope was tight in the area of membranous urehtra. Could be dysfunctional voiding?    I think we should schedule him for urodynamics in the clinic to see if we can determine his bladder sensation (residual in 200s), detrussor pressure with voiding and his sphincter activity with voiding (ensure no DSD)   If it shows obstruction consider jack, di dnot appear to have a high riding bladder neck    Continue flomax 0.8mg nightly for now   Continue tadalafil 5mg daily    cipro x 1 in recovery    Again nocturia multifactorial and treating prostate would not necessarily significantly improve his nighttime urination.        Explained nocturia may not improve significantly with treatment of prostate. Might go from 4x to 3x, would still recommend workup for other factors like sleep apnea  · Sleep study from PCP  · Wear compression stockings every day  · Lift legs 2 hours before bedtime in recliner  · Limit fluids 3 hours before bedtime  · No caffeine 6 hours before bedtime        Before leaving, please make sure you have all your personal belongings such as glasses, purses, wallets, keys, cell phones, jewelry, jackets etc     Transrectal Ultrasound and Biopsy  A transrectal ultrasound is an imaging test. It uses sound waves to create pictures of a mans prostate gland. Your prostate gland is in front of your rectum. For this test, a special probe (transducer) is placed directly into your rectum. During the test, tissue samples (a biopsy) may also be taken. The test is done by a  specially trained technologist (a sonographer).    Before leaving, you may need to wait for a short time while the images are reviewed. In most cases, you can go back to your normal routine after the test. If you had a biopsy, you may see some blood in your urine, sperm, or stool for a day or so. This is normal. Your health care provider will let you know when your test results are ready.  In some cases, a diagnosis cant be made from the tissue sample that was taken. If this happens, your provider will talk with you about whether you may need another biopsy. Or you may need a different procedure.       © 3453-7484 [employer]. [address] [place] [address], [place] [address]. All rights reserved. This information is not intended as a substitute for professional medical care. Always follow your healthcare professional's instructions.

## 2020-11-23 NOTE — DISCHARGE SUMMARY
[facility]  Discharge Note  Short Stay    Procedure(s) (LRB):  TRANSRECTAL ULTRASOUND (N/A)  CYSTOSCOPY (N/A)    OUTCOME: Patient tolerated treatment/procedure well without complication and is now ready for discharge.    DISPOSITION: Home or Self Care    FINAL DIAGNOSIS:  <principal problem not specified> slow flow    FOLLOWUP: In clinic    DISCHARGE INSTRUCTIONS:  No discharge procedures on file.

## 2020-11-23 NOTE — TELEPHONE ENCOUNTER
----- Message from Roselyn Shrestha MD sent at 11/23/2020  3:13 PM CST -----  Please mail him [facility] info

## 2020-11-23 NOTE — TELEPHONE ENCOUNTER
----- Message from Roselyn Shrestha MD sent at 11/23/2020  2:53 PM CST -----  Schedule patient for uds at next avail

## 2020-11-23 NOTE — PROGRESS NOTES
Uroflow results (date: 11/17/2020): Voiding time: 175.8s, Flow time: 108.5s, TTP flow: 3.0s, Peak flowrate: 14.5 mL/s, Average flowrate: 3.4mL/s, Intervals: 9, Voided volume: 371 mL, Pvr by bladder scan: 239ml. Pattern of curve: slow flow with delayed emptying and incomplete emptying   Follow up scheduled for: 11/23/2020      Uroflow results reviewed and interpreted by me ()

## 2020-11-24 VITALS
DIASTOLIC BLOOD PRESSURE: 66 MMHG | SYSTOLIC BLOOD PRESSURE: 134 MMHG | BODY MASS INDEX: 33.44 KG/M2 | HEART RATE: 89 BPM | OXYGEN SATURATION: 99 % | WEIGHT: 226.44 LBS | RESPIRATION RATE: 17 BRPM | TEMPERATURE: 98 F

## 2020-12-17 DIAGNOSIS — I48.91 ATRIAL FIBRILLATION: ICD-10-CM

## 2020-12-17 DIAGNOSIS — I10 HYPERTENSION: ICD-10-CM

## 2020-12-17 DIAGNOSIS — G47.9 FATIGUE DUE TO SLEEP PATTERN DISTURBANCE: ICD-10-CM

## 2020-12-17 DIAGNOSIS — Z01.818 OTHER SPECIFIED PRE-OPERATIVE EXAMINATION: Primary | ICD-10-CM

## 2020-12-17 DIAGNOSIS — R53.83 FATIGUE DUE TO SLEEP PATTERN DISTURBANCE: ICD-10-CM

## 2020-12-17 DIAGNOSIS — R06.83 SNORING: ICD-10-CM

## 2020-12-17 DIAGNOSIS — G47.19 EXCESSIVE DAYTIME SLEEPINESS: ICD-10-CM

## 2020-12-17 DIAGNOSIS — G47.33 OSA (OBSTRUCTIVE SLEEP APNEA): ICD-10-CM

## 2020-12-18 ENCOUNTER — OFFICE VISIT (OUTPATIENT)
Dept: FAMILY MEDICINE | Facility: CLINIC | Age: 65
End: 2020-12-18
Payer: MEDICARE

## 2020-12-18 VITALS
TEMPERATURE: 98 F | BODY MASS INDEX: 33.24 KG/M2 | HEART RATE: 102 BPM | OXYGEN SATURATION: 97 % | SYSTOLIC BLOOD PRESSURE: 118 MMHG | HEIGHT: 69 IN | DIASTOLIC BLOOD PRESSURE: 70 MMHG | RESPIRATION RATE: 16 BRPM | WEIGHT: 224.44 LBS

## 2020-12-18 DIAGNOSIS — I48.0 PAROXYSMAL ATRIAL FIBRILLATION: ICD-10-CM

## 2020-12-18 DIAGNOSIS — Z79.01 ANTICOAGULATED ON COUMADIN: Primary | ICD-10-CM

## 2020-12-18 LAB — INR PPP: 2.4 (ref 2–3)

## 2020-12-18 PROCEDURE — 99213 OFFICE O/P EST LOW 20 MIN: CPT | Mod: S$GLB,,, | Performed by: INTERNAL MEDICINE

## 2020-12-18 PROCEDURE — 3288F FALL RISK ASSESSMENT DOCD: CPT | Mod: CPTII,S$GLB,, | Performed by: INTERNAL MEDICINE

## 2020-12-18 PROCEDURE — 85610 POCT INR: ICD-10-PCS | Mod: QW,,, | Performed by: INTERNAL MEDICINE

## 2020-12-18 PROCEDURE — 3078F DIAST BP <80 MM HG: CPT | Mod: CPTII,S$GLB,, | Performed by: INTERNAL MEDICINE

## 2020-12-18 PROCEDURE — 3078F PR MOST RECENT DIASTOLIC BLOOD PRESSURE < 80 MM HG: ICD-10-PCS | Mod: CPTII,S$GLB,, | Performed by: INTERNAL MEDICINE

## 2020-12-18 PROCEDURE — 1101F PT FALLS ASSESS-DOCD LE1/YR: CPT | Mod: CPTII,S$GLB,, | Performed by: INTERNAL MEDICINE

## 2020-12-18 PROCEDURE — 1159F MED LIST DOCD IN RCRD: CPT | Mod: S$GLB,,, | Performed by: INTERNAL MEDICINE

## 2020-12-18 PROCEDURE — 3074F SYST BP LT 130 MM HG: CPT | Mod: CPTII,S$GLB,, | Performed by: INTERNAL MEDICINE

## 2020-12-18 PROCEDURE — 85610 PROTHROMBIN TIME: CPT | Mod: QW,,, | Performed by: INTERNAL MEDICINE

## 2020-12-18 PROCEDURE — 1125F AMNT PAIN NOTED PAIN PRSNT: CPT | Mod: S$GLB,,, | Performed by: INTERNAL MEDICINE

## 2020-12-18 PROCEDURE — 1101F PR PT FALLS ASSESS DOC 0-1 FALLS W/OUT INJ PAST YR: ICD-10-PCS | Mod: CPTII,S$GLB,, | Performed by: INTERNAL MEDICINE

## 2020-12-18 PROCEDURE — 1159F PR MEDICATION LIST DOCUMENTED IN MEDICAL RECORD: ICD-10-PCS | Mod: S$GLB,,, | Performed by: INTERNAL MEDICINE

## 2020-12-18 PROCEDURE — 1125F PR PAIN SEVERITY QUANTIFIED, PAIN PRESENT: ICD-10-PCS | Mod: S$GLB,,, | Performed by: INTERNAL MEDICINE

## 2020-12-18 PROCEDURE — 3008F BODY MASS INDEX DOCD: CPT | Mod: CPTII,S$GLB,, | Performed by: INTERNAL MEDICINE

## 2020-12-18 PROCEDURE — 3074F PR MOST RECENT SYSTOLIC BLOOD PRESSURE < 130 MM HG: ICD-10-PCS | Mod: CPTII,S$GLB,, | Performed by: INTERNAL MEDICINE

## 2020-12-18 PROCEDURE — 3288F PR FALLS RISK ASSESSMENT DOCUMENTED: ICD-10-PCS | Mod: CPTII,S$GLB,, | Performed by: INTERNAL MEDICINE

## 2020-12-18 PROCEDURE — 99213 PR OFFICE/OUTPT VISIT, EST, LEVL III, 20-29 MIN: ICD-10-PCS | Mod: S$GLB,,, | Performed by: INTERNAL MEDICINE

## 2020-12-18 PROCEDURE — 3008F PR BODY MASS INDEX (BMI) DOCUMENTED: ICD-10-PCS | Mod: CPTII,S$GLB,, | Performed by: INTERNAL MEDICINE

## 2020-12-18 NOTE — PROGRESS NOTES
"Subjective:      3:51 PM     Patient ID: Albin Fernandez is a 65 y.o. male.    Chief Complaint: coumadin monitoring    HPI       Coumadin monitoring:  Lab Results   Component Value Date    INR 2.3 11/17/2020    INR 2.2 10/15/2020    INR 2.0 09/17/2020       ANTICOAGULATION DX: (The following diagnoses are positive if BOLD, negative otherwise.)  Atrial_fibirillation.  . DVT. Pulmonary_embolism.  Aortic_valve_replacement.  TIA/CVA.         Current Dosage:    3 mg   Compliance with medication - good  Diet changes: no.     watches diet: yes.  .  Pending medical/dental procedure: no  Concomitant medication changes (including over the counter/herbs): no  Alcohol use:  no        The following symptoms are positive if BOLD, negative otherwise.    Bleeding episodes:    Gingival_bleeding., epistaxis ., ecchymoses, hematuria . , melena, blood_per_rectum  Thrombotic event:    shortness_of_breath .  pain/swelling_of_extremity . , numbness  , tingling . , headache .   Intolerance of drug:  nausea/vomiting/diarrhea . , rash . , skin necrosis .     Review of Systems      Objective:      Vitals:    12/18/20 1527   BP: 118/70   Pulse: 102   Resp: 16   Temp: 97.6 °F (36.4 °C)   TempSrc: Oral   SpO2: 97%   Weight: 101.8 kg (224 lb 6.9 oz)   Height: 5' 9" (1.753 m)   PainSc:   7   PainLoc: Back     Physical Exam  Vitals signs and nursing note reviewed.   Constitutional:       Appearance: He is well-developed.   Cardiovascular:      Rate and Rhythm: Normal rate. Rhythm irregular.      Heart sounds: Normal heart sounds.   Pulmonary:      Effort: Pulmonary effort is normal.      Breath sounds: Normal breath sounds.   Abdominal:      Palpations: Abdomen is soft.      Tenderness: There is no abdominal tenderness.   Neurological:      Mental Status: He is alert.   Psychiatric:         Behavior: Behavior normal.         Thought Content: Thought content normal.       Recent Results (from the past 1008 hour(s))   POCT URINE DIPSTICK WITHOUT " MICROSCOPE    Collection Time: 11/10/20  2:06 PM   Result Value Ref Range    Color, UA Yellow     Spec Grav UA 1.030     pH, UA 5.5     WBC, UA n     Nitrite, UA n     Protein trace     Glucose, UA n     Ketones, UA n     Urobilinogen, UA n     Bilirubin n     Blood, UA n     Clarity, UA Clear    POCT INR    Collection Time: 11/17/20  4:10 PM   Result Value Ref Range    INR 2.3    COVID-19 Routine Screening    Collection Time: 11/20/20 10:30 AM   Result Value Ref Range    SARS-CoV2 (COVID-19) Qualitative PCR Not Detected Not Detected   Urine Dip with micro, POC    Collection Time: 11/23/20  1:55 PM   Result Value Ref Range    Color, UA Yellow     Spec Grav UA 1.020     pH, UA 5     WBC, UA neg     Blood, UA neg     Nitrite, UA neg     Ketones, UA neg     Bilirubin neg     Urobilinogen, UA neg     Protein trace     Glucose, UA neg     Casts      Bacteria      Crystals      White Blood Cells      RBC Cells Counted            Assessment:       1. Anticoagulated on Coumadin    2. Paroxysmal atrial fibrillation          Plan:   Continue Coumadin 3 mg a day    Anticoagulated on Coumadin  -     POCT INR    Paroxysmal atrial fibrillation  -     POCT INR      Follow up in about 1 month (around 1/18/2021).

## 2020-12-18 NOTE — PATIENT INSTRUCTIONS
Continue Coumadin 3 mg a day  Warfarin is a blood-thinning medication that helps treat and prevent blood clots. There is no specific warfarin diet. However, certain foods and beverages can make warfarin less effective in preventing blood clots. It's important to pay attention to what you eat while taking warfarin.    One nutrient that can lessen warfarin's effectiveness is vitamin K. It's important to be consistent in how much vitamin K you get daily. The adequate intake level of vitamin K for adult men is 120 micrograms (mcg). For adult women, it's 90 mcg. While eating small amounts of foods that are rich in vitamin K shouldn't cause a problem, avoid consuming large amounts of certain foods or drinks, including:    Kale  Spinach  [place] sprouts  Collards  Mustard greens  Chard  Broccoli  Asparagus  Green tea  Certain drinks can increase the effect of warfarin, leading to bleeding problems. Avoid or consume only small amounts of these drinks when taking warfarin:    Cranberry juice  Alcohol          If you have labs scheduled at [facility] you need to make an appointment. This is a measure to protect you from covid 19.      Thank you for choosing [facility].     Please fill out the patient experience survey.

## 2021-01-04 ENCOUNTER — PROCEDURE VISIT (OUTPATIENT)
Dept: UROLOGY | Facility: CLINIC | Age: 66
End: 2021-01-04
Payer: MEDICARE

## 2021-01-04 ENCOUNTER — HOSPITAL ENCOUNTER (OUTPATIENT)
Dept: PREADMISSION TESTING | Facility: HOSPITAL | Age: 66
Discharge: HOME | End: 2021-01-04
Attending: INTERNAL MEDICINE
Payer: MEDICARE

## 2021-01-04 VITALS
BODY MASS INDEX: 33.8 KG/M2 | WEIGHT: 228.19 LBS | SYSTOLIC BLOOD PRESSURE: 145 MMHG | HEIGHT: 69 IN | DIASTOLIC BLOOD PRESSURE: 91 MMHG | HEART RATE: 85 BPM

## 2021-01-04 DIAGNOSIS — R39.15 URGENCY OF MICTURITION: ICD-10-CM

## 2021-01-04 DIAGNOSIS — R39.198 INCREASING RESIDUAL URINE: ICD-10-CM

## 2021-01-04 DIAGNOSIS — N52.9 ERECTILE DYSFUNCTION, UNSPECIFIED ERECTILE DYSFUNCTION TYPE: ICD-10-CM

## 2021-01-04 DIAGNOSIS — R35.1 NOCTURIA: ICD-10-CM

## 2021-01-04 DIAGNOSIS — Z01.818 OTHER SPECIFIED PRE-OPERATIVE EXAMINATION: ICD-10-CM

## 2021-01-04 DIAGNOSIS — R33.9 INCOMPLETE EMPTYING OF BLADDER: Primary | ICD-10-CM

## 2021-01-04 LAB
BILIRUB SERPL-MCNC: NEGATIVE MG/DL
BLOOD URINE, POC: ABNORMAL
CLARITY, POC UA: CLEAR
COLOR, POC UA: YELLOW
GLUCOSE UR QL STRIP: NEGATIVE
KETONES UR QL STRIP: NEGATIVE
LEUKOCYTE ESTERASE URINE, POC: NEGATIVE
NITRITE, POC UA: NEGATIVE
PH, POC UA: 5.5
PROTEIN, POC: NEGATIVE
SPECIFIC GRAVITY, POC UA: 1
UROBILINOGEN, POC UA: 0.2

## 2021-01-04 PROCEDURE — 99499 NO LOS: ICD-10-PCS | Mod: S$GLB,,, | Performed by: UROLOGY

## 2021-01-04 PROCEDURE — 51741 PR UROFLOWMETRY, COMPLEX: ICD-10-PCS | Mod: 26,51,S$GLB, | Performed by: UROLOGY

## 2021-01-04 PROCEDURE — 81002 URINALYSIS NONAUTO W/O SCOPE: CPT | Mod: S$GLB,,, | Performed by: UROLOGY

## 2021-01-04 PROCEDURE — 51728 CYSTOMETROGRAM W/VP: CPT | Mod: 26,S$GLB,, | Performed by: UROLOGY

## 2021-01-04 PROCEDURE — 51741 ELECTRO-UROFLOWMETRY FIRST: CPT | Mod: 26,51,S$GLB, | Performed by: UROLOGY

## 2021-01-04 PROCEDURE — 99499 UNLISTED E&M SERVICE: CPT | Mod: S$GLB,,, | Performed by: UROLOGY

## 2021-01-04 PROCEDURE — 81002 POCT URINE DIPSTICK WITHOUT MICROSCOPE: ICD-10-PCS | Mod: S$GLB,,, | Performed by: UROLOGY

## 2021-01-04 PROCEDURE — 51784 ANAL/URINARY MUSCLE STUDY: CPT | Mod: 26,51,S$GLB, | Performed by: UROLOGY

## 2021-01-04 PROCEDURE — 51784 PR ANAL/URINARY MUSCLE STUDY: ICD-10-PCS | Mod: 26,51,S$GLB, | Performed by: UROLOGY

## 2021-01-04 PROCEDURE — U0003 INFECTIOUS AGENT DETECTION BY NUCLEIC ACID (DNA OR RNA); SEVERE ACUTE RESPIRATORY SYNDROME CORONAVIRUS 2 (SARS-COV-2) (CORONAVIRUS DISEASE [COVID-19]), AMPLIFIED PROBE TECHNIQUE, MAKING USE OF HIGH THROUGHPUT TECHNOLOGIES AS DESCRIBED BY CMS-2020-01-R: HCPCS

## 2021-01-04 PROCEDURE — 51728 PR COMPLEX CYSTOMETROGRAM VOIDING PRESSURE STUDIES: ICD-10-PCS | Mod: 26,S$GLB,, | Performed by: UROLOGY

## 2021-01-04 RX ORDER — AMOXICILLIN AND CLAVULANATE POTASSIUM 875; 125 MG/1; MG/1
1 TABLET, FILM COATED ORAL 2 TIMES DAILY
Qty: 14 TABLET | Refills: 0 | Status: SHIPPED | OUTPATIENT
Start: 2021-01-04 | End: 2021-01-11

## 2021-01-05 LAB — SARS-COV-2 RNA RESP QL NAA+PROBE: NOT DETECTED

## 2021-01-07 ENCOUNTER — PROCEDURE VISIT (OUTPATIENT)
Dept: SLEEP MEDICINE | Facility: HOSPITAL | Age: 66
End: 2021-01-07
Attending: INTERNAL MEDICINE
Payer: MEDICARE

## 2021-01-07 DIAGNOSIS — G47.19 EXCESSIVE DAYTIME SLEEPINESS: ICD-10-CM

## 2021-01-07 DIAGNOSIS — R53.83 FATIGUE DUE TO SLEEP PATTERN DISTURBANCE: ICD-10-CM

## 2021-01-07 DIAGNOSIS — R06.83 SNORING: ICD-10-CM

## 2021-01-07 DIAGNOSIS — I10 HYPERTENSION: ICD-10-CM

## 2021-01-07 DIAGNOSIS — G47.9 FATIGUE DUE TO SLEEP PATTERN DISTURBANCE: ICD-10-CM

## 2021-01-07 DIAGNOSIS — I48.91 ATRIAL FIBRILLATION: ICD-10-CM

## 2021-01-07 DIAGNOSIS — G47.33 OSA (OBSTRUCTIVE SLEEP APNEA): ICD-10-CM

## 2021-01-07 PROCEDURE — 95810 POLYSOM 6/> YRS 4/> PARAM: CPT

## 2021-01-19 ENCOUNTER — OFFICE VISIT (OUTPATIENT)
Dept: FAMILY MEDICINE | Facility: CLINIC | Age: 66
End: 2021-01-19
Payer: MEDICARE

## 2021-01-19 VITALS
TEMPERATURE: 98 F | BODY MASS INDEX: 34.25 KG/M2 | OXYGEN SATURATION: 97 % | WEIGHT: 231.25 LBS | RESPIRATION RATE: 16 BRPM | HEART RATE: 94 BPM | DIASTOLIC BLOOD PRESSURE: 78 MMHG | SYSTOLIC BLOOD PRESSURE: 128 MMHG | HEIGHT: 69 IN

## 2021-01-19 DIAGNOSIS — I48.0 PAROXYSMAL ATRIAL FIBRILLATION: ICD-10-CM

## 2021-01-19 DIAGNOSIS — Z79.01 ANTICOAGULATED ON COUMADIN: Primary | ICD-10-CM

## 2021-01-19 LAB — INR PPP: 1.7 (ref 2–3)

## 2021-01-19 PROCEDURE — 85610 PROTHROMBIN TIME: CPT | Mod: QW,,, | Performed by: INTERNAL MEDICINE

## 2021-01-19 PROCEDURE — 1125F PR PAIN SEVERITY QUANTIFIED, PAIN PRESENT: ICD-10-PCS | Mod: S$GLB,,, | Performed by: INTERNAL MEDICINE

## 2021-01-19 PROCEDURE — 1159F PR MEDICATION LIST DOCUMENTED IN MEDICAL RECORD: ICD-10-PCS | Mod: S$GLB,,, | Performed by: INTERNAL MEDICINE

## 2021-01-19 PROCEDURE — 3008F PR BODY MASS INDEX (BMI) DOCUMENTED: ICD-10-PCS | Mod: CPTII,S$GLB,, | Performed by: INTERNAL MEDICINE

## 2021-01-19 PROCEDURE — 1159F MED LIST DOCD IN RCRD: CPT | Mod: S$GLB,,, | Performed by: INTERNAL MEDICINE

## 2021-01-19 PROCEDURE — 3074F PR MOST RECENT SYSTOLIC BLOOD PRESSURE < 130 MM HG: ICD-10-PCS | Mod: CPTII,S$GLB,, | Performed by: INTERNAL MEDICINE

## 2021-01-19 PROCEDURE — 1101F PR PT FALLS ASSESS DOC 0-1 FALLS W/OUT INJ PAST YR: ICD-10-PCS | Mod: CPTII,S$GLB,, | Performed by: INTERNAL MEDICINE

## 2021-01-19 PROCEDURE — 99214 OFFICE O/P EST MOD 30 MIN: CPT | Mod: S$GLB,,, | Performed by: INTERNAL MEDICINE

## 2021-01-19 PROCEDURE — 99214 PR OFFICE/OUTPT VISIT, EST, LEVL IV, 30-39 MIN: ICD-10-PCS | Mod: S$GLB,,, | Performed by: INTERNAL MEDICINE

## 2021-01-19 PROCEDURE — 3008F BODY MASS INDEX DOCD: CPT | Mod: CPTII,S$GLB,, | Performed by: INTERNAL MEDICINE

## 2021-01-19 PROCEDURE — 1125F AMNT PAIN NOTED PAIN PRSNT: CPT | Mod: S$GLB,,, | Performed by: INTERNAL MEDICINE

## 2021-01-19 PROCEDURE — 1101F PT FALLS ASSESS-DOCD LE1/YR: CPT | Mod: CPTII,S$GLB,, | Performed by: INTERNAL MEDICINE

## 2021-01-19 PROCEDURE — 85610 POCT INR: ICD-10-PCS | Mod: QW,,, | Performed by: INTERNAL MEDICINE

## 2021-01-19 PROCEDURE — 3078F PR MOST RECENT DIASTOLIC BLOOD PRESSURE < 80 MM HG: ICD-10-PCS | Mod: CPTII,S$GLB,, | Performed by: INTERNAL MEDICINE

## 2021-01-19 PROCEDURE — 3288F PR FALLS RISK ASSESSMENT DOCUMENTED: ICD-10-PCS | Mod: CPTII,S$GLB,, | Performed by: INTERNAL MEDICINE

## 2021-01-19 PROCEDURE — 3288F FALL RISK ASSESSMENT DOCD: CPT | Mod: CPTII,S$GLB,, | Performed by: INTERNAL MEDICINE

## 2021-01-19 PROCEDURE — 3078F DIAST BP <80 MM HG: CPT | Mod: CPTII,S$GLB,, | Performed by: INTERNAL MEDICINE

## 2021-01-19 PROCEDURE — 3074F SYST BP LT 130 MM HG: CPT | Mod: CPTII,S$GLB,, | Performed by: INTERNAL MEDICINE

## 2021-01-19 RX ORDER — SILDENAFIL 100 MG/1
100 TABLET, FILM COATED ORAL DAILY PRN
Qty: 30 TABLET | Refills: 5 | Status: SHIPPED | OUTPATIENT
Start: 2021-01-19 | End: 2022-02-16

## 2021-01-19 RX ORDER — WARFARIN 2 MG/1
2 TABLET ORAL DAILY
Qty: 90 TABLET | Refills: 1 | Status: SHIPPED | OUTPATIENT
Start: 2021-01-19 | End: 2021-02-22 | Stop reason: SDUPTHER

## 2021-01-21 DIAGNOSIS — R06.83 SNORING: ICD-10-CM

## 2021-01-21 DIAGNOSIS — G47.33 OSA (OBSTRUCTIVE SLEEP APNEA): ICD-10-CM

## 2021-01-21 DIAGNOSIS — I10 HYPERTENSION: ICD-10-CM

## 2021-01-21 DIAGNOSIS — Z01.818 OTHER SPECIFIED PRE-OPERATIVE EXAMINATION: Primary | ICD-10-CM

## 2021-01-21 DIAGNOSIS — G47.19 EXCESSIVE DAYTIME SLEEPINESS: ICD-10-CM

## 2021-01-21 DIAGNOSIS — I48.91 ATRIAL FIBRILLATION: ICD-10-CM

## 2021-01-21 DIAGNOSIS — G47.9 FATIGUE DUE TO SLEEP PATTERN DISTURBANCE: ICD-10-CM

## 2021-01-21 DIAGNOSIS — R53.83 FATIGUE DUE TO SLEEP PATTERN DISTURBANCE: ICD-10-CM

## 2021-01-25 ENCOUNTER — TELEPHONE (OUTPATIENT)
Dept: ADMINISTRATIVE | Facility: HOSPITAL | Age: 66
End: 2021-01-25

## 2021-01-25 ENCOUNTER — PATIENT OUTREACH (OUTPATIENT)
Dept: ADMINISTRATIVE | Facility: HOSPITAL | Age: 66
End: 2021-01-25

## 2021-01-26 ENCOUNTER — HOSPITAL ENCOUNTER (OUTPATIENT)
Dept: PREADMISSION TESTING | Facility: HOSPITAL | Age: 66
Discharge: HOME | End: 2021-01-26
Attending: INTERNAL MEDICINE
Payer: MEDICARE

## 2021-01-26 DIAGNOSIS — Z01.818 OTHER SPECIFIED PRE-OPERATIVE EXAMINATION: ICD-10-CM

## 2021-01-26 PROCEDURE — U0003 INFECTIOUS AGENT DETECTION BY NUCLEIC ACID (DNA OR RNA); SEVERE ACUTE RESPIRATORY SYNDROME CORONAVIRUS 2 (SARS-COV-2) (CORONAVIRUS DISEASE [COVID-19]), AMPLIFIED PROBE TECHNIQUE, MAKING USE OF HIGH THROUGHPUT TECHNOLOGIES AS DESCRIBED BY CMS-2020-01-R: HCPCS

## 2021-01-27 LAB — SARS-COV-2 RNA RESP QL NAA+PROBE: NOT DETECTED

## 2021-01-29 ENCOUNTER — PROCEDURE VISIT (OUTPATIENT)
Dept: SLEEP MEDICINE | Facility: HOSPITAL | Age: 66
End: 2021-01-29
Attending: INTERNAL MEDICINE
Payer: MEDICARE

## 2021-01-29 DIAGNOSIS — I10 HYPERTENSION: ICD-10-CM

## 2021-01-29 DIAGNOSIS — G47.33 OSA (OBSTRUCTIVE SLEEP APNEA): ICD-10-CM

## 2021-01-29 DIAGNOSIS — I48.91 ATRIAL FIBRILLATION: ICD-10-CM

## 2021-01-29 DIAGNOSIS — R06.83 SNORING: ICD-10-CM

## 2021-01-29 DIAGNOSIS — G47.9 FATIGUE DUE TO SLEEP PATTERN DISTURBANCE: ICD-10-CM

## 2021-01-29 DIAGNOSIS — G47.19 EXCESSIVE DAYTIME SLEEPINESS: ICD-10-CM

## 2021-01-29 DIAGNOSIS — R53.83 FATIGUE DUE TO SLEEP PATTERN DISTURBANCE: ICD-10-CM

## 2021-01-29 PROCEDURE — 95811 POLYSOM 6/>YRS CPAP 4/> PARM: CPT

## 2021-02-09 ENCOUNTER — OFFICE VISIT (OUTPATIENT)
Dept: FAMILY MEDICINE | Facility: CLINIC | Age: 66
End: 2021-02-09
Payer: MEDICARE

## 2021-02-09 VITALS
HEIGHT: 69 IN | RESPIRATION RATE: 16 BRPM | WEIGHT: 230.38 LBS | OXYGEN SATURATION: 97 % | DIASTOLIC BLOOD PRESSURE: 74 MMHG | TEMPERATURE: 98 F | SYSTOLIC BLOOD PRESSURE: 122 MMHG | HEART RATE: 92 BPM | BODY MASS INDEX: 34.12 KG/M2

## 2021-02-09 DIAGNOSIS — I48.0 PAROXYSMAL ATRIAL FIBRILLATION: ICD-10-CM

## 2021-02-09 DIAGNOSIS — R53.83 FATIGUE, UNSPECIFIED TYPE: ICD-10-CM

## 2021-02-09 DIAGNOSIS — I42.0 DILATED CARDIOMYOPATHY: ICD-10-CM

## 2021-02-09 DIAGNOSIS — R09.82 POSTNASAL DRIP: ICD-10-CM

## 2021-02-09 DIAGNOSIS — I70.0 ATHEROSCLEROSIS OF AORTA: ICD-10-CM

## 2021-02-09 DIAGNOSIS — Z77.110 EXACERBATION OF COPD ASSOCIATED WITH VOLCANIC SMOG EXPOSURE: ICD-10-CM

## 2021-02-09 DIAGNOSIS — Z79.01 ANTICOAGULATED ON COUMADIN: Primary | ICD-10-CM

## 2021-02-09 DIAGNOSIS — J44.1 EXACERBATION OF COPD ASSOCIATED WITH VOLCANIC SMOG EXPOSURE: ICD-10-CM

## 2021-02-09 LAB — INR PPP: 2.2 (ref 2–3)

## 2021-02-09 PROCEDURE — 1125F PR PAIN SEVERITY QUANTIFIED, PAIN PRESENT: ICD-10-PCS | Mod: S$GLB,,, | Performed by: INTERNAL MEDICINE

## 2021-02-09 PROCEDURE — 3074F SYST BP LT 130 MM HG: CPT | Mod: CPTII,S$GLB,, | Performed by: INTERNAL MEDICINE

## 2021-02-09 PROCEDURE — 1125F AMNT PAIN NOTED PAIN PRSNT: CPT | Mod: S$GLB,,, | Performed by: INTERNAL MEDICINE

## 2021-02-09 PROCEDURE — 3078F DIAST BP <80 MM HG: CPT | Mod: CPTII,S$GLB,, | Performed by: INTERNAL MEDICINE

## 2021-02-09 PROCEDURE — 3288F FALL RISK ASSESSMENT DOCD: CPT | Mod: CPTII,S$GLB,, | Performed by: INTERNAL MEDICINE

## 2021-02-09 PROCEDURE — 99214 OFFICE O/P EST MOD 30 MIN: CPT | Mod: S$GLB,,, | Performed by: INTERNAL MEDICINE

## 2021-02-09 PROCEDURE — 85610 POCT INR: ICD-10-PCS | Mod: QW,,, | Performed by: INTERNAL MEDICINE

## 2021-02-09 PROCEDURE — 3074F PR MOST RECENT SYSTOLIC BLOOD PRESSURE < 130 MM HG: ICD-10-PCS | Mod: CPTII,S$GLB,, | Performed by: INTERNAL MEDICINE

## 2021-02-09 PROCEDURE — 85610 PROTHROMBIN TIME: CPT | Mod: QW,,, | Performed by: INTERNAL MEDICINE

## 2021-02-09 PROCEDURE — 3008F BODY MASS INDEX DOCD: CPT | Mod: CPTII,S$GLB,, | Performed by: INTERNAL MEDICINE

## 2021-02-09 PROCEDURE — 1101F PT FALLS ASSESS-DOCD LE1/YR: CPT | Mod: CPTII,S$GLB,, | Performed by: INTERNAL MEDICINE

## 2021-02-09 PROCEDURE — 3078F PR MOST RECENT DIASTOLIC BLOOD PRESSURE < 80 MM HG: ICD-10-PCS | Mod: CPTII,S$GLB,, | Performed by: INTERNAL MEDICINE

## 2021-02-09 PROCEDURE — 1159F MED LIST DOCD IN RCRD: CPT | Mod: S$GLB,,, | Performed by: INTERNAL MEDICINE

## 2021-02-09 PROCEDURE — 99214 PR OFFICE/OUTPT VISIT, EST, LEVL IV, 30-39 MIN: ICD-10-PCS | Mod: S$GLB,,, | Performed by: INTERNAL MEDICINE

## 2021-02-09 PROCEDURE — 1101F PR PT FALLS ASSESS DOC 0-1 FALLS W/OUT INJ PAST YR: ICD-10-PCS | Mod: CPTII,S$GLB,, | Performed by: INTERNAL MEDICINE

## 2021-02-09 PROCEDURE — 3008F PR BODY MASS INDEX (BMI) DOCUMENTED: ICD-10-PCS | Mod: CPTII,S$GLB,, | Performed by: INTERNAL MEDICINE

## 2021-02-09 PROCEDURE — 1159F PR MEDICATION LIST DOCUMENTED IN MEDICAL RECORD: ICD-10-PCS | Mod: S$GLB,,, | Performed by: INTERNAL MEDICINE

## 2021-02-09 PROCEDURE — 3288F PR FALLS RISK ASSESSMENT DOCUMENTED: ICD-10-PCS | Mod: CPTII,S$GLB,, | Performed by: INTERNAL MEDICINE

## 2021-02-09 RX ORDER — METOPROLOL TARTRATE 50 MG/1
50 TABLET ORAL 2 TIMES DAILY
Status: ON HOLD | COMMUNITY
End: 2021-12-08

## 2021-02-09 RX ORDER — IPRATROPIUM BROMIDE 42 UG/1
2 SPRAY, METERED NASAL 4 TIMES DAILY
Qty: 15 ML | Refills: 5 | Status: SHIPPED | OUTPATIENT
Start: 2021-02-09 | End: 2022-09-22

## 2021-02-12 ENCOUNTER — TELEPHONE (OUTPATIENT)
Dept: FAMILY MEDICINE | Facility: CLINIC | Age: 66
End: 2021-02-12

## 2021-02-12 ENCOUNTER — PATIENT MESSAGE (OUTPATIENT)
Dept: FAMILY MEDICINE | Facility: CLINIC | Age: 66
End: 2021-02-12

## 2021-02-12 DIAGNOSIS — J44.1 COPD EXACERBATION: Primary | ICD-10-CM

## 2021-02-12 RX ORDER — PREDNISONE 20 MG/1
40 TABLET ORAL DAILY
Qty: 10 TABLET | Refills: 0 | Status: SHIPPED | OUTPATIENT
Start: 2021-02-12 | End: 2021-02-17

## 2021-02-12 RX ORDER — DOXYCYCLINE HYCLATE 100 MG
100 TABLET ORAL EVERY 12 HOURS
Qty: 10 TABLET | Refills: 0 | Status: SHIPPED | OUTPATIENT
Start: 2021-02-12 | End: 2021-02-17

## 2021-02-23 ENCOUNTER — TELEPHONE (OUTPATIENT)
Dept: FAMILY MEDICINE | Facility: CLINIC | Age: 66
End: 2021-02-23

## 2021-02-23 DIAGNOSIS — J34.89 SINUS PAIN: Primary | ICD-10-CM

## 2021-03-01 ENCOUNTER — PATIENT MESSAGE (OUTPATIENT)
Dept: FAMILY MEDICINE | Facility: CLINIC | Age: 66
End: 2021-03-01

## 2021-03-06 ENCOUNTER — IMMUNIZATION (OUTPATIENT)
Dept: PRIMARY CARE CLINIC | Facility: CLINIC | Age: 66
End: 2021-03-06
Payer: MEDICARE

## 2021-03-06 DIAGNOSIS — Z23 NEED FOR VACCINATION: Primary | ICD-10-CM

## 2021-03-06 PROCEDURE — 91300 COVID-19, MRNA, LNP-S, PF, 30 MCG/0.3 ML DOSE VACCINE: ICD-10-PCS | Mod: S$GLB,,, | Performed by: FAMILY MEDICINE

## 2021-03-06 PROCEDURE — 0001A COVID-19, MRNA, LNP-S, PF, 30 MCG/0.3 ML DOSE VACCINE: ICD-10-PCS | Mod: CV19,S$GLB,, | Performed by: FAMILY MEDICINE

## 2021-03-06 PROCEDURE — 91300 COVID-19, MRNA, LNP-S, PF, 30 MCG/0.3 ML DOSE VACCINE: CPT | Mod: S$GLB,,, | Performed by: FAMILY MEDICINE

## 2021-03-06 PROCEDURE — 0001A COVID-19, MRNA, LNP-S, PF, 30 MCG/0.3 ML DOSE VACCINE: CPT | Mod: CV19,S$GLB,, | Performed by: FAMILY MEDICINE

## 2021-03-09 ENCOUNTER — OFFICE VISIT (OUTPATIENT)
Dept: FAMILY MEDICINE | Facility: CLINIC | Age: 66
End: 2021-03-09
Payer: MEDICARE

## 2021-03-09 VITALS
TEMPERATURE: 97 F | WEIGHT: 231.69 LBS | SYSTOLIC BLOOD PRESSURE: 130 MMHG | HEART RATE: 100 BPM | RESPIRATION RATE: 20 BRPM | OXYGEN SATURATION: 98 % | DIASTOLIC BLOOD PRESSURE: 68 MMHG | BODY MASS INDEX: 34.32 KG/M2 | HEIGHT: 69 IN

## 2021-03-09 DIAGNOSIS — Z79.01 ANTICOAGULATED ON COUMADIN: Primary | ICD-10-CM

## 2021-03-09 DIAGNOSIS — I48.0 PAROXYSMAL ATRIAL FIBRILLATION: ICD-10-CM

## 2021-03-09 DIAGNOSIS — R35.0 URINARY FREQUENCY: ICD-10-CM

## 2021-03-09 LAB
BILIRUB SERPL-MCNC: NEGATIVE MG/DL
BLOOD URINE, POC: NEGATIVE
CLARITY, POC UA: CLEAR
COLOR, POC UA: YELLOW
GLUCOSE UR QL STRIP: NORMAL
KETONES UR QL STRIP: NEGATIVE
LEUKOCYTE ESTERASE URINE, POC: NEGATIVE
NITRITE, POC UA: NEGATIVE
PH, POC UA: 5
PROTEIN, POC: NORMAL
SPECIFIC GRAVITY, POC UA: 1.02
UROBILINOGEN, POC UA: NORMAL

## 2021-03-09 PROCEDURE — 3288F PR FALLS RISK ASSESSMENT DOCUMENTED: ICD-10-PCS | Mod: CPTII,S$GLB,, | Performed by: INTERNAL MEDICINE

## 2021-03-09 PROCEDURE — 3288F FALL RISK ASSESSMENT DOCD: CPT | Mod: CPTII,S$GLB,, | Performed by: INTERNAL MEDICINE

## 2021-03-09 PROCEDURE — 1101F PT FALLS ASSESS-DOCD LE1/YR: CPT | Mod: CPTII,S$GLB,, | Performed by: INTERNAL MEDICINE

## 2021-03-09 PROCEDURE — 3075F SYST BP GE 130 - 139MM HG: CPT | Mod: CPTII,S$GLB,, | Performed by: INTERNAL MEDICINE

## 2021-03-09 PROCEDURE — 1101F PR PT FALLS ASSESS DOC 0-1 FALLS W/OUT INJ PAST YR: ICD-10-PCS | Mod: CPTII,S$GLB,, | Performed by: INTERNAL MEDICINE

## 2021-03-09 PROCEDURE — 99214 PR OFFICE/OUTPT VISIT, EST, LEVL IV, 30-39 MIN: ICD-10-PCS | Mod: 25,S$GLB,, | Performed by: INTERNAL MEDICINE

## 2021-03-09 PROCEDURE — 3075F PR MOST RECENT SYSTOLIC BLOOD PRESS GE 130-139MM HG: ICD-10-PCS | Mod: CPTII,S$GLB,, | Performed by: INTERNAL MEDICINE

## 2021-03-09 PROCEDURE — 3008F BODY MASS INDEX DOCD: CPT | Mod: CPTII,S$GLB,, | Performed by: INTERNAL MEDICINE

## 2021-03-09 PROCEDURE — 3078F PR MOST RECENT DIASTOLIC BLOOD PRESSURE < 80 MM HG: ICD-10-PCS | Mod: CPTII,S$GLB,, | Performed by: INTERNAL MEDICINE

## 2021-03-09 PROCEDURE — 99214 OFFICE O/P EST MOD 30 MIN: CPT | Mod: 25,S$GLB,, | Performed by: INTERNAL MEDICINE

## 2021-03-09 PROCEDURE — 1125F AMNT PAIN NOTED PAIN PRSNT: CPT | Mod: S$GLB,,, | Performed by: INTERNAL MEDICINE

## 2021-03-09 PROCEDURE — 3008F PR BODY MASS INDEX (BMI) DOCUMENTED: ICD-10-PCS | Mod: CPTII,S$GLB,, | Performed by: INTERNAL MEDICINE

## 2021-03-09 PROCEDURE — 1159F MED LIST DOCD IN RCRD: CPT | Mod: S$GLB,,, | Performed by: INTERNAL MEDICINE

## 2021-03-09 PROCEDURE — 81002 URINALYSIS NONAUTO W/O SCOPE: CPT | Mod: S$GLB,,, | Performed by: INTERNAL MEDICINE

## 2021-03-09 PROCEDURE — 1125F PR PAIN SEVERITY QUANTIFIED, PAIN PRESENT: ICD-10-PCS | Mod: S$GLB,,, | Performed by: INTERNAL MEDICINE

## 2021-03-09 PROCEDURE — 81002 POCT URINE DIPSTICK WITHOUT MICROSCOPE: ICD-10-PCS | Mod: S$GLB,,, | Performed by: INTERNAL MEDICINE

## 2021-03-09 PROCEDURE — 3078F DIAST BP <80 MM HG: CPT | Mod: CPTII,S$GLB,, | Performed by: INTERNAL MEDICINE

## 2021-03-09 PROCEDURE — 1159F PR MEDICATION LIST DOCUMENTED IN MEDICAL RECORD: ICD-10-PCS | Mod: S$GLB,,, | Performed by: INTERNAL MEDICINE

## 2021-03-27 ENCOUNTER — IMMUNIZATION (OUTPATIENT)
Dept: PRIMARY CARE CLINIC | Facility: CLINIC | Age: 66
End: 2021-03-27

## 2021-03-27 DIAGNOSIS — Z23 NEED FOR VACCINATION: Primary | ICD-10-CM

## 2021-03-27 PROCEDURE — 0002A COVID-19, MRNA, LNP-S, PF, 30 MCG/0.3 ML DOSE VACCINE: ICD-10-PCS | Mod: CV19,S$GLB,, | Performed by: FAMILY MEDICINE

## 2021-03-27 PROCEDURE — 91300 COVID-19, MRNA, LNP-S, PF, 30 MCG/0.3 ML DOSE VACCINE: CPT | Mod: S$GLB,,, | Performed by: FAMILY MEDICINE

## 2021-03-27 PROCEDURE — 91300 COVID-19, MRNA, LNP-S, PF, 30 MCG/0.3 ML DOSE VACCINE: ICD-10-PCS | Mod: S$GLB,,, | Performed by: FAMILY MEDICINE

## 2021-03-27 PROCEDURE — 0002A COVID-19, MRNA, LNP-S, PF, 30 MCG/0.3 ML DOSE VACCINE: CPT | Mod: CV19,S$GLB,, | Performed by: FAMILY MEDICINE

## 2021-04-09 ENCOUNTER — OFFICE VISIT (OUTPATIENT)
Dept: FAMILY MEDICINE | Facility: CLINIC | Age: 66
End: 2021-04-09
Payer: MEDICARE

## 2021-04-09 VITALS
DIASTOLIC BLOOD PRESSURE: 72 MMHG | HEIGHT: 69 IN | WEIGHT: 229.06 LBS | SYSTOLIC BLOOD PRESSURE: 130 MMHG | OXYGEN SATURATION: 98 % | HEART RATE: 99 BPM | RESPIRATION RATE: 20 BRPM | TEMPERATURE: 97 F | BODY MASS INDEX: 33.93 KG/M2

## 2021-04-09 DIAGNOSIS — Z79.01 ANTICOAGULATED ON COUMADIN: Primary | ICD-10-CM

## 2021-04-09 DIAGNOSIS — J06.9 UPPER RESPIRATORY TRACT INFECTION, UNSPECIFIED TYPE: ICD-10-CM

## 2021-04-09 DIAGNOSIS — I48.0 PAROXYSMAL ATRIAL FIBRILLATION: ICD-10-CM

## 2021-04-09 PROCEDURE — 99213 OFFICE O/P EST LOW 20 MIN: CPT | Mod: S$GLB,,, | Performed by: INTERNAL MEDICINE

## 2021-04-09 PROCEDURE — 1159F PR MEDICATION LIST DOCUMENTED IN MEDICAL RECORD: ICD-10-PCS | Mod: S$GLB,,, | Performed by: INTERNAL MEDICINE

## 2021-04-09 PROCEDURE — 99499 UNLISTED E&M SERVICE: CPT | Mod: S$GLB,,, | Performed by: INTERNAL MEDICINE

## 2021-04-09 PROCEDURE — 99213 PR OFFICE/OUTPT VISIT, EST, LEVL III, 20-29 MIN: ICD-10-PCS | Mod: S$GLB,,, | Performed by: INTERNAL MEDICINE

## 2021-04-09 PROCEDURE — 3008F PR BODY MASS INDEX (BMI) DOCUMENTED: ICD-10-PCS | Mod: CPTII,S$GLB,, | Performed by: INTERNAL MEDICINE

## 2021-04-09 PROCEDURE — 3288F FALL RISK ASSESSMENT DOCD: CPT | Mod: CPTII,S$GLB,, | Performed by: INTERNAL MEDICINE

## 2021-04-09 PROCEDURE — 1125F PR PAIN SEVERITY QUANTIFIED, PAIN PRESENT: ICD-10-PCS | Mod: S$GLB,,, | Performed by: INTERNAL MEDICINE

## 2021-04-09 PROCEDURE — 1159F MED LIST DOCD IN RCRD: CPT | Mod: S$GLB,,, | Performed by: INTERNAL MEDICINE

## 2021-04-09 PROCEDURE — 1125F AMNT PAIN NOTED PAIN PRSNT: CPT | Mod: S$GLB,,, | Performed by: INTERNAL MEDICINE

## 2021-04-09 PROCEDURE — 3008F BODY MASS INDEX DOCD: CPT | Mod: CPTII,S$GLB,, | Performed by: INTERNAL MEDICINE

## 2021-04-09 PROCEDURE — 3288F PR FALLS RISK ASSESSMENT DOCUMENTED: ICD-10-PCS | Mod: CPTII,S$GLB,, | Performed by: INTERNAL MEDICINE

## 2021-04-09 PROCEDURE — 1101F PR PT FALLS ASSESS DOC 0-1 FALLS W/OUT INJ PAST YR: ICD-10-PCS | Mod: CPTII,S$GLB,, | Performed by: INTERNAL MEDICINE

## 2021-04-09 PROCEDURE — 99499 RISK ADDL DX/OHS AUDIT: ICD-10-PCS | Mod: S$GLB,,, | Performed by: INTERNAL MEDICINE

## 2021-04-09 PROCEDURE — 1101F PT FALLS ASSESS-DOCD LE1/YR: CPT | Mod: CPTII,S$GLB,, | Performed by: INTERNAL MEDICINE

## 2021-04-09 RX ORDER — ZAFIRLUKAST 10 MG/1
20 TABLET, FILM COATED ORAL
Qty: 120 TABLET | Refills: 0 | Status: SHIPPED | OUTPATIENT
Start: 2021-04-09 | End: 2021-04-30 | Stop reason: SDUPTHER

## 2021-04-29 ENCOUNTER — TELEPHONE (OUTPATIENT)
Dept: FAMILY MEDICINE | Facility: CLINIC | Age: 66
End: 2021-04-29

## 2021-04-29 DIAGNOSIS — J06.9 UPPER RESPIRATORY TRACT INFECTION, UNSPECIFIED TYPE: ICD-10-CM

## 2021-04-30 RX ORDER — ZAFIRLUKAST 10 MG/1
10 TABLET, FILM COATED ORAL
Qty: 60 TABLET | Refills: 5 | Status: SHIPPED | OUTPATIENT
Start: 2021-04-30 | End: 2021-05-03 | Stop reason: CLARIF

## 2021-05-03 RX ORDER — ZAFIRLUKAST 20 MG/1
20 TABLET, FILM COATED ORAL 2 TIMES DAILY
Qty: 60 TABLET | Refills: 5 | Status: SHIPPED | OUTPATIENT
Start: 2021-05-03 | End: 2021-06-02

## 2021-05-14 ENCOUNTER — OFFICE VISIT (OUTPATIENT)
Dept: FAMILY MEDICINE | Facility: CLINIC | Age: 66
End: 2021-05-14
Payer: MEDICARE

## 2021-05-14 VITALS
HEART RATE: 92 BPM | TEMPERATURE: 98 F | DIASTOLIC BLOOD PRESSURE: 64 MMHG | HEIGHT: 69 IN | BODY MASS INDEX: 33.18 KG/M2 | SYSTOLIC BLOOD PRESSURE: 110 MMHG | OXYGEN SATURATION: 95 % | RESPIRATION RATE: 20 BRPM | WEIGHT: 224 LBS

## 2021-05-14 DIAGNOSIS — Z79.01 ANTICOAGULATED ON COUMADIN: Primary | ICD-10-CM

## 2021-05-14 LAB — INR PPP: 2.6 (ref 2–3)

## 2021-05-14 PROCEDURE — 1159F PR MEDICATION LIST DOCUMENTED IN MEDICAL RECORD: ICD-10-PCS | Mod: S$GLB,,, | Performed by: INTERNAL MEDICINE

## 2021-05-14 PROCEDURE — 85610 POCT INR: ICD-10-PCS | Mod: QW,,, | Performed by: INTERNAL MEDICINE

## 2021-05-14 PROCEDURE — 99213 PR OFFICE/OUTPT VISIT, EST, LEVL III, 20-29 MIN: ICD-10-PCS | Mod: S$GLB,,, | Performed by: INTERNAL MEDICINE

## 2021-05-14 PROCEDURE — 1101F PT FALLS ASSESS-DOCD LE1/YR: CPT | Mod: CPTII,S$GLB,, | Performed by: INTERNAL MEDICINE

## 2021-05-14 PROCEDURE — 3008F BODY MASS INDEX DOCD: CPT | Mod: CPTII,S$GLB,, | Performed by: INTERNAL MEDICINE

## 2021-05-14 PROCEDURE — 3008F PR BODY MASS INDEX (BMI) DOCUMENTED: ICD-10-PCS | Mod: CPTII,S$GLB,, | Performed by: INTERNAL MEDICINE

## 2021-05-14 PROCEDURE — 3288F PR FALLS RISK ASSESSMENT DOCUMENTED: ICD-10-PCS | Mod: CPTII,S$GLB,, | Performed by: INTERNAL MEDICINE

## 2021-05-14 PROCEDURE — 1125F PR PAIN SEVERITY QUANTIFIED, PAIN PRESENT: ICD-10-PCS | Mod: S$GLB,,, | Performed by: INTERNAL MEDICINE

## 2021-05-14 PROCEDURE — 3288F FALL RISK ASSESSMENT DOCD: CPT | Mod: CPTII,S$GLB,, | Performed by: INTERNAL MEDICINE

## 2021-05-14 PROCEDURE — 99213 OFFICE O/P EST LOW 20 MIN: CPT | Mod: S$GLB,,, | Performed by: INTERNAL MEDICINE

## 2021-05-14 PROCEDURE — 1159F MED LIST DOCD IN RCRD: CPT | Mod: S$GLB,,, | Performed by: INTERNAL MEDICINE

## 2021-05-14 PROCEDURE — 1125F AMNT PAIN NOTED PAIN PRSNT: CPT | Mod: S$GLB,,, | Performed by: INTERNAL MEDICINE

## 2021-05-14 PROCEDURE — 85610 PROTHROMBIN TIME: CPT | Mod: QW,,, | Performed by: INTERNAL MEDICINE

## 2021-05-14 PROCEDURE — 1101F PR PT FALLS ASSESS DOC 0-1 FALLS W/OUT INJ PAST YR: ICD-10-PCS | Mod: CPTII,S$GLB,, | Performed by: INTERNAL MEDICINE

## 2021-06-18 ENCOUNTER — OFFICE VISIT (OUTPATIENT)
Dept: FAMILY MEDICINE | Facility: CLINIC | Age: 66
End: 2021-06-18
Payer: MEDICARE

## 2021-06-18 VITALS
RESPIRATION RATE: 18 BRPM | HEART RATE: 96 BPM | TEMPERATURE: 98 F | OXYGEN SATURATION: 97 % | WEIGHT: 224 LBS | BODY MASS INDEX: 33.18 KG/M2 | SYSTOLIC BLOOD PRESSURE: 122 MMHG | DIASTOLIC BLOOD PRESSURE: 77 MMHG | HEIGHT: 69 IN

## 2021-06-18 DIAGNOSIS — E03.9 HYPOTHYROIDISM, UNSPECIFIED TYPE: ICD-10-CM

## 2021-06-18 DIAGNOSIS — Z79.01 ANTICOAGULATED ON COUMADIN: Primary | ICD-10-CM

## 2021-06-18 DIAGNOSIS — E78.5 HYPERLIPIDEMIA, UNSPECIFIED HYPERLIPIDEMIA TYPE: ICD-10-CM

## 2021-06-18 DIAGNOSIS — I10 ESSENTIAL HYPERTENSION: ICD-10-CM

## 2021-06-18 DIAGNOSIS — I48.0 PAROXYSMAL ATRIAL FIBRILLATION: ICD-10-CM

## 2021-06-18 LAB — INR PPP: 2.4 (ref 2–3)

## 2021-06-18 PROCEDURE — 1125F AMNT PAIN NOTED PAIN PRSNT: CPT | Mod: S$GLB,,, | Performed by: NURSE PRACTITIONER

## 2021-06-18 PROCEDURE — 85610 POCT INR: ICD-10-PCS | Mod: QW,,, | Performed by: NURSE PRACTITIONER

## 2021-06-18 PROCEDURE — 1101F PR PT FALLS ASSESS DOC 0-1 FALLS W/OUT INJ PAST YR: ICD-10-PCS | Mod: CPTII,S$GLB,, | Performed by: NURSE PRACTITIONER

## 2021-06-18 PROCEDURE — 99213 OFFICE O/P EST LOW 20 MIN: CPT | Mod: S$GLB,,, | Performed by: NURSE PRACTITIONER

## 2021-06-18 PROCEDURE — 3288F PR FALLS RISK ASSESSMENT DOCUMENTED: ICD-10-PCS | Mod: CPTII,S$GLB,, | Performed by: NURSE PRACTITIONER

## 2021-06-18 PROCEDURE — 99213 PR OFFICE/OUTPT VISIT, EST, LEVL III, 20-29 MIN: ICD-10-PCS | Mod: S$GLB,,, | Performed by: NURSE PRACTITIONER

## 2021-06-18 PROCEDURE — 3008F BODY MASS INDEX DOCD: CPT | Mod: CPTII,S$GLB,, | Performed by: NURSE PRACTITIONER

## 2021-06-18 PROCEDURE — 1125F PR PAIN SEVERITY QUANTIFIED, PAIN PRESENT: ICD-10-PCS | Mod: S$GLB,,, | Performed by: NURSE PRACTITIONER

## 2021-06-18 PROCEDURE — 3288F FALL RISK ASSESSMENT DOCD: CPT | Mod: CPTII,S$GLB,, | Performed by: NURSE PRACTITIONER

## 2021-06-18 PROCEDURE — 3008F PR BODY MASS INDEX (BMI) DOCUMENTED: ICD-10-PCS | Mod: CPTII,S$GLB,, | Performed by: NURSE PRACTITIONER

## 2021-06-18 PROCEDURE — 85610 PROTHROMBIN TIME: CPT | Mod: QW,,, | Performed by: NURSE PRACTITIONER

## 2021-06-18 PROCEDURE — 1101F PT FALLS ASSESS-DOCD LE1/YR: CPT | Mod: CPTII,S$GLB,, | Performed by: NURSE PRACTITIONER

## 2021-07-16 ENCOUNTER — LAB VISIT (OUTPATIENT)
Dept: LAB | Facility: HOSPITAL | Age: 66
End: 2021-07-16
Attending: NURSE PRACTITIONER
Payer: MEDICARE

## 2021-07-16 DIAGNOSIS — Z79.01 ANTICOAGULATED ON COUMADIN: ICD-10-CM

## 2021-07-16 DIAGNOSIS — E78.5 HYPERLIPIDEMIA, UNSPECIFIED HYPERLIPIDEMIA TYPE: ICD-10-CM

## 2021-07-16 DIAGNOSIS — E03.9 HYPOTHYROIDISM, UNSPECIFIED TYPE: ICD-10-CM

## 2021-07-16 DIAGNOSIS — I48.0 PAROXYSMAL ATRIAL FIBRILLATION: ICD-10-CM

## 2021-07-16 DIAGNOSIS — I10 ESSENTIAL HYPERTENSION: ICD-10-CM

## 2021-07-16 LAB
ALBUMIN SERPL BCP-MCNC: 3.6 G/DL (ref 3.5–5.2)
ALP SERPL-CCNC: 70 U/L (ref 55–135)
ALT SERPL W/O P-5'-P-CCNC: 18 U/L (ref 10–44)
ANION GAP SERPL CALC-SCNC: 8 MMOL/L (ref 8–16)
AST SERPL-CCNC: 23 U/L (ref 10–40)
BASOPHILS # BLD AUTO: 0.03 K/UL (ref 0–0.2)
BASOPHILS NFR BLD: 0.6 % (ref 0–1.9)
BILIRUB SERPL-MCNC: 0.6 MG/DL (ref 0.1–1)
BUN SERPL-MCNC: 19 MG/DL (ref 8–23)
CALCIUM SERPL-MCNC: 9 MG/DL (ref 8.7–10.5)
CHLORIDE SERPL-SCNC: 108 MMOL/L (ref 95–110)
CHOLEST SERPL-MCNC: 144 MG/DL (ref 120–199)
CHOLEST/HDLC SERPL: 4.4 {RATIO} (ref 2–5)
CO2 SERPL-SCNC: 25 MMOL/L (ref 23–29)
CREAT SERPL-MCNC: 1 MG/DL (ref 0.5–1.4)
DIFFERENTIAL METHOD: ABNORMAL
EOSINOPHIL # BLD AUTO: 0.2 K/UL (ref 0–0.5)
EOSINOPHIL NFR BLD: 3.2 % (ref 0–8)
ERYTHROCYTE [DISTWIDTH] IN BLOOD BY AUTOMATED COUNT: 13 % (ref 11.5–14.5)
EST. GFR  (AFRICAN AMERICAN): >60 ML/MIN/1.73 M^2
EST. GFR  (NON AFRICAN AMERICAN): >60 ML/MIN/1.73 M^2
GLUCOSE SERPL-MCNC: 96 MG/DL (ref 70–110)
HCT VFR BLD AUTO: 42.9 % (ref 40–54)
HDLC SERPL-MCNC: 33 MG/DL (ref 40–75)
HDLC SERPL: 22.9 % (ref 20–50)
HGB BLD-MCNC: 14.4 G/DL (ref 14–18)
IMM GRANULOCYTES # BLD AUTO: 0.03 K/UL (ref 0–0.04)
IMM GRANULOCYTES NFR BLD AUTO: 0.6 % (ref 0–0.5)
INR PPP: 2 (ref 0.8–1.2)
LDLC SERPL CALC-MCNC: 89 MG/DL (ref 63–159)
LYMPHOCYTES # BLD AUTO: 1 K/UL (ref 1–4.8)
LYMPHOCYTES NFR BLD: 19.6 % (ref 18–48)
MCH RBC QN AUTO: 31.5 PG (ref 27–31)
MCHC RBC AUTO-ENTMCNC: 33.6 G/DL (ref 32–36)
MCV RBC AUTO: 94 FL (ref 82–98)
MONOCYTES # BLD AUTO: 0.5 K/UL (ref 0.3–1)
MONOCYTES NFR BLD: 10.3 % (ref 4–15)
NEUTROPHILS # BLD AUTO: 3.3 K/UL (ref 1.8–7.7)
NEUTROPHILS NFR BLD: 65.7 % (ref 38–73)
NONHDLC SERPL-MCNC: 111 MG/DL
NRBC BLD-RTO: 0 /100 WBC
PLATELET # BLD AUTO: 189 K/UL (ref 150–450)
PMV BLD AUTO: 9.8 FL (ref 9.2–12.9)
POTASSIUM SERPL-SCNC: 4.2 MMOL/L (ref 3.5–5.1)
PROT SERPL-MCNC: 6.6 G/DL (ref 6–8.4)
PROTHROMBIN TIME: 20.9 SEC (ref 9–12.5)
RBC # BLD AUTO: 4.57 M/UL (ref 4.6–6.2)
SODIUM SERPL-SCNC: 141 MMOL/L (ref 136–145)
T4 FREE SERPL-MCNC: 1.05 NG/DL (ref 0.71–1.51)
TRIGL SERPL-MCNC: 110 MG/DL (ref 30–150)
TSH SERPL DL<=0.005 MIU/L-ACNC: 2.18 UIU/ML (ref 0.4–4)
WBC # BLD AUTO: 5.06 K/UL (ref 3.9–12.7)

## 2021-07-16 PROCEDURE — 84439 ASSAY OF FREE THYROXINE: CPT | Performed by: NURSE PRACTITIONER

## 2021-07-16 PROCEDURE — 80061 LIPID PANEL: CPT | Performed by: NURSE PRACTITIONER

## 2021-07-16 PROCEDURE — 85610 PROTHROMBIN TIME: CPT | Performed by: NURSE PRACTITIONER

## 2021-07-16 PROCEDURE — 36415 COLL VENOUS BLD VENIPUNCTURE: CPT | Mod: PO | Performed by: NURSE PRACTITIONER

## 2021-07-16 PROCEDURE — 80053 COMPREHEN METABOLIC PANEL: CPT | Performed by: NURSE PRACTITIONER

## 2021-07-16 PROCEDURE — 85025 COMPLETE CBC W/AUTO DIFF WBC: CPT | Performed by: NURSE PRACTITIONER

## 2021-07-16 PROCEDURE — 84443 ASSAY THYROID STIM HORMONE: CPT | Performed by: NURSE PRACTITIONER

## 2021-07-19 ENCOUNTER — OFFICE VISIT (OUTPATIENT)
Dept: FAMILY MEDICINE | Facility: CLINIC | Age: 66
End: 2021-07-19
Payer: MEDICARE

## 2021-07-19 VITALS
TEMPERATURE: 98 F | SYSTOLIC BLOOD PRESSURE: 112 MMHG | OXYGEN SATURATION: 97 % | WEIGHT: 219.56 LBS | HEART RATE: 99 BPM | RESPIRATION RATE: 20 BRPM | DIASTOLIC BLOOD PRESSURE: 62 MMHG | BODY MASS INDEX: 32.43 KG/M2

## 2021-07-19 DIAGNOSIS — N52.9 ERECTILE DYSFUNCTION, UNSPECIFIED ERECTILE DYSFUNCTION TYPE: ICD-10-CM

## 2021-07-19 DIAGNOSIS — E78.5 HYPERLIPIDEMIA, UNSPECIFIED HYPERLIPIDEMIA TYPE: ICD-10-CM

## 2021-07-19 DIAGNOSIS — E03.9 HYPOTHYROIDISM, UNSPECIFIED TYPE: ICD-10-CM

## 2021-07-19 DIAGNOSIS — Z79.01 ANTICOAGULATED ON COUMADIN: Primary | ICD-10-CM

## 2021-07-19 DIAGNOSIS — I48.0 PAROXYSMAL ATRIAL FIBRILLATION: ICD-10-CM

## 2021-07-19 PROCEDURE — 99499 RISK ADDL DX/OHS AUDIT: ICD-10-PCS | Mod: S$GLB,,, | Performed by: INTERNAL MEDICINE

## 2021-07-19 PROCEDURE — 99214 OFFICE O/P EST MOD 30 MIN: CPT | Mod: S$GLB,,, | Performed by: INTERNAL MEDICINE

## 2021-07-19 PROCEDURE — 99214 PR OFFICE/OUTPT VISIT, EST, LEVL IV, 30-39 MIN: ICD-10-PCS | Mod: S$GLB,,, | Performed by: INTERNAL MEDICINE

## 2021-07-19 PROCEDURE — 3008F PR BODY MASS INDEX (BMI) DOCUMENTED: ICD-10-PCS | Mod: CPTII,S$GLB,, | Performed by: INTERNAL MEDICINE

## 2021-07-19 PROCEDURE — 3008F BODY MASS INDEX DOCD: CPT | Mod: CPTII,S$GLB,, | Performed by: INTERNAL MEDICINE

## 2021-07-19 PROCEDURE — 1159F MED LIST DOCD IN RCRD: CPT | Mod: CPTII,S$GLB,, | Performed by: INTERNAL MEDICINE

## 2021-07-19 PROCEDURE — 1159F PR MEDICATION LIST DOCUMENTED IN MEDICAL RECORD: ICD-10-PCS | Mod: CPTII,S$GLB,, | Performed by: INTERNAL MEDICINE

## 2021-07-19 PROCEDURE — 99499 UNLISTED E&M SERVICE: CPT | Mod: S$GLB,,, | Performed by: INTERNAL MEDICINE

## 2021-07-19 RX ORDER — TADALAFIL 5 MG/1
5 TABLET ORAL DAILY
Qty: 90 TABLET | Refills: 3 | Status: SHIPPED | OUTPATIENT
Start: 2021-07-19 | End: 2022-02-16 | Stop reason: ALTCHOICE

## 2021-07-19 RX ORDER — WARFARIN 2 MG/1
3 TABLET ORAL DAILY
Qty: 135 TABLET | Refills: 1 | Status: SHIPPED | OUTPATIENT
Start: 2021-07-19 | End: 2022-05-02

## 2021-08-20 ENCOUNTER — OFFICE VISIT (OUTPATIENT)
Dept: FAMILY MEDICINE | Facility: CLINIC | Age: 66
End: 2021-08-20
Payer: MEDICARE

## 2021-08-20 VITALS
TEMPERATURE: 97 F | WEIGHT: 222.69 LBS | RESPIRATION RATE: 18 BRPM | OXYGEN SATURATION: 97 % | HEART RATE: 95 BPM | DIASTOLIC BLOOD PRESSURE: 70 MMHG | SYSTOLIC BLOOD PRESSURE: 130 MMHG | BODY MASS INDEX: 32.98 KG/M2 | HEIGHT: 69 IN

## 2021-08-20 DIAGNOSIS — Z01.818 PREOPERATIVE CLEARANCE: ICD-10-CM

## 2021-08-20 DIAGNOSIS — I48.0 PAROXYSMAL ATRIAL FIBRILLATION: ICD-10-CM

## 2021-08-20 DIAGNOSIS — Z79.01 ANTICOAGULATED ON COUMADIN: Primary | ICD-10-CM

## 2021-08-20 LAB — INR PPP: 2.4 (ref 2–3)

## 2021-08-20 PROCEDURE — 1101F PR PT FALLS ASSESS DOC 0-1 FALLS W/OUT INJ PAST YR: ICD-10-PCS | Mod: CPTII,S$GLB,, | Performed by: INTERNAL MEDICINE

## 2021-08-20 PROCEDURE — 99499 RISK ADDL DX/OHS AUDIT: ICD-10-PCS | Mod: S$GLB,,, | Performed by: INTERNAL MEDICINE

## 2021-08-20 PROCEDURE — 3288F PR FALLS RISK ASSESSMENT DOCUMENTED: ICD-10-PCS | Mod: CPTII,S$GLB,, | Performed by: INTERNAL MEDICINE

## 2021-08-20 PROCEDURE — 3008F BODY MASS INDEX DOCD: CPT | Mod: CPTII,S$GLB,, | Performed by: INTERNAL MEDICINE

## 2021-08-20 PROCEDURE — 3075F PR MOST RECENT SYSTOLIC BLOOD PRESS GE 130-139MM HG: ICD-10-PCS | Mod: CPTII,S$GLB,, | Performed by: INTERNAL MEDICINE

## 2021-08-20 PROCEDURE — 1159F PR MEDICATION LIST DOCUMENTED IN MEDICAL RECORD: ICD-10-PCS | Mod: CPTII,S$GLB,, | Performed by: INTERNAL MEDICINE

## 2021-08-20 PROCEDURE — 85610 POCT INR: ICD-10-PCS | Mod: QW,,, | Performed by: INTERNAL MEDICINE

## 2021-08-20 PROCEDURE — 1125F PR PAIN SEVERITY QUANTIFIED, PAIN PRESENT: ICD-10-PCS | Mod: CPTII,S$GLB,, | Performed by: INTERNAL MEDICINE

## 2021-08-20 PROCEDURE — 1159F MED LIST DOCD IN RCRD: CPT | Mod: CPTII,S$GLB,, | Performed by: INTERNAL MEDICINE

## 2021-08-20 PROCEDURE — 3288F FALL RISK ASSESSMENT DOCD: CPT | Mod: CPTII,S$GLB,, | Performed by: INTERNAL MEDICINE

## 2021-08-20 PROCEDURE — 85610 PROTHROMBIN TIME: CPT | Mod: QW,,, | Performed by: INTERNAL MEDICINE

## 2021-08-20 PROCEDURE — 3078F DIAST BP <80 MM HG: CPT | Mod: CPTII,S$GLB,, | Performed by: INTERNAL MEDICINE

## 2021-08-20 PROCEDURE — 3078F PR MOST RECENT DIASTOLIC BLOOD PRESSURE < 80 MM HG: ICD-10-PCS | Mod: CPTII,S$GLB,, | Performed by: INTERNAL MEDICINE

## 2021-08-20 PROCEDURE — 3075F SYST BP GE 130 - 139MM HG: CPT | Mod: CPTII,S$GLB,, | Performed by: INTERNAL MEDICINE

## 2021-08-20 PROCEDURE — 99214 OFFICE O/P EST MOD 30 MIN: CPT | Mod: S$GLB,,, | Performed by: INTERNAL MEDICINE

## 2021-08-20 PROCEDURE — 99499 UNLISTED E&M SERVICE: CPT | Mod: S$GLB,,, | Performed by: INTERNAL MEDICINE

## 2021-08-20 PROCEDURE — 3008F PR BODY MASS INDEX (BMI) DOCUMENTED: ICD-10-PCS | Mod: CPTII,S$GLB,, | Performed by: INTERNAL MEDICINE

## 2021-08-20 PROCEDURE — 99214 PR OFFICE/OUTPT VISIT, EST, LEVL IV, 30-39 MIN: ICD-10-PCS | Mod: S$GLB,,, | Performed by: INTERNAL MEDICINE

## 2021-08-20 PROCEDURE — 1125F AMNT PAIN NOTED PAIN PRSNT: CPT | Mod: CPTII,S$GLB,, | Performed by: INTERNAL MEDICINE

## 2021-08-20 PROCEDURE — 1101F PT FALLS ASSESS-DOCD LE1/YR: CPT | Mod: CPTII,S$GLB,, | Performed by: INTERNAL MEDICINE

## 2021-09-09 ENCOUNTER — TELEPHONE (OUTPATIENT)
Dept: FAMILY MEDICINE | Facility: CLINIC | Age: 66
End: 2021-09-09

## 2021-09-09 ENCOUNTER — OFFICE VISIT (OUTPATIENT)
Dept: FAMILY MEDICINE | Facility: CLINIC | Age: 66
End: 2021-09-09
Payer: MEDICARE

## 2021-09-09 VITALS
TEMPERATURE: 99 F | BODY MASS INDEX: 32.98 KG/M2 | DIASTOLIC BLOOD PRESSURE: 68 MMHG | WEIGHT: 222.69 LBS | SYSTOLIC BLOOD PRESSURE: 138 MMHG | HEIGHT: 69 IN | HEART RATE: 89 BPM | OXYGEN SATURATION: 98 % | RESPIRATION RATE: 16 BRPM

## 2021-09-09 DIAGNOSIS — U07.1 COVID-19 VIRUS DETECTED: ICD-10-CM

## 2021-09-09 DIAGNOSIS — J20.8 ACUTE BRONCHITIS DUE TO COVID-19 VIRUS: ICD-10-CM

## 2021-09-09 DIAGNOSIS — Z11.59 ENCOUNTER FOR SCREENING FOR OTHER VIRAL DISEASES: ICD-10-CM

## 2021-09-09 DIAGNOSIS — U07.1 ACUTE BRONCHITIS DUE TO COVID-19 VIRUS: ICD-10-CM

## 2021-09-09 DIAGNOSIS — U07.1 COVID-19: Primary | ICD-10-CM

## 2021-09-09 LAB
CTP QC/QA: YES
SARS-COV-2 RDRP RESP QL NAA+PROBE: POSITIVE

## 2021-09-09 PROCEDURE — 3075F PR MOST RECENT SYSTOLIC BLOOD PRESS GE 130-139MM HG: ICD-10-PCS | Mod: CPTII,S$GLB,, | Performed by: NURSE PRACTITIONER

## 2021-09-09 PROCEDURE — 3008F PR BODY MASS INDEX (BMI) DOCUMENTED: ICD-10-PCS | Mod: CPTII,S$GLB,, | Performed by: NURSE PRACTITIONER

## 2021-09-09 PROCEDURE — 3288F FALL RISK ASSESSMENT DOCD: CPT | Mod: CPTII,S$GLB,, | Performed by: NURSE PRACTITIONER

## 2021-09-09 PROCEDURE — 3078F DIAST BP <80 MM HG: CPT | Mod: CPTII,S$GLB,, | Performed by: NURSE PRACTITIONER

## 2021-09-09 PROCEDURE — 3008F BODY MASS INDEX DOCD: CPT | Mod: CPTII,S$GLB,, | Performed by: NURSE PRACTITIONER

## 2021-09-09 PROCEDURE — 1125F PR PAIN SEVERITY QUANTIFIED, PAIN PRESENT: ICD-10-PCS | Mod: CPTII,S$GLB,, | Performed by: NURSE PRACTITIONER

## 2021-09-09 PROCEDURE — 99214 OFFICE O/P EST MOD 30 MIN: CPT | Mod: S$GLB,,, | Performed by: NURSE PRACTITIONER

## 2021-09-09 PROCEDURE — 99214 PR OFFICE/OUTPT VISIT, EST, LEVL IV, 30-39 MIN: ICD-10-PCS | Mod: S$GLB,,, | Performed by: NURSE PRACTITIONER

## 2021-09-09 PROCEDURE — 3075F SYST BP GE 130 - 139MM HG: CPT | Mod: CPTII,S$GLB,, | Performed by: NURSE PRACTITIONER

## 2021-09-09 PROCEDURE — 3288F PR FALLS RISK ASSESSMENT DOCUMENTED: ICD-10-PCS | Mod: CPTII,S$GLB,, | Performed by: NURSE PRACTITIONER

## 2021-09-09 PROCEDURE — 1101F PT FALLS ASSESS-DOCD LE1/YR: CPT | Mod: CPTII,S$GLB,, | Performed by: NURSE PRACTITIONER

## 2021-09-09 PROCEDURE — U0002 COVID-19 LAB TEST NON-CDC: HCPCS | Mod: QW,S$GLB,, | Performed by: NURSE PRACTITIONER

## 2021-09-09 PROCEDURE — 3078F PR MOST RECENT DIASTOLIC BLOOD PRESSURE < 80 MM HG: ICD-10-PCS | Mod: CPTII,S$GLB,, | Performed by: NURSE PRACTITIONER

## 2021-09-09 PROCEDURE — 1125F AMNT PAIN NOTED PAIN PRSNT: CPT | Mod: CPTII,S$GLB,, | Performed by: NURSE PRACTITIONER

## 2021-09-09 PROCEDURE — U0002: ICD-10-PCS | Mod: QW,S$GLB,, | Performed by: NURSE PRACTITIONER

## 2021-09-09 PROCEDURE — 1101F PR PT FALLS ASSESS DOC 0-1 FALLS W/OUT INJ PAST YR: ICD-10-PCS | Mod: CPTII,S$GLB,, | Performed by: NURSE PRACTITIONER

## 2021-09-09 RX ORDER — PROMETHAZINE HYDROCHLORIDE AND DEXTROMETHORPHAN HYDROBROMIDE 6.25; 15 MG/5ML; MG/5ML
5 SYRUP ORAL NIGHTLY
Qty: 120 ML | Refills: 0 | Status: SHIPPED | OUTPATIENT
Start: 2021-09-09 | End: 2021-09-19

## 2021-09-09 RX ORDER — OFLOXACIN 3 MG/ML
SOLUTION/ DROPS OPHTHALMIC
COMMUNITY
Start: 2021-09-07 | End: 2022-09-22

## 2021-09-09 RX ORDER — ZAFIRLUKAST 20 MG/1
TABLET, FILM COATED ORAL
COMMUNITY
Start: 2021-09-02 | End: 2022-04-29

## 2021-09-09 RX ORDER — PREDNISOLONE ACETATE 10 MG/ML
SUSPENSION/ DROPS OPHTHALMIC
COMMUNITY
Start: 2021-09-07 | End: 2022-09-22

## 2021-09-11 ENCOUNTER — INFUSION (OUTPATIENT)
Dept: INFECTIOUS DISEASES | Facility: HOSPITAL | Age: 66
End: 2021-09-11
Attending: NURSE PRACTITIONER
Payer: MEDICARE

## 2021-09-11 VITALS
WEIGHT: 222 LBS | OXYGEN SATURATION: 98 % | BODY MASS INDEX: 32.88 KG/M2 | SYSTOLIC BLOOD PRESSURE: 131 MMHG | TEMPERATURE: 98 F | HEART RATE: 83 BPM | HEIGHT: 69 IN | DIASTOLIC BLOOD PRESSURE: 81 MMHG | RESPIRATION RATE: 20 BRPM

## 2021-09-11 DIAGNOSIS — U07.1 COVID-19: ICD-10-CM

## 2021-09-11 PROCEDURE — M0243 CASIRIVI AND IMDEVI INFUSION: HCPCS | Performed by: NURSE PRACTITIONER

## 2021-09-11 PROCEDURE — 25000003 PHARM REV CODE 250: Performed by: NURSE PRACTITIONER

## 2021-09-11 PROCEDURE — 63600175 PHARM REV CODE 636 W HCPCS: Performed by: NURSE PRACTITIONER

## 2021-09-11 RX ORDER — DIPHENHYDRAMINE HYDROCHLORIDE 50 MG/ML
25 INJECTION INTRAMUSCULAR; INTRAVENOUS ONCE AS NEEDED
Status: DISCONTINUED | OUTPATIENT
Start: 2021-09-11 | End: 2021-12-08

## 2021-09-11 RX ORDER — EPINEPHRINE 0.3 MG/.3ML
0.3 INJECTION SUBCUTANEOUS
Status: DISCONTINUED | OUTPATIENT
Start: 2021-09-11 | End: 2021-12-08

## 2021-09-11 RX ORDER — SODIUM CHLORIDE 0.9 % (FLUSH) 0.9 %
10 SYRINGE (ML) INJECTION
Status: DISCONTINUED | OUTPATIENT
Start: 2021-09-11 | End: 2021-12-08

## 2021-09-11 RX ORDER — ALBUTEROL SULFATE 90 UG/1
2 AEROSOL, METERED RESPIRATORY (INHALATION)
Status: DISCONTINUED | OUTPATIENT
Start: 2021-09-11 | End: 2021-12-08

## 2021-09-11 RX ORDER — ACETAMINOPHEN 325 MG/1
650 TABLET ORAL ONCE AS NEEDED
Status: DISCONTINUED | OUTPATIENT
Start: 2021-09-11 | End: 2021-12-08

## 2021-09-11 RX ORDER — ONDANSETRON 4 MG/1
4 TABLET, ORALLY DISINTEGRATING ORAL ONCE AS NEEDED
Status: DISCONTINUED | OUTPATIENT
Start: 2021-09-11 | End: 2021-12-08

## 2021-09-11 RX ADMIN — CASIRIVIMAB AND IMDEVIMAB 600 MG: 600; 600 INJECTION, SOLUTION, CONCENTRATE INTRAVENOUS at 08:09

## 2021-09-11 RX ADMIN — SODIUM CHLORIDE: 0.9 INJECTION, SOLUTION INTRAVENOUS at 08:09

## 2021-09-20 ENCOUNTER — OFFICE VISIT (OUTPATIENT)
Dept: FAMILY MEDICINE | Facility: CLINIC | Age: 66
End: 2021-09-20
Payer: MEDICARE

## 2021-09-20 VITALS
WEIGHT: 223.13 LBS | TEMPERATURE: 99 F | SYSTOLIC BLOOD PRESSURE: 110 MMHG | HEART RATE: 89 BPM | DIASTOLIC BLOOD PRESSURE: 62 MMHG | OXYGEN SATURATION: 97 % | BODY MASS INDEX: 32.95 KG/M2

## 2021-09-20 DIAGNOSIS — I48.0 PAROXYSMAL ATRIAL FIBRILLATION: ICD-10-CM

## 2021-09-20 DIAGNOSIS — Z79.01 ANTICOAGULATED ON COUMADIN: Primary | ICD-10-CM

## 2021-09-20 LAB — INR PPP: 2.6 (ref 2–3)

## 2021-09-20 PROCEDURE — 85610 PROTHROMBIN TIME: CPT | Mod: QW,,, | Performed by: INTERNAL MEDICINE

## 2021-09-20 PROCEDURE — 3078F DIAST BP <80 MM HG: CPT | Mod: CPTII,S$GLB,, | Performed by: INTERNAL MEDICINE

## 2021-09-20 PROCEDURE — 1159F MED LIST DOCD IN RCRD: CPT | Mod: CPTII,S$GLB,, | Performed by: INTERNAL MEDICINE

## 2021-09-20 PROCEDURE — 3074F SYST BP LT 130 MM HG: CPT | Mod: CPTII,S$GLB,, | Performed by: INTERNAL MEDICINE

## 2021-09-20 PROCEDURE — 1101F PR PT FALLS ASSESS DOC 0-1 FALLS W/OUT INJ PAST YR: ICD-10-PCS | Mod: CPTII,S$GLB,, | Performed by: INTERNAL MEDICINE

## 2021-09-20 PROCEDURE — 3074F PR MOST RECENT SYSTOLIC BLOOD PRESSURE < 130 MM HG: ICD-10-PCS | Mod: CPTII,S$GLB,, | Performed by: INTERNAL MEDICINE

## 2021-09-20 PROCEDURE — 3078F PR MOST RECENT DIASTOLIC BLOOD PRESSURE < 80 MM HG: ICD-10-PCS | Mod: CPTII,S$GLB,, | Performed by: INTERNAL MEDICINE

## 2021-09-20 PROCEDURE — 99499 UNLISTED E&M SERVICE: CPT | Mod: S$GLB,,, | Performed by: INTERNAL MEDICINE

## 2021-09-20 PROCEDURE — 1101F PT FALLS ASSESS-DOCD LE1/YR: CPT | Mod: CPTII,S$GLB,, | Performed by: INTERNAL MEDICINE

## 2021-09-20 PROCEDURE — 3288F PR FALLS RISK ASSESSMENT DOCUMENTED: ICD-10-PCS | Mod: CPTII,S$GLB,, | Performed by: INTERNAL MEDICINE

## 2021-09-20 PROCEDURE — 3288F FALL RISK ASSESSMENT DOCD: CPT | Mod: CPTII,S$GLB,, | Performed by: INTERNAL MEDICINE

## 2021-09-20 PROCEDURE — 85610 POCT INR: ICD-10-PCS | Mod: QW,,, | Performed by: INTERNAL MEDICINE

## 2021-09-20 PROCEDURE — 99213 OFFICE O/P EST LOW 20 MIN: CPT | Mod: S$GLB,,, | Performed by: INTERNAL MEDICINE

## 2021-09-20 PROCEDURE — 3008F PR BODY MASS INDEX (BMI) DOCUMENTED: ICD-10-PCS | Mod: CPTII,S$GLB,, | Performed by: INTERNAL MEDICINE

## 2021-09-20 PROCEDURE — 1159F PR MEDICATION LIST DOCUMENTED IN MEDICAL RECORD: ICD-10-PCS | Mod: CPTII,S$GLB,, | Performed by: INTERNAL MEDICINE

## 2021-09-20 PROCEDURE — 99499 RISK ADDL DX/OHS AUDIT: ICD-10-PCS | Mod: S$GLB,,, | Performed by: INTERNAL MEDICINE

## 2021-09-20 PROCEDURE — 3008F BODY MASS INDEX DOCD: CPT | Mod: CPTII,S$GLB,, | Performed by: INTERNAL MEDICINE

## 2021-09-20 PROCEDURE — 99213 PR OFFICE/OUTPT VISIT, EST, LEVL III, 20-29 MIN: ICD-10-PCS | Mod: S$GLB,,, | Performed by: INTERNAL MEDICINE

## 2021-09-28 ENCOUNTER — PATIENT MESSAGE (OUTPATIENT)
Dept: FAMILY MEDICINE | Facility: CLINIC | Age: 66
End: 2021-09-28

## 2021-09-29 ENCOUNTER — TELEPHONE (OUTPATIENT)
Dept: FAMILY MEDICINE | Facility: CLINIC | Age: 66
End: 2021-09-29

## 2021-09-29 DIAGNOSIS — Z79.01 ANTICOAGULATED ON COUMADIN: ICD-10-CM

## 2021-09-29 RX ORDER — WARFARIN 1 MG/1
TABLET ORAL
Qty: 90 TABLET | Refills: 1 | Status: SHIPPED | OUTPATIENT
Start: 2021-09-29 | End: 2022-05-02

## 2021-09-30 ENCOUNTER — TELEPHONE (OUTPATIENT)
Dept: FAMILY MEDICINE | Facility: CLINIC | Age: 66
End: 2021-09-30

## 2021-10-01 ENCOUNTER — TELEPHONE (OUTPATIENT)
Dept: FAMILY MEDICINE | Facility: CLINIC | Age: 66
End: 2021-10-01

## 2021-10-04 ENCOUNTER — OFFICE VISIT (OUTPATIENT)
Dept: FAMILY MEDICINE | Facility: CLINIC | Age: 66
End: 2021-10-04
Payer: MEDICARE

## 2021-10-04 VITALS
WEIGHT: 222.25 LBS | BODY MASS INDEX: 32.92 KG/M2 | HEART RATE: 104 BPM | SYSTOLIC BLOOD PRESSURE: 110 MMHG | DIASTOLIC BLOOD PRESSURE: 60 MMHG | HEIGHT: 69 IN | OXYGEN SATURATION: 96 %

## 2021-10-04 DIAGNOSIS — R35.1 NOCTURIA: ICD-10-CM

## 2021-10-04 DIAGNOSIS — H26.9 CATARACT, UNSPECIFIED CATARACT TYPE, UNSPECIFIED LATERALITY: ICD-10-CM

## 2021-10-04 DIAGNOSIS — R33.9 URINARY RETENTION: Primary | ICD-10-CM

## 2021-10-04 PROCEDURE — 3074F SYST BP LT 130 MM HG: CPT | Mod: CPTII,S$GLB,, | Performed by: INTERNAL MEDICINE

## 2021-10-04 PROCEDURE — 3288F PR FALLS RISK ASSESSMENT DOCUMENTED: ICD-10-PCS | Mod: CPTII,S$GLB,, | Performed by: INTERNAL MEDICINE

## 2021-10-04 PROCEDURE — 3008F BODY MASS INDEX DOCD: CPT | Mod: CPTII,S$GLB,, | Performed by: INTERNAL MEDICINE

## 2021-10-04 PROCEDURE — 1126F AMNT PAIN NOTED NONE PRSNT: CPT | Mod: CPTII,S$GLB,, | Performed by: INTERNAL MEDICINE

## 2021-10-04 PROCEDURE — 1101F PR PT FALLS ASSESS DOC 0-1 FALLS W/OUT INJ PAST YR: ICD-10-PCS | Mod: CPTII,S$GLB,, | Performed by: INTERNAL MEDICINE

## 2021-10-04 PROCEDURE — 3008F PR BODY MASS INDEX (BMI) DOCUMENTED: ICD-10-PCS | Mod: CPTII,S$GLB,, | Performed by: INTERNAL MEDICINE

## 2021-10-04 PROCEDURE — 1159F MED LIST DOCD IN RCRD: CPT | Mod: CPTII,S$GLB,, | Performed by: INTERNAL MEDICINE

## 2021-10-04 PROCEDURE — 1101F PT FALLS ASSESS-DOCD LE1/YR: CPT | Mod: CPTII,S$GLB,, | Performed by: INTERNAL MEDICINE

## 2021-10-04 PROCEDURE — 1159F PR MEDICATION LIST DOCUMENTED IN MEDICAL RECORD: ICD-10-PCS | Mod: CPTII,S$GLB,, | Performed by: INTERNAL MEDICINE

## 2021-10-04 PROCEDURE — 99213 PR OFFICE/OUTPT VISIT, EST, LEVL III, 20-29 MIN: ICD-10-PCS | Mod: S$GLB,,, | Performed by: INTERNAL MEDICINE

## 2021-10-04 PROCEDURE — 3288F FALL RISK ASSESSMENT DOCD: CPT | Mod: CPTII,S$GLB,, | Performed by: INTERNAL MEDICINE

## 2021-10-04 PROCEDURE — 3074F PR MOST RECENT SYSTOLIC BLOOD PRESSURE < 130 MM HG: ICD-10-PCS | Mod: CPTII,S$GLB,, | Performed by: INTERNAL MEDICINE

## 2021-10-04 PROCEDURE — 3078F PR MOST RECENT DIASTOLIC BLOOD PRESSURE < 80 MM HG: ICD-10-PCS | Mod: CPTII,S$GLB,, | Performed by: INTERNAL MEDICINE

## 2021-10-04 PROCEDURE — 1126F PR PAIN SEVERITY QUANTIFIED, NO PAIN PRESENT: ICD-10-PCS | Mod: CPTII,S$GLB,, | Performed by: INTERNAL MEDICINE

## 2021-10-04 PROCEDURE — 3078F DIAST BP <80 MM HG: CPT | Mod: CPTII,S$GLB,, | Performed by: INTERNAL MEDICINE

## 2021-10-04 PROCEDURE — 99213 OFFICE O/P EST LOW 20 MIN: CPT | Mod: S$GLB,,, | Performed by: INTERNAL MEDICINE

## 2021-10-05 ENCOUNTER — PATIENT MESSAGE (OUTPATIENT)
Dept: FAMILY MEDICINE | Facility: CLINIC | Age: 66
End: 2021-10-05

## 2021-10-18 ENCOUNTER — TELEPHONE (OUTPATIENT)
Dept: FAMILY MEDICINE | Facility: CLINIC | Age: 66
End: 2021-10-18

## 2021-11-01 ENCOUNTER — TELEPHONE (OUTPATIENT)
Dept: FAMILY MEDICINE | Facility: CLINIC | Age: 66
End: 2021-11-01

## 2021-11-01 ENCOUNTER — OFFICE VISIT (OUTPATIENT)
Dept: FAMILY MEDICINE | Facility: CLINIC | Age: 66
End: 2021-11-01
Payer: MEDICARE

## 2021-11-01 ENCOUNTER — LAB VISIT (OUTPATIENT)
Dept: LAB | Facility: HOSPITAL | Age: 66
End: 2021-11-01
Attending: INTERNAL MEDICINE
Payer: MEDICARE

## 2021-11-01 VITALS
OXYGEN SATURATION: 98 % | TEMPERATURE: 97 F | DIASTOLIC BLOOD PRESSURE: 70 MMHG | BODY MASS INDEX: 33.69 KG/M2 | WEIGHT: 227.5 LBS | HEIGHT: 69 IN | SYSTOLIC BLOOD PRESSURE: 138 MMHG | HEART RATE: 95 BPM

## 2021-11-01 DIAGNOSIS — R33.9 URINARY RETENTION: Primary | ICD-10-CM

## 2021-11-01 DIAGNOSIS — Z79.01 ANTICOAGULATED ON COUMADIN: ICD-10-CM

## 2021-11-01 DIAGNOSIS — R33.9 URINARY RETENTION: ICD-10-CM

## 2021-11-01 DIAGNOSIS — I48.0 PAROXYSMAL ATRIAL FIBRILLATION: ICD-10-CM

## 2021-11-01 LAB
ALBUMIN SERPL BCP-MCNC: 4 G/DL (ref 3.5–5.2)
ALP SERPL-CCNC: 73 U/L (ref 55–135)
ALT SERPL W/O P-5'-P-CCNC: 18 U/L (ref 10–44)
ANION GAP SERPL CALC-SCNC: 9 MMOL/L (ref 8–16)
AST SERPL-CCNC: 21 U/L (ref 10–40)
BILIRUB SERPL-MCNC: 0.5 MG/DL (ref 0.1–1)
BUN SERPL-MCNC: 13 MG/DL (ref 8–23)
CALCIUM SERPL-MCNC: 9.6 MG/DL (ref 8.7–10.5)
CHLORIDE SERPL-SCNC: 102 MMOL/L (ref 95–110)
CO2 SERPL-SCNC: 29 MMOL/L (ref 23–29)
CREAT SERPL-MCNC: 1 MG/DL (ref 0.5–1.4)
EST. GFR  (AFRICAN AMERICAN): >60 ML/MIN/1.73 M^2
EST. GFR  (NON AFRICAN AMERICAN): >60 ML/MIN/1.73 M^2
GLUCOSE SERPL-MCNC: 88 MG/DL (ref 70–110)
INR PPP: 2.4 (ref 2–3)
POTASSIUM SERPL-SCNC: 4.3 MMOL/L (ref 3.5–5.1)
PROT SERPL-MCNC: 7.2 G/DL (ref 6–8.4)
SODIUM SERPL-SCNC: 140 MMOL/L (ref 136–145)

## 2021-11-01 PROCEDURE — 85610 PROTHROMBIN TIME: CPT | Mod: QW,,, | Performed by: INTERNAL MEDICINE

## 2021-11-01 PROCEDURE — 90662 FLU VACCINE - HIGH DOSE (65+) PRESERVATIVE FREE IM: ICD-10-PCS | Mod: S$GLB,,, | Performed by: INTERNAL MEDICINE

## 2021-11-01 PROCEDURE — 3075F PR MOST RECENT SYSTOLIC BLOOD PRESS GE 130-139MM HG: ICD-10-PCS | Mod: CPTII,S$GLB,, | Performed by: INTERNAL MEDICINE

## 2021-11-01 PROCEDURE — 3078F PR MOST RECENT DIASTOLIC BLOOD PRESSURE < 80 MM HG: ICD-10-PCS | Mod: CPTII,S$GLB,, | Performed by: INTERNAL MEDICINE

## 2021-11-01 PROCEDURE — 1101F PT FALLS ASSESS-DOCD LE1/YR: CPT | Mod: CPTII,S$GLB,, | Performed by: INTERNAL MEDICINE

## 2021-11-01 PROCEDURE — 99214 PR OFFICE/OUTPT VISIT, EST, LEVL IV, 30-39 MIN: ICD-10-PCS | Mod: S$GLB,,, | Performed by: INTERNAL MEDICINE

## 2021-11-01 PROCEDURE — 3075F SYST BP GE 130 - 139MM HG: CPT | Mod: CPTII,S$GLB,, | Performed by: INTERNAL MEDICINE

## 2021-11-01 PROCEDURE — G0008 FLU VACCINE - HIGH DOSE (65+) PRESERVATIVE FREE IM: ICD-10-PCS | Mod: S$GLB,,, | Performed by: INTERNAL MEDICINE

## 2021-11-01 PROCEDURE — 3288F FALL RISK ASSESSMENT DOCD: CPT | Mod: CPTII,S$GLB,, | Performed by: INTERNAL MEDICINE

## 2021-11-01 PROCEDURE — 1159F MED LIST DOCD IN RCRD: CPT | Mod: CPTII,S$GLB,, | Performed by: INTERNAL MEDICINE

## 2021-11-01 PROCEDURE — 85610 POCT INR: ICD-10-PCS | Mod: QW,,, | Performed by: INTERNAL MEDICINE

## 2021-11-01 PROCEDURE — 1125F AMNT PAIN NOTED PAIN PRSNT: CPT | Mod: CPTII,S$GLB,, | Performed by: INTERNAL MEDICINE

## 2021-11-01 PROCEDURE — 99214 OFFICE O/P EST MOD 30 MIN: CPT | Mod: S$GLB,,, | Performed by: INTERNAL MEDICINE

## 2021-11-01 PROCEDURE — 3078F DIAST BP <80 MM HG: CPT | Mod: CPTII,S$GLB,, | Performed by: INTERNAL MEDICINE

## 2021-11-01 PROCEDURE — 1159F PR MEDICATION LIST DOCUMENTED IN MEDICAL RECORD: ICD-10-PCS | Mod: CPTII,S$GLB,, | Performed by: INTERNAL MEDICINE

## 2021-11-01 PROCEDURE — 80053 COMPREHEN METABOLIC PANEL: CPT | Performed by: INTERNAL MEDICINE

## 2021-11-01 PROCEDURE — G0008 ADMIN INFLUENZA VIRUS VAC: HCPCS | Mod: S$GLB,,, | Performed by: INTERNAL MEDICINE

## 2021-11-01 PROCEDURE — 90662 IIV NO PRSV INCREASED AG IM: CPT | Mod: S$GLB,,, | Performed by: INTERNAL MEDICINE

## 2021-11-01 PROCEDURE — 3288F PR FALLS RISK ASSESSMENT DOCUMENTED: ICD-10-PCS | Mod: CPTII,S$GLB,, | Performed by: INTERNAL MEDICINE

## 2021-11-01 PROCEDURE — 1101F PR PT FALLS ASSESS DOC 0-1 FALLS W/OUT INJ PAST YR: ICD-10-PCS | Mod: CPTII,S$GLB,, | Performed by: INTERNAL MEDICINE

## 2021-11-01 PROCEDURE — 3008F BODY MASS INDEX DOCD: CPT | Mod: CPTII,S$GLB,, | Performed by: INTERNAL MEDICINE

## 2021-11-01 PROCEDURE — 3008F PR BODY MASS INDEX (BMI) DOCUMENTED: ICD-10-PCS | Mod: CPTII,S$GLB,, | Performed by: INTERNAL MEDICINE

## 2021-11-01 PROCEDURE — 1125F PR PAIN SEVERITY QUANTIFIED, PAIN PRESENT: ICD-10-PCS | Mod: CPTII,S$GLB,, | Performed by: INTERNAL MEDICINE

## 2021-11-01 PROCEDURE — 36415 COLL VENOUS BLD VENIPUNCTURE: CPT | Mod: PO | Performed by: INTERNAL MEDICINE

## 2021-11-05 ENCOUNTER — TELEPHONE (OUTPATIENT)
Dept: UROLOGY | Facility: CLINIC | Age: 66
End: 2021-11-05
Payer: MEDICARE

## 2021-11-14 ENCOUNTER — PATIENT OUTREACH (OUTPATIENT)
Dept: ADMINISTRATIVE | Facility: OTHER | Age: 66
End: 2021-11-14
Payer: MEDICARE

## 2021-11-15 ENCOUNTER — OFFICE VISIT (OUTPATIENT)
Dept: UROLOGY | Facility: CLINIC | Age: 66
End: 2021-11-15
Payer: MEDICARE

## 2021-11-15 VITALS
BODY MASS INDEX: 28.67 KG/M2 | RESPIRATION RATE: 18 BRPM | HEIGHT: 69 IN | HEART RATE: 94 BPM | SYSTOLIC BLOOD PRESSURE: 147 MMHG | WEIGHT: 193.56 LBS | DIASTOLIC BLOOD PRESSURE: 92 MMHG

## 2021-11-15 DIAGNOSIS — R35.0 BENIGN PROSTATIC HYPERPLASIA WITH URINARY FREQUENCY: Primary | ICD-10-CM

## 2021-11-15 DIAGNOSIS — N40.1 BENIGN PROSTATIC HYPERPLASIA WITH URINARY FREQUENCY: Primary | ICD-10-CM

## 2021-11-15 DIAGNOSIS — E78.5 HYPERLIPIDEMIA, UNSPECIFIED HYPERLIPIDEMIA TYPE: ICD-10-CM

## 2021-11-15 DIAGNOSIS — I42.0 DILATED CARDIOMYOPATHY: ICD-10-CM

## 2021-11-15 DIAGNOSIS — I48.0 PAROXYSMAL ATRIAL FIBRILLATION: ICD-10-CM

## 2021-11-15 DIAGNOSIS — R33.9 INCOMPLETE EMPTYING OF BLADDER: ICD-10-CM

## 2021-11-15 LAB
BILIRUB SERPL-MCNC: NORMAL MG/DL
BLOOD URINE, POC: NORMAL
CLARITY, POC UA: CLEAR
COLOR, POC UA: YELLOW
GLUCOSE UR QL STRIP: NORMAL
KETONES UR QL STRIP: NORMAL
LEUKOCYTE ESTERASE URINE, POC: NORMAL
NITRITE, POC UA: NORMAL
PH, POC UA: 6
POC RESIDUAL URINE VOLUME: 84 ML (ref 0–100)
PROTEIN, POC: NORMAL
SPECIFIC GRAVITY, POC UA: 1.01
UROBILINOGEN, POC UA: 0.2

## 2021-11-15 PROCEDURE — 51798 US URINE CAPACITY MEASURE: CPT | Mod: S$GLB,,, | Performed by: STUDENT IN AN ORGANIZED HEALTH CARE EDUCATION/TRAINING PROGRAM

## 2021-11-15 PROCEDURE — 3288F FALL RISK ASSESSMENT DOCD: CPT | Mod: CPTII,S$GLB,, | Performed by: STUDENT IN AN ORGANIZED HEALTH CARE EDUCATION/TRAINING PROGRAM

## 2021-11-15 PROCEDURE — 3080F PR MOST RECENT DIASTOLIC BLOOD PRESSURE >= 90 MM HG: ICD-10-PCS | Mod: CPTII,S$GLB,, | Performed by: STUDENT IN AN ORGANIZED HEALTH CARE EDUCATION/TRAINING PROGRAM

## 2021-11-15 PROCEDURE — 51798 POCT BLADDER SCAN: ICD-10-PCS | Mod: S$GLB,,, | Performed by: STUDENT IN AN ORGANIZED HEALTH CARE EDUCATION/TRAINING PROGRAM

## 2021-11-15 PROCEDURE — 3077F SYST BP >= 140 MM HG: CPT | Mod: CPTII,S$GLB,, | Performed by: STUDENT IN AN ORGANIZED HEALTH CARE EDUCATION/TRAINING PROGRAM

## 2021-11-15 PROCEDURE — 81002 URINALYSIS NONAUTO W/O SCOPE: CPT | Mod: S$GLB,,, | Performed by: STUDENT IN AN ORGANIZED HEALTH CARE EDUCATION/TRAINING PROGRAM

## 2021-11-15 PROCEDURE — 1126F AMNT PAIN NOTED NONE PRSNT: CPT | Mod: CPTII,S$GLB,, | Performed by: STUDENT IN AN ORGANIZED HEALTH CARE EDUCATION/TRAINING PROGRAM

## 2021-11-15 PROCEDURE — 1101F PR PT FALLS ASSESS DOC 0-1 FALLS W/OUT INJ PAST YR: ICD-10-PCS | Mod: CPTII,S$GLB,, | Performed by: STUDENT IN AN ORGANIZED HEALTH CARE EDUCATION/TRAINING PROGRAM

## 2021-11-15 PROCEDURE — 1101F PT FALLS ASSESS-DOCD LE1/YR: CPT | Mod: CPTII,S$GLB,, | Performed by: STUDENT IN AN ORGANIZED HEALTH CARE EDUCATION/TRAINING PROGRAM

## 2021-11-15 PROCEDURE — 1160F PR REVIEW ALL MEDS BY PRESCRIBER/CLIN PHARMACIST DOCUMENTED: ICD-10-PCS | Mod: CPTII,S$GLB,, | Performed by: STUDENT IN AN ORGANIZED HEALTH CARE EDUCATION/TRAINING PROGRAM

## 2021-11-15 PROCEDURE — 1159F MED LIST DOCD IN RCRD: CPT | Mod: CPTII,S$GLB,, | Performed by: STUDENT IN AN ORGANIZED HEALTH CARE EDUCATION/TRAINING PROGRAM

## 2021-11-15 PROCEDURE — 1159F PR MEDICATION LIST DOCUMENTED IN MEDICAL RECORD: ICD-10-PCS | Mod: CPTII,S$GLB,, | Performed by: STUDENT IN AN ORGANIZED HEALTH CARE EDUCATION/TRAINING PROGRAM

## 2021-11-15 PROCEDURE — 99214 PR OFFICE/OUTPT VISIT, EST, LEVL IV, 30-39 MIN: ICD-10-PCS | Mod: S$GLB,,, | Performed by: STUDENT IN AN ORGANIZED HEALTH CARE EDUCATION/TRAINING PROGRAM

## 2021-11-15 PROCEDURE — 3080F DIAST BP >= 90 MM HG: CPT | Mod: CPTII,S$GLB,, | Performed by: STUDENT IN AN ORGANIZED HEALTH CARE EDUCATION/TRAINING PROGRAM

## 2021-11-15 PROCEDURE — 81002 POCT URINE DIPSTICK WITHOUT MICROSCOPE: ICD-10-PCS | Mod: S$GLB,,, | Performed by: STUDENT IN AN ORGANIZED HEALTH CARE EDUCATION/TRAINING PROGRAM

## 2021-11-15 PROCEDURE — 3077F PR MOST RECENT SYSTOLIC BLOOD PRESSURE >= 140 MM HG: ICD-10-PCS | Mod: CPTII,S$GLB,, | Performed by: STUDENT IN AN ORGANIZED HEALTH CARE EDUCATION/TRAINING PROGRAM

## 2021-11-15 PROCEDURE — 99214 OFFICE O/P EST MOD 30 MIN: CPT | Mod: S$GLB,,, | Performed by: STUDENT IN AN ORGANIZED HEALTH CARE EDUCATION/TRAINING PROGRAM

## 2021-11-15 PROCEDURE — 3008F PR BODY MASS INDEX (BMI) DOCUMENTED: ICD-10-PCS | Mod: CPTII,S$GLB,, | Performed by: STUDENT IN AN ORGANIZED HEALTH CARE EDUCATION/TRAINING PROGRAM

## 2021-11-15 PROCEDURE — 99999 PR PBB SHADOW E&M-EST. PATIENT-LVL V: CPT | Mod: PBBFAC,,, | Performed by: STUDENT IN AN ORGANIZED HEALTH CARE EDUCATION/TRAINING PROGRAM

## 2021-11-15 PROCEDURE — 3288F PR FALLS RISK ASSESSMENT DOCUMENTED: ICD-10-PCS | Mod: CPTII,S$GLB,, | Performed by: STUDENT IN AN ORGANIZED HEALTH CARE EDUCATION/TRAINING PROGRAM

## 2021-11-15 PROCEDURE — 1160F RVW MEDS BY RX/DR IN RCRD: CPT | Mod: CPTII,S$GLB,, | Performed by: STUDENT IN AN ORGANIZED HEALTH CARE EDUCATION/TRAINING PROGRAM

## 2021-11-15 PROCEDURE — 1126F PR PAIN SEVERITY QUANTIFIED, NO PAIN PRESENT: ICD-10-PCS | Mod: CPTII,S$GLB,, | Performed by: STUDENT IN AN ORGANIZED HEALTH CARE EDUCATION/TRAINING PROGRAM

## 2021-11-15 PROCEDURE — 3008F BODY MASS INDEX DOCD: CPT | Mod: CPTII,S$GLB,, | Performed by: STUDENT IN AN ORGANIZED HEALTH CARE EDUCATION/TRAINING PROGRAM

## 2021-11-15 PROCEDURE — 99999 PR PBB SHADOW E&M-EST. PATIENT-LVL V: ICD-10-PCS | Mod: PBBFAC,,, | Performed by: STUDENT IN AN ORGANIZED HEALTH CARE EDUCATION/TRAINING PROGRAM

## 2021-11-22 ENCOUNTER — PATIENT MESSAGE (OUTPATIENT)
Dept: SURGERY | Facility: AMBULARY SURGERY CENTER | Age: 66
End: 2021-11-22
Payer: MEDICARE

## 2021-11-22 RX ORDER — DIAZEPAM 5 MG/1
5 TABLET ORAL ONCE
Qty: 1 TABLET | Refills: 0 | Status: SHIPPED | OUTPATIENT
Start: 2021-11-22 | End: 2021-12-27

## 2021-11-23 ENCOUNTER — HOSPITAL ENCOUNTER (OUTPATIENT)
Dept: RADIOLOGY | Facility: HOSPITAL | Age: 66
Discharge: HOME | End: 2021-11-23
Attending: STUDENT IN AN ORGANIZED HEALTH CARE EDUCATION/TRAINING PROGRAM
Payer: MEDICARE

## 2021-11-23 DIAGNOSIS — R33.9 INCOMPLETE EMPTYING OF BLADDER: ICD-10-CM

## 2021-11-23 PROCEDURE — 76770 US RETROPERITONEAL COMPLETE: ICD-10-PCS | Mod: 26,,, | Performed by: RADIOLOGY

## 2021-11-23 PROCEDURE — 76770 US EXAM ABDO BACK WALL COMP: CPT | Mod: 26,,, | Performed by: RADIOLOGY

## 2021-11-23 PROCEDURE — 76770 US EXAM ABDO BACK WALL COMP: CPT | Mod: TC

## 2021-12-01 ENCOUNTER — OFFICE VISIT (OUTPATIENT)
Dept: FAMILY MEDICINE | Facility: CLINIC | Age: 66
End: 2021-12-01
Payer: MEDICARE

## 2021-12-01 VITALS
BODY MASS INDEX: 34.54 KG/M2 | TEMPERATURE: 98 F | SYSTOLIC BLOOD PRESSURE: 110 MMHG | RESPIRATION RATE: 18 BRPM | DIASTOLIC BLOOD PRESSURE: 64 MMHG | OXYGEN SATURATION: 95 % | WEIGHT: 233.94 LBS | HEART RATE: 83 BPM

## 2021-12-01 DIAGNOSIS — Z79.01 ANTICOAGULATED ON COUMADIN: Primary | ICD-10-CM

## 2021-12-01 DIAGNOSIS — R33.9 URINARY RETENTION: ICD-10-CM

## 2021-12-01 DIAGNOSIS — I48.0 PAROXYSMAL ATRIAL FIBRILLATION: ICD-10-CM

## 2021-12-01 PROCEDURE — 99214 PR OFFICE/OUTPT VISIT, EST, LEVL IV, 30-39 MIN: ICD-10-PCS | Mod: S$GLB,,, | Performed by: INTERNAL MEDICINE

## 2021-12-01 PROCEDURE — 99214 OFFICE O/P EST MOD 30 MIN: CPT | Mod: S$GLB,,, | Performed by: INTERNAL MEDICINE

## 2021-12-08 ENCOUNTER — HOSPITAL ENCOUNTER (OUTPATIENT)
Facility: AMBULARY SURGERY CENTER | Age: 66
Discharge: HOME | End: 2021-12-08
Attending: STUDENT IN AN ORGANIZED HEALTH CARE EDUCATION/TRAINING PROGRAM | Admitting: STUDENT IN AN ORGANIZED HEALTH CARE EDUCATION/TRAINING PROGRAM
Payer: MEDICARE

## 2021-12-08 DIAGNOSIS — E78.5 HYPERLIPIDEMIA, UNSPECIFIED HYPERLIPIDEMIA TYPE: ICD-10-CM

## 2021-12-08 DIAGNOSIS — N40.1 BENIGN PROSTATIC HYPERPLASIA WITH URINARY OBSTRUCTION: Primary | ICD-10-CM

## 2021-12-08 DIAGNOSIS — N40.1 BENIGN LOCALIZED PROSTATIC HYPERPLASIA WITH LOWER URINARY TRACT SYMPTOMS (LUTS): ICD-10-CM

## 2021-12-08 DIAGNOSIS — N13.8 BENIGN PROSTATIC HYPERPLASIA WITH URINARY OBSTRUCTION: Primary | ICD-10-CM

## 2021-12-08 PROCEDURE — 52000 CYSTOURETHROSCOPY: CPT | Performed by: STUDENT IN AN ORGANIZED HEALTH CARE EDUCATION/TRAINING PROGRAM

## 2021-12-08 PROCEDURE — 52000 PR CYSTOURETHROSCOPY: ICD-10-PCS | Mod: ,,, | Performed by: STUDENT IN AN ORGANIZED HEALTH CARE EDUCATION/TRAINING PROGRAM

## 2021-12-08 PROCEDURE — 52000 CYSTOURETHROSCOPY: CPT | Mod: ,,, | Performed by: STUDENT IN AN ORGANIZED HEALTH CARE EDUCATION/TRAINING PROGRAM

## 2021-12-08 RX ORDER — LIDOCAINE HYDROCHLORIDE 20 MG/ML
JELLY TOPICAL
Status: DISCONTINUED | OUTPATIENT
Start: 2021-12-08 | End: 2021-12-08 | Stop reason: HOSPADM

## 2021-12-08 RX ORDER — LIDOCAINE HYDROCHLORIDE 20 MG/ML
JELLY TOPICAL
Status: DISCONTINUED
Start: 2021-12-08 | End: 2021-12-08 | Stop reason: HOSPADM

## 2021-12-08 RX ORDER — SULFAMETHOXAZOLE AND TRIMETHOPRIM 800; 160 MG/1; MG/1
1 TABLET ORAL 2 TIMES DAILY
Qty: 2 TABLET | Refills: 0 | Status: SHIPPED | OUTPATIENT
Start: 2021-12-08 | End: 2021-12-09

## 2021-12-08 RX ORDER — SIMVASTATIN 40 MG/1
40 TABLET, FILM COATED ORAL NIGHTLY
Qty: 30 TABLET | Refills: 11 | Status: SHIPPED | OUTPATIENT
Start: 2021-12-08 | End: 2022-04-29

## 2021-12-13 VITALS
DIASTOLIC BLOOD PRESSURE: 87 MMHG | HEART RATE: 85 BPM | BODY MASS INDEX: 34.54 KG/M2 | OXYGEN SATURATION: 100 % | TEMPERATURE: 98 F | SYSTOLIC BLOOD PRESSURE: 142 MMHG | RESPIRATION RATE: 20 BRPM | WEIGHT: 233.94 LBS

## 2021-12-16 ENCOUNTER — PATIENT MESSAGE (OUTPATIENT)
Dept: UROLOGY | Facility: CLINIC | Age: 66
End: 2021-12-16
Payer: MEDICARE

## 2021-12-27 ENCOUNTER — OFFICE VISIT (OUTPATIENT)
Dept: FAMILY MEDICINE | Facility: CLINIC | Age: 66
End: 2021-12-27
Payer: MEDICARE

## 2021-12-27 VITALS
RESPIRATION RATE: 18 BRPM | OXYGEN SATURATION: 97 % | BODY MASS INDEX: 34.12 KG/M2 | DIASTOLIC BLOOD PRESSURE: 80 MMHG | HEIGHT: 69 IN | HEART RATE: 101 BPM | SYSTOLIC BLOOD PRESSURE: 136 MMHG | WEIGHT: 230.38 LBS | TEMPERATURE: 98 F

## 2021-12-27 DIAGNOSIS — I42.0 DILATED CARDIOMYOPATHY: ICD-10-CM

## 2021-12-27 DIAGNOSIS — Z00.00 ANNUAL PHYSICAL EXAM: Primary | ICD-10-CM

## 2021-12-27 DIAGNOSIS — Z79.01 ANTICOAGULATED ON COUMADIN: ICD-10-CM

## 2021-12-27 DIAGNOSIS — I48.0 PAROXYSMAL ATRIAL FIBRILLATION: ICD-10-CM

## 2021-12-27 DIAGNOSIS — Z01.818 PREOPERATIVE CLEARANCE: ICD-10-CM

## 2021-12-27 PROCEDURE — 1101F PT FALLS ASSESS-DOCD LE1/YR: CPT | Mod: CPTII,S$GLB,, | Performed by: INTERNAL MEDICINE

## 2021-12-27 PROCEDURE — 99213 PR OFFICE/OUTPT VISIT, EST, LEVL III, 20-29 MIN: ICD-10-PCS | Mod: S$GLB,,, | Performed by: INTERNAL MEDICINE

## 2021-12-27 PROCEDURE — 3079F PR MOST RECENT DIASTOLIC BLOOD PRESSURE 80-89 MM HG: ICD-10-PCS | Mod: CPTII,S$GLB,, | Performed by: INTERNAL MEDICINE

## 2021-12-27 PROCEDURE — 1126F AMNT PAIN NOTED NONE PRSNT: CPT | Mod: CPTII,S$GLB,, | Performed by: INTERNAL MEDICINE

## 2021-12-27 PROCEDURE — 3079F DIAST BP 80-89 MM HG: CPT | Mod: CPTII,S$GLB,, | Performed by: INTERNAL MEDICINE

## 2021-12-27 PROCEDURE — 1159F MED LIST DOCD IN RCRD: CPT | Mod: CPTII,S$GLB,, | Performed by: INTERNAL MEDICINE

## 2021-12-27 PROCEDURE — 1101F PR PT FALLS ASSESS DOC 0-1 FALLS W/OUT INJ PAST YR: ICD-10-PCS | Mod: CPTII,S$GLB,, | Performed by: INTERNAL MEDICINE

## 2021-12-27 PROCEDURE — 3288F FALL RISK ASSESSMENT DOCD: CPT | Mod: CPTII,S$GLB,, | Performed by: INTERNAL MEDICINE

## 2021-12-27 PROCEDURE — 3008F BODY MASS INDEX DOCD: CPT | Mod: CPTII,S$GLB,, | Performed by: INTERNAL MEDICINE

## 2021-12-27 PROCEDURE — 3075F SYST BP GE 130 - 139MM HG: CPT | Mod: CPTII,S$GLB,, | Performed by: INTERNAL MEDICINE

## 2021-12-27 PROCEDURE — 1126F PR PAIN SEVERITY QUANTIFIED, NO PAIN PRESENT: ICD-10-PCS | Mod: CPTII,S$GLB,, | Performed by: INTERNAL MEDICINE

## 2021-12-27 PROCEDURE — 1159F PR MEDICATION LIST DOCUMENTED IN MEDICAL RECORD: ICD-10-PCS | Mod: CPTII,S$GLB,, | Performed by: INTERNAL MEDICINE

## 2021-12-27 PROCEDURE — 3288F PR FALLS RISK ASSESSMENT DOCUMENTED: ICD-10-PCS | Mod: CPTII,S$GLB,, | Performed by: INTERNAL MEDICINE

## 2021-12-27 PROCEDURE — 3075F PR MOST RECENT SYSTOLIC BLOOD PRESS GE 130-139MM HG: ICD-10-PCS | Mod: CPTII,S$GLB,, | Performed by: INTERNAL MEDICINE

## 2021-12-27 PROCEDURE — 3008F PR BODY MASS INDEX (BMI) DOCUMENTED: ICD-10-PCS | Mod: CPTII,S$GLB,, | Performed by: INTERNAL MEDICINE

## 2021-12-27 PROCEDURE — 99213 OFFICE O/P EST LOW 20 MIN: CPT | Mod: S$GLB,,, | Performed by: INTERNAL MEDICINE

## 2021-12-28 ENCOUNTER — PATIENT MESSAGE (OUTPATIENT)
Dept: FAMILY MEDICINE | Facility: CLINIC | Age: 66
End: 2021-12-28
Payer: MEDICARE

## 2021-12-29 ENCOUNTER — TELEPHONE (OUTPATIENT)
Dept: UROLOGY | Facility: CLINIC | Age: 66
End: 2021-12-29

## 2021-12-29 ENCOUNTER — PATIENT MESSAGE (OUTPATIENT)
Dept: SURGERY | Facility: HOSPITAL | Age: 66
End: 2021-12-29
Payer: MEDICARE

## 2021-12-29 ENCOUNTER — CLINICAL SUPPORT (OUTPATIENT)
Dept: UROLOGY | Facility: CLINIC | Age: 66
End: 2021-12-29
Payer: MEDICARE

## 2021-12-29 ENCOUNTER — PATIENT MESSAGE (OUTPATIENT)
Dept: UROLOGY | Facility: CLINIC | Age: 66
End: 2021-12-29

## 2021-12-29 ENCOUNTER — TELEPHONE (OUTPATIENT)
Dept: UROLOGY | Facility: CLINIC | Age: 66
End: 2021-12-29
Payer: MEDICARE

## 2021-12-29 DIAGNOSIS — R82.998 CELLS AND CASTS IN URINE: Primary | ICD-10-CM

## 2021-12-29 DIAGNOSIS — N40.1 BENIGN PROSTATIC HYPERPLASIA WITH URINARY FREQUENCY: Primary | ICD-10-CM

## 2021-12-29 DIAGNOSIS — N40.1 BENIGN PROSTATIC HYPERPLASIA WITH URINARY FREQUENCY: ICD-10-CM

## 2021-12-29 DIAGNOSIS — R33.9 INCOMPLETE EMPTYING OF BLADDER: ICD-10-CM

## 2021-12-29 DIAGNOSIS — R35.0 BENIGN PROSTATIC HYPERPLASIA WITH URINARY FREQUENCY: ICD-10-CM

## 2021-12-29 DIAGNOSIS — R35.0 BENIGN PROSTATIC HYPERPLASIA WITH URINARY FREQUENCY: Primary | ICD-10-CM

## 2021-12-29 PROCEDURE — 99499 NO LOS: ICD-10-PCS | Mod: S$GLB,,, | Performed by: STUDENT IN AN ORGANIZED HEALTH CARE EDUCATION/TRAINING PROGRAM

## 2021-12-29 PROCEDURE — 87086 URINE CULTURE/COLONY COUNT: CPT | Performed by: STUDENT IN AN ORGANIZED HEALTH CARE EDUCATION/TRAINING PROGRAM

## 2021-12-29 PROCEDURE — 99499 UNLISTED E&M SERVICE: CPT | Mod: S$GLB,,, | Performed by: STUDENT IN AN ORGANIZED HEALTH CARE EDUCATION/TRAINING PROGRAM

## 2021-12-31 LAB — BACTERIA UR CULT: NO GROWTH

## 2022-01-04 ENCOUNTER — LAB VISIT (OUTPATIENT)
Dept: PRIMARY CARE CLINIC | Facility: CLINIC | Age: 67
End: 2022-01-04
Payer: MEDICARE

## 2022-01-04 DIAGNOSIS — Z01.818 PREOP TESTING: ICD-10-CM

## 2022-01-04 PROCEDURE — U0005 INFEC AGEN DETEC AMPLI PROBE: HCPCS | Performed by: STUDENT IN AN ORGANIZED HEALTH CARE EDUCATION/TRAINING PROGRAM

## 2022-01-04 PROCEDURE — U0003 INFECTIOUS AGENT DETECTION BY NUCLEIC ACID (DNA OR RNA); SEVERE ACUTE RESPIRATORY SYNDROME CORONAVIRUS 2 (SARS-COV-2) (CORONAVIRUS DISEASE [COVID-19]), AMPLIFIED PROBE TECHNIQUE, MAKING USE OF HIGH THROUGHPUT TECHNOLOGIES AS DESCRIBED BY CMS-2020-01-R: HCPCS | Performed by: STUDENT IN AN ORGANIZED HEALTH CARE EDUCATION/TRAINING PROGRAM

## 2022-01-05 LAB — SARS-COV-2 RNA RESP QL NAA+PROBE: NOT DETECTED

## 2022-01-05 NOTE — PRE-PROCEDURE INSTRUCTIONS
Pre-op phone call made to pt.  All medications reviewed and  pre-procedure  instructions given to pt.  Pt verbalized understanding. Info also put on [employer]

## 2022-01-06 ENCOUNTER — ANESTHESIA EVENT (OUTPATIENT)
Dept: SURGERY | Facility: HOSPITAL | Age: 67
End: 2022-01-06
Payer: MEDICARE

## 2022-01-07 ENCOUNTER — ANESTHESIA (OUTPATIENT)
Dept: SURGERY | Facility: HOSPITAL | Age: 67
End: 2022-01-07
Payer: MEDICARE

## 2022-01-07 ENCOUNTER — HOSPITAL ENCOUNTER (OUTPATIENT)
Facility: HOSPITAL | Age: 67
Discharge: HOME | End: 2022-01-07
Attending: STUDENT IN AN ORGANIZED HEALTH CARE EDUCATION/TRAINING PROGRAM | Admitting: STUDENT IN AN ORGANIZED HEALTH CARE EDUCATION/TRAINING PROGRAM
Payer: MEDICARE

## 2022-01-07 DIAGNOSIS — N13.8 BENIGN PROSTATIC HYPERPLASIA WITH URINARY OBSTRUCTION: Primary | ICD-10-CM

## 2022-01-07 DIAGNOSIS — Z01.818 PREOP EXAMINATION: ICD-10-CM

## 2022-01-07 DIAGNOSIS — N13.8 BPH WITH OBSTRUCTION/LOWER URINARY TRACT SYMPTOMS: ICD-10-CM

## 2022-01-07 DIAGNOSIS — N40.1 BPH WITH OBSTRUCTION/LOWER URINARY TRACT SYMPTOMS: ICD-10-CM

## 2022-01-07 DIAGNOSIS — N40.1 BENIGN PROSTATIC HYPERPLASIA WITH URINARY OBSTRUCTION: Primary | ICD-10-CM

## 2022-01-07 PROBLEM — R35.0 BENIGN PROSTATIC HYPERPLASIA WITH URINARY FREQUENCY: Status: ACTIVE | Noted: 2022-01-07

## 2022-01-07 LAB
ANION GAP SERPL CALC-SCNC: 8 MMOL/L (ref 8–16)
BASOPHILS # BLD AUTO: 0.05 K/UL (ref 0–0.2)
BASOPHILS NFR BLD: 0.8 % (ref 0–1.9)
BUN SERPL-MCNC: 13 MG/DL (ref 8–23)
CALCIUM SERPL-MCNC: 9.5 MG/DL (ref 8.7–10.5)
CHLORIDE SERPL-SCNC: 104 MMOL/L (ref 95–110)
CO2 SERPL-SCNC: 29 MMOL/L (ref 23–29)
CREAT SERPL-MCNC: 1.1 MG/DL (ref 0.5–1.4)
DIFFERENTIAL METHOD: ABNORMAL
EOSINOPHIL # BLD AUTO: 0.2 K/UL (ref 0–0.5)
EOSINOPHIL NFR BLD: 3.6 % (ref 0–8)
ERYTHROCYTE [DISTWIDTH] IN BLOOD BY AUTOMATED COUNT: 13 % (ref 11.5–14.5)
EST. GFR  (AFRICAN AMERICAN): >60 ML/MIN/1.73 M^2
EST. GFR  (NON AFRICAN AMERICAN): >60 ML/MIN/1.73 M^2
GLUCOSE SERPL-MCNC: 96 MG/DL (ref 70–110)
HCT VFR BLD AUTO: 45.8 % (ref 40–54)
HGB BLD-MCNC: 15.3 G/DL (ref 14–18)
IMM GRANULOCYTES # BLD AUTO: 0.05 K/UL (ref 0–0.04)
IMM GRANULOCYTES NFR BLD AUTO: 0.8 % (ref 0–0.5)
INR PPP: 1 (ref 0.8–1.2)
LYMPHOCYTES # BLD AUTO: 1.6 K/UL (ref 1–4.8)
LYMPHOCYTES NFR BLD: 26.9 % (ref 18–48)
MCH RBC QN AUTO: 31.9 PG (ref 27–31)
MCHC RBC AUTO-ENTMCNC: 33.4 G/DL (ref 32–36)
MCV RBC AUTO: 95 FL (ref 82–98)
MONOCYTES # BLD AUTO: 0.7 K/UL (ref 0.3–1)
MONOCYTES NFR BLD: 12.2 % (ref 4–15)
NEUTROPHILS # BLD AUTO: 3.4 K/UL (ref 1.8–7.7)
NEUTROPHILS NFR BLD: 55.7 % (ref 38–73)
NRBC BLD-RTO: 0 /100 WBC
PLATELET # BLD AUTO: 207 K/UL (ref 150–450)
PMV BLD AUTO: 9.5 FL (ref 9.2–12.9)
POTASSIUM SERPL-SCNC: 3.9 MMOL/L (ref 3.5–5.1)
PROTHROMBIN TIME: 10.6 SEC (ref 9–12.5)
RBC # BLD AUTO: 4.8 M/UL (ref 4.6–6.2)
SODIUM SERPL-SCNC: 141 MMOL/L (ref 136–145)
WBC # BLD AUTO: 6.06 K/UL (ref 3.9–12.7)

## 2022-01-07 PROCEDURE — D9220A PRA ANESTHESIA: ICD-10-PCS | Mod: CRNA,,, | Performed by: NURSE ANESTHETIST, CERTIFIED REGISTERED

## 2022-01-07 PROCEDURE — 85610 PROTHROMBIN TIME: CPT | Performed by: STUDENT IN AN ORGANIZED HEALTH CARE EDUCATION/TRAINING PROGRAM

## 2022-01-07 PROCEDURE — 88305 TISSUE EXAM BY PATHOLOGIST: ICD-10-PCS | Mod: 26,,, | Performed by: PATHOLOGY

## 2022-01-07 PROCEDURE — 36000706: Performed by: STUDENT IN AN ORGANIZED HEALTH CARE EDUCATION/TRAINING PROGRAM

## 2022-01-07 PROCEDURE — 80048 BASIC METABOLIC PNL TOTAL CA: CPT | Performed by: ANESTHESIOLOGY

## 2022-01-07 PROCEDURE — 93010 EKG 12-LEAD: ICD-10-PCS | Mod: ,,, | Performed by: INTERNAL MEDICINE

## 2022-01-07 PROCEDURE — 37000008 HC ANESTHESIA 1ST 15 MINUTES: Performed by: STUDENT IN AN ORGANIZED HEALTH CARE EDUCATION/TRAINING PROGRAM

## 2022-01-07 PROCEDURE — 36000707: Performed by: STUDENT IN AN ORGANIZED HEALTH CARE EDUCATION/TRAINING PROGRAM

## 2022-01-07 PROCEDURE — 37000009 HC ANESTHESIA EA ADD 15 MINS: Performed by: STUDENT IN AN ORGANIZED HEALTH CARE EDUCATION/TRAINING PROGRAM

## 2022-01-07 PROCEDURE — D9220A PRA ANESTHESIA: Mod: ANES,,, | Performed by: ANESTHESIOLOGY

## 2022-01-07 PROCEDURE — 63600175 PHARM REV CODE 636 W HCPCS: Performed by: ANESTHESIOLOGY

## 2022-01-07 PROCEDURE — D9220A PRA ANESTHESIA: ICD-10-PCS | Mod: ANES,,, | Performed by: ANESTHESIOLOGY

## 2022-01-07 PROCEDURE — 88305 TISSUE EXAM BY PATHOLOGIST: CPT | Mod: 26,,, | Performed by: PATHOLOGY

## 2022-01-07 PROCEDURE — 27201423 OPTIME MED/SURG SUP & DEVICES STERILE SUPPLY: Performed by: STUDENT IN AN ORGANIZED HEALTH CARE EDUCATION/TRAINING PROGRAM

## 2022-01-07 PROCEDURE — 52630 PR REMV RESID OBSTRUC PROSTATE,>1 YR: ICD-10-PCS | Mod: ,,, | Performed by: STUDENT IN AN ORGANIZED HEALTH CARE EDUCATION/TRAINING PROGRAM

## 2022-01-07 PROCEDURE — D9220A PRA ANESTHESIA: Mod: CRNA,,, | Performed by: NURSE ANESTHETIST, CERTIFIED REGISTERED

## 2022-01-07 PROCEDURE — 63600175 PHARM REV CODE 636 W HCPCS: Performed by: NURSE ANESTHETIST, CERTIFIED REGISTERED

## 2022-01-07 PROCEDURE — 93005 ELECTROCARDIOGRAM TRACING: CPT

## 2022-01-07 PROCEDURE — 25000003 PHARM REV CODE 250: Performed by: ANESTHESIOLOGY

## 2022-01-07 PROCEDURE — 63600175 PHARM REV CODE 636 W HCPCS: Performed by: STUDENT IN AN ORGANIZED HEALTH CARE EDUCATION/TRAINING PROGRAM

## 2022-01-07 PROCEDURE — 85025 COMPLETE CBC W/AUTO DIFF WBC: CPT | Performed by: ANESTHESIOLOGY

## 2022-01-07 PROCEDURE — 71000015 HC POSTOP RECOV 1ST HR: Performed by: STUDENT IN AN ORGANIZED HEALTH CARE EDUCATION/TRAINING PROGRAM

## 2022-01-07 PROCEDURE — 36415 COLL VENOUS BLD VENIPUNCTURE: CPT | Performed by: ANESTHESIOLOGY

## 2022-01-07 PROCEDURE — 36415 COLL VENOUS BLD VENIPUNCTURE: CPT | Performed by: STUDENT IN AN ORGANIZED HEALTH CARE EDUCATION/TRAINING PROGRAM

## 2022-01-07 PROCEDURE — 71000033 HC RECOVERY, INTIAL HOUR: Performed by: STUDENT IN AN ORGANIZED HEALTH CARE EDUCATION/TRAINING PROGRAM

## 2022-01-07 PROCEDURE — 93010 ELECTROCARDIOGRAM REPORT: CPT | Mod: ,,, | Performed by: INTERNAL MEDICINE

## 2022-01-07 PROCEDURE — 88305 TISSUE EXAM BY PATHOLOGIST: CPT | Performed by: PATHOLOGY

## 2022-01-07 PROCEDURE — 71000039 HC RECOVERY, EACH ADD'L HOUR: Performed by: STUDENT IN AN ORGANIZED HEALTH CARE EDUCATION/TRAINING PROGRAM

## 2022-01-07 PROCEDURE — 52630 REMOVE PROSTATE REGROWTH: CPT | Mod: ,,, | Performed by: STUDENT IN AN ORGANIZED HEALTH CARE EDUCATION/TRAINING PROGRAM

## 2022-01-07 PROCEDURE — 25000003 PHARM REV CODE 250: Performed by: NURSE ANESTHETIST, CERTIFIED REGISTERED

## 2022-01-07 RX ORDER — LIDOCAINE HCL/PF 100 MG/5ML
SYRINGE (ML) INTRAVENOUS
Status: DISCONTINUED | OUTPATIENT
Start: 2022-01-07 | End: 2022-01-07

## 2022-01-07 RX ORDER — LIDOCAINE HYDROCHLORIDE 10 MG/ML
1 INJECTION, SOLUTION EPIDURAL; INFILTRATION; INTRACAUDAL; PERINEURAL ONCE
Status: ACTIVE | OUTPATIENT
Start: 2022-01-07

## 2022-01-07 RX ORDER — HYOSCYAMINE SULFATE 0.12 MG/1
0.12 TABLET SUBLINGUAL EVERY 4 HOURS PRN
Qty: 30 TABLET | Refills: 1 | Status: SHIPPED | OUTPATIENT
Start: 2022-01-07 | End: 2022-02-16 | Stop reason: ALTCHOICE

## 2022-01-07 RX ORDER — ONDANSETRON HYDROCHLORIDE 2 MG/ML
INJECTION, SOLUTION INTRAMUSCULAR; INTRAVENOUS
Status: DISCONTINUED | OUTPATIENT
Start: 2022-01-07 | End: 2022-01-07

## 2022-01-07 RX ORDER — PHENYLEPHRINE HYDROCHLORIDE 10 MG/ML
INJECTION INTRAVENOUS
Status: DISCONTINUED | OUTPATIENT
Start: 2022-01-07 | End: 2022-01-07

## 2022-01-07 RX ORDER — CEFAZOLIN SODIUM 2 G/50ML
2 SOLUTION INTRAVENOUS
Status: COMPLETED | OUTPATIENT
Start: 2022-01-07 | End: 2022-01-07

## 2022-01-07 RX ORDER — PHENAZOPYRIDINE HYDROCHLORIDE 100 MG/1
100 TABLET, FILM COATED ORAL 3 TIMES DAILY PRN
Qty: 30 TABLET | Refills: 3 | Status: SHIPPED | OUTPATIENT
Start: 2022-01-07 | End: 2022-01-17

## 2022-01-07 RX ORDER — LIDOCAINE HYDROCHLORIDE 20 MG/ML
JELLY TOPICAL
Status: DISCONTINUED | OUTPATIENT
Start: 2022-01-07 | End: 2022-01-07

## 2022-01-07 RX ORDER — OXYCODONE HYDROCHLORIDE 5 MG/1
5 TABLET ORAL
Status: DISPENSED | OUTPATIENT
Start: 2022-01-07

## 2022-01-07 RX ORDER — MIDAZOLAM HYDROCHLORIDE 1 MG/ML
INJECTION INTRAMUSCULAR; INTRAVENOUS
Status: DISCONTINUED | OUTPATIENT
Start: 2022-01-07 | End: 2022-01-07

## 2022-01-07 RX ORDER — LIDOCAINE HYDROCHLORIDE 10 MG/ML
1 INJECTION, SOLUTION EPIDURAL; INFILTRATION; INTRACAUDAL; PERINEURAL ONCE AS NEEDED
Status: ACTIVE | OUTPATIENT
Start: 2022-01-07 | End: 2033-06-04

## 2022-01-07 RX ORDER — HYDROCODONE BITARTRATE AND ACETAMINOPHEN 5; 325 MG/1; MG/1
1 TABLET ORAL EVERY 4 HOURS PRN
Status: DISCONTINUED | OUTPATIENT
Start: 2022-01-07 | End: 2022-01-07 | Stop reason: HOSPADM

## 2022-01-07 RX ORDER — ONDANSETRON 4 MG/1
8 TABLET, ORALLY DISINTEGRATING ORAL EVERY 8 HOURS PRN
Status: DISCONTINUED | OUTPATIENT
Start: 2022-01-07 | End: 2022-01-07 | Stop reason: HOSPADM

## 2022-01-07 RX ORDER — FENTANYL CITRATE 50 UG/ML
25 INJECTION, SOLUTION INTRAMUSCULAR; INTRAVENOUS EVERY 5 MIN PRN
Status: COMPLETED | OUTPATIENT
Start: 2022-01-07 | End: 2022-01-07

## 2022-01-07 RX ORDER — TRAMADOL HYDROCHLORIDE 50 MG/1
50 TABLET ORAL EVERY 6 HOURS PRN
Qty: 5 TABLET | Refills: 0 | Status: SHIPPED | OUTPATIENT
Start: 2022-01-07 | End: 2022-03-31

## 2022-01-07 RX ORDER — FENTANYL CITRATE 50 UG/ML
INJECTION, SOLUTION INTRAMUSCULAR; INTRAVENOUS
Status: DISCONTINUED | OUTPATIENT
Start: 2022-01-07 | End: 2022-01-07

## 2022-01-07 RX ORDER — PROPOFOL 10 MG/ML
VIAL (ML) INTRAVENOUS
Status: DISCONTINUED | OUTPATIENT
Start: 2022-01-07 | End: 2022-01-07

## 2022-01-07 RX ORDER — HYDROMORPHONE HYDROCHLORIDE 2 MG/ML
0.2 INJECTION, SOLUTION INTRAMUSCULAR; INTRAVENOUS; SUBCUTANEOUS EVERY 5 MIN PRN
Status: ACTIVE | OUTPATIENT
Start: 2022-01-07

## 2022-01-07 RX ORDER — DEXAMETHASONE SODIUM PHOSPHATE 4 MG/ML
INJECTION, SOLUTION INTRA-ARTICULAR; INTRALESIONAL; INTRAMUSCULAR; INTRAVENOUS; SOFT TISSUE
Status: DISCONTINUED | OUTPATIENT
Start: 2022-01-07 | End: 2022-01-07

## 2022-01-07 RX ADMIN — CEFAZOLIN SODIUM 2 G: 2 SOLUTION INTRAVENOUS at 10:01

## 2022-01-07 RX ADMIN — ONDANSETRON 4 MG: 2 INJECTION, SOLUTION INTRAMUSCULAR; INTRAVENOUS at 09:01

## 2022-01-07 RX ADMIN — PHENYLEPHRINE HYDROCHLORIDE 200 MCG: 10 INJECTION INTRAVENOUS at 10:01

## 2022-01-07 RX ADMIN — LIDOCAINE HYDROCHLORIDE 4 ML: 20 JELLY TOPICAL at 09:01

## 2022-01-07 RX ADMIN — FENTANYL CITRATE 25 MCG: 50 INJECTION INTRAMUSCULAR; INTRAVENOUS at 11:01

## 2022-01-07 RX ADMIN — FENTANYL CITRATE 100 MCG: 50 INJECTION, SOLUTION INTRAMUSCULAR; INTRAVENOUS at 09:01

## 2022-01-07 RX ADMIN — PHENYLEPHRINE HYDROCHLORIDE 100 MCG: 10 INJECTION INTRAVENOUS at 10:01

## 2022-01-07 RX ADMIN — SODIUM CHLORIDE, SODIUM GLUCONATE, SODIUM ACETATE, POTASSIUM CHLORIDE, MAGNESIUM CHLORIDE, SODIUM PHOSPHATE, DIBASIC, AND POTASSIUM PHOSPHATE: .53; .5; .37; .037; .03; .012; .00082 INJECTION, SOLUTION INTRAVENOUS at 10:01

## 2022-01-07 RX ADMIN — OXYCODONE HYDROCHLORIDE 5 MG: 5 TABLET ORAL at 11:01

## 2022-01-07 RX ADMIN — FENTANYL CITRATE 100 MCG: 50 INJECTION, SOLUTION INTRAMUSCULAR; INTRAVENOUS at 10:01

## 2022-01-07 RX ADMIN — SODIUM CHLORIDE, SODIUM GLUCONATE, SODIUM ACETATE, POTASSIUM CHLORIDE, MAGNESIUM CHLORIDE, SODIUM PHOSPHATE, DIBASIC, AND POTASSIUM PHOSPHATE: .53; .5; .37; .037; .03; .012; .00082 INJECTION, SOLUTION INTRAVENOUS at 08:01

## 2022-01-07 RX ADMIN — DEXAMETHASONE SODIUM PHOSPHATE 4 MG: 4 INJECTION, SOLUTION INTRA-ARTICULAR; INTRALESIONAL; INTRAMUSCULAR; INTRAVENOUS; SOFT TISSUE at 10:01

## 2022-01-07 RX ADMIN — MIDAZOLAM HYDROCHLORIDE 2 MG: 1 INJECTION, SOLUTION INTRAMUSCULAR; INTRAVENOUS at 09:01

## 2022-01-07 RX ADMIN — PROPOFOL 200 MG: 10 INJECTION, EMULSION INTRAVENOUS at 09:01

## 2022-01-07 RX ADMIN — LIDOCAINE HYDROCHLORIDE 100 MG: 20 INJECTION INTRAVENOUS at 09:01

## 2022-01-07 NOTE — PATIENT INSTRUCTIONS
Post Cystoscopy and Transurethral resection of Prostate Instructions  Do not strain to have a bowel movement  No strenuous exercise x 7 days  No driving while you are on narcotic pain medications or if your patel  catheter is in place    You can expect:  To see blood in your urine.    Go to the ER if:  Your catheter stops draining  You are having severe abdominal pain  Inability to void if you do not have a catheter    Call the doctor if:   Temperature is greater than 101F   Persistent vomiting and inability to keep food down

## 2022-01-07 NOTE — ANESTHESIA PROCEDURE NOTES
Intubation    Date/Time: 1/7/2022 9:57 AM  Performed by: Hai Navarro Jr. CRNA  Authorized by: Chris Chinchilla MD     Intubation:     Induction:  Intravenous    Intubated:  Postinduction    Mask Ventilation:  Easy mask    Attempts:  1    Attempted By:  CRNA    Difficult Airway Encountered?: No      Complications:  None    Airway Device:  Supraglottic airway/LMA    Airway Device Size:  4.0    Style/Cuff Inflation:  Cuffed (inflated to minimal occlusive pressure)    Secured at:  The lips    Placement Verified By:  Capnometry    Complicating Factors:  Anterior larynx, narrow palate and poor neck/head extension    Findings Post-Intubation:  BS equal bilateral and atraumatic/condition of teeth unchanged

## 2022-01-07 NOTE — ANESTHESIA PREPROCEDURE EVALUATION
01/07/2022  Albin Fernandez is a 66 y.o., male.    Anesthesia Evaluation    I have reviewed the Patient Summary Reports.    I have reviewed the Nursing Notes.    I have reviewed the Medications.     Review of Systems  Anesthesia Hx:  No problems with previous Anesthesia    Social:  Non-Smoker    Cardiovascular:   Hypertension Dysrhythmias atrial fibrillation hyperlipidemia Cardiomyopathy   Pulmonary:   COPD, mild Asthma mild Sleep Apnea    Education provided regarding risk of obstructive sleep apnea     Renal/:   Chronic Renal Disease BPH    Musculoskeletal:   Arthritis     Neurological:   Neuromuscular Disease,    Endocrine:   Hypothyroidism        Physical Exam  General:  Well nourished, Obesity    Airway/Jaw/Neck:  Airway Findings: Mouth Opening: Normal Tongue: Normal  General Airway Assessment: Adult  Oropharynx Findings:  Mallampati: II  Jaw/Neck Findings:  Neck ROM: Normal ROM     Eyes/Ears/Nose:  Eyes/Ears/Nose Findings:    Dental:  Dental Findings:   Chest/Lungs:  Chest/Lungs Findings: Normal Respiratory Rate     Heart/Vascular:  Heart Findings: Rate: Normal  Rhythm: Regular Rhythm        Mental Status:  Mental Status Findings:  Cooperative, Alert and Oriented         Anesthesia Plan  Type of Anesthesia, risks & benefits discussed:  Anesthesia Type:  general    Patient's Preference:   Plan Factors:          Intra-op Monitoring Plan: standard ASA monitors  Intra-op Monitoring Plan Comments:   Post Op Pain Control Plan: multimodal analgesia  Post Op Pain Control Plan Comments:     Induction:   IV  Beta Blocker:  Patient is on a Beta-Blocker and has received one dose within the past 24 hours (No further documentation required).       Informed Consent: Patient understands risks and agrees with Anesthesia plan.  Questions answered. Anesthesia consent signed with patient.  ASA Score: 3     Day of Surgery  Review of History & Physical:            Ready For Surgery From Anesthesia Perspective.

## 2022-01-07 NOTE — ANESTHESIA POSTPROCEDURE EVALUATION
Anesthesia Post Evaluation    Patient: Albin Fernandez    Procedure(s) Performed: Procedure(s) (LRB):  TURP (TRANSURETHRAL RESECTION OF PROSTATE) (N/A)    Final Anesthesia Type: general      Patient location during evaluation: PACU  Patient participation: Yes- Able to Participate  Level of consciousness: awake and alert and oriented  Post-procedure vital signs: reviewed and stable  Pain management: adequate  Airway patency: patent    PONV status at discharge: No PONV  Anesthetic complications: no      Cardiovascular status: blood pressure returned to baseline and stable  Respiratory status: unassisted and spontaneous ventilation  Hydration status: euvolemic  Follow-up not needed.          Vitals Value Taken Time   /96 01/07/22 1204   Temp 36.3 °C (97.3 °F) 01/07/22 1145   Pulse 84 01/07/22 1204   Resp 19 01/07/22 1204   SpO2 99 % 01/07/22 1204         Event Time   Out of Recovery 11:50:00         Pain/Varghese Score: Pain Rating Prior to Med Admin: 7 (1/7/2022 11:33 AM)  Varghese Score: 9 (1/7/2022 11:30 AM)
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
## 2022-01-07 NOTE — TRANSFER OF CARE
"Anesthesia Transfer of Care Note    Patient: Albin Fernandez    Procedure(s) Performed: Procedure(s) (LRB):  TURP (TRANSURETHRAL RESECTION OF PROSTATE) (N/A)    Patient location: PACU    Anesthesia Type: general    Transport from OR: Transported from OR on 2-3 L/min O2 by NC with adequate spontaneous ventilation    Post pain: adequate analgesia    Post assessment: no apparent anesthetic complications and tolerated procedure well    Post vital signs: stable    Level of consciousness: awake, alert and oriented    Nausea/Vomiting: no nausea/vomiting    Complications: none    Transfer of care protocol was followed      Last vitals:   Visit Vitals  BP (!) 158/90   Pulse 83   Temp 36.5 °C (97.7 °F) (Skin)   Resp 16   Ht 5' 9" (1.753 m)   Wt 104.3 kg (230 lb)   SpO2 97%   BMI 33.97 kg/m²     "

## 2022-01-07 NOTE — DISCHARGE SUMMARY
[facility]  Discharge Note  Short Stay    Admit Date: 1/7/2022    Discharge Date and Time: 01/07/2022 10:55 AM      Attending Physician: Cate Sharp MD     Discharge Provider: Cate Sharp    Diagnoses:  Active Hospital Problems    Diagnosis  POA    *Benign prostatic hyperplasia with urinary frequency [N40.1, R35.0]  Yes    Dilated cardiomyopathy [I42.0]  Yes    Atherosclerosis of aorta [I70.0]  Yes    Atrial fibrillation [I48.91]  Yes    Hyperlipidemia [E78.5]  Yes      Resolved Hospital Problems   No resolved problems to display.       Discharged Condition: good    Hospital Course: Patient was admitted for TURP and tolerated the procedure well with no complications. The patient was discharged home in good condition on the same day.       Final Diagnoses: Same as principal problem.    Disposition: Home or Self Care    Follow up/Patient Instructions:    Medications:  Reconciled Home Medications:   Current Discharge Medication List      START taking these medications    Details   hyoscyamine (LEVSIN/SL) 0.125 mg Subl Place 1 tablet (0.125 mg total) under the tongue every 4 (four) hours as needed (bladder spasms).  Qty: 30 tablet, Refills: 1      phenazopyridine (PYRIDIUM) 100 MG tablet Take 1 tablet (100 mg total) by mouth 3 (three) times daily as needed for Pain.  Qty: 30 tablet, Refills: 3      traMADoL (ULTRAM) 50 mg tablet Take 1 tablet (50 mg total) by mouth every 6 (six) hours as needed for Pain.  Qty: 5 tablet, Refills: 0    Comments: Quantity prescribed more than 7 day supply? No         CONTINUE these medications which have NOT CHANGED    Details   co-enzyme Q-10 30 mg capsule Take 30 mg by mouth once daily.      fluticasone propionate (FLONASE) 50 mcg/actuation nasal spray INHALE 2 SPRAYS INTO EACH NOSTRIL ONCE DAILY  Qty: 16 g, Refills: 11    Associated Diagnoses: Dysfunction of both eustachian tubes      furosemide (LASIX) 20 MG tablet Take 1 tablet (20 mg total) by mouth  daily as needed.  Qty: 90 tablet, Refills: 3      HYDROcodone-acetaminophen (NORCO)  mg per tablet Take 1 tablet by mouth 2 (two) times daily.      ipratropium (ATROVENT) 42 mcg (0.06 %) nasal spray 2 sprays by Nasal route 4 (four) times daily.  Qty: 15 mL, Refills: 5    Associated Diagnoses: Postnasal drip      metoprolol succinate (TOPROL-XL) 50 MG 24 hr tablet TAKE 1 TABLET (50 MG TOTAL) BY MOUTH ONCE DAILY.  Qty: 30 tablet, Refills: 11    Associated Diagnoses: Paroxysmal atrial fibrillation      ofloxacin (OCUFLOX) 0.3 % ophthalmic solution       prednisoLONE acetate (PRED FORTE) 1 % DrpS       senna-docusate 8.6-50 mg (SENNA WITH DOCUSATE SODIUM) 8.6-50 mg per tablet Take 2 tablets by mouth 2 (two) times daily.  Qty: 120 tablet, Refills: 5    Associated Diagnoses: Drug-induced constipation      sildenafiL (VIAGRA) 100 MG tablet Take 1 tablet (100 mg total) by mouth daily as needed for Erectile Dysfunction.  Qty: 30 tablet, Refills: 5      simvastatin (ZOCOR) 40 MG tablet Take 1 tablet (40 mg total) by mouth every evening.  Qty: 30 tablet, Refills: 11    Associated Diagnoses: Hyperlipidemia, unspecified hyperlipidemia type      tadalafiL (CIALIS) 5 MG tablet Take 1 tablet (5 mg total) by mouth once daily.  Qty: 90 tablet, Refills: 3    Comments: This prescription was filled on 9/5/2019. Any refills authorized will be placed on file.  Associated Diagnoses: Erectile dysfunction, unspecified erectile dysfunction type      zafirlukast (ACCOLATE) 20 MG tablet TAKE 1 TABLET (20 MG TOTAL) BY MOUTH 2 (TWO) TIMES DAILY.      CYANOCOBALAMIN, VITAMIN B-12, (VITAMIN B-12 ORAL) Take 1 mg by mouth once daily.      levothyroxine (SYNTHROID) 112 MCG tablet TAKE 1 TABLET BY MOUTH ONCE DAILY FIRST THING IN MORNING ON EMPTY STOMACH WITH FULL GLASS OF WATER  Qty: 90 tablet, Refills: 3    Associated Diagnoses: Hypothyroidism, unspecified type      tamsulosin (FLOMAX) 0.4 mg Cap Take 2 capsules (0.8 mg total) by mouth every  evening.  Qty: 180 capsule, Refills: 3      valACYclovir (VALTREX) 1000 MG tablet TAKE ONE TABLET BY MOUTH THREE TIMES A DAY  Qty: 21 tablet, Refills: 1    Associated Diagnoses: Herpes simplex infection of penis      !! warfarin (COUMADIN) 1 MG tablet TAKE ONE TABLET BY MOUTH DAILY ALONG WITH THE 2MG TABLET  Qty: 90 tablet, Refills: 1    Associated Diagnoses: Anticoagulated on Coumadin      !! warfarin (COUMADIN) 2 MG tablet Take 1.5 tablets (3 mg total) by mouth Daily.  Qty: 135 tablet, Refills: 1    Associated Diagnoses: Anticoagulated on Coumadin       !! - Potential duplicate medications found. Please discuss with provider.        Discharge Procedure Orders   Diet general     Call MD for:  temperature >100.4     Call MD for:  severe uncontrolled pain     Call MD for:  persistent nausea and vomiting     No dressing needed      Follow-up Information     Cate Sharp MD In 6 weeks.    Specialty: Urology  Why: assess symptoms  Contact information:  [address] [place] [address] [address]IVE  [address] [place] [address] [id number] [phone]                           Cate Sharp MD  Urology Department

## 2022-01-07 NOTE — DISCHARGE INSTRUCTIONS
"Discharge Instructions: After Your Surgery/Procedure  Youve just had surgery. During surgery you were given medicine called anesthesia to keep you relaxed and free of pain. After surgery you may have some pain or nausea. This is common. Here are some tips for feeling better and getting well after surgery.     Stay on schedule with your medication.   Going home  Your doctor or nurse will show you how to take care of yourself when you go home. He or she will also answer your questions. Have an adult family member or friend drive you home.      For your safety we recommend these precaution for the first 24 hours after your procedure:  · Do not drive or use heavy equipment.  · Do not make important decisions or sign legal papers.  · Do not drink alcohol.  · Have someone stay with you, if needed. He or she can watch for problems and help keep you safe.  · Your concentration, balance, coordination, and judgement may be impaired for many hours after anesthesia.  Use caution when ambulating or standing up.     · You may feel weak and "washed out" after anesthesia and surgery.      Subtle residual effects of general anesthesia or sedation with regional / local anesthesia can last more than 24 hours.  Rest for the remainder of the day or longer if your Doctor/Surgeon has advised you to do so.  Although you may feel normal within the first 24 hours, your reflexes and mental ability may be impaired without you realizing it.  You may feel dizzy, lightheaded or sleepy for 24 hours or longer.      Be sure to go to all follow-up visits with your doctor. And rest after your surgery for as long as your doctor tells you to.  Coping with pain  If you have pain after surgery, pain medicine will help you feel better. Take it as told, before pain becomes severe. Also, ask your doctor or pharmacist about other ways to control pain. This might be with heat, ice, or relaxation. And follow any other instructions your surgeon or nurse gives " you.  Tips for taking pain medicine  To get the best relief possible, remember these points:  · Pain medicines can upset your stomach. Taking them with a little food may help.  · Most pain relievers taken by mouth need at least 20 to 30 minutes to start to work.  · Taking medicine on a schedule can help you remember to take it. Try to time your medicine so that you can take it before starting an activity. This might be before you get dressed, go for a walk, or sit down for dinner.  · Constipation is a common side effect of pain medicines. Call your doctor before taking any medicines such as laxatives or stool softeners to help ease constipation. Also ask if you should skip any foods. Drinking lots of fluids and eating foods such as fruits and vegetables that are high in fiber can also help. Remember, do not take laxatives unless your surgeon has prescribed them.  · Drinking alcohol and taking pain medicine can cause dizziness and slow your breathing. It can even be deadly. Do not drink alcohol while taking pain medicine.  · Pain medicine can make you react more slowly to things. Do not drive or run machinery while taking pain medicine.  Your health care provider may tell you to take acetaminophen to help ease your pain. Ask him or her how much you are supposed to take each day. Acetaminophen or other pain relievers may interact with your prescription medicines or other over-the-counter (OTC) drugs. Some prescription medicines have acetaminophen and other ingredients. Using both prescription and OTC acetaminophen for pain can cause you to overdose. Read the labels on your OTC medicines with care. This will help you to clearly know the list of ingredients, how much to take, and any warnings. It may also help you not take too much acetaminophen. If you have questions or do not understand the information, ask your pharmacist or health care provider to explain it to you before you take the OTC medicine.  Managing  nausea  Some people have an upset stomach after surgery. This is often because of anesthesia, pain, or pain medicine, or the stress of surgery. These tips will help you handle nausea and eat healthy foods as you get better. If you were on a special food plan before surgery, ask your doctor if you should follow it while you get better. These tips may help:  · Do not push yourself to eat. Your body will tell you when to eat and how much.  · Start off with clear liquids and soup. They are easier to digest.  · Next try semi-solid foods, such as mashed potatoes, applesauce, and gelatin, as you feel ready.  · Slowly move to solid foods. Dont eat fatty, rich, or spicy foods at first.  · Do not force yourself to have 3 large meals a day. Instead eat smaller amounts more often.  · Take pain medicines with a small amount of solid food, such as crackers or toast, to avoid nausea.     Call your surgeon if  · You still have pain an hour after taking medicine. The medicine may not be strong enough.  · You feel too sleepy, dizzy, or groggy. The medicine may be too strong.  · You have side effects like nausea, vomiting, or skin changes, such as rash, itching, or hives.       If you have obstructive sleep apnea  You were given anesthesia medicine during surgery to keep you comfortable and free of pain. After surgery, you may have more apnea spells because of this medicine and other medicines you were given. The spells may last longer than usual.   At home:  · Keep using the continuous positive airway pressure (CPAP) device when you sleep. Unless your health care provider tells you not to, use it when you sleep, day or night. CPAP is a common device used to treat obstructive sleep apnea.  · Talk with your provider before taking any pain medicine, muscle relaxants, or sedatives. Your provider will tell you about the possible dangers of taking these medicines.  © 2097-4199 [employer]. [address] [place] [address]  [place] [address]. All rights reserved. This information is not intended as a substitute for professional medical care. Always follow your healthcare professional's instructions.    Post op instructions for prevention of DVT  What is deep vein thrombosis?  Deep vein thrombosis (DVT) is the medical term for blood clots in the deep veins of the leg.  These blood clots can be dangerous.  A DVT can block a blood vessel and keep blood from getting where it needs to go.  Another problem is that the clot can travel to other parts of the body such as the lungs.  A clot that travels to the lungs is called a pulmonary embolus (PE) and can cause serious problems with breathing which can lead to death.  Am I at risk for DVT/PE?  If you are not very active, you are at risk of DVT.  Anyone confined to bed, sitting for long periods of time, recovering from surgery, etc. increases the risk of DVT.  Other risk factors are cancer diagnosis, certain medications, estrogen replacement in any form,older age, obesity, pregnancy, smoking, history of clotting disorders, and dehydration.  How will I know if I have a DVT?   Swelling in the lower leg   Pain   Warmth, redness, hardness or bulging of the vein  If you have any of these symptoms, call your doctors office right away.  Some people will not have any symptoms until the clot moves to the lungs.  What are the symptoms of a PE?   Panting, shortness of breath, or trouble breathing   Sharp, knife-like chest pain when you breathe   Coughing or coughing up blood   Rapid heartbeat  If you have any of these symptoms or get worse quickly, call 911 for emergency treatment.  How can I prevent a DVT?   Avoid long periods of inactivity and dont cross your legs--get up and walk around every hour or so.   Stay active--walking after surgery is highly encouraged.  This means you should get out of the house and walk in the neighborhood.  Going up and down stairs will not impair healing (unless advised  against such activity by your doctor).     Drink plenty of noncaffeinated, nonalcoholic fluids each day to prevent dehydration.   Wear special support stockings, if they have been advised by your doctor.   If you travel, stop at least once an hour and walk around.   Avoid smoking (assistance with stopping is available through your healthcare provider)    Always notify your doctor if you are not able to follow the post operative instructions that are given to you at the time of discharge.  It may be necessary to prescribe one of the medications available to prevent DVT.We hope your stay was comfortable as you heal now, mend and rest.    For we have enjoyed taking care of you by giving your our best.    And as you get better, by regaining your health and strength;   We count it as a privilege to have served you and hope your time at [facility] was well spent.      Thank  You!!!

## 2022-01-07 NOTE — PLAN OF CARE
Patient will be discharged home with catheter and return to MD office in 4 days for removal. Handouts provided, IV will be removed and all valuables were returned.  Provided with catheter teachings

## 2022-01-10 ENCOUNTER — PATIENT MESSAGE (OUTPATIENT)
Dept: PREADMISSION TESTING | Facility: HOSPITAL | Age: 67
End: 2022-01-10
Payer: MEDICARE

## 2022-01-10 VITALS
TEMPERATURE: 97 F | OXYGEN SATURATION: 99 % | DIASTOLIC BLOOD PRESSURE: 96 MMHG | RESPIRATION RATE: 19 BRPM | BODY MASS INDEX: 34.07 KG/M2 | HEART RATE: 84 BPM | WEIGHT: 230 LBS | HEIGHT: 69 IN | SYSTOLIC BLOOD PRESSURE: 148 MMHG

## 2022-01-11 ENCOUNTER — CLINICAL SUPPORT (OUTPATIENT)
Dept: UROLOGY | Facility: CLINIC | Age: 67
End: 2022-01-11
Payer: MEDICARE

## 2022-01-11 DIAGNOSIS — Z97.8 INDWELLING FOLEY CATHETER PRESENT: Primary | ICD-10-CM

## 2022-01-11 LAB
FINAL PATHOLOGIC DIAGNOSIS: NORMAL
GROSS: NORMAL
Lab: NORMAL

## 2022-01-11 PROCEDURE — 99499 NO LOS: ICD-10-PCS | Mod: S$GLB,,, | Performed by: STUDENT IN AN ORGANIZED HEALTH CARE EDUCATION/TRAINING PROGRAM

## 2022-01-11 PROCEDURE — 99499 UNLISTED E&M SERVICE: CPT | Mod: S$GLB,,, | Performed by: STUDENT IN AN ORGANIZED HEALTH CARE EDUCATION/TRAINING PROGRAM

## 2022-01-11 NOTE — PROGRESS NOTES
Patient arrived to clinic to have catheter removed, deflated 30 ml saline balloon, removed 22 fr patel catheter without difficulty, patient tolerated well, discharge teaching done, patient verbally understood.

## 2022-01-27 ENCOUNTER — TELEPHONE (OUTPATIENT)
Dept: FAMILY MEDICINE | Facility: CLINIC | Age: 67
End: 2022-01-27
Payer: MEDICARE

## 2022-01-27 NOTE — TELEPHONE ENCOUNTER
Patient had an appt today so I did just have him come in so I could check his INR. His INR was 2.1, he had surgery on 1/7/22 and resumed his coumadin on 1/9/22.

## 2022-01-27 NOTE — TELEPHONE ENCOUNTER
Called patient to reschedule appt due to provider being out. Patient wants us to call him once we get more info about provider's return.

## 2022-01-31 ENCOUNTER — PATIENT MESSAGE (OUTPATIENT)
Dept: FAMILY MEDICINE | Facility: CLINIC | Age: 67
End: 2022-01-31
Payer: MEDICARE

## 2022-01-31 DIAGNOSIS — H91.90 HEARING LOSS, UNSPECIFIED HEARING LOSS TYPE, UNSPECIFIED LATERALITY: Primary | ICD-10-CM

## 2022-02-01 ENCOUNTER — TELEPHONE (OUTPATIENT)
Dept: FAMILY MEDICINE | Facility: CLINIC | Age: 67
End: 2022-02-01
Payer: MEDICARE

## 2022-02-14 NOTE — PROGRESS NOTES
[facility] Note    PCP: West Chang MD (Inactive)    Chief Complaint: BPH     SUBJECTIVE:       History of Present Illness:  Albin Fernandez is a 66 y.o. male who presents to clinic for BPH. He is Established  to our clinic.     S/P TURP on 1/7/22. Pathology benign, 16 g.  Doing well from a urinary standpoint.   Nocturia improved to 1-2x.  No hematuria or dysuria.   No bothersome daytime complaints.     11/15/21  Previous patient of Dr. Shrestha. Here to discuss Urolift.   Hx of TURP in 2017. After this he did really well for a while and then his symptoms returned.  Had cysto/trus in Nov 2020 which showed a 22 g prostate with anterior obstructing tissue but otherwise open. He did undergo UDS which did not show ability to mount a detrusor pressure however, test was somewhat questionable as catheter may not have been all the way in the bladder. He did have a sensation to void.   Last PSA: 1.5 (8/25/20)    Has a solitary left kidney secondary to a traumatic fall when he was a child.     He is currently on Flomax 0.8 mg.   Today he is complaining of frequency, nocturia multiple times a night. He does use a CPAP machine. Stops drinking fluids after 6 pm. No sodas.   No hematuria. No dysuria.   Has a bowel movement every day, but has to take stool softner.     UA today: negative for blood, leuks, nitrite   PVR today: 84 cc    Last urine culture: no documented UTIs    Lab Results   Component Value Date    CREATININE 1.1 01/07/2022      Family  hx: grandfather and uncle had prostate cancer   Hx of COPD, a fib, HTN, HLD    Past medical, family, and social history reviewed as documented in chart with pertinent positive medical, family, and social history detailed in HPI.    Review of patient's allergies indicates:   Allergen Reactions    Antihistamines - alkylamine Other (See Comments)     Urinary retention       Past Medical History:   Diagnosis Date    Arthritis     Atrial fibrillation      "BPH with obstruction/lower urinary tract symptoms     Cardiomyopathy, dilated     Disorder of kidney and ureter     Hyperlipidemia     Hypertension     Hypothyroidism     Sleep apnea      Past Surgical History:   Procedure Laterality Date    ARTHROSCOPY OF ELBOW Left 8/9/2019    Procedure: ARTHROSCOPY, ELBOW;  Surgeon: Giovany Marquez II, MD;  Location: [facility] OR;  Service: Orthopedics;  Laterality: Left;    BACK SURGERY      FOOT SURGERY      IRRIGATION AND DEBRIDEMENT OF UPPER EXTREMITY  8/9/2019    Procedure: IRRIGATION AND DEBRIDEMENT, UPPER EXTREMITY;  Surgeon: Giovany Marquez II, MD;  Location: [facility] OR;  Service: Orthopedics;;    KIDNEY SURGERY      one kidney removed    NEPHRECTOMY      TRANSRECTAL ULTRASOUND EXAMINATION N/A 11/23/2020    Procedure: TRANSRECTAL ULTRASOUND;  Surgeon: Roselyn Shrestha MD;  Location[facility] OR;  Service: Urology;  Laterality: N/A;    TRANSURETHRAL RESECTION OF PROSTATE N/A 1/7/2022    Procedure: TURP (TRANSURETHRAL RESECTION OF PROSTATE);  Surgeon: Cate Sharp MD;  Location: [facility] OR;  Service: Urology;  Laterality: N/A;     Family History   Problem Relation Age of Onset    Cirrhosis Father     Pancreatic cancer Mother     Breast cancer Sister      Social History     Tobacco Use    Smoking status: Never Smoker    Smokeless tobacco: Never Used   Substance Use Topics    Alcohol use: No    Drug use: No        Review of Systems   Genitourinary: Positive for nocturia. Negative for difficulty urinating, dysuria, frequency, scrotal swelling, testicular pain and urgency.       OBJECTIVE:     Anticoagulation: Coumadin 2 mg     Estimated body mass index is 33.96 kg/m² as calculated from the following:    Height as of this encounter: 5' 9" (1.753 m).    Weight as of this encounter: 104.3 kg (229 lb 15 oz).    Vital Signs (Most Recent)  Pulse: 65 (02/16/22 1305)  Resp: 18 (02/16/22 1305)  BP: 128/79 (02/16/22 1305)    Physical Exam  Constitutional:       " General: He is not in acute distress.     Appearance: Normal appearance. He is not ill-appearing.   HENT:      Head: Normocephalic and atraumatic.   Eyes:      General: No scleral icterus.  Cardiovascular:      Rate and Rhythm: Normal rate and regular rhythm.   Pulmonary:      Effort: Pulmonary effort is normal. No respiratory distress.   Abdominal:      General: There is no distension.   Skin:     Coloration: Skin is not jaundiced.   Neurological:      General: No focal deficit present.      Mental Status: He is alert and oriented to person, place, and time.   Psychiatric:         Mood and Affect: Mood normal.         Behavior: Behavior normal.         Thought Content: Thought content normal.         BMP  Lab Results   Component Value Date     01/07/2022    K 3.9 01/07/2022     01/07/2022    CO2 29 01/07/2022    BUN 13 01/07/2022    CREATININE 1.1 01/07/2022    CALCIUM 9.5 01/07/2022    ANIONGAP 8 01/07/2022    ESTGFRAFRICA >60 01/07/2022    EGFRNONAA >60 01/07/2022       Lab Results   Component Value Date    WBC 6.06 01/07/2022    HGB 15.3 01/07/2022    HCT 45.8 01/07/2022    MCV 95 01/07/2022     01/07/2022       Lab Results   Component Value Date    PSADIAG 1.6 11/23/2021    PSATOTAL 1.5 08/25/2020    PSATOTAL 1.3 07/01/2019    PSAFREE 0.38 08/25/2020    PSAFREE 0.36 07/01/2019     Imaging:  No pertinent recent imaging available.    ASSESSMENT     1. Benign prostatic hyperplasia with urinary frequency        PLAN:     - Doing very well  - Stop Flomax. Can restart if needed.   - Refill on Valtrex for recurrent herpes infections  - Cialis 20 mg prescribed. Instructed to try half pill first.   - Follow up in 6 months or sooner if needed       Cate Sharp MD

## 2022-02-16 ENCOUNTER — OFFICE VISIT (OUTPATIENT)
Dept: UROLOGY | Facility: CLINIC | Age: 67
End: 2022-02-16
Payer: MEDICARE

## 2022-02-16 VITALS
HEIGHT: 69 IN | DIASTOLIC BLOOD PRESSURE: 79 MMHG | BODY MASS INDEX: 34.06 KG/M2 | RESPIRATION RATE: 18 BRPM | SYSTOLIC BLOOD PRESSURE: 128 MMHG | HEART RATE: 65 BPM | WEIGHT: 229.94 LBS

## 2022-02-16 DIAGNOSIS — N40.1 BENIGN PROSTATIC HYPERPLASIA WITH URINARY FREQUENCY: Primary | ICD-10-CM

## 2022-02-16 DIAGNOSIS — R35.0 BENIGN PROSTATIC HYPERPLASIA WITH URINARY FREQUENCY: Primary | ICD-10-CM

## 2022-02-16 DIAGNOSIS — A60.01 HERPES SIMPLEX INFECTION OF PENIS: ICD-10-CM

## 2022-02-16 LAB — POC RESIDUAL URINE VOLUME: 0 ML (ref 0–100)

## 2022-02-16 PROCEDURE — 1159F MED LIST DOCD IN RCRD: CPT | Mod: CPTII,S$GLB,, | Performed by: STUDENT IN AN ORGANIZED HEALTH CARE EDUCATION/TRAINING PROGRAM

## 2022-02-16 PROCEDURE — 3078F DIAST BP <80 MM HG: CPT | Mod: CPTII,S$GLB,, | Performed by: STUDENT IN AN ORGANIZED HEALTH CARE EDUCATION/TRAINING PROGRAM

## 2022-02-16 PROCEDURE — 1101F PT FALLS ASSESS-DOCD LE1/YR: CPT | Mod: CPTII,S$GLB,, | Performed by: STUDENT IN AN ORGANIZED HEALTH CARE EDUCATION/TRAINING PROGRAM

## 2022-02-16 PROCEDURE — 1126F AMNT PAIN NOTED NONE PRSNT: CPT | Mod: CPTII,S$GLB,, | Performed by: STUDENT IN AN ORGANIZED HEALTH CARE EDUCATION/TRAINING PROGRAM

## 2022-02-16 PROCEDURE — 99999 PR PBB SHADOW E&M-EST. PATIENT-LVL III: CPT | Mod: PBBFAC,,, | Performed by: STUDENT IN AN ORGANIZED HEALTH CARE EDUCATION/TRAINING PROGRAM

## 2022-02-16 PROCEDURE — 3008F PR BODY MASS INDEX (BMI) DOCUMENTED: ICD-10-PCS | Mod: CPTII,S$GLB,, | Performed by: STUDENT IN AN ORGANIZED HEALTH CARE EDUCATION/TRAINING PROGRAM

## 2022-02-16 PROCEDURE — 51798 US URINE CAPACITY MEASURE: CPT | Mod: S$GLB,,, | Performed by: STUDENT IN AN ORGANIZED HEALTH CARE EDUCATION/TRAINING PROGRAM

## 2022-02-16 PROCEDURE — 1126F PR PAIN SEVERITY QUANTIFIED, NO PAIN PRESENT: ICD-10-PCS | Mod: CPTII,S$GLB,, | Performed by: STUDENT IN AN ORGANIZED HEALTH CARE EDUCATION/TRAINING PROGRAM

## 2022-02-16 PROCEDURE — 99024 POSTOP FOLLOW-UP VISIT: CPT | Mod: S$GLB,,, | Performed by: STUDENT IN AN ORGANIZED HEALTH CARE EDUCATION/TRAINING PROGRAM

## 2022-02-16 PROCEDURE — 3078F PR MOST RECENT DIASTOLIC BLOOD PRESSURE < 80 MM HG: ICD-10-PCS | Mod: CPTII,S$GLB,, | Performed by: STUDENT IN AN ORGANIZED HEALTH CARE EDUCATION/TRAINING PROGRAM

## 2022-02-16 PROCEDURE — 1160F RVW MEDS BY RX/DR IN RCRD: CPT | Mod: CPTII,S$GLB,, | Performed by: STUDENT IN AN ORGANIZED HEALTH CARE EDUCATION/TRAINING PROGRAM

## 2022-02-16 PROCEDURE — 1160F PR REVIEW ALL MEDS BY PRESCRIBER/CLIN PHARMACIST DOCUMENTED: ICD-10-PCS | Mod: CPTII,S$GLB,, | Performed by: STUDENT IN AN ORGANIZED HEALTH CARE EDUCATION/TRAINING PROGRAM

## 2022-02-16 PROCEDURE — 99999 PR PBB SHADOW E&M-EST. PATIENT-LVL III: ICD-10-PCS | Mod: PBBFAC,,, | Performed by: STUDENT IN AN ORGANIZED HEALTH CARE EDUCATION/TRAINING PROGRAM

## 2022-02-16 PROCEDURE — 3288F PR FALLS RISK ASSESSMENT DOCUMENTED: ICD-10-PCS | Mod: CPTII,S$GLB,, | Performed by: STUDENT IN AN ORGANIZED HEALTH CARE EDUCATION/TRAINING PROGRAM

## 2022-02-16 PROCEDURE — 3008F BODY MASS INDEX DOCD: CPT | Mod: CPTII,S$GLB,, | Performed by: STUDENT IN AN ORGANIZED HEALTH CARE EDUCATION/TRAINING PROGRAM

## 2022-02-16 PROCEDURE — 3288F FALL RISK ASSESSMENT DOCD: CPT | Mod: CPTII,S$GLB,, | Performed by: STUDENT IN AN ORGANIZED HEALTH CARE EDUCATION/TRAINING PROGRAM

## 2022-02-16 PROCEDURE — 51798 POCT BLADDER SCAN: ICD-10-PCS | Mod: S$GLB,,, | Performed by: STUDENT IN AN ORGANIZED HEALTH CARE EDUCATION/TRAINING PROGRAM

## 2022-02-16 PROCEDURE — 99024 PR POST-OP FOLLOW-UP VISIT: ICD-10-PCS | Mod: S$GLB,,, | Performed by: STUDENT IN AN ORGANIZED HEALTH CARE EDUCATION/TRAINING PROGRAM

## 2022-02-16 PROCEDURE — 3074F PR MOST RECENT SYSTOLIC BLOOD PRESSURE < 130 MM HG: ICD-10-PCS | Mod: CPTII,S$GLB,, | Performed by: STUDENT IN AN ORGANIZED HEALTH CARE EDUCATION/TRAINING PROGRAM

## 2022-02-16 PROCEDURE — 1101F PR PT FALLS ASSESS DOC 0-1 FALLS W/OUT INJ PAST YR: ICD-10-PCS | Mod: CPTII,S$GLB,, | Performed by: STUDENT IN AN ORGANIZED HEALTH CARE EDUCATION/TRAINING PROGRAM

## 2022-02-16 PROCEDURE — 3074F SYST BP LT 130 MM HG: CPT | Mod: CPTII,S$GLB,, | Performed by: STUDENT IN AN ORGANIZED HEALTH CARE EDUCATION/TRAINING PROGRAM

## 2022-02-16 PROCEDURE — 1159F PR MEDICATION LIST DOCUMENTED IN MEDICAL RECORD: ICD-10-PCS | Mod: CPTII,S$GLB,, | Performed by: STUDENT IN AN ORGANIZED HEALTH CARE EDUCATION/TRAINING PROGRAM

## 2022-02-16 RX ORDER — TADALAFIL 20 MG/1
20 TABLET ORAL DAILY PRN
Qty: 30 TABLET | Refills: 6 | Status: SHIPPED | OUTPATIENT
Start: 2022-02-16 | End: 2022-08-10 | Stop reason: SDUPTHER

## 2022-02-16 RX ORDER — VALACYCLOVIR HYDROCHLORIDE 1 G/1
500 TABLET, FILM COATED ORAL 3 TIMES DAILY
Qty: 5 TABLET | Refills: 4 | Status: SHIPPED | OUTPATIENT
Start: 2022-02-16 | End: 2023-03-09 | Stop reason: SDUPTHER

## 2022-02-17 ENCOUNTER — PATIENT MESSAGE (OUTPATIENT)
Dept: FAMILY MEDICINE | Facility: CLINIC | Age: 67
End: 2022-02-17
Payer: MEDICARE

## 2022-03-04 ENCOUNTER — OFFICE VISIT (OUTPATIENT)
Dept: FAMILY MEDICINE | Facility: CLINIC | Age: 67
End: 2022-03-04
Payer: MEDICARE

## 2022-03-04 VITALS
DIASTOLIC BLOOD PRESSURE: 76 MMHG | OXYGEN SATURATION: 96 % | HEART RATE: 87 BPM | TEMPERATURE: 99 F | SYSTOLIC BLOOD PRESSURE: 120 MMHG | BODY MASS INDEX: 33.4 KG/M2 | HEIGHT: 69 IN | WEIGHT: 225.5 LBS

## 2022-03-04 DIAGNOSIS — Z79.01 ANTICOAGULATED ON COUMADIN: ICD-10-CM

## 2022-03-04 DIAGNOSIS — E78.2 MIXED HYPERLIPIDEMIA: ICD-10-CM

## 2022-03-04 DIAGNOSIS — I48.20 CHRONIC ATRIAL FIBRILLATION: Primary | ICD-10-CM

## 2022-03-04 LAB — INR PPP: 2.4 (ref 2–3)

## 2022-03-04 PROCEDURE — 3078F DIAST BP <80 MM HG: CPT | Mod: CPTII,S$GLB,, | Performed by: PHYSICIAN ASSISTANT

## 2022-03-04 PROCEDURE — 1159F MED LIST DOCD IN RCRD: CPT | Mod: CPTII,S$GLB,, | Performed by: PHYSICIAN ASSISTANT

## 2022-03-04 PROCEDURE — 3288F PR FALLS RISK ASSESSMENT DOCUMENTED: ICD-10-PCS | Mod: CPTII,S$GLB,, | Performed by: PHYSICIAN ASSISTANT

## 2022-03-04 PROCEDURE — 1160F PR REVIEW ALL MEDS BY PRESCRIBER/CLIN PHARMACIST DOCUMENTED: ICD-10-PCS | Mod: CPTII,S$GLB,, | Performed by: PHYSICIAN ASSISTANT

## 2022-03-04 PROCEDURE — 3008F PR BODY MASS INDEX (BMI) DOCUMENTED: ICD-10-PCS | Mod: CPTII,S$GLB,, | Performed by: PHYSICIAN ASSISTANT

## 2022-03-04 PROCEDURE — 1160F RVW MEDS BY RX/DR IN RCRD: CPT | Mod: CPTII,S$GLB,, | Performed by: PHYSICIAN ASSISTANT

## 2022-03-04 PROCEDURE — 1101F PR PT FALLS ASSESS DOC 0-1 FALLS W/OUT INJ PAST YR: ICD-10-PCS | Mod: CPTII,S$GLB,, | Performed by: PHYSICIAN ASSISTANT

## 2022-03-04 PROCEDURE — 99214 OFFICE O/P EST MOD 30 MIN: CPT | Mod: S$GLB,,, | Performed by: PHYSICIAN ASSISTANT

## 2022-03-04 PROCEDURE — 3008F BODY MASS INDEX DOCD: CPT | Mod: CPTII,S$GLB,, | Performed by: PHYSICIAN ASSISTANT

## 2022-03-04 PROCEDURE — 3074F PR MOST RECENT SYSTOLIC BLOOD PRESSURE < 130 MM HG: ICD-10-PCS | Mod: CPTII,S$GLB,, | Performed by: PHYSICIAN ASSISTANT

## 2022-03-04 PROCEDURE — 99214 PR OFFICE/OUTPT VISIT, EST, LEVL IV, 30-39 MIN: ICD-10-PCS | Mod: S$GLB,,, | Performed by: PHYSICIAN ASSISTANT

## 2022-03-04 PROCEDURE — 3288F FALL RISK ASSESSMENT DOCD: CPT | Mod: CPTII,S$GLB,, | Performed by: PHYSICIAN ASSISTANT

## 2022-03-04 PROCEDURE — 3078F PR MOST RECENT DIASTOLIC BLOOD PRESSURE < 80 MM HG: ICD-10-PCS | Mod: CPTII,S$GLB,, | Performed by: PHYSICIAN ASSISTANT

## 2022-03-04 PROCEDURE — 85610 PROTHROMBIN TIME: CPT | Mod: QW,,, | Performed by: PHYSICIAN ASSISTANT

## 2022-03-04 PROCEDURE — 85610 POCT INR: ICD-10-PCS | Mod: QW,,, | Performed by: PHYSICIAN ASSISTANT

## 2022-03-04 PROCEDURE — 1126F PR PAIN SEVERITY QUANTIFIED, NO PAIN PRESENT: ICD-10-PCS | Mod: CPTII,S$GLB,, | Performed by: PHYSICIAN ASSISTANT

## 2022-03-04 PROCEDURE — 1126F AMNT PAIN NOTED NONE PRSNT: CPT | Mod: CPTII,S$GLB,, | Performed by: PHYSICIAN ASSISTANT

## 2022-03-04 PROCEDURE — 1101F PT FALLS ASSESS-DOCD LE1/YR: CPT | Mod: CPTII,S$GLB,, | Performed by: PHYSICIAN ASSISTANT

## 2022-03-04 PROCEDURE — 1159F PR MEDICATION LIST DOCUMENTED IN MEDICAL RECORD: ICD-10-PCS | Mod: CPTII,S$GLB,, | Performed by: PHYSICIAN ASSISTANT

## 2022-03-04 PROCEDURE — 3074F SYST BP LT 130 MM HG: CPT | Mod: CPTII,S$GLB,, | Performed by: PHYSICIAN ASSISTANT

## 2022-03-07 NOTE — PROGRESS NOTES
Subjective:       Patient ID: Albin Fernandez is a 66 y.o. male.    Chief Complaint: Follow-up (Patient states he is feeling better and believes he may need his INR checked; Patient is taking Coumadin. )    HPI   Feels well  Review of Systems   Constitutional: Negative.  Negative for activity change, appetite change, chills, diaphoresis, fatigue, fever and unexpected weight change.   HENT: Negative.    Eyes: Negative.    Respiratory: Negative.  Negative for cough and shortness of breath.    Cardiovascular: Positive for palpitations. Negative for chest pain and leg swelling.   Gastrointestinal: Negative.    Endocrine: Negative.    Genitourinary: Negative.    Musculoskeletal: Negative.    Integumentary:  Negative for rash. Negative.   Neurological: Negative.          Objective:      Physical Exam  Vitals reviewed.   Constitutional:       General: He is not in acute distress.     Appearance: Normal appearance. He is obese. He is not ill-appearing, toxic-appearing or diaphoretic.   HENT:      Head: Normocephalic and atraumatic.      Right Ear: Tympanic membrane, ear canal and external ear normal. There is no impacted cerumen.      Left Ear: Tympanic membrane, ear canal and external ear normal. There is no impacted cerumen.      Nose: Nose normal.      Mouth/Throat:      Mouth: Mucous membranes are moist.      Pharynx: Oropharynx is clear. No oropharyngeal exudate or posterior oropharyngeal erythema.   Eyes:      General: No scleral icterus.     Conjunctiva/sclera: Conjunctivae normal.   Neck:      Vascular: No carotid bruit.   Cardiovascular:      Rate and Rhythm: Normal rate. Rhythm irregular.      Pulses: Normal pulses.      Heart sounds: Normal heart sounds. No murmur heard.    No friction rub. No gallop.   Pulmonary:      Effort: Pulmonary effort is normal. No respiratory distress.      Breath sounds: Normal breath sounds. No stridor. No wheezing, rhonchi or rales.   Abdominal:      General: Abdomen is flat. Bowel  sounds are normal. There is no distension.      Palpations: Abdomen is soft. There is no mass.      Tenderness: There is no abdominal tenderness. There is no guarding or rebound.      Hernia: No hernia is present.   Musculoskeletal:         General: No swelling.      Cervical back: Normal range of motion and neck supple. No rigidity or tenderness.      Right lower leg: No edema.      Left lower leg: No edema.   Lymphadenopathy:      Cervical: No cervical adenopathy.   Skin:     General: Skin is warm and dry.      Findings: No rash.   Neurological:      General: No focal deficit present.      Mental Status: He is alert and oriented to person, place, and time.         Assessment:       Problem List Items Addressed This Visit     Atrial fibrillation - Primary    Hyperlipidemia    Anticoagulated on Coumadin    Relevant Orders    POCT INR (Completed)          Plan:       Albin was seen today for follow-up.    Diagnoses and all orders for this visit:    Chronic atrial fibrillation    Mixed hyperlipidemia    Anticoagulated on Coumadin  -     POCT INR    INR  2.4

## 2022-03-23 ENCOUNTER — TELEPHONE (OUTPATIENT)
Dept: FAMILY MEDICINE | Facility: CLINIC | Age: 67
End: 2022-03-23
Payer: MEDICARE

## 2022-03-23 ENCOUNTER — OFFICE VISIT (OUTPATIENT)
Dept: URGENT CARE | Facility: CLINIC | Age: 67
End: 2022-03-23
Payer: MEDICARE

## 2022-03-23 VITALS
HEIGHT: 69 IN | TEMPERATURE: 98 F | OXYGEN SATURATION: 97 % | WEIGHT: 224 LBS | SYSTOLIC BLOOD PRESSURE: 126 MMHG | HEART RATE: 65 BPM | RESPIRATION RATE: 24 BRPM | DIASTOLIC BLOOD PRESSURE: 82 MMHG | BODY MASS INDEX: 33.18 KG/M2

## 2022-03-23 DIAGNOSIS — H93.233 HEARING ABNORMALLY ACUTE, BILATERAL: Primary | ICD-10-CM

## 2022-03-23 DIAGNOSIS — R09.81 SINUS CONGESTION: ICD-10-CM

## 2022-03-23 DIAGNOSIS — R42 DIZZINESS: ICD-10-CM

## 2022-03-23 PROCEDURE — 99499 RISK ADDL DX/OHS AUDIT: ICD-10-PCS | Mod: S$GLB,,,

## 2022-03-23 PROCEDURE — 1159F PR MEDICATION LIST DOCUMENTED IN MEDICAL RECORD: ICD-10-PCS | Mod: CPTII,S$GLB,,

## 2022-03-23 PROCEDURE — 3074F SYST BP LT 130 MM HG: CPT | Mod: CPTII,S$GLB,,

## 2022-03-23 PROCEDURE — 3074F PR MOST RECENT SYSTOLIC BLOOD PRESSURE < 130 MM HG: ICD-10-PCS | Mod: CPTII,S$GLB,,

## 2022-03-23 PROCEDURE — 1160F RVW MEDS BY RX/DR IN RCRD: CPT | Mod: CPTII,S$GLB,,

## 2022-03-23 PROCEDURE — 3008F PR BODY MASS INDEX (BMI) DOCUMENTED: ICD-10-PCS | Mod: CPTII,S$GLB,,

## 2022-03-23 PROCEDURE — 3079F PR MOST RECENT DIASTOLIC BLOOD PRESSURE 80-89 MM HG: ICD-10-PCS | Mod: CPTII,S$GLB,,

## 2022-03-23 PROCEDURE — 3079F DIAST BP 80-89 MM HG: CPT | Mod: CPTII,S$GLB,,

## 2022-03-23 PROCEDURE — 1159F MED LIST DOCD IN RCRD: CPT | Mod: CPTII,S$GLB,,

## 2022-03-23 PROCEDURE — 99499 UNLISTED E&M SERVICE: CPT | Mod: S$GLB,,,

## 2022-03-23 PROCEDURE — 99214 OFFICE O/P EST MOD 30 MIN: CPT | Mod: S$GLB,,,

## 2022-03-23 PROCEDURE — 3008F BODY MASS INDEX DOCD: CPT | Mod: CPTII,S$GLB,,

## 2022-03-23 PROCEDURE — 99214 PR OFFICE/OUTPT VISIT, EST, LEVL IV, 30-39 MIN: ICD-10-PCS | Mod: S$GLB,,,

## 2022-03-23 PROCEDURE — 1160F PR REVIEW ALL MEDS BY PRESCRIBER/CLIN PHARMACIST DOCUMENTED: ICD-10-PCS | Mod: CPTII,S$GLB,,

## 2022-03-23 RX ORDER — AZELASTINE 1 MG/ML
1 SPRAY, METERED NASAL 2 TIMES DAILY
Qty: 30 ML | Refills: 0 | Status: SHIPPED | OUTPATIENT
Start: 2022-03-23 | End: 2022-09-22

## 2022-03-23 NOTE — PROGRESS NOTES
"Subjective:       Patient ID: Albin Fernandez is a 66 y.o. male.    Vitals:  height is 5' 9" (1.753 m) and weight is 101.6 kg (224 lb). His temperature is 98.4 °F (36.9 °C). His blood pressure is 126/82 and his pulse is 65. His respiration is 24 (abnormal) and oxygen saturation is 97%.     Chief Complaint: Dizziness and Hearing Problem    For the past three weeks he's noticed some differences in his hearing aid. Thinks something is wrong with his ears. States his hearing aid sounds muffled when he is on the phone with someone. He reports one episode of dizziness with nausea yesterday right after he stood up from the toliet. This has resolved and is currently not having any of those symptoms.       Constitution: Negative for chills, fever and unexpected weight change.   HENT: Positive for ear pain (right ear feels like there something in there), hearing loss (Described as hearing aids sound muffled when in) and postnasal drip. Negative for ear discharge, facial swelling, facial trauma, congestion, sinus pain, sinus pressure and sore throat.    Neck: Negative for neck pain and neck stiffness.   Cardiovascular: Negative for chest pain.   Eyes: Negative for eye pain, double vision and blurred vision.   Respiratory: Negative for chest tightness, cough and shortness of breath.    Gastrointestinal: Negative for abdominal pain, nausea, vomiting, constipation and diarrhea.   Genitourinary: Negative for dysuria and flank pain.   Neurological: Positive for dizziness (one episode yesterday, currently not dizzy). Negative for history of vertigo, light-headedness, passing out, facial drooping, coordination disturbances, loss of balance, disorientation, altered mental status, loss of consciousness, tingling and seizures.   Psychiatric/Behavioral: Negative for altered mental status, disorientation and confusion.       Objective:      Physical Exam   Constitutional: He is oriented to person, place, and time. He appears " well-developed. He is cooperative.  Non-toxic appearance. He does not appear ill. No distress.   HENT:   Head: Normocephalic and atraumatic.   Ears:   Right Ear: Tympanic membrane, external ear and ear canal normal. No drainage, swelling, tenderness or cerumen not present. No foreign bodies. No mastoid tenderness. Tympanic membrane is not injected, not perforated, not erythematous, not retracted and not bulging. No middle ear effusion. Decreased hearing is noted.   Left Ear: There is cerumen present (partial, TM still visable. ). No tenderness. No mastoid tenderness. Tympanic membrane is not injected, not scarred, not perforated, not erythematous, not retracted and not bulging.  No middle ear effusion. Decreased hearing is noted.   Nose: Nose normal. No mucosal edema, rhinorrhea or nasal deformity. No epistaxis. Right sinus exhibits no maxillary sinus tenderness and no frontal sinus tenderness. Left sinus exhibits no maxillary sinus tenderness and no frontal sinus tenderness.   Mouth/Throat: Uvula is midline, oropharynx is clear and moist and mucous membranes are normal. No trismus in the jaw. Normal dentition. No uvula swelling. No oropharyngeal exudate, posterior oropharyngeal edema or posterior oropharyngeal erythema.   Eyes: Conjunctivae and lids are normal. Pupils are equal, round, and reactive to light. No scleral icterus.      extraocular movement intact   Neck: Trachea normal and phonation normal. Neck supple. No edema present. No erythema present. No neck rigidity present.   Cardiovascular: Normal rate, regular rhythm, normal heart sounds and normal pulses.   Pulmonary/Chest: Effort normal and breath sounds normal. No respiratory distress. He has no decreased breath sounds. He has no rhonchi.   Abdominal: Normal appearance.   Musculoskeletal: Normal range of motion.         General: No deformity. Normal range of motion.   Neurological: no focal deficit. He is alert and oriented to person, place, and time.  He has normal motor skills, normal sensation, normal reflexes and intact cranial nerves. He displays facial symmetry and no dysarthria. He exhibits normal muscle tone. He has a normal Finger-Nose-Finger Test. Coordination: Heel to shin test normal. He displays no seizure activity. Gait normal. Coordination normal. GCS eye subscore is 4. GCS verbal subscore is 5. GCS motor subscore is 6.   Skin: Skin is warm, dry, intact, not diaphoretic and not pale. Capillary refill takes 2 to 3 seconds.   Psychiatric: His speech is normal and behavior is normal. Judgment and thought content normal.   Nursing note and vitals reviewed.        Assessment:       1. Hearing abnormally acute, bilateral    2. Dizziness    3. Sinus congestion          Plan:         Hearing abnormally acute, bilateral  -     Orthostatic vital signs    Dizziness  -     Orthostatic vital signs  -     Ambulatory referral/consult to ENT    Sinus congestion    Other orders  -     azelastine (ASTELIN) 137 mcg (0.1 %) nasal spray; 1 spray (137 mcg total) by Nasal route 2 (two) times daily.  Dispense: 30 mL; Refill: 0         Patient presents with complaints that is hearing aids sound muffled and feels like his right ear is clogged. Patient also had some dizziness and nausea yesterday after he stood up fast from the toilet. Currently not having any dizziness or nausea. Orthostatics were normal. No history of stroke, not smoker, has afib but INR controlled. Patient ear exam was normal, other than the left having scant amount of cerumen in left ear. Patient has no neuro deficits or weakness on exam. Patient will follow up with ENT, strict ED and fall precautions given. Patient verbalized understanding. Patient not candidate for Antivert or antihistamine, reports problem with urinary retention.

## 2022-03-23 NOTE — PATIENT INSTRUCTIONS
Follow up with ENT as discussed.     Follow up with your hearing aid company today to have hearing aid evaluated for any defects.     If you develop any dizziness, nausea, or worsening symptoms go to the emergency room for further evaluation and treatment.     Take flonase and Astelin as directed to relieve congestion and determine if this helps with the feeling of fluid on your right ear.

## 2022-03-25 ENCOUNTER — PATIENT MESSAGE (OUTPATIENT)
Dept: FAMILY MEDICINE | Facility: CLINIC | Age: 67
End: 2022-03-25
Payer: MEDICARE

## 2022-03-28 ENCOUNTER — PATIENT MESSAGE (OUTPATIENT)
Dept: FAMILY MEDICINE | Facility: CLINIC | Age: 67
End: 2022-03-28
Payer: MEDICARE

## 2022-03-30 ENCOUNTER — PES CALL (OUTPATIENT)
Dept: ADMINISTRATIVE | Facility: CLINIC | Age: 67
End: 2022-03-30
Payer: MEDICARE

## 2022-03-30 ENCOUNTER — TELEPHONE (OUTPATIENT)
Dept: CARDIOLOGY | Facility: CLINIC | Age: 67
End: 2022-03-30
Payer: MEDICARE

## 2022-03-30 NOTE — TELEPHONE ENCOUNTER
Called and verified  with wife Mrs. Draper . Patient needs to bring current  insurance card , and sign kiosk

## 2022-03-30 NOTE — TELEPHONE ENCOUNTER
----- Message from Chris Downey sent at 3/30/2022  9:44 AM CDT -----  Type: Needs Medical Advice  Who Called:  Pt's wife gregorio Garcia Call Back Number: [phone]    Additional Information: Sts that his first appt is tomorrow and she did the pre check but wants to know if there is any other paperwork they will need to complete and if it can be faxed to them so she can have it filled out before the appt.  [phone]  Please advise -- Thank you

## 2022-03-31 ENCOUNTER — OFFICE VISIT (OUTPATIENT)
Dept: CARDIOLOGY | Facility: CLINIC | Age: 67
End: 2022-03-31
Payer: MEDICARE

## 2022-03-31 ENCOUNTER — ANTI-COAG VISIT (OUTPATIENT)
Dept: CARDIOLOGY | Facility: CLINIC | Age: 67
End: 2022-03-31
Payer: MEDICARE

## 2022-03-31 ENCOUNTER — LAB VISIT (OUTPATIENT)
Dept: LAB | Facility: HOSPITAL | Age: 67
End: 2022-03-31
Attending: INTERNAL MEDICINE
Payer: MEDICARE

## 2022-03-31 VITALS
WEIGHT: 221 LBS | BODY MASS INDEX: 32.73 KG/M2 | HEART RATE: 89 BPM | OXYGEN SATURATION: 98 % | DIASTOLIC BLOOD PRESSURE: 80 MMHG | SYSTOLIC BLOOD PRESSURE: 126 MMHG | HEIGHT: 69 IN

## 2022-03-31 DIAGNOSIS — E78.2 MIXED HYPERLIPIDEMIA: ICD-10-CM

## 2022-03-31 DIAGNOSIS — I48.21 PERMANENT ATRIAL FIBRILLATION: Primary | ICD-10-CM

## 2022-03-31 DIAGNOSIS — I48.21 PERMANENT ATRIAL FIBRILLATION: ICD-10-CM

## 2022-03-31 PROBLEM — I42.0 DILATED CARDIOMYOPATHY: Status: RESOLVED | Noted: 2021-02-09 | Resolved: 2022-03-31

## 2022-03-31 LAB
BNP SERPL-MCNC: 177 PG/ML (ref 0–99)
INR PPP: 3
PROTHROMBIN TIME: 29.6 SEC (ref 11.4–13.7)

## 2022-03-31 PROCEDURE — 1159F MED LIST DOCD IN RCRD: CPT | Mod: CPTII,S$GLB,, | Performed by: INTERNAL MEDICINE

## 2022-03-31 PROCEDURE — 3008F PR BODY MASS INDEX (BMI) DOCUMENTED: ICD-10-PCS | Mod: CPTII,S$GLB,, | Performed by: INTERNAL MEDICINE

## 2022-03-31 PROCEDURE — 1101F PR PT FALLS ASSESS DOC 0-1 FALLS W/OUT INJ PAST YR: ICD-10-PCS | Mod: CPTII,S$GLB,, | Performed by: INTERNAL MEDICINE

## 2022-03-31 PROCEDURE — 99204 OFFICE O/P NEW MOD 45 MIN: CPT | Mod: S$GLB,,, | Performed by: INTERNAL MEDICINE

## 2022-03-31 PROCEDURE — 3079F PR MOST RECENT DIASTOLIC BLOOD PRESSURE 80-89 MM HG: ICD-10-PCS | Mod: CPTII,S$GLB,, | Performed by: INTERNAL MEDICINE

## 2022-03-31 PROCEDURE — 3074F PR MOST RECENT SYSTOLIC BLOOD PRESSURE < 130 MM HG: ICD-10-PCS | Mod: CPTII,S$GLB,, | Performed by: INTERNAL MEDICINE

## 2022-03-31 PROCEDURE — 83880 ASSAY OF NATRIURETIC PEPTIDE: CPT | Performed by: INTERNAL MEDICINE

## 2022-03-31 PROCEDURE — 85610 PROTHROMBIN TIME: CPT | Performed by: INTERNAL MEDICINE

## 2022-03-31 PROCEDURE — 1126F PR PAIN SEVERITY QUANTIFIED, NO PAIN PRESENT: ICD-10-PCS | Mod: CPTII,S$GLB,, | Performed by: INTERNAL MEDICINE

## 2022-03-31 PROCEDURE — 36415 COLL VENOUS BLD VENIPUNCTURE: CPT | Performed by: INTERNAL MEDICINE

## 2022-03-31 PROCEDURE — 3079F DIAST BP 80-89 MM HG: CPT | Mod: CPTII,S$GLB,, | Performed by: INTERNAL MEDICINE

## 2022-03-31 PROCEDURE — 3074F SYST BP LT 130 MM HG: CPT | Mod: CPTII,S$GLB,, | Performed by: INTERNAL MEDICINE

## 2022-03-31 PROCEDURE — 93000 ELECTROCARDIOGRAM COMPLETE: CPT | Mod: S$GLB,,, | Performed by: GENERAL PRACTICE

## 2022-03-31 PROCEDURE — 3288F FALL RISK ASSESSMENT DOCD: CPT | Mod: CPTII,S$GLB,, | Performed by: INTERNAL MEDICINE

## 2022-03-31 PROCEDURE — 99204 PR OFFICE/OUTPT VISIT, NEW, LEVL IV, 45-59 MIN: ICD-10-PCS | Mod: S$GLB,,, | Performed by: INTERNAL MEDICINE

## 2022-03-31 PROCEDURE — 3288F PR FALLS RISK ASSESSMENT DOCUMENTED: ICD-10-PCS | Mod: CPTII,S$GLB,, | Performed by: INTERNAL MEDICINE

## 2022-03-31 PROCEDURE — 93000 EKG 12-LEAD: ICD-10-PCS | Mod: S$GLB,,, | Performed by: GENERAL PRACTICE

## 2022-03-31 PROCEDURE — 1126F AMNT PAIN NOTED NONE PRSNT: CPT | Mod: CPTII,S$GLB,, | Performed by: INTERNAL MEDICINE

## 2022-03-31 PROCEDURE — 3008F BODY MASS INDEX DOCD: CPT | Mod: CPTII,S$GLB,, | Performed by: INTERNAL MEDICINE

## 2022-03-31 PROCEDURE — 1101F PT FALLS ASSESS-DOCD LE1/YR: CPT | Mod: CPTII,S$GLB,, | Performed by: INTERNAL MEDICINE

## 2022-03-31 PROCEDURE — 1159F PR MEDICATION LIST DOCUMENTED IN MEDICAL RECORD: ICD-10-PCS | Mod: CPTII,S$GLB,, | Performed by: INTERNAL MEDICINE

## 2022-03-31 NOTE — PROGRESS NOTES
[facility] - Cardiology    Subjective:     Patient ID:  Albin Fernandez is a 66 y.o. male patient here for evaluation Establish Care (New patient ) and Atrial Fibrillation (Patient takes medication for A Fib )      HPI:  66-year-old male here to establish care.  Patient reports he is being treated by outside cardiologist but would like to transfer his care here as the cardiologist does not visit this hospital anymore.  He reports a longstanding history of atrial fibrillation and that he has been shocked multiple times and he never comes of atrial fibrillation.  Does not report any symptoms with it.  He has been active at home without any symptoms of chest pain or shortness of breath.  Also reports he had angiogram multiple years ago which did not show any significant obstructive heart disease.    Review of Systems   All other systems reviewed and are negative.       Past Medical History:   Diagnosis Date    Arthritis     Atrial fibrillation     BPH with obstruction/lower urinary tract symptoms     Cardiomyopathy, dilated     Disorder of kidney and ureter     Hyperlipidemia     Hypertension     Hypothyroidism     Sleep apnea        Past Surgical History:   Procedure Laterality Date    ARTHROSCOPY OF ELBOW Left 08/09/2019    Procedure: ARTHROSCOPY, ELBOW;  Surgeon: Giovany Marquez II, MD;  Location: [facility] [place] OR;  Service: Orthopedics;  Laterality: Left;    BACK SURGERY      CATARACT EXTRACTION Right     FOOT SURGERY      IRRIGATION AND DEBRIDEMENT OF UPPER EXTREMITY  08/09/2019    Procedure: IRRIGATION AND DEBRIDEMENT, UPPER EXTREMITY;  Surgeon: Giovany Marquez II, MD;  Location: [facility] [place] OR;  Service: Orthopedics;;    KIDNEY SURGERY      one kidney removed    NEPHRECTOMY      TRANSRECTAL ULTRASOUND EXAMINATION N/A 11/23/2020    Procedure: TRANSRECTAL ULTRASOUND;  Surgeon: Roselyn Shrestha MD;  Location[facility] OR;  Service: Urology;  Laterality: N/A;    TRANSURETHRAL RESECTION OF PROSTATE N/A 01/07/2022     Procedure: TURP (TRANSURETHRAL RESECTION OF PROSTATE);  Surgeon: Cate Sharp MD;  [facility]: [facility];  Service: Urology;  Laterality: N/A;       Family History   Problem Relation Age of Onset    Cirrhosis Father     Pancreatic cancer Mother     Breast cancer Sister        Social History     Socioeconomic History    Marital status:    Tobacco Use    Smoking status: Never Smoker    Smokeless tobacco: Never Used   Substance and Sexual Activity    Alcohol use: No    Drug use: No    Sexual activity: Yes     Partners: Female       Current Outpatient Medications   Medication Sig Dispense Refill    azelastine (ASTELIN) 137 mcg (0.1 %) nasal spray 1 spray (137 mcg total) by Nasal route 2 (two) times daily. 30 mL 0    co-enzyme Q-10 30 mg capsule Take 30 mg by mouth once daily.      CYANOCOBALAMIN, VITAMIN B-12, (VITAMIN B-12 ORAL) Take 1 mg by mouth once daily.      fluticasone propionate (FLONASE) 50 mcg/actuation nasal spray INHALE 2 SPRAYS INTO EACH NOSTRIL ONCE DAILY 16 g 11    HYDROcodone-acetaminophen (NORCO)  mg per tablet Take 1 tablet by mouth 2 (two) times daily.      levothyroxine (SYNTHROID) 112 MCG tablet TAKE 1 TABLET BY MOUTH ONCE DAILY FIRST THING IN MORNING ON EMPTY STOMACH WITH FULL GLASS OF WATER 90 tablet 3    metoprolol succinate (TOPROL-XL) 50 MG 24 hr tablet TAKE 1 TABLET (50 MG TOTAL) BY MOUTH ONCE DAILY. 30 tablet 11    senna-docusate 8.6-50 mg (SENNA WITH DOCUSATE SODIUM) 8.6-50 mg per tablet Take 2 tablets by mouth 2 (two) times daily. 120 tablet 5    simvastatin (ZOCOR) 40 MG tablet Take 1 tablet (40 mg total) by mouth every evening. 30 tablet 11    valACYclovir (VALTREX) 1000 MG tablet Take 0.5 tablets (500 mg total) by mouth 3 (three) times daily. for 3 days 5 tablet 4    warfarin (COUMADIN) 1 MG tablet TAKE ONE TABLET BY MOUTH DAILY ALONG WITH THE 2MG TABLET 90 tablet 1    warfarin (COUMADIN) 2 MG tablet Take 1.5 tablets (3 mg total) by mouth Daily. 135  tablet 1    furosemide (LASIX) 20 MG tablet Take 1 tablet (20 mg total) by mouth daily as needed. 90 tablet 3    ipratropium (ATROVENT) 42 mcg (0.06 %) nasal spray 2 sprays by Nasal route 4 (four) times daily. (Patient not taking: Reported on 3/31/2022) 15 mL 5    ofloxacin (OCUFLOX) 0.3 % ophthalmic solution       prednisoLONE acetate (PRED FORTE) 1 % DrpS       tadalafiL (CIALIS) 20 MG Tab Take 1 tablet (20 mg total) by mouth daily as needed (intercourse). (Patient not taking: Reported on 3/31/2022) 30 tablet 6    zafirlukast (ACCOLATE) 20 MG tablet TAKE 1 TABLET (20 MG TOTAL) BY MOUTH 2 (TWO) TIMES DAILY.       No current facility-administered medications for this visit.     Facility-Administered Medications Ordered in Other Visits   Medication Dose Route Frequency Provider Last Rate Last Admin    electrolyte-S (ISOLYTE)   Intravenous Continuous Casi Murillo MD 10 mL/hr at 01/07/22 0829 New Bag at 01/07/22 1043    HYDROmorphone (PF) injection 0.2 mg  0.2 mg Intravenous Q5 Min PRN Casi Murillo MD        LIDOcaine (PF) 10 mg/ml (1%) injection 10 mg  1 mL Intradermal Once PRN Casi Murillo MD        LIDOcaine (PF) 10 mg/ml (1%) injection 10 mg  1 mL Intradermal Once Casi Murillo MD        oxyCODONE immediate release tablet 5 mg  5 mg Oral Q3H PRN Casi Murillo MD   5 mg at 01/07/22 1110       Review of patient's allergies indicates:   Allergen Reactions    Antihistamines - alkylamine Other (See Comments)     Urinary retention         Objective:        Vitals:    03/31/22 1155   BP: 126/80   Pulse: 89       Physical Exam  Vitals reviewed.   Constitutional:       Appearance: Normal appearance.   HENT:      Mouth/Throat:      Mouth: Mucous membranes are moist.   Eyes:      Extraocular Movements: Extraocular movements intact.      Pupils: Pupils are equal, round, and reactive to light.   Cardiovascular:      Rate and Rhythm: Normal rate. Rhythm irregular.      Pulses:  Normal pulses.      Heart sounds: Normal heart sounds. No murmur heard.    No gallop.   Pulmonary:      Effort: Pulmonary effort is normal.      Breath sounds: Normal breath sounds.   Abdominal:      General: Bowel sounds are normal.      Palpations: Abdomen is soft.   Musculoskeletal:         General: Normal range of motion.      Cervical back: Normal range of motion.   Skin:     General: Skin is warm and dry.   Neurological:      General: No focal deficit present.      Mental Status: He is alert and oriented to person, place, and time.   Psychiatric:         Mood and Affect: Mood normal.         LIPIDS - LAST 2   Lab Results   Component Value Date    CHOL 144 07/16/2021    CHOL 160 07/09/2020    HDL 33 (L) 07/16/2021    HDL 41 07/09/2020    LDLCALC 89.0 07/16/2021    LDLCALC 96.6 07/09/2020    TRIG 110 07/16/2021    TRIG 112 07/09/2020    CHOLHDL 22.9 07/16/2021    CHOLHDL 25.6 07/09/2020       CBC - LAST 2  Lab Results   Component Value Date    WBC 6.06 01/07/2022    WBC 5.06 07/16/2021    RBC 4.80 01/07/2022    RBC 4.57 (L) 07/16/2021    HGB 15.3 01/07/2022    HGB 14.4 07/16/2021    HCT 45.8 01/07/2022    HCT 42.9 07/16/2021    MCV 95 01/07/2022    MCV 94 07/16/2021    MCH 31.9 (H) 01/07/2022    MCH 31.5 (H) 07/16/2021    MCHC 33.4 01/07/2022    MCHC 33.6 07/16/2021    RDW 13.0 01/07/2022    RDW 13.0 07/16/2021     01/07/2022     07/16/2021    MPV 9.5 01/07/2022    MPV 9.8 07/16/2021    GRAN 3.4 01/07/2022    GRAN 55.7 01/07/2022    LYMPH 1.6 01/07/2022    LYMPH 26.9 01/07/2022    MONO 0.7 01/07/2022    MONO 12.2 01/07/2022    BASO 0.05 01/07/2022    BASO 0.03 07/16/2021    NRBC 0 01/07/2022    NRBC 0 07/16/2021       CHEMISTRY & LIVER FUNCTION - LAST 2  Lab Results   Component Value Date     01/07/2022     11/01/2021    K 3.9 01/07/2022    K 4.3 11/01/2021     01/07/2022     11/01/2021    CO2 29 01/07/2022    CO2 29 11/01/2021    ANIONGAP 8 01/07/2022    ANIONGAP 9  11/01/2021    BUN 13 01/07/2022    BUN 13 11/01/2021    CREATININE 1.1 01/07/2022    CREATININE 1.0 11/01/2021    GLU 96 01/07/2022    GLU 88 11/01/2021    CALCIUM 9.5 01/07/2022    CALCIUM 9.6 11/01/2021    ALBUMIN 4.0 11/01/2021    ALBUMIN 3.6 07/16/2021    PROT 7.2 11/01/2021    PROT 6.6 07/16/2021    ALKPHOS 73 11/01/2021    ALKPHOS 70 07/16/2021    ALT 18 11/01/2021    ALT 18 07/16/2021    AST 21 11/01/2021    AST 23 07/16/2021    BILITOT 0.5 11/01/2021    BILITOT 0.6 07/16/2021        CARDIAC PROFILE - LAST 2  Lab Results   Component Value Date     (H) 05/28/2018        COAGULATION - LAST 2  Lab Results   Component Value Date    INR 2.4 03/04/2022    INR 1.0 01/07/2022    APTT 28.5 08/09/2019       ENDOCRINE & PSA - LAST 2  Lab Results   Component Value Date    TSH 2.184 07/16/2021    TSH 2.310 07/09/2020        ECHOCARDIOGRAM RESULTS  No results found for this or any previous visit.      CURRENT/PREVIOUS VISIT EKG  Results for orders placed or performed during the hospital encounter of 01/07/22   EKG 12-lead    Collection Time: 01/07/22  8:16 AM    Narrative    Test Reason : Z01.818,    Vent. Rate : 076 BPM     Atrial Rate : 133 BPM     P-R Int : 000 ms          QRS Dur : 086 ms      QT Int : 378 ms       P-R-T Axes : 000 030 043 degrees     QTc Int : 425 ms    Atrial fibrillation  Abnormal ECG  When compared with ECG of 30-JUL-2019 08:23,  No significant change was found  Confirmed by Carlotta PARTIDA, Donovan ([id number]) on 1/14/2022 7:25:53 PM    Referred By: MARKUS CHRISTENSEN           Confirmed By:Donovan Laguerre MD     No valid procedures specified.   No results found for this or any previous visit.    No valid procedures specified.        Assessment:       1. Permanent atrial fibrillation    2. Mixed hyperlipidemia           Plan:       Permanent atrial fibrillation  -     Ambulatory referral/consult to Cardiology  -     IN OFFICE EKG 12-LEAD (to [facility])  -     Echo; Future  -     B-TYPE NATRIURETIC PEPTIDE;  Future; Expected date: 03/31/2022  -     Ambulatory referral/consult to Anticoagulation Monitoring; Future; Expected date: 04/07/2022  -     Protime-INR; Future; Expected date: 03/31/2022    Mixed hyperlipidemia    Doing well from atrial fibrillation standpoint.  Patient would like to get of metoprolol as it makes him tired.  Will consider switching him to calcium channel blocker if his EF is normal on the echocardiogram.  Continue with warfarin.  Unsure why patient is on warfarin instead of Eliquis, he reports his cardiologist told him that it is better to be on warfarin when the stain atrial fibrillation.  Will get an echocardiogram, if no evidence of structural heart disease, will transition him to Eliquis.  Hyperlipidemia is well controlled, continue with simvastatin.  Follow up in about 4 weeks (around 4/28/2022) for f/u echo.          Don Deleon MD  [facility] - Cardiology

## 2022-03-31 NOTE — PROGRESS NOTES
67 y/o M w/ hx Afib, HTN, hypothyroidism, dyslipidemia.     INR at goal. Medications and chart reviewed. No changes noted to necessitate adjustment of warfarin or follow-up plan. See calendar.  Pt to continue home dose of warfarin 3mg daily. Will re-assess in 2 weeks.

## 2022-04-06 ENCOUNTER — TELEPHONE (OUTPATIENT)
Dept: FAMILY MEDICINE | Facility: CLINIC | Age: 67
End: 2022-04-06
Payer: MEDICARE

## 2022-04-06 NOTE — TELEPHONE ENCOUNTER
----- Message from Vladimir Tafoya, Patient Care Assistant sent at 4/6/2022  2:55 PM CDT -----  Contact: [facility]  Type: Needs Medical Advice    Who Called: Sarah- [facility]  Best Call Back Number: [phone]   Inquiry/Question: Pt is requesting to have PT INR finger sticks and Sarah is calling to see if that is possible. Please call back and advise. Thank you~

## 2022-04-06 NOTE — TELEPHONE ENCOUNTER
Spoke with Pearl Sarah via phone. Advised that we do not do POCT PT/INR here either. Pt will need to go to lab for blood draw. All understanding.

## 2022-04-06 NOTE — PROGRESS NOTES
Spoke with Albinjose Fernandez. Pt education reviewed regarding when to call the coumadin clinic as well as diet while taking warfarin. Pt confirmed he has a 1mg and 2mg warfarin tablet and takes 3mg QHS. He avoids vitamin K foods and does not drink alcohol. Pt's questions addressed and he verbalized understanding of all information provided.     Update: Pt states that he would prefer have fingerstick POCT at his previous PCP (Dr. Chang's) office in [place] and prefer to avoid venipuncture. I placed a call to that [facility] office, spoke with nurse Nur, who said they would be unable to accommodate pt's request. I notified Mr. Fernandez that the clinic would not be able to do a fingerstick. Pt states he will not get venipuncture due to painful sticks and scar tissue. He refused to lab draw and said he will wait to be switched to Eliquis.

## 2022-04-28 ENCOUNTER — PATIENT MESSAGE (OUTPATIENT)
Dept: CARDIOLOGY | Facility: CLINIC | Age: 67
End: 2022-04-28
Payer: MEDICARE

## 2022-04-28 NOTE — PROGRESS NOTES
Pt called to let us know that he is seeing Dr. Deleon tomorrow and is hoping to switch to Eliquis. He will call coumadin clinic and let us know if he is switching. Pt declined to get venipuncture INR done due to previous venipuncture trauma.

## 2022-04-29 ENCOUNTER — OFFICE VISIT (OUTPATIENT)
Dept: CARDIOLOGY | Facility: CLINIC | Age: 67
End: 2022-04-29
Payer: MEDICARE

## 2022-04-29 VITALS
BODY MASS INDEX: 32.88 KG/M2 | WEIGHT: 222 LBS | DIASTOLIC BLOOD PRESSURE: 80 MMHG | HEART RATE: 83 BPM | HEIGHT: 69 IN | OXYGEN SATURATION: 97 % | SYSTOLIC BLOOD PRESSURE: 118 MMHG

## 2022-04-29 DIAGNOSIS — M79.10 MYALGIA DUE TO STATIN: ICD-10-CM

## 2022-04-29 DIAGNOSIS — E78.2 MIXED HYPERLIPIDEMIA: ICD-10-CM

## 2022-04-29 DIAGNOSIS — T46.6X5A MYALGIA DUE TO STATIN: ICD-10-CM

## 2022-04-29 DIAGNOSIS — I83.91 VARICOSE VEINS OF RIGHT LOWER EXTREMITY, UNSPECIFIED WHETHER COMPLICATED: ICD-10-CM

## 2022-04-29 DIAGNOSIS — I48.21 PERMANENT ATRIAL FIBRILLATION: Primary | ICD-10-CM

## 2022-04-29 DIAGNOSIS — I50.20 HFREF (HEART FAILURE WITH REDUCED EJECTION FRACTION): ICD-10-CM

## 2022-04-29 PROCEDURE — 3079F PR MOST RECENT DIASTOLIC BLOOD PRESSURE 80-89 MM HG: ICD-10-PCS | Mod: CPTII,S$GLB,, | Performed by: INTERNAL MEDICINE

## 2022-04-29 PROCEDURE — 3288F FALL RISK ASSESSMENT DOCD: CPT | Mod: CPTII,S$GLB,, | Performed by: INTERNAL MEDICINE

## 2022-04-29 PROCEDURE — 1126F PR PAIN SEVERITY QUANTIFIED, NO PAIN PRESENT: ICD-10-PCS | Mod: CPTII,S$GLB,, | Performed by: INTERNAL MEDICINE

## 2022-04-29 PROCEDURE — 1126F AMNT PAIN NOTED NONE PRSNT: CPT | Mod: CPTII,S$GLB,, | Performed by: INTERNAL MEDICINE

## 2022-04-29 PROCEDURE — 1101F PT FALLS ASSESS-DOCD LE1/YR: CPT | Mod: CPTII,S$GLB,, | Performed by: INTERNAL MEDICINE

## 2022-04-29 PROCEDURE — 3008F BODY MASS INDEX DOCD: CPT | Mod: CPTII,S$GLB,, | Performed by: INTERNAL MEDICINE

## 2022-04-29 PROCEDURE — 99499 RISK ADDL DX/OHS AUDIT: ICD-10-PCS | Mod: S$GLB,,, | Performed by: INTERNAL MEDICINE

## 2022-04-29 PROCEDURE — 3074F SYST BP LT 130 MM HG: CPT | Mod: CPTII,S$GLB,, | Performed by: INTERNAL MEDICINE

## 2022-04-29 PROCEDURE — 99214 OFFICE O/P EST MOD 30 MIN: CPT | Mod: S$GLB,,, | Performed by: INTERNAL MEDICINE

## 2022-04-29 PROCEDURE — 3008F PR BODY MASS INDEX (BMI) DOCUMENTED: ICD-10-PCS | Mod: CPTII,S$GLB,, | Performed by: INTERNAL MEDICINE

## 2022-04-29 PROCEDURE — 3074F PR MOST RECENT SYSTOLIC BLOOD PRESSURE < 130 MM HG: ICD-10-PCS | Mod: CPTII,S$GLB,, | Performed by: INTERNAL MEDICINE

## 2022-04-29 PROCEDURE — 99214 PR OFFICE/OUTPT VISIT, EST, LEVL IV, 30-39 MIN: ICD-10-PCS | Mod: S$GLB,,, | Performed by: INTERNAL MEDICINE

## 2022-04-29 PROCEDURE — 3288F PR FALLS RISK ASSESSMENT DOCUMENTED: ICD-10-PCS | Mod: CPTII,S$GLB,, | Performed by: INTERNAL MEDICINE

## 2022-04-29 PROCEDURE — 99499 UNLISTED E&M SERVICE: CPT | Mod: S$GLB,,, | Performed by: INTERNAL MEDICINE

## 2022-04-29 PROCEDURE — 3079F DIAST BP 80-89 MM HG: CPT | Mod: CPTII,S$GLB,, | Performed by: INTERNAL MEDICINE

## 2022-04-29 PROCEDURE — 1101F PR PT FALLS ASSESS DOC 0-1 FALLS W/OUT INJ PAST YR: ICD-10-PCS | Mod: CPTII,S$GLB,, | Performed by: INTERNAL MEDICINE

## 2022-04-29 RX ORDER — ATORVASTATIN CALCIUM 10 MG/1
10 TABLET, FILM COATED ORAL NIGHTLY
Qty: 90 TABLET | Refills: 3 | Status: SHIPPED | OUTPATIENT
Start: 2022-04-29 | End: 2023-04-26

## 2022-04-29 NOTE — PROGRESS NOTES
[facility] - Cardiology    Subjective:     Patient ID:  Albin Fernandez is a 66 y.o. male patient here for evaluation Follow-up (ECHO)      HPI:  66-year-old male here for follow-up of his atrial fibrillation.  He has had a longstanding history of AFib.  He had an echocardiogram done here which showed normal EF.  Patient brought his echo report sent stress test report from [facility] done earlier which showed a EF of 35-40% with normal perfusion imaging.  Patient reports doing well at home without any chest pain or shortness of breath.  No palpitations.  He would like to consider switching to Eliquis from Coumadin at this point.    Review of Systems   All other systems reviewed and are negative.       Past Medical History:   Diagnosis Date    Arthritis     Atrial fibrillation     BPH with obstruction/lower urinary tract symptoms     Cardiomyopathy, dilated     Disorder of kidney and ureter     Hyperlipidemia     Hypertension     Hypothyroidism     Sleep apnea        Past Surgical History:   Procedure Laterality Date    ARTHROSCOPY OF ELBOW Left 08/09/2019    Procedure: ARTHROSCOPY, ELBOW;  Surgeon: Giovany Marquez II, MD;  Location: [facility] OR;  Service: Orthopedics;  Laterality: Left;    BACK SURGERY      CATARACT EXTRACTION Right     FOOT SURGERY      IRRIGATION AND DEBRIDEMENT OF UPPER EXTREMITY  08/09/2019    Procedure: IRRIGATION AND DEBRIDEMENT, UPPER EXTREMITY;  Surgeon: Giovany Marquez II, MD;  Location: [facility] OR;  Service: Orthopedics;;    KIDNEY SURGERY      one kidney removed    NEPHRECTOMY      TRANSRECTAL ULTRASOUND EXAMINATION N/A 11/23/2020    Procedure: TRANSRECTAL ULTRASOUND;  Surgeon: Roselyn Shrestha MD;  Location: [facility] OR;  Service: Urology;  Laterality: N/A;    TRANSURETHRAL RESECTION OF PROSTATE N/A 01/07/2022    Procedure: TURP (TRANSURETHRAL RESECTION OF PROSTATE);  Surgeon: Cate Sharp MD;  Location: [facility] OR;  Service: Urology;  Laterality: N/A;       Family  History   Problem Relation Age of Onset    Cirrhosis Father     Pancreatic cancer Mother     Breast cancer Sister        Social History     Socioeconomic History    Marital status:    Tobacco Use    Smoking status: Never Smoker    Smokeless tobacco: Never Used   Substance and Sexual Activity    Alcohol use: No    Drug use: No    Sexual activity: Yes     Partners: Female       Current Outpatient Medications   Medication Sig Dispense Refill    azelastine (ASTELIN) 137 mcg (0.1 %) nasal spray 1 spray (137 mcg total) by Nasal route 2 (two) times daily. 30 mL 0    co-enzyme Q-10 30 mg capsule Take 30 mg by mouth once daily.      fluticasone propionate (FLONASE) 50 mcg/actuation nasal spray INHALE 2 SPRAYS INTO EACH NOSTRIL ONCE DAILY 16 g 11    HYDROcodone-acetaminophen (NORCO)  mg per tablet Take 1 tablet by mouth 2 (two) times daily.      levothyroxine (SYNTHROID) 112 MCG tablet TAKE 1 TABLET BY MOUTH ONCE DAILY FIRST THING IN MORNING ON EMPTY STOMACH WITH FULL GLASS OF WATER 90 tablet 3    metoprolol succinate (TOPROL-XL) 50 MG 24 hr tablet TAKE 1 TABLET (50 MG TOTAL) BY MOUTH ONCE DAILY. 30 tablet 11    senna-docusate 8.6-50 mg (SENNA WITH DOCUSATE SODIUM) 8.6-50 mg per tablet Take 2 tablets by mouth 2 (two) times daily. 120 tablet 5    warfarin (COUMADIN) 1 MG tablet TAKE ONE TABLET BY MOUTH DAILY ALONG WITH THE 2MG TABLET 90 tablet 1    warfarin (COUMADIN) 2 MG tablet Take 1.5 tablets (3 mg total) by mouth Daily. 135 tablet 1    apixaban (ELIQUIS) 5 mg Tab Take 1 tablet (5 mg total) by mouth 2 (two) times daily. 180 tablet 3    atorvastatin (LIPITOR) 10 MG tablet Take 1 tablet (10 mg total) by mouth every evening. 90 tablet 3    CYANOCOBALAMIN, VITAMIN B-12, (VITAMIN B-12 ORAL) Take 1 mg by mouth once daily.      furosemide (LASIX) 20 MG tablet Take 1 tablet (20 mg total) by mouth daily as needed. 90 tablet 3    ipratropium (ATROVENT) 42 mcg (0.06 %) nasal spray 2 sprays by Nasal  route 4 (four) times daily. (Patient not taking: No sig reported) 15 mL 5    ofloxacin (OCUFLOX) 0.3 % ophthalmic solution       prednisoLONE acetate (PRED FORTE) 1 % DrpS       tadalafiL (CIALIS) 20 MG Tab Take 1 tablet (20 mg total) by mouth daily as needed (intercourse). (Patient not taking: No sig reported) 30 tablet 6    valACYclovir (VALTREX) 1000 MG tablet Take 0.5 tablets (500 mg total) by mouth 3 (three) times daily. for 3 days 5 tablet 4     No current facility-administered medications for this visit.     Facility-Administered Medications Ordered in Other Visits   Medication Dose Route Frequency Provider Last Rate Last Admin    electrolyte-S (ISOLYTE)   Intravenous Continuous Casi Murillo MD 10 mL/hr at 01/07/22 0829 New Bag at 01/07/22 1043    HYDROmorphone (PF) injection 0.2 mg  0.2 mg Intravenous Q5 Min PRN Casi Murillo MD        LIDOcaine (PF) 10 mg/ml (1%) injection 10 mg  1 mL Intradermal Once PRN Casi Murillo MD        LIDOcaine (PF) 10 mg/ml (1%) injection 10 mg  1 mL Intradermal Once Casi Murillo MD        oxyCODONE immediate release tablet 5 mg  5 mg Oral Q3H PRN Casi Murillo MD   5 mg at 01/07/22 1110       Review of patient's allergies indicates:   Allergen Reactions    Antihistamines - alkylamine Other (See Comments)     Urinary retention         Objective:        Vitals:    04/29/22 1016   BP: 118/80   Pulse: 83       Physical Exam  Vitals reviewed.   Constitutional:       Appearance: Normal appearance.   HENT:      Mouth/Throat:      Mouth: Mucous membranes are moist.   Eyes:      Extraocular Movements: Extraocular movements intact.      Pupils: Pupils are equal, round, and reactive to light.   Cardiovascular:      Rate and Rhythm: Normal rate. Rhythm irregular.      Pulses: Normal pulses.      Heart sounds: Normal heart sounds. No murmur heard.    No gallop.   Pulmonary:      Effort: Pulmonary effort is normal.      Breath sounds: Normal  breath sounds.   Abdominal:      General: Bowel sounds are normal.      Palpations: Abdomen is soft.   Musculoskeletal:         General: Normal range of motion.      Cervical back: Normal range of motion.   Skin:     General: Skin is warm and dry.   Neurological:      General: No focal deficit present.      Mental Status: He is alert and oriented to person, place, and time.   Psychiatric:         Mood and Affect: Mood normal.         LIPIDS - LAST 2   Lab Results   Component Value Date    CHOL 144 07/16/2021    CHOL 160 07/09/2020    HDL 33 (L) 07/16/2021    HDL 41 07/09/2020    LDLCALC 89.0 07/16/2021    LDLCALC 96.6 07/09/2020    TRIG 110 07/16/2021    TRIG 112 07/09/2020    CHOLHDL 22.9 07/16/2021    CHOLHDL 25.6 07/09/2020       CBC - LAST 2  Lab Results   Component Value Date    WBC 6.06 01/07/2022    WBC 5.06 07/16/2021    RBC 4.80 01/07/2022    RBC 4.57 (L) 07/16/2021    HGB 15.3 01/07/2022    HGB 14.4 07/16/2021    HCT 45.8 01/07/2022    HCT 42.9 07/16/2021    MCV 95 01/07/2022    MCV 94 07/16/2021    MCH 31.9 (H) 01/07/2022    MCH 31.5 (H) 07/16/2021    MCHC 33.4 01/07/2022    MCHC 33.6 07/16/2021    RDW 13.0 01/07/2022    RDW 13.0 07/16/2021     01/07/2022     07/16/2021    MPV 9.5 01/07/2022    MPV 9.8 07/16/2021    GRAN 3.4 01/07/2022    GRAN 55.7 01/07/2022    LYMPH 1.6 01/07/2022    LYMPH 26.9 01/07/2022    MONO 0.7 01/07/2022    MONO 12.2 01/07/2022    BASO 0.05 01/07/2022    BASO 0.03 07/16/2021    NRBC 0 01/07/2022    NRBC 0 07/16/2021       CHEMISTRY & LIVER FUNCTION - LAST 2  Lab Results   Component Value Date     01/07/2022     11/01/2021    K 3.9 01/07/2022    K 4.3 11/01/2021     01/07/2022     11/01/2021    CO2 29 01/07/2022    CO2 29 11/01/2021    ANIONGAP 8 01/07/2022    ANIONGAP 9 11/01/2021    BUN 13 01/07/2022    BUN 13 11/01/2021    CREATININE 1.1 01/07/2022    CREATININE 1.0 11/01/2021    GLU 96 01/07/2022    GLU 88 11/01/2021    CALCIUM 9.5  01/07/2022    CALCIUM 9.6 11/01/2021    ALBUMIN 4.0 11/01/2021    ALBUMIN 3.6 07/16/2021    PROT 7.2 11/01/2021    PROT 6.6 07/16/2021    ALKPHOS 73 11/01/2021    ALKPHOS 70 07/16/2021    ALT 18 11/01/2021    ALT 18 07/16/2021    AST 21 11/01/2021    AST 23 07/16/2021    BILITOT 0.5 11/01/2021    BILITOT 0.6 07/16/2021        CARDIAC PROFILE - LAST 2  Lab Results   Component Value Date     (H) 03/31/2022     (H) 05/28/2018        COAGULATION - LAST 2  Lab Results   Component Value Date    LABPT 29.6 (H) 03/31/2022    INR 3.0 03/31/2022    INR 2.4 03/04/2022    APTT 28.5 08/09/2019       ENDOCRINE & PSA - LAST 2  Lab Results   Component Value Date    TSH 2.184 07/16/2021    TSH 2.310 07/09/2020        ECHOCARDIOGRAM RESULTS  Results for orders placed during the hospital encounter of 04/12/22    Echo    Interpretation Summary  · The left ventricle is normal in size with mild concentric hypertrophy and normal systolic function.  · The estimated ejection fraction is 55%.  · Atrial fibrillation observed.  · Normal right ventricular size with normal right ventricular systolic function.  · Moderate to severe left atrial enlargement.  · Mild right atrial enlargement.  · Mild aortic regurgitation.  · Mild mitral regurgitation.  · Mild tricuspid regurgitation.  · Normal central venous pressure (3 mmHg).  · The estimated PA systolic pressure is 19 mmHg.      CURRENT/PREVIOUS VISIT EKG  Results for orders placed or performed in visit on 03/31/22   IN OFFICE EKG 12-LEAD (to [place])    Collection Time: 03/31/22 12:04 PM    Narrative    Test Reason : I42.0,    Vent. Rate : 084 BPM     Atrial Rate : 102 BPM     P-R Int : 000 ms          QRS Dur : 094 ms      QT Int : 382 ms       P-R-T Axes : 000 080 036 degrees     QTc Int : 451 ms    Atrial fibrillation  Abnormal ECG  When compared with ECG of 07-JAN-2022 08:16,  No significant change was found  Confirmed by Casi PARTIDA, Jered RAPHAEL ([id number]) on 4/5/2022 7:42:46  PM    Referred By: CHERYL ROBERTS           Confirmed By:Jered Lance MD     No valid procedures specified.   No results found for this or any previous visit.    No valid procedures specified.        Assessment:       1. Permanent atrial fibrillation    2. Myalgia due to statin    3. Mixed hyperlipidemia    4. Varicose veins of right lower extremity, unspecified whether complicated    5. HFrEF (heart failure with reduced ejection fraction)           Plan:       Permanent atrial fibrillation  -     Protime-INR; Future; Expected date: 04/29/2022    Myalgia due to statin    Mixed hyperlipidemia    Varicose veins of right lower extremity, unspecified whether complicated    HFrEF (heart failure with reduced ejection fraction)    Other orders  -     apixaban (ELIQUIS) 5 mg Tab; Take 1 tablet (5 mg total) by mouth 2 (two) times daily.  Dispense: 180 tablet; Refill: 3  -     atorvastatin (LIPITOR) 10 MG tablet; Take 1 tablet (10 mg total) by mouth every evening.  Dispense: 90 tablet; Refill: 3    Stop warfarin today.  Check INR Monday morning.  Follow-up Monday afternoon in the clinic.  If INR acceptable, will initiate Eliquis.  Will prescribe Eliquis but patient informed not to take any Eliquis until he sees us on Monday.  Eliquis prescribed to see if patient can afford it.  Patient has varicose veins on the right lower extremity.  Does not report any pain or ulceration or excessive discomfort from it.  Advised him to wear compression stockings, low-salt diet and keep the legs elevated.  Offered a vascular referral for further evaluation but patient would like to defer for now.  Patient informed to watch her for wounds and ulceration.  Patient also reports that since he has ran out of his simvastatin, his pain in his calf muscles has significantly improved.  Will try to give him a low-dose atorvastatin and monitor.  Patient instructed to stop taking it if he is develops pain and discomfort in his legs again.  His EF was  reduced in the past but is normal, continue with current medical therapy.     Follow up in 3 days (on 5/2/2022) for PT INR.          Don Deleon MD  [facility] - Cardiology

## 2022-05-02 ENCOUNTER — OFFICE VISIT (OUTPATIENT)
Dept: CARDIOLOGY | Facility: CLINIC | Age: 67
End: 2022-05-02
Payer: MEDICARE

## 2022-05-02 ENCOUNTER — ANTI-COAG VISIT (OUTPATIENT)
Dept: CARDIOLOGY | Facility: CLINIC | Age: 67
End: 2022-05-02
Payer: MEDICARE

## 2022-05-02 ENCOUNTER — LAB VISIT (OUTPATIENT)
Dept: LAB | Facility: HOSPITAL | Age: 67
End: 2022-05-02
Attending: INTERNAL MEDICINE
Payer: MEDICARE

## 2022-05-02 VITALS
SYSTOLIC BLOOD PRESSURE: 118 MMHG | OXYGEN SATURATION: 97 % | DIASTOLIC BLOOD PRESSURE: 80 MMHG | HEIGHT: 69 IN | WEIGHT: 228 LBS | HEART RATE: 88 BPM | BODY MASS INDEX: 33.77 KG/M2

## 2022-05-02 DIAGNOSIS — E78.2 MIXED HYPERLIPIDEMIA: ICD-10-CM

## 2022-05-02 DIAGNOSIS — I48.21 PERMANENT ATRIAL FIBRILLATION: Primary | ICD-10-CM

## 2022-05-02 DIAGNOSIS — I48.21 PERMANENT ATRIAL FIBRILLATION: ICD-10-CM

## 2022-05-02 LAB
INR PPP: 1.3
PROTHROMBIN TIME: 15.2 SEC (ref 11.4–13.7)

## 2022-05-02 PROCEDURE — 99214 PR OFFICE/OUTPT VISIT, EST, LEVL IV, 30-39 MIN: ICD-10-PCS | Mod: S$GLB,,, | Performed by: INTERNAL MEDICINE

## 2022-05-02 PROCEDURE — 3079F DIAST BP 80-89 MM HG: CPT | Mod: CPTII,S$GLB,, | Performed by: INTERNAL MEDICINE

## 2022-05-02 PROCEDURE — 3288F FALL RISK ASSESSMENT DOCD: CPT | Mod: CPTII,S$GLB,, | Performed by: INTERNAL MEDICINE

## 2022-05-02 PROCEDURE — 1101F PT FALLS ASSESS-DOCD LE1/YR: CPT | Mod: CPTII,S$GLB,, | Performed by: INTERNAL MEDICINE

## 2022-05-02 PROCEDURE — 1159F MED LIST DOCD IN RCRD: CPT | Mod: CPTII,S$GLB,, | Performed by: INTERNAL MEDICINE

## 2022-05-02 PROCEDURE — 1101F PR PT FALLS ASSESS DOC 0-1 FALLS W/OUT INJ PAST YR: ICD-10-PCS | Mod: CPTII,S$GLB,, | Performed by: INTERNAL MEDICINE

## 2022-05-02 PROCEDURE — 3008F PR BODY MASS INDEX (BMI) DOCUMENTED: ICD-10-PCS | Mod: CPTII,S$GLB,, | Performed by: INTERNAL MEDICINE

## 2022-05-02 PROCEDURE — 85610 PROTHROMBIN TIME: CPT | Performed by: INTERNAL MEDICINE

## 2022-05-02 PROCEDURE — 3074F SYST BP LT 130 MM HG: CPT | Mod: CPTII,S$GLB,, | Performed by: INTERNAL MEDICINE

## 2022-05-02 PROCEDURE — 1126F PR PAIN SEVERITY QUANTIFIED, NO PAIN PRESENT: ICD-10-PCS | Mod: CPTII,S$GLB,, | Performed by: INTERNAL MEDICINE

## 2022-05-02 PROCEDURE — 1159F PR MEDICATION LIST DOCUMENTED IN MEDICAL RECORD: ICD-10-PCS | Mod: CPTII,S$GLB,, | Performed by: INTERNAL MEDICINE

## 2022-05-02 PROCEDURE — 36415 COLL VENOUS BLD VENIPUNCTURE: CPT | Performed by: INTERNAL MEDICINE

## 2022-05-02 PROCEDURE — 99214 OFFICE O/P EST MOD 30 MIN: CPT | Mod: S$GLB,,, | Performed by: INTERNAL MEDICINE

## 2022-05-02 PROCEDURE — 3074F PR MOST RECENT SYSTOLIC BLOOD PRESSURE < 130 MM HG: ICD-10-PCS | Mod: CPTII,S$GLB,, | Performed by: INTERNAL MEDICINE

## 2022-05-02 PROCEDURE — 3288F PR FALLS RISK ASSESSMENT DOCUMENTED: ICD-10-PCS | Mod: CPTII,S$GLB,, | Performed by: INTERNAL MEDICINE

## 2022-05-02 PROCEDURE — 3079F PR MOST RECENT DIASTOLIC BLOOD PRESSURE 80-89 MM HG: ICD-10-PCS | Mod: CPTII,S$GLB,, | Performed by: INTERNAL MEDICINE

## 2022-05-02 PROCEDURE — 1126F AMNT PAIN NOTED NONE PRSNT: CPT | Mod: CPTII,S$GLB,, | Performed by: INTERNAL MEDICINE

## 2022-05-02 PROCEDURE — 3008F BODY MASS INDEX DOCD: CPT | Mod: CPTII,S$GLB,, | Performed by: INTERNAL MEDICINE

## 2022-05-02 RX ORDER — TAMSULOSIN HYDROCHLORIDE 0.4 MG/1
CAPSULE ORAL
Qty: 180 CAPSULE | Refills: 3 | OUTPATIENT
Start: 2022-05-02

## 2022-05-02 NOTE — PROGRESS NOTES
[facility] - Cardiology    Subjective:     Patient ID:  Albin Fernandez is a 66 y.o. male patient here for evaluation Follow-up (Follow up Labs )      HPI:  Patient here for his follow-up of INR.  He is being switched from warfarin to Eliquis.  His INR is 1.3 this morning.  He reports doing well.    Review of Systems   All other systems reviewed and are negative.       Past Medical History:   Diagnosis Date    Arthritis     Atrial fibrillation     BPH with obstruction/lower urinary tract symptoms     Cardiomyopathy, dilated     Disorder of kidney and ureter     Hyperlipidemia     Hypertension     Hypothyroidism     Sleep apnea        Past Surgical History:   Procedure Laterality Date    ARTHROSCOPY OF ELBOW Left 08/09/2019    Procedure: ARTHROSCOPY, ELBOW;  Surgeon: Giovany Marquez II, MD;  Location: [facility] OR;  Service: Orthopedics;  Laterality: Left;    BACK SURGERY      CATARACT EXTRACTION Right     FOOT SURGERY      IRRIGATION AND DEBRIDEMENT OF UPPER EXTREMITY  08/09/2019    Procedure: IRRIGATION AND DEBRIDEMENT, UPPER EXTREMITY;  Surgeon: Giovany Marquez II, MD;  Location: [facility] OR;  Service: Orthopedics;;    KIDNEY SURGERY      one kidney removed    NEPHRECTOMY      TRANSRECTAL ULTRASOUND EXAMINATION N/A 11/23/2020    Procedure: TRANSRECTAL ULTRASOUND;  Surgeon: Roselyn Shrestha MD;  Location: [facility] OR;  Service: Urology;  Laterality: N/A;    TRANSURETHRAL RESECTION OF PROSTATE N/A 01/07/2022    Procedure: TURP (TRANSURETHRAL RESECTION OF PROSTATE);  Surgeon: Cate Sharp MD;  Location: [facility] OR;  Service: Urology;  Laterality: N/A;       Family History   Problem Relation Age of Onset    Cirrhosis Father     Pancreatic cancer Mother     Breast cancer Sister        Social History     Socioeconomic History    Marital status:    Tobacco Use    Smoking status: Never Smoker    Smokeless tobacco: Never Used   Substance and Sexual Activity    Alcohol use: No    Drug use: No     Sexual activity: Yes     Partners: Female       Current Outpatient Medications   Medication Sig Dispense Refill    atorvastatin (LIPITOR) 10 MG tablet Take 1 tablet (10 mg total) by mouth every evening. 90 tablet 3    azelastine (ASTELIN) 137 mcg (0.1 %) nasal spray 1 spray (137 mcg total) by Nasal route 2 (two) times daily. 30 mL 0    co-enzyme Q-10 30 mg capsule Take 30 mg by mouth once daily.      CYANOCOBALAMIN, VITAMIN B-12, (VITAMIN B-12 ORAL) Take 1 mg by mouth once daily.      fluticasone propionate (FLONASE) 50 mcg/actuation nasal spray INHALE 2 SPRAYS INTO EACH NOSTRIL ONCE DAILY 16 g 11    HYDROcodone-acetaminophen (NORCO)  mg per tablet Take 1 tablet by mouth 2 (two) times daily.      levothyroxine (SYNTHROID) 112 MCG tablet TAKE 1 TABLET BY MOUTH ONCE DAILY FIRST THING IN MORNING ON EMPTY STOMACH WITH FULL GLASS OF WATER 90 tablet 3    metoprolol succinate (TOPROL-XL) 50 MG 24 hr tablet TAKE 1 TABLET (50 MG TOTAL) BY MOUTH ONCE DAILY. 30 tablet 11    ofloxacin (OCUFLOX) 0.3 % ophthalmic solution       prednisoLONE acetate (PRED FORTE) 1 % DrpS       senna-docusate 8.6-50 mg (SENNA WITH DOCUSATE SODIUM) 8.6-50 mg per tablet Take 2 tablets by mouth 2 (two) times daily. 120 tablet 5    apixaban (ELIQUIS) 5 mg Tab Take 1 tablet (5 mg total) by mouth 2 (two) times daily. (Patient not taking: Reported on 5/2/2022) 180 tablet 3    furosemide (LASIX) 20 MG tablet Take 1 tablet (20 mg total) by mouth daily as needed. 90 tablet 3    ipratropium (ATROVENT) 42 mcg (0.06 %) nasal spray 2 sprays by Nasal route 4 (four) times daily. (Patient not taking: No sig reported) 15 mL 5    tadalafiL (CIALIS) 20 MG Tab Take 1 tablet (20 mg total) by mouth daily as needed (intercourse). (Patient not taking: No sig reported) 30 tablet 6    valACYclovir (VALTREX) 1000 MG tablet Take 0.5 tablets (500 mg total) by mouth 3 (three) times daily. for 3 days 5 tablet 4     No current facility-administered  medications for this visit.     Facility-Administered Medications Ordered in Other Visits   Medication Dose Route Frequency Provider Last Rate Last Admin    electrolyte-S (ISOLYTE)   Intravenous Continuous Casi Murillo MD 10 mL/hr at 01/07/22 0829 New Bag at 01/07/22 1043    HYDROmorphone (PF) injection 0.2 mg  0.2 mg Intravenous Q5 Min PRN Casi Murillo MD        LIDOcaine (PF) 10 mg/ml (1%) injection 10 mg  1 mL Intradermal Once PRN Casi Murillo MD        LIDOcaine (PF) 10 mg/ml (1%) injection 10 mg  1 mL Intradermal Once Casi Murillo MD        oxyCODONE immediate release tablet 5 mg  5 mg Oral Q3H PRN Casi Murillo MD   5 mg at 01/07/22 1110       Review of patient's allergies indicates:   Allergen Reactions    Antihistamines - alkylamine Other (See Comments)     Urinary retention         Objective:        Vitals:    05/02/22 0958   BP: 118/80   Pulse: 88       Physical Exam  Vitals reviewed.   Constitutional:       Appearance: Normal appearance.   HENT:      Mouth/Throat:      Mouth: Mucous membranes are moist.   Eyes:      Extraocular Movements: Extraocular movements intact.      Pupils: Pupils are equal, round, and reactive to light.   Cardiovascular:      Rate and Rhythm: Normal rate. Rhythm irregular.      Pulses: Normal pulses.      Heart sounds: Normal heart sounds. No murmur heard.    No gallop.   Pulmonary:      Effort: Pulmonary effort is normal.      Breath sounds: Normal breath sounds.   Abdominal:      General: Bowel sounds are normal.      Palpations: Abdomen is soft.   Musculoskeletal:         General: Normal range of motion.      Cervical back: Normal range of motion.   Skin:     General: Skin is warm and dry.   Neurological:      General: No focal deficit present.      Mental Status: He is alert and oriented to person, place, and time.   Psychiatric:         Mood and Affect: Mood normal.         LIPIDS - LAST 2   Lab Results   Component Value Date     CHOL 144 07/16/2021    CHOL 160 07/09/2020    HDL 33 (L) 07/16/2021    HDL 41 07/09/2020    LDLCALC 89.0 07/16/2021    LDLCALC 96.6 07/09/2020    TRIG 110 07/16/2021    TRIG 112 07/09/2020    CHOLHDL 22.9 07/16/2021    CHOLHDL 25.6 07/09/2020       CBC - LAST 2  Lab Results   Component Value Date    WBC 6.06 01/07/2022    WBC 5.06 07/16/2021    RBC 4.80 01/07/2022    RBC 4.57 (L) 07/16/2021    HGB 15.3 01/07/2022    HGB 14.4 07/16/2021    HCT 45.8 01/07/2022    HCT 42.9 07/16/2021    MCV 95 01/07/2022    MCV 94 07/16/2021    MCH 31.9 (H) 01/07/2022    MCH 31.5 (H) 07/16/2021    MCHC 33.4 01/07/2022    MCHC 33.6 07/16/2021    RDW 13.0 01/07/2022    RDW 13.0 07/16/2021     01/07/2022     07/16/2021    MPV 9.5 01/07/2022    MPV 9.8 07/16/2021    GRAN 3.4 01/07/2022    GRAN 55.7 01/07/2022    LYMPH 1.6 01/07/2022    LYMPH 26.9 01/07/2022    MONO 0.7 01/07/2022    MONO 12.2 01/07/2022    BASO 0.05 01/07/2022    BASO 0.03 07/16/2021    NRBC 0 01/07/2022    NRBC 0 07/16/2021       CHEMISTRY & LIVER FUNCTION - LAST 2  Lab Results   Component Value Date     01/07/2022     11/01/2021    K 3.9 01/07/2022    K 4.3 11/01/2021     01/07/2022     11/01/2021    CO2 29 01/07/2022    CO2 29 11/01/2021    ANIONGAP 8 01/07/2022    ANIONGAP 9 11/01/2021    BUN 13 01/07/2022    BUN 13 11/01/2021    CREATININE 1.1 01/07/2022    CREATININE 1.0 11/01/2021    GLU 96 01/07/2022    GLU 88 11/01/2021    CALCIUM 9.5 01/07/2022    CALCIUM 9.6 11/01/2021    ALBUMIN 4.0 11/01/2021    ALBUMIN 3.6 07/16/2021    PROT 7.2 11/01/2021    PROT 6.6 07/16/2021    ALKPHOS 73 11/01/2021    ALKPHOS 70 07/16/2021    ALT 18 11/01/2021    ALT 18 07/16/2021    AST 21 11/01/2021    AST 23 07/16/2021    BILITOT 0.5 11/01/2021    BILITOT 0.6 07/16/2021        CARDIAC PROFILE - LAST 2  Lab Results   Component Value Date     (H) 03/31/2022     (H) 05/28/2018        COAGULATION - LAST 2  Lab Results   Component Value  Date    LABPT 15.2 (H) 05/02/2022    LABPT 29.6 (H) 03/31/2022    INR 1.3 05/02/2022    INR 3.0 03/31/2022    APTT 28.5 08/09/2019       ENDOCRINE & PSA - LAST 2  Lab Results   Component Value Date    TSH 2.184 07/16/2021    TSH 2.310 07/09/2020        ECHOCARDIOGRAM RESULTS  Results for orders placed during the hospital encounter of 04/12/22    Echo    Interpretation Summary  · The left ventricle is normal in size with mild concentric hypertrophy and normal systolic function.  · The estimated ejection fraction is 55%.  · Atrial fibrillation observed.  · Normal right ventricular size with normal right ventricular systolic function.  · Moderate to severe left atrial enlargement.  · Mild right atrial enlargement.  · Mild aortic regurgitation.  · Mild mitral regurgitation.  · Mild tricuspid regurgitation.  · Normal central venous pressure (3 mmHg).  · The estimated PA systolic pressure is 19 mmHg.      CURRENT/PREVIOUS VISIT EKG  Results for orders placed or performed in visit on 03/31/22   IN OFFICE EKG 12-LEAD (to [place])    Collection Time: 03/31/22 12:04 PM    Narrative    Test Reason : I42.0,    Vent. Rate : 084 BPM     Atrial Rate : 102 BPM     P-R Int : 000 ms          QRS Dur : 094 ms      QT Int : 382 ms       P-R-T Axes : 000 080 036 degrees     QTc Int : 451 ms    Atrial fibrillation  Abnormal ECG  When compared with ECG of 07-JAN-2022 08:16,  No significant change was found  Confirmed by Casi PARTIDA, Jered RAPHAEL ([id number]) on 4/5/2022 7:42:46 PM    Referred By: CHERYL ROBERTS           Confirmed By:Jered Lance MD     No valid procedures specified.   No results found for this or any previous visit.    No valid procedures specified.        Assessment:       1. Permanent atrial fibrillation    2. Mixed hyperlipidemia           Plan:       Permanent atrial fibrillation    Mixed hyperlipidemia    INR is 1.3.  Stop Coumadin.  Initiate Eliquis 5 mg twice a day.  Patient is to by his NanoViricidesitor today and tested out.  Continue  with low-dose Lipitor as tolerated by symptoms and up titrate.    Follow up in about 3 months (around 8/2/2022) for Follow-up for statin intolerance, AFib, Eliquis initiation.          Don Deleon MD  [facility] - Cardiology

## 2022-05-02 NOTE — TELEPHONE ENCOUNTER
----- Message from Janina Matute sent at 5/2/2022  3:04 PM CDT -----  Regarding: Refill  Contact: patient  Type:  RX Refill Request    Who Called:  patient   Refill or New Rx:  Refill  RX Name and Strength:  Flomax    Is this a 30 day or 90 day RX:  30day  Preferred Pharmacy with phone number:    [id number] - [place], [place] - [address] [address]  Phone: [phone] Fax: [phone]    Local or Mail Order:  Local  Ordering Provider:  Dr. Aron Garcia Call Back Number:  [phone] (home)

## 2022-05-03 ENCOUNTER — TELEPHONE (OUTPATIENT)
Dept: UROLOGY | Facility: CLINIC | Age: 67
End: 2022-05-03
Payer: MEDICARE

## 2022-05-03 NOTE — PROGRESS NOTES
Pt has been advised to start Eliquis & is picking up his prescription today.  See findings.  Will d/c from [facility].

## 2022-05-03 NOTE — TELEPHONE ENCOUNTER
----- Message from Lizzie Benton sent at 5/3/2022 11:05 AM CDT -----  Pharmacy Calling to Clarify an RX    Name of Caller: Josh    Pharmacy Name: [facility]     Prescription Name: tamsulosin (FLOMAX) 0.4 mg Cap    What do they need to clarify?: new prescription    Best Call Back Number:[phone]

## 2022-05-03 NOTE — TELEPHONE ENCOUNTER
Spoke with the pt and he states he states he is having trouble with urination and has not slept x 3 nights. Offered an appointment today of which he declined therefore I scheduled him with the NP for tomorrow per his request, Pt is requesting Flomax.

## 2022-05-04 ENCOUNTER — OFFICE VISIT (OUTPATIENT)
Dept: UROLOGY | Facility: CLINIC | Age: 67
End: 2022-05-04
Payer: MEDICARE

## 2022-05-04 VITALS
HEIGHT: 69 IN | SYSTOLIC BLOOD PRESSURE: 152 MMHG | WEIGHT: 220.44 LBS | HEART RATE: 98 BPM | DIASTOLIC BLOOD PRESSURE: 84 MMHG | BODY MASS INDEX: 32.65 KG/M2 | RESPIRATION RATE: 18 BRPM

## 2022-05-04 DIAGNOSIS — R35.1 NOCTURIA: ICD-10-CM

## 2022-05-04 DIAGNOSIS — N40.1 BPH WITH OBSTRUCTION/LOWER URINARY TRACT SYMPTOMS: Primary | ICD-10-CM

## 2022-05-04 DIAGNOSIS — N13.8 BPH WITH OBSTRUCTION/LOWER URINARY TRACT SYMPTOMS: Primary | ICD-10-CM

## 2022-05-04 LAB
BILIRUB SERPL-MCNC: NORMAL MG/DL
BLOOD URINE, POC: NORMAL
CLARITY, POC UA: CLEAR
COLOR, POC UA: YELLOW
GLUCOSE UR QL STRIP: NORMAL
KETONES UR QL STRIP: NORMAL
LEUKOCYTE ESTERASE URINE, POC: NORMAL
NITRITE, POC UA: NORMAL
PH, POC UA: 6.5
POC RESIDUAL URINE VOLUME: 31 ML (ref 0–100)
PROTEIN, POC: NORMAL
SPECIFIC GRAVITY, POC UA: 1.02
UROBILINOGEN, POC UA: 0.2

## 2022-05-04 PROCEDURE — 51798 POCT BLADDER SCAN: ICD-10-PCS | Mod: S$GLB,,, | Performed by: NURSE PRACTITIONER

## 2022-05-04 PROCEDURE — 3079F PR MOST RECENT DIASTOLIC BLOOD PRESSURE 80-89 MM HG: ICD-10-PCS | Mod: CPTII,S$GLB,, | Performed by: NURSE PRACTITIONER

## 2022-05-04 PROCEDURE — 99999 PR PBB SHADOW E&M-EST. PATIENT-LVL V: CPT | Mod: PBBFAC,,, | Performed by: NURSE PRACTITIONER

## 2022-05-04 PROCEDURE — 3079F DIAST BP 80-89 MM HG: CPT | Mod: CPTII,S$GLB,, | Performed by: NURSE PRACTITIONER

## 2022-05-04 PROCEDURE — 1125F PR PAIN SEVERITY QUANTIFIED, PAIN PRESENT: ICD-10-PCS | Mod: CPTII,S$GLB,, | Performed by: NURSE PRACTITIONER

## 2022-05-04 PROCEDURE — 1101F PT FALLS ASSESS-DOCD LE1/YR: CPT | Mod: CPTII,S$GLB,, | Performed by: NURSE PRACTITIONER

## 2022-05-04 PROCEDURE — 1160F PR REVIEW ALL MEDS BY PRESCRIBER/CLIN PHARMACIST DOCUMENTED: ICD-10-PCS | Mod: CPTII,S$GLB,, | Performed by: NURSE PRACTITIONER

## 2022-05-04 PROCEDURE — 3288F PR FALLS RISK ASSESSMENT DOCUMENTED: ICD-10-PCS | Mod: CPTII,S$GLB,, | Performed by: NURSE PRACTITIONER

## 2022-05-04 PROCEDURE — 1159F MED LIST DOCD IN RCRD: CPT | Mod: CPTII,S$GLB,, | Performed by: NURSE PRACTITIONER

## 2022-05-04 PROCEDURE — 81002 POCT URINE DIPSTICK WITHOUT MICROSCOPE: ICD-10-PCS | Mod: S$GLB,,, | Performed by: NURSE PRACTITIONER

## 2022-05-04 PROCEDURE — 99213 PR OFFICE/OUTPT VISIT, EST, LEVL III, 20-29 MIN: ICD-10-PCS | Mod: S$GLB,,, | Performed by: NURSE PRACTITIONER

## 2022-05-04 PROCEDURE — 1160F RVW MEDS BY RX/DR IN RCRD: CPT | Mod: CPTII,S$GLB,, | Performed by: NURSE PRACTITIONER

## 2022-05-04 PROCEDURE — 3008F PR BODY MASS INDEX (BMI) DOCUMENTED: ICD-10-PCS | Mod: CPTII,S$GLB,, | Performed by: NURSE PRACTITIONER

## 2022-05-04 PROCEDURE — 1159F PR MEDICATION LIST DOCUMENTED IN MEDICAL RECORD: ICD-10-PCS | Mod: CPTII,S$GLB,, | Performed by: NURSE PRACTITIONER

## 2022-05-04 PROCEDURE — 99213 OFFICE O/P EST LOW 20 MIN: CPT | Mod: S$GLB,,, | Performed by: NURSE PRACTITIONER

## 2022-05-04 PROCEDURE — 3288F FALL RISK ASSESSMENT DOCD: CPT | Mod: CPTII,S$GLB,, | Performed by: NURSE PRACTITIONER

## 2022-05-04 PROCEDURE — 1101F PR PT FALLS ASSESS DOC 0-1 FALLS W/OUT INJ PAST YR: ICD-10-PCS | Mod: CPTII,S$GLB,, | Performed by: NURSE PRACTITIONER

## 2022-05-04 PROCEDURE — 51798 US URINE CAPACITY MEASURE: CPT | Mod: S$GLB,,, | Performed by: NURSE PRACTITIONER

## 2022-05-04 PROCEDURE — 1125F AMNT PAIN NOTED PAIN PRSNT: CPT | Mod: CPTII,S$GLB,, | Performed by: NURSE PRACTITIONER

## 2022-05-04 PROCEDURE — 3077F SYST BP >= 140 MM HG: CPT | Mod: CPTII,S$GLB,, | Performed by: NURSE PRACTITIONER

## 2022-05-04 PROCEDURE — 3008F BODY MASS INDEX DOCD: CPT | Mod: CPTII,S$GLB,, | Performed by: NURSE PRACTITIONER

## 2022-05-04 PROCEDURE — 3077F PR MOST RECENT SYSTOLIC BLOOD PRESSURE >= 140 MM HG: ICD-10-PCS | Mod: CPTII,S$GLB,, | Performed by: NURSE PRACTITIONER

## 2022-05-04 PROCEDURE — 99999 PR PBB SHADOW E&M-EST. PATIENT-LVL V: ICD-10-PCS | Mod: PBBFAC,,, | Performed by: NURSE PRACTITIONER

## 2022-05-04 PROCEDURE — 81002 URINALYSIS NONAUTO W/O SCOPE: CPT | Mod: S$GLB,,, | Performed by: NURSE PRACTITIONER

## 2022-05-04 RX ORDER — TAMSULOSIN HYDROCHLORIDE 0.4 MG/1
0.4 CAPSULE ORAL DAILY
Qty: 30 CAPSULE | Refills: 2 | Status: SHIPPED | OUTPATIENT
Start: 2022-05-04 | End: 2022-09-07 | Stop reason: SDUPTHER

## 2022-05-04 NOTE — PROGRESS NOTES
CHIEF COMPLAINT:    Mr. Fernandez is a 66 y.o. male presenting for difficulty urinating.   PRESENTING ILLNESS:    Albin Fernandez is a 66 y.o. male with a PMH of BPH who presents for difficulty urinating. His last clinic visit was 2/16/22 with Dr. Sharp.    Today patient presents for difficulty voiding. He reports he was started on Zafirlukast 20 mg 2 weeks ago for a sinus infection. He started to experience issues urinating 1 week ago. He reports nocturia x 4-5, straining to start stream, intermittent stream and slightly weaker stream (symptoms compared to voiding after TURP). He denies daytime frequency or urgency. Denies urinary incontinence. Denies dysuria, gross hematuria, flank pain, fever, chills, nausea or vomiting. He is not taking any medications for BPH since TURP. He only drinks water and coffee in the AM. No soda or alcohol. He stops drinking water at 6 pm.    2/16/22  S/P TURP on 1/7/22. Pathology benign, 16 g.  Doing well from a urinary standpoint.   Nocturia improved to 1-2x.  No hematuria or dysuria.   No bothersome daytime complaints.     1/7/22  Pre-Op Diagnosis: BPH with bladder outlet obstruction  Procedure(s) Performed:   TURP    11/15/21  Previous patient of Dr. Shrestha. Here to discuss Urolift.   Hx of TURP in 2017. After this he did really well for a while and then his symptoms returned.  Had cysto/trus in Nov 2020 which showed a 22 g prostate with anterior obstructing tissue but otherwise open. He did undergo UDS which did not show ability to mount a detrusor pressure however, test was somewhat questionable as catheter may not have been all the way in the bladder. He did have a sensation to void.   Last PSA: 1.5 (8/25/20)     Has a solitary left kidney secondary to a traumatic fall when he was a child.      He is currently on Flomax 0.8 mg.   Today he is complaining of frequency, nocturia multiple times a night. He does use a CPAP machine. Stops drinking fluids after 6 pm. No sodas.   No  hematuria. No dysuria.   Has a bowel movement every day, but has to take stool softner    Urine cultures:  Lab Results   Component Value Date    LABURIN No growth 12/30/2021    LABURIN No growth 11/13/2017           REVIEW OF SYSTEMS:    Review of Systems    Constitutional: Negative for fever and chills.   HENT: Positive for hearing loss (wears hearing aids).   Eyes: Negative for visual disturbance.   Respiratory: Negative for shortness of breath.   Cardiovascular: Negative for chest pain.   Gastrointestinal: Negative for nausea, vomiting.   Genitourinary:  See above  Neurological: Negative for dizziness.   Hematological: Does not bruise/bleed easily.   Psychiatric/Behavioral: Negative for confusion.       PATIENT HISTORY:    Past Medical History:   Diagnosis Date    Arthritis     Atrial fibrillation     BPH with obstruction/lower urinary tract symptoms     Cardiomyopathy, dilated     Disorder of kidney and ureter     Hyperlipidemia     Hypertension     Hypothyroidism     Sleep apnea        Past Surgical History:   Procedure Laterality Date    ARTHROSCOPY OF ELBOW Left 08/09/2019    Procedure: ARTHROSCOPY, ELBOW;  Surgeon: Giovany Marquez II, MD;  Location: [facility] OR;  Service: Orthopedics;  Laterality: Left;    BACK SURGERY      CATARACT EXTRACTION Right     FOOT SURGERY      IRRIGATION AND DEBRIDEMENT OF UPPER EXTREMITY  08/09/2019    Procedure: IRRIGATION AND DEBRIDEMENT, UPPER EXTREMITY;  Surgeon: Giovany Marquez II, MD;  Location: [facility] OR;  Service: Orthopedics;;    KIDNEY SURGERY      one kidney removed    NEPHRECTOMY      TRANSRECTAL ULTRASOUND EXAMINATION N/A 11/23/2020    Procedure: TRANSRECTAL ULTRASOUND;  Surgeon: Roselyn Shrestha MD;  [facility]: [facility];  Service: Urology;  Laterality: N/A;    TRANSURETHRAL RESECTION OF PROSTATE N/A 01/07/2022    Procedure: TURP (TRANSURETHRAL RESECTION OF PROSTATE);  Surgeon: Cate Sharp MD;  Location: [facility] OR;  Service: Urology;   Laterality: N/A;       Family History   Problem Relation Age of Onset    Cirrhosis Father     Pancreatic cancer Mother     Breast cancer Sister        Social History     Socioeconomic History    Marital status:    Tobacco Use    Smoking status: Never Smoker    Smokeless tobacco: Never Used   Substance and Sexual Activity    Alcohol use: No    Drug use: No    Sexual activity: Yes     Partners: Female       Allergies:  Antihistamines - alkylamine    Medications:    Current Outpatient Medications:     apixaban (ELIQUIS) 5 mg Tab, Take 1 tablet (5 mg total) by mouth 2 (two) times daily., Disp: 180 tablet, Rfl: 3    atorvastatin (LIPITOR) 10 MG tablet, Take 1 tablet (10 mg total) by mouth every evening., Disp: 90 tablet, Rfl: 3    azelastine (ASTELIN) 137 mcg (0.1 %) nasal spray, 1 spray (137 mcg total) by Nasal route 2 (two) times daily., Disp: 30 mL, Rfl: 0    co-enzyme Q-10 30 mg capsule, Take 30 mg by mouth once daily., Disp: , Rfl:     CYANOCOBALAMIN, VITAMIN B-12, (VITAMIN B-12 ORAL), Take 1 mg by mouth once daily., Disp: , Rfl:     fluticasone propionate (FLONASE) 50 mcg/actuation nasal spray, INHALE 2 SPRAYS INTO EACH NOSTRIL ONCE DAILY, Disp: 16 g, Rfl: 11    HYDROcodone-acetaminophen (NORCO)  mg per tablet, Take 1 tablet by mouth 2 (two) times daily., Disp: , Rfl:     ipratropium (ATROVENT) 42 mcg (0.06 %) nasal spray, 2 sprays by Nasal route 4 (four) times daily., Disp: 15 mL, Rfl: 5    levothyroxine (SYNTHROID) 112 MCG tablet, TAKE 1 TABLET BY MOUTH ONCE DAILY FIRST THING IN MORNING ON EMPTY STOMACH WITH FULL GLASS OF WATER, Disp: 90 tablet, Rfl: 3    metoprolol succinate (TOPROL-XL) 50 MG 24 hr tablet, TAKE 1 TABLET (50 MG TOTAL) BY MOUTH ONCE DAILY., Disp: 30 tablet, Rfl: 11    ofloxacin (OCUFLOX) 0.3 % ophthalmic solution, , Disp: , Rfl:     prednisoLONE acetate (PRED FORTE) 1 % DrpS, , Disp: , Rfl:     senna-docusate 8.6-50 mg (SENNA WITH DOCUSATE SODIUM) 8.6-50 mg per  tablet, Take 2 tablets by mouth 2 (two) times daily., Disp: 120 tablet, Rfl: 5    tadalafiL (CIALIS) 20 MG Tab, Take 1 tablet (20 mg total) by mouth daily as needed (intercourse)., Disp: 30 tablet, Rfl: 6    furosemide (LASIX) 20 MG tablet, Take 1 tablet (20 mg total) by mouth daily as needed., Disp: 90 tablet, Rfl: 3    tamsulosin (FLOMAX) 0.4 mg Cap, Take 1 capsule (0.4 mg total) by mouth once daily., Disp: 30 capsule, Rfl: 2    valACYclovir (VALTREX) 1000 MG tablet, Take 0.5 tablets (500 mg total) by mouth 3 (three) times daily. for 3 days, Disp: 5 tablet, Rfl: 4  No current facility-administered medications for this visit.    Facility-Administered Medications Ordered in Other Visits:     electrolyte-S (ISOLYTE), , Intravenous, Continuous, Casi Murillo MD, Last Rate: 10 mL/hr at 01/07/22 0829, New Bag at 01/07/22 1043    HYDROmorphone (PF) injection 0.2 mg, 0.2 mg, Intravenous, Q5 Min PRN, Casi Murillo MD    LIDOcaine (PF) 10 mg/ml (1%) injection 10 mg, 1 mL, Intradermal, Once PRN, Casi Murillo MD    LIDOcaine (PF) 10 mg/ml (1%) injection 10 mg, 1 mL, Intradermal, Once, Casi Murillo MD    oxyCODONE immediate release tablet 5 mg, 5 mg, Oral, Q3H PRN, Casi Murillo MD, 5 mg at 01/07/22 1110    PHYSICAL EXAMINATION:    Constitutional: He is oriented to person, place, and time. He appears well-developed and well-nourished.  He is in no apparent distress.    Neck: Normal ROM.     Cardiovascular: Normal rate.      Pulmonary/Chest: Effort normal. No respiratory distress.     Abdominal:  He exhibits no distension.  There is no CVA tenderness.     Neurological: He is alert and oriented to person, place, and time.     Skin: Skin is warm and dry.     Psych: Cooperative with normal affect.      Physical Exam      LABS:    U/a: sp grav 1.020, pH 6.5, negative    PVR: done with bladder scanner immediately after voiding, 31 ml    Lab Results   Component Value Date    PSADIAG 1.6  11/23/2021    PSATOTAL 1.5 08/25/2020    PSATOTAL 1.3 07/01/2019    PSAFREE 0.38 08/25/2020    PSAFREE 0.36 07/01/2019    PSAFREEPCT 25.33 08/25/2020    PSAFREEPCT 27.69 07/01/2019     Lab Results   Component Value Date    CREATININE 1.1 01/07/2022         IMPRESSION:    Encounter Diagnoses   Name Primary?    BPH with obstruction/lower urinary tract symptoms Yes    Nocturia          PLAN:  -Restart flomax nightly. Voiding issues could be d/t sinus medication, which patient has discontinued yesterday.  Discussed side effects, indications, and MOA for flomax. Prescription sent to the pharmacy. Pt verbalized understanding.  -Conservative measures discussed and given in AVS  -Notify me in 1 month if symptoms improved.       I encouraged him or any of his family members to call or email me with questions and/or concerns.      20 minutes of total time spent on the encounter, which includes face to face time and non-face to face time preparing to see the patient (eg, review of tests), Obtaining and/or reviewing separately obtained history, Documenting clinical information in the electronic or other health record, Independently interpreting results (not separately reported) and communicating results to the patient/family/caregiver, or Care coordination (not separately reported).

## 2022-05-04 NOTE — PATIENT INSTRUCTIONS
Discussed conservative measures to control urgency and frequency including   1. Avoiding/minimizing bladder irritants (see below), especially in afternoon and evening hours    Discussed bladder irritants include coffee (even decaf), tea, alcohol, soda, spicy foods, acidic juices (orange, tomato), vinegar, and artificial sweeteners/sugary beverages.    2. timed voiding - empty on a schedule (approx ~2-3 hours) in spite of need to urinate, to get ahead of urge    3. dont postponing voiding - dont hold it on purpose     4. bowel regimen as distended bowel has extrinsic compressive effect on bladder.   - any or all of the following in any combination, titrate to soft daily bowel movement without pushing or straining  - colace/stool softener capsule - once to twice daily  - miralax - 1 capful daily to start, can increase to 2x daily (or decrease to 1/2 cap daily)  - increase dietary fibery  - fiber supplements, such as metamucil  - prunes, prune juice    5. INCREASE water intake    6. Stop fluids 2 hours before bed, and urinate just before bed

## 2022-06-21 ENCOUNTER — TELEPHONE (OUTPATIENT)
Dept: FAMILY MEDICINE | Facility: CLINIC | Age: 67
End: 2022-06-21
Payer: MEDICARE

## 2022-06-21 NOTE — TELEPHONE ENCOUNTER
----- Message from Poornima Hernandez sent at 6/21/2022 10:25 AM CDT -----  Regarding: pt spouse called  Name of Who is Calling: DARIAN MYERS [[id number]] Bonny ( spouse)      What is the request in detail: pt wife is requesting July 19 th or after for a surgery clearance for her . He is a former patient of Dr Chang office.If there is any paperwork that needs to be picked up and filled out the wife would like to know.  Please advise       Can the clinic reply by [facility]: No      What Number to Call Back if not in [facility]: [phone]

## 2022-06-21 NOTE — TELEPHONE ENCOUNTER
Pt is having cataract surgery on August 19th on left eye and needs clearance.  Scheduled pre op clearance and establish acre appt

## 2022-08-01 DIAGNOSIS — I48.0 PAROXYSMAL ATRIAL FIBRILLATION: ICD-10-CM

## 2022-08-04 RX ORDER — METOPROLOL SUCCINATE 50 MG/1
50 TABLET, EXTENDED RELEASE ORAL DAILY
Qty: 30 TABLET | Refills: 11 | Status: SHIPPED | OUTPATIENT
Start: 2022-08-04 | End: 2023-11-27

## 2022-08-08 ENCOUNTER — OFFICE VISIT (OUTPATIENT)
Dept: FAMILY MEDICINE | Facility: CLINIC | Age: 67
End: 2022-08-08
Payer: MEDICARE

## 2022-08-08 VITALS
HEART RATE: 91 BPM | HEIGHT: 69 IN | WEIGHT: 227.94 LBS | BODY MASS INDEX: 33.76 KG/M2 | TEMPERATURE: 98 F | OXYGEN SATURATION: 97 % | DIASTOLIC BLOOD PRESSURE: 68 MMHG | SYSTOLIC BLOOD PRESSURE: 136 MMHG

## 2022-08-08 DIAGNOSIS — Z01.818 PRE-OP EXAMINATION: Primary | ICD-10-CM

## 2022-08-08 DIAGNOSIS — Z90.5 ABSENT KIDNEY: ICD-10-CM

## 2022-08-08 DIAGNOSIS — E78.2 MIXED HYPERLIPIDEMIA: ICD-10-CM

## 2022-08-08 DIAGNOSIS — Z79.01 LONG TERM (CURRENT) USE OF ANTICOAGULANTS: ICD-10-CM

## 2022-08-08 DIAGNOSIS — I48.91 ATRIAL FIBRILLATION, UNSPECIFIED TYPE: ICD-10-CM

## 2022-08-08 PROCEDURE — 1159F PR MEDICATION LIST DOCUMENTED IN MEDICAL RECORD: ICD-10-PCS | Mod: CPTII,S$GLB,, | Performed by: PHYSICIAN ASSISTANT

## 2022-08-08 PROCEDURE — 99999 PR PBB SHADOW E&M-EST. PATIENT-LVL IV: CPT | Mod: PBBFAC,,, | Performed by: PHYSICIAN ASSISTANT

## 2022-08-08 PROCEDURE — 1101F PT FALLS ASSESS-DOCD LE1/YR: CPT | Mod: CPTII,S$GLB,, | Performed by: PHYSICIAN ASSISTANT

## 2022-08-08 PROCEDURE — 3078F DIAST BP <80 MM HG: CPT | Mod: CPTII,S$GLB,, | Performed by: PHYSICIAN ASSISTANT

## 2022-08-08 PROCEDURE — 99999 PR PBB SHADOW E&M-EST. PATIENT-LVL IV: ICD-10-PCS | Mod: PBBFAC,,, | Performed by: PHYSICIAN ASSISTANT

## 2022-08-08 PROCEDURE — 99214 PR OFFICE/OUTPT VISIT, EST, LEVL IV, 30-39 MIN: ICD-10-PCS | Mod: S$GLB,,, | Performed by: PHYSICIAN ASSISTANT

## 2022-08-08 PROCEDURE — 1101F PR PT FALLS ASSESS DOC 0-1 FALLS W/OUT INJ PAST YR: ICD-10-PCS | Mod: CPTII,S$GLB,, | Performed by: PHYSICIAN ASSISTANT

## 2022-08-08 PROCEDURE — 3075F SYST BP GE 130 - 139MM HG: CPT | Mod: CPTII,S$GLB,, | Performed by: PHYSICIAN ASSISTANT

## 2022-08-08 PROCEDURE — 1159F MED LIST DOCD IN RCRD: CPT | Mod: CPTII,S$GLB,, | Performed by: PHYSICIAN ASSISTANT

## 2022-08-08 PROCEDURE — 3008F BODY MASS INDEX DOCD: CPT | Mod: CPTII,S$GLB,, | Performed by: PHYSICIAN ASSISTANT

## 2022-08-08 PROCEDURE — 3288F FALL RISK ASSESSMENT DOCD: CPT | Mod: CPTII,S$GLB,, | Performed by: PHYSICIAN ASSISTANT

## 2022-08-08 PROCEDURE — 3288F PR FALLS RISK ASSESSMENT DOCUMENTED: ICD-10-PCS | Mod: CPTII,S$GLB,, | Performed by: PHYSICIAN ASSISTANT

## 2022-08-08 PROCEDURE — 1160F PR REVIEW ALL MEDS BY PRESCRIBER/CLIN PHARMACIST DOCUMENTED: ICD-10-PCS | Mod: CPTII,S$GLB,, | Performed by: PHYSICIAN ASSISTANT

## 2022-08-08 PROCEDURE — 99214 OFFICE O/P EST MOD 30 MIN: CPT | Mod: S$GLB,,, | Performed by: PHYSICIAN ASSISTANT

## 2022-08-08 PROCEDURE — 1160F RVW MEDS BY RX/DR IN RCRD: CPT | Mod: CPTII,S$GLB,, | Performed by: PHYSICIAN ASSISTANT

## 2022-08-08 PROCEDURE — 3008F PR BODY MASS INDEX (BMI) DOCUMENTED: ICD-10-PCS | Mod: CPTII,S$GLB,, | Performed by: PHYSICIAN ASSISTANT

## 2022-08-08 PROCEDURE — 3078F PR MOST RECENT DIASTOLIC BLOOD PRESSURE < 80 MM HG: ICD-10-PCS | Mod: CPTII,S$GLB,, | Performed by: PHYSICIAN ASSISTANT

## 2022-08-08 PROCEDURE — 3075F PR MOST RECENT SYSTOLIC BLOOD PRESS GE 130-139MM HG: ICD-10-PCS | Mod: CPTII,S$GLB,, | Performed by: PHYSICIAN ASSISTANT

## 2022-08-08 RX ORDER — KETOROLAC TROMETHAMINE 4 MG/ML
1 SOLUTION/ DROPS OPHTHALMIC DAILY
COMMUNITY
Start: 2022-06-21 | End: 2022-09-22

## 2022-08-08 NOTE — PROGRESS NOTES
[facility] Note    PCP: West Chang MD    Chief Complaint: BPH     SUBJECTIVE:       History of Present Illness:  Albin Fernandez is a 66 y.o. male who presents to clinic for BPH. He is Established  to our clinic.     Stopped Flomax last week for cataract surgery. Feels like he is actually doing better without it.   Nocturia x 2-4. Now wearing a CPAP machine.   During the day he is doing pretty well. No straining. Does have frequency.   No hematuria or dysuria.    10 mg of cialis not working     PVR 78 cc    2/16/22  S/P TURP on 1/7/22. Pathology benign, 16 g.  Doing well from a urinary standpoint.   Nocturia improved to 1-2x.  No hematuria or dysuria.   No bothersome daytime complaints.     11/15/21  Previous patient of Dr. Shrestha. Here to discuss Urolift.   Hx of TURP in 2017. After this he did really well for a while and then his symptoms returned.  Had cysto/trus in Nov 2020 which showed a 22 g prostate with anterior obstructing tissue but otherwise open. He did undergo UDS which did not show ability to mount a detrusor pressure however, test was somewhat questionable as catheter may not have been all the way in the bladder. He did have a sensation to void.   Last PSA: 1.5 (8/25/20)    Has a solitary left kidney secondary to a traumatic fall when he was a child.     He is currently on Flomax 0.8 mg.   Today he is complaining of frequency, nocturia multiple times a night. He does use a CPAP machine. Stops drinking fluids after 6 pm. No sodas.   No hematuria. No dysuria.   Has a bowel movement every day, but has to take stool softner.     UA today: negative for blood, leuks, nitrite   PVR today: 84 cc    Last urine culture: no documented UTIs    Lab Results   Component Value Date    CREATININE 1.1 01/07/2022      Family  hx: grandfather and uncle had prostate cancer   Hx of COPD, a fib, HTN, HLD    Past medical, family, and social history reviewed as documented in chart with  pertinent positive medical, family, and social history detailed in HPI.    Review of patient's allergies indicates:   Allergen Reactions    Antihistamines - alkylamine Other (See Comments)     Urinary retention       Past Medical History:   Diagnosis Date    Arthritis     Atrial fibrillation     BPH with obstruction/lower urinary tract symptoms     Cardiomyopathy, dilated     Disorder of kidney and ureter     Hyperlipidemia     Hypertension     Hypothyroidism     Sleep apnea      Past Surgical History:   Procedure Laterality Date    ARTHROSCOPY OF ELBOW Left 08/09/2019    Procedure: ARTHROSCOPY, ELBOW;  Surgeon: Giovany Marquez II, MD;  Location: [facility] OR;  Service: Orthopedics;  Laterality: Left;    BACK SURGERY      CATARACT EXTRACTION Right     FOOT SURGERY      IRRIGATION AND DEBRIDEMENT OF UPPER EXTREMITY  08/09/2019    Procedure: IRRIGATION AND DEBRIDEMENT, UPPER EXTREMITY;  Surgeon: Giovany Marquez II, MD;  Location: [facility] OR;  Service: Orthopedics;;    KIDNEY SURGERY      one kidney removed    NEPHRECTOMY      TRANSRECTAL ULTRASOUND EXAMINATION N/A 11/23/2020    Procedure: TRANSRECTAL ULTRASOUND;  Surgeon: Roselyn Shrestha MD;  Location: [facility] OR;  Service: Urology;  Laterality: N/A;    TRANSURETHRAL RESECTION OF PROSTATE N/A 01/07/2022    Procedure: TURP (TRANSURETHRAL RESECTION OF PROSTATE);  Surgeon: Cate Sharp MD;  Location: [facility] OR;  Service: Urology;  Laterality: N/A;     Family History   Problem Relation Age of Onset    Cirrhosis Father     Pancreatic cancer Mother     Breast cancer Sister      Social History     Tobacco Use    Smoking status: Never Smoker    Smokeless tobacco: Never Used   Substance Use Topics    Alcohol use: No    Drug use: No        Review of Systems   Genitourinary: Positive for frequency and nocturia. Negative for difficulty urinating, dysuria, hematuria, scrotal swelling, testicular pain and urgency.       OBJECTIVE:     Anticoagulation:  "Coumadin 2 mg     Estimated body mass index is 33.66 kg/m² as calculated from the following:    Height as of this encounter: 5' 9" (1.753 m).    Weight as of this encounter: 103.4 kg (227 lb 15.3 oz).    Vital Signs (Most Recent)  Pulse: 87 (08/10/22 1305)  Resp: 18 (08/10/22 1305)  BP: 139/88 (08/10/22 1305)    Physical Exam  Constitutional:       General: He is not in acute distress.     Appearance: Normal appearance. He is not ill-appearing.   HENT:      Head: Normocephalic and atraumatic.   Eyes:      General: No scleral icterus.  Cardiovascular:      Rate and Rhythm: Normal rate and regular rhythm.   Pulmonary:      Effort: Pulmonary effort is normal. No respiratory distress.   Abdominal:      General: There is no distension.   Skin:     Coloration: Skin is not jaundiced.   Neurological:      General: No focal deficit present.      Mental Status: He is alert and oriented to person, place, and time.   Psychiatric:         Mood and Affect: Mood normal.         Behavior: Behavior normal.         Thought Content: Thought content normal.         BMP   Lab Results   Component Value Date     01/07/2022    K 3.9 01/07/2022     01/07/2022    CO2 29 01/07/2022    BUN 13 01/07/2022    CREATININE 1.1 01/07/2022    CALCIUM 9.5 01/07/2022    ANIONGAP 8 01/07/2022    ESTGFRAFRICA >60 01/07/2022    EGFRNONAA >60 01/07/2022       Lab Results   Component Value Date    WBC 6.06 01/07/2022    HGB 15.3 01/07/2022    HCT 45.8 01/07/2022    MCV 95 01/07/2022     01/07/2022       Lab Results   Component Value Date    PSADIAG 1.6 11/23/2021    PSATOTAL 1.5 08/25/2020    PSATOTAL 1.3 07/01/2019    PSAFREE 0.38 08/25/2020    PSAFREE 0.36 07/01/2019     Imaging:  No pertinent recent imaging available.    ASSESSMENT     1. BPH with obstruction/lower urinary tract symptoms    2. Atrial fibrillation, unspecified type    3. Mixed hyperlipidemia    4. Atherosclerosis of aorta        PLAN:     - Main complaint is nocturia. " During the day he is ok  - We discussed that he likely has some element of detrusor dysfunction   - If symptoms worsen will repeat cysto to r/o obstructing tissue   - Discussed that he needs to ensure that his CPAP machine is appropriately fitting. Hold fluids 2 hours prior to bed.   - Take full 20 mg of Cialis. Discussed ICI however he is not interested right now. Will let me know if he changes his mind.   - Follow up in 6 months or sooner if needed       I spent 30 minutes with the patient. Over 50% of the visit was spent in counseling.      Cate Sharp MD

## 2022-08-08 NOTE — PROGRESS NOTES
Subjective:       Patient ID: Albin Fernandez is a 66 y.o. male.    Chief Complaint: Pre-op Exam (8/19)    HPI   Pre op clearance  Pt. Scheduled for cataract surgery 8-19-22  Dr Henderson will perform the surgery under local anesthesia   Pt. Feels well  Review of Systems   Constitutional: Negative.  Negative for activity change, appetite change, chills, diaphoresis, fatigue, fever and unexpected weight change.   HENT: Negative.    Eyes: Negative.    Respiratory: Negative.  Negative for cough and shortness of breath.    Cardiovascular: Negative.  Negative for chest pain and leg swelling.   Gastrointestinal: Negative.    Endocrine: Negative.    Genitourinary: Negative.    Musculoskeletal: Negative.    Integumentary:  Negative for rash. Negative.   Neurological: Negative.          Objective:      Physical Exam  Vitals reviewed.   Constitutional:       General: He is not in acute distress.     Appearance: Normal appearance. He is obese. He is not ill-appearing, toxic-appearing or diaphoretic.   HENT:      Head: Normocephalic and atraumatic.      Right Ear: Tympanic membrane, ear canal and external ear normal. There is no impacted cerumen.      Left Ear: Tympanic membrane, ear canal and external ear normal. There is no impacted cerumen.      Nose: Nose normal.      Mouth/Throat:      Mouth: Mucous membranes are moist.      Pharynx: Oropharynx is clear. No oropharyngeal exudate or posterior oropharyngeal erythema.   Eyes:      General: No scleral icterus.     Conjunctiva/sclera: Conjunctivae normal.   Neck:      Vascular: No carotid bruit.   Cardiovascular:      Rate and Rhythm: Normal rate. Rhythm irregular.      Pulses: Normal pulses.      Heart sounds: Normal heart sounds. No murmur heard.    No friction rub. No gallop.   Pulmonary:      Effort: Pulmonary effort is normal. No respiratory distress.      Breath sounds: Normal breath sounds. No stridor. No wheezing, rhonchi or rales.   Abdominal:      General: Abdomen is  flat. Bowel sounds are normal. There is no distension.      Palpations: Abdomen is soft. There is no mass.      Tenderness: There is no abdominal tenderness. There is no guarding or rebound.      Hernia: No hernia is present.   Musculoskeletal:         General: No swelling.      Cervical back: Normal range of motion and neck supple. No rigidity or tenderness.      Right lower leg: No edema.      Left lower leg: No edema.   Lymphadenopathy:      Cervical: No cervical adenopathy.   Skin:     General: Skin is warm and dry.      Findings: No rash.   Neurological:      General: No focal deficit present.      Mental Status: He is alert and oriented to person, place, and time.         Assessment:       Problem List Items Addressed This Visit     Atrial fibrillation    Hyperlipidemia    Long term (current) use of anticoagulants    Absent kidney      Other Visit Diagnoses     Pre-op examination    -  Primary          Plan:       Albin was seen today for pre-op exam.    Diagnoses and all orders for this visit:    Pre-op examination    Absent kidney    Atrial fibrillation, unspecified type    Mixed hyperlipidemia    Long term (current) use of anticoagulants    discussed otc's  Pt. Can stop Eliquis 5 days prior to surgery    Pt. Cleared for cataract surgery and local anesthesia  Dr. Henderson notified

## 2022-08-10 ENCOUNTER — OFFICE VISIT (OUTPATIENT)
Dept: UROLOGY | Facility: CLINIC | Age: 67
End: 2022-08-10
Payer: MEDICARE

## 2022-08-10 VITALS
HEIGHT: 69 IN | WEIGHT: 227.94 LBS | SYSTOLIC BLOOD PRESSURE: 139 MMHG | DIASTOLIC BLOOD PRESSURE: 88 MMHG | RESPIRATION RATE: 18 BRPM | BODY MASS INDEX: 33.76 KG/M2 | HEART RATE: 87 BPM

## 2022-08-10 DIAGNOSIS — N13.8 BPH WITH OBSTRUCTION/LOWER URINARY TRACT SYMPTOMS: Primary | ICD-10-CM

## 2022-08-10 DIAGNOSIS — E78.2 MIXED HYPERLIPIDEMIA: ICD-10-CM

## 2022-08-10 DIAGNOSIS — I70.0 ATHEROSCLEROSIS OF AORTA: ICD-10-CM

## 2022-08-10 DIAGNOSIS — N40.1 BPH WITH OBSTRUCTION/LOWER URINARY TRACT SYMPTOMS: Primary | ICD-10-CM

## 2022-08-10 DIAGNOSIS — I48.91 ATRIAL FIBRILLATION, UNSPECIFIED TYPE: ICD-10-CM

## 2022-08-10 LAB — POC RESIDUAL URINE VOLUME: 78 ML (ref 0–100)

## 2022-08-10 PROCEDURE — 99214 PR OFFICE/OUTPT VISIT, EST, LEVL IV, 30-39 MIN: ICD-10-PCS | Mod: S$GLB,,, | Performed by: STUDENT IN AN ORGANIZED HEALTH CARE EDUCATION/TRAINING PROGRAM

## 2022-08-10 PROCEDURE — 99999 PR PBB SHADOW E&M-EST. PATIENT-LVL III: CPT | Mod: PBBFAC,,, | Performed by: STUDENT IN AN ORGANIZED HEALTH CARE EDUCATION/TRAINING PROGRAM

## 2022-08-10 PROCEDURE — 1126F PR PAIN SEVERITY QUANTIFIED, NO PAIN PRESENT: ICD-10-PCS | Mod: CPTII,S$GLB,, | Performed by: STUDENT IN AN ORGANIZED HEALTH CARE EDUCATION/TRAINING PROGRAM

## 2022-08-10 PROCEDURE — 51798 POCT BLADDER SCAN: ICD-10-PCS | Mod: S$GLB,,, | Performed by: STUDENT IN AN ORGANIZED HEALTH CARE EDUCATION/TRAINING PROGRAM

## 2022-08-10 PROCEDURE — 3008F PR BODY MASS INDEX (BMI) DOCUMENTED: ICD-10-PCS | Mod: CPTII,S$GLB,, | Performed by: STUDENT IN AN ORGANIZED HEALTH CARE EDUCATION/TRAINING PROGRAM

## 2022-08-10 PROCEDURE — 1101F PR PT FALLS ASSESS DOC 0-1 FALLS W/OUT INJ PAST YR: ICD-10-PCS | Mod: CPTII,S$GLB,, | Performed by: STUDENT IN AN ORGANIZED HEALTH CARE EDUCATION/TRAINING PROGRAM

## 2022-08-10 PROCEDURE — 1126F AMNT PAIN NOTED NONE PRSNT: CPT | Mod: CPTII,S$GLB,, | Performed by: STUDENT IN AN ORGANIZED HEALTH CARE EDUCATION/TRAINING PROGRAM

## 2022-08-10 PROCEDURE — 3008F BODY MASS INDEX DOCD: CPT | Mod: CPTII,S$GLB,, | Performed by: STUDENT IN AN ORGANIZED HEALTH CARE EDUCATION/TRAINING PROGRAM

## 2022-08-10 PROCEDURE — 3075F SYST BP GE 130 - 139MM HG: CPT | Mod: CPTII,S$GLB,, | Performed by: STUDENT IN AN ORGANIZED HEALTH CARE EDUCATION/TRAINING PROGRAM

## 2022-08-10 PROCEDURE — 3288F FALL RISK ASSESSMENT DOCD: CPT | Mod: CPTII,S$GLB,, | Performed by: STUDENT IN AN ORGANIZED HEALTH CARE EDUCATION/TRAINING PROGRAM

## 2022-08-10 PROCEDURE — 51798 US URINE CAPACITY MEASURE: CPT | Mod: S$GLB,,, | Performed by: STUDENT IN AN ORGANIZED HEALTH CARE EDUCATION/TRAINING PROGRAM

## 2022-08-10 PROCEDURE — 1101F PT FALLS ASSESS-DOCD LE1/YR: CPT | Mod: CPTII,S$GLB,, | Performed by: STUDENT IN AN ORGANIZED HEALTH CARE EDUCATION/TRAINING PROGRAM

## 2022-08-10 PROCEDURE — 1159F PR MEDICATION LIST DOCUMENTED IN MEDICAL RECORD: ICD-10-PCS | Mod: CPTII,S$GLB,, | Performed by: STUDENT IN AN ORGANIZED HEALTH CARE EDUCATION/TRAINING PROGRAM

## 2022-08-10 PROCEDURE — 99999 PR PBB SHADOW E&M-EST. PATIENT-LVL III: ICD-10-PCS | Mod: PBBFAC,,, | Performed by: STUDENT IN AN ORGANIZED HEALTH CARE EDUCATION/TRAINING PROGRAM

## 2022-08-10 PROCEDURE — 99214 OFFICE O/P EST MOD 30 MIN: CPT | Mod: S$GLB,,, | Performed by: STUDENT IN AN ORGANIZED HEALTH CARE EDUCATION/TRAINING PROGRAM

## 2022-08-10 PROCEDURE — 3079F PR MOST RECENT DIASTOLIC BLOOD PRESSURE 80-89 MM HG: ICD-10-PCS | Mod: CPTII,S$GLB,, | Performed by: STUDENT IN AN ORGANIZED HEALTH CARE EDUCATION/TRAINING PROGRAM

## 2022-08-10 PROCEDURE — 1159F MED LIST DOCD IN RCRD: CPT | Mod: CPTII,S$GLB,, | Performed by: STUDENT IN AN ORGANIZED HEALTH CARE EDUCATION/TRAINING PROGRAM

## 2022-08-10 PROCEDURE — 3075F PR MOST RECENT SYSTOLIC BLOOD PRESS GE 130-139MM HG: ICD-10-PCS | Mod: CPTII,S$GLB,, | Performed by: STUDENT IN AN ORGANIZED HEALTH CARE EDUCATION/TRAINING PROGRAM

## 2022-08-10 PROCEDURE — 3079F DIAST BP 80-89 MM HG: CPT | Mod: CPTII,S$GLB,, | Performed by: STUDENT IN AN ORGANIZED HEALTH CARE EDUCATION/TRAINING PROGRAM

## 2022-08-10 PROCEDURE — 3288F PR FALLS RISK ASSESSMENT DOCUMENTED: ICD-10-PCS | Mod: CPTII,S$GLB,, | Performed by: STUDENT IN AN ORGANIZED HEALTH CARE EDUCATION/TRAINING PROGRAM

## 2022-08-10 RX ORDER — TADALAFIL 20 MG/1
20 TABLET ORAL DAILY PRN
Qty: 30 TABLET | Refills: 6 | Status: SHIPPED | OUTPATIENT
Start: 2022-08-10 | End: 2023-03-09 | Stop reason: SDUPTHER

## 2022-09-06 ENCOUNTER — PATIENT MESSAGE (OUTPATIENT)
Dept: UROLOGY | Facility: CLINIC | Age: 67
End: 2022-09-06
Payer: MEDICARE

## 2022-09-06 DIAGNOSIS — R35.1 NOCTURIA: ICD-10-CM

## 2022-09-07 ENCOUNTER — PATIENT MESSAGE (OUTPATIENT)
Dept: UROLOGY | Facility: CLINIC | Age: 67
End: 2022-09-07
Payer: MEDICARE

## 2022-09-07 RX ORDER — TAMSULOSIN HYDROCHLORIDE 0.4 MG/1
0.4 CAPSULE ORAL DAILY
Qty: 90 CAPSULE | Refills: 3 | Status: SHIPPED | OUTPATIENT
Start: 2022-09-07 | End: 2023-03-09

## 2022-09-22 ENCOUNTER — OFFICE VISIT (OUTPATIENT)
Dept: FAMILY MEDICINE | Facility: CLINIC | Age: 67
End: 2022-09-22
Payer: MEDICARE

## 2022-09-22 ENCOUNTER — LAB VISIT (OUTPATIENT)
Dept: LAB | Facility: HOSPITAL | Age: 67
End: 2022-09-22
Attending: STUDENT IN AN ORGANIZED HEALTH CARE EDUCATION/TRAINING PROGRAM
Payer: MEDICARE

## 2022-09-22 VITALS
SYSTOLIC BLOOD PRESSURE: 114 MMHG | WEIGHT: 229.5 LBS | DIASTOLIC BLOOD PRESSURE: 68 MMHG | OXYGEN SATURATION: 97 % | HEIGHT: 69 IN | TEMPERATURE: 98 F | HEART RATE: 86 BPM | RESPIRATION RATE: 15 BRPM | BODY MASS INDEX: 33.99 KG/M2

## 2022-09-22 DIAGNOSIS — R35.0 BENIGN PROSTATIC HYPERPLASIA WITH URINARY FREQUENCY: ICD-10-CM

## 2022-09-22 DIAGNOSIS — Z00.00 HEALTHCARE MAINTENANCE: Primary | ICD-10-CM

## 2022-09-22 DIAGNOSIS — Z12.5 ENCOUNTER FOR PROSTATE CANCER SCREENING: ICD-10-CM

## 2022-09-22 DIAGNOSIS — E78.2 MIXED HYPERLIPIDEMIA: ICD-10-CM

## 2022-09-22 DIAGNOSIS — E03.9 HYPOTHYROIDISM, UNSPECIFIED TYPE: ICD-10-CM

## 2022-09-22 DIAGNOSIS — I48.21 PERMANENT ATRIAL FIBRILLATION: ICD-10-CM

## 2022-09-22 DIAGNOSIS — N40.1 BENIGN PROSTATIC HYPERPLASIA WITH URINARY FREQUENCY: ICD-10-CM

## 2022-09-22 PROBLEM — Z77.110 EXACERBATION OF COPD ASSOCIATED WITH VOLCANIC SMOG EXPOSURE: Status: RESOLVED | Noted: 2021-02-09 | Resolved: 2022-09-22

## 2022-09-22 PROBLEM — J44.1 EXACERBATION OF COPD ASSOCIATED WITH VOLCANIC SMOG EXPOSURE: Status: RESOLVED | Noted: 2021-02-09 | Resolved: 2022-09-22

## 2022-09-22 LAB
CHOLEST SERPL-MCNC: 140 MG/DL (ref 120–199)
CHOLEST/HDLC SERPL: 4.1 {RATIO} (ref 2–5)
COMPLEXED PSA SERPL-MCNC: 0.74 NG/ML (ref 0–4)
HDLC SERPL-MCNC: 34 MG/DL (ref 40–75)
HDLC SERPL: 24.3 % (ref 20–50)
LDLC SERPL CALC-MCNC: 79.8 MG/DL (ref 63–159)
NONHDLC SERPL-MCNC: 106 MG/DL
TRIGL SERPL-MCNC: 131 MG/DL (ref 30–150)
TSH SERPL DL<=0.005 MIU/L-ACNC: 3.67 UIU/ML (ref 0.4–4)

## 2022-09-22 PROCEDURE — 99214 PR OFFICE/OUTPT VISIT, EST, LEVL IV, 30-39 MIN: ICD-10-PCS | Mod: S$GLB,,, | Performed by: STUDENT IN AN ORGANIZED HEALTH CARE EDUCATION/TRAINING PROGRAM

## 2022-09-22 PROCEDURE — 1101F PT FALLS ASSESS-DOCD LE1/YR: CPT | Mod: CPTII,S$GLB,, | Performed by: STUDENT IN AN ORGANIZED HEALTH CARE EDUCATION/TRAINING PROGRAM

## 2022-09-22 PROCEDURE — 3074F SYST BP LT 130 MM HG: CPT | Mod: CPTII,S$GLB,, | Performed by: STUDENT IN AN ORGANIZED HEALTH CARE EDUCATION/TRAINING PROGRAM

## 2022-09-22 PROCEDURE — 84153 ASSAY OF PSA TOTAL: CPT | Performed by: STUDENT IN AN ORGANIZED HEALTH CARE EDUCATION/TRAINING PROGRAM

## 2022-09-22 PROCEDURE — 3078F PR MOST RECENT DIASTOLIC BLOOD PRESSURE < 80 MM HG: ICD-10-PCS | Mod: CPTII,S$GLB,, | Performed by: STUDENT IN AN ORGANIZED HEALTH CARE EDUCATION/TRAINING PROGRAM

## 2022-09-22 PROCEDURE — 3074F PR MOST RECENT SYSTOLIC BLOOD PRESSURE < 130 MM HG: ICD-10-PCS | Mod: CPTII,S$GLB,, | Performed by: STUDENT IN AN ORGANIZED HEALTH CARE EDUCATION/TRAINING PROGRAM

## 2022-09-22 PROCEDURE — 1159F MED LIST DOCD IN RCRD: CPT | Mod: CPTII,S$GLB,, | Performed by: STUDENT IN AN ORGANIZED HEALTH CARE EDUCATION/TRAINING PROGRAM

## 2022-09-22 PROCEDURE — 3008F BODY MASS INDEX DOCD: CPT | Mod: CPTII,S$GLB,, | Performed by: STUDENT IN AN ORGANIZED HEALTH CARE EDUCATION/TRAINING PROGRAM

## 2022-09-22 PROCEDURE — 99999 PR PBB SHADOW E&M-EST. PATIENT-LVL IV: ICD-10-PCS | Mod: PBBFAC,,, | Performed by: STUDENT IN AN ORGANIZED HEALTH CARE EDUCATION/TRAINING PROGRAM

## 2022-09-22 PROCEDURE — 1126F AMNT PAIN NOTED NONE PRSNT: CPT | Mod: CPTII,S$GLB,, | Performed by: STUDENT IN AN ORGANIZED HEALTH CARE EDUCATION/TRAINING PROGRAM

## 2022-09-22 PROCEDURE — 1159F PR MEDICATION LIST DOCUMENTED IN MEDICAL RECORD: ICD-10-PCS | Mod: CPTII,S$GLB,, | Performed by: STUDENT IN AN ORGANIZED HEALTH CARE EDUCATION/TRAINING PROGRAM

## 2022-09-22 PROCEDURE — 3078F DIAST BP <80 MM HG: CPT | Mod: CPTII,S$GLB,, | Performed by: STUDENT IN AN ORGANIZED HEALTH CARE EDUCATION/TRAINING PROGRAM

## 2022-09-22 PROCEDURE — 3008F PR BODY MASS INDEX (BMI) DOCUMENTED: ICD-10-PCS | Mod: CPTII,S$GLB,, | Performed by: STUDENT IN AN ORGANIZED HEALTH CARE EDUCATION/TRAINING PROGRAM

## 2022-09-22 PROCEDURE — 80061 LIPID PANEL: CPT | Performed by: STUDENT IN AN ORGANIZED HEALTH CARE EDUCATION/TRAINING PROGRAM

## 2022-09-22 PROCEDURE — 99214 OFFICE O/P EST MOD 30 MIN: CPT | Mod: S$GLB,,, | Performed by: STUDENT IN AN ORGANIZED HEALTH CARE EDUCATION/TRAINING PROGRAM

## 2022-09-22 PROCEDURE — 99999 PR PBB SHADOW E&M-EST. PATIENT-LVL IV: CPT | Mod: PBBFAC,,, | Performed by: STUDENT IN AN ORGANIZED HEALTH CARE EDUCATION/TRAINING PROGRAM

## 2022-09-22 PROCEDURE — 3288F PR FALLS RISK ASSESSMENT DOCUMENTED: ICD-10-PCS | Mod: CPTII,S$GLB,, | Performed by: STUDENT IN AN ORGANIZED HEALTH CARE EDUCATION/TRAINING PROGRAM

## 2022-09-22 PROCEDURE — 84443 ASSAY THYROID STIM HORMONE: CPT | Performed by: STUDENT IN AN ORGANIZED HEALTH CARE EDUCATION/TRAINING PROGRAM

## 2022-09-22 PROCEDURE — 3288F FALL RISK ASSESSMENT DOCD: CPT | Mod: CPTII,S$GLB,, | Performed by: STUDENT IN AN ORGANIZED HEALTH CARE EDUCATION/TRAINING PROGRAM

## 2022-09-22 PROCEDURE — 36415 COLL VENOUS BLD VENIPUNCTURE: CPT | Mod: PO | Performed by: STUDENT IN AN ORGANIZED HEALTH CARE EDUCATION/TRAINING PROGRAM

## 2022-09-22 PROCEDURE — 1101F PR PT FALLS ASSESS DOC 0-1 FALLS W/OUT INJ PAST YR: ICD-10-PCS | Mod: CPTII,S$GLB,, | Performed by: STUDENT IN AN ORGANIZED HEALTH CARE EDUCATION/TRAINING PROGRAM

## 2022-09-22 PROCEDURE — 1126F PR PAIN SEVERITY QUANTIFIED, NO PAIN PRESENT: ICD-10-PCS | Mod: CPTII,S$GLB,, | Performed by: STUDENT IN AN ORGANIZED HEALTH CARE EDUCATION/TRAINING PROGRAM

## 2022-09-22 RX ORDER — LEVOTHYROXINE SODIUM 112 UG/1
TABLET ORAL
Qty: 90 TABLET | Refills: 3 | Status: SHIPPED | OUTPATIENT
Start: 2022-09-22 | End: 2022-09-22

## 2022-09-22 RX ORDER — LEVOTHYROXINE SODIUM 112 UG/1
TABLET ORAL
Qty: 90 TABLET | Refills: 3 | Status: SHIPPED | OUTPATIENT
Start: 2022-09-22 | End: 2023-09-25

## 2022-09-22 NOTE — PATIENT INSTRUCTIONS
Branden Talamantes, Please read below to learn more on healthy choices, screenings, and useful information related to your health.  Most of my patients read it. Most of my healthy patients complete their cancer screenings. Don't lose out on improving your health.     Table of Contents:     Overdue health maintenance   Cancer prevention  Explanation on the different components of your blood work and interpretation  Frequently asked questions   Blood pressure log     If you are due for any health screening(s) below please notify me so we can arrange them to be ordered and scheduled to maintain your health. Most healthy patients at your age complete them.     All of your core healthy metrics are met.                              Cancer Prevention    Why should you choose to get screened for cancer? One simple reason is because you are important. You matter and deserve to have the best health so you can fulfill your great potential.       Colon Cancer screening - Most colon cancers can be prevented by screening. In most cases a polyp in the colon can grow and is not cancerous at first, but it can become cancerous years later. A polyp is like a seed that can grow into a weed if it is left in the soil. If the seed is detected and removed, no weed sprouts. A FIT test/Cologuard test and colonoscopy can detect precancerous polyps and lead to the prevention of cancer. A polyp is just like a seed that can be removed before the roots take hold. A colonoscopy can remove these polyps and eliminate the chance that these polyps turn into cancer.     Cervical Cancer screening- A PAP smear can detect precancerous cells that can become cervical cancer and can lead to procedures to remove them. It is very important to get a PAP smear as investing these few minutes can help prevent a lot of trouble down the road. If you want to do the most you can do to spend more time with your family and friends', cancer screenings can help with that goal.  "    Breast Cancer screening- Mammograms can detect breast cancer before it has spread and early detection allows the ability to remove the lesion prior to any spread. It is similar to a small rotten spot on fruit. If the spoiled part is removed quickly it can preserve the rest of the fruit, but if it is left alone it will corrupt the whole peach.     Smoking Cessation- "Smoking is like a chimney. The tar builds up and causes a back draft which leads to a cough. Smoking is like sitting in a car with a blocked exhaust system." Smoking can increase your risk for lung, mouth, throat, nose, esophagus, bladder, kidney, ureter, pancreas, stomach, liver, cervix, ovary, bowel, and leukemia [2]. If you have smoked in the past, you might meet the criteria for a CT lung cancer screening.     Lung Cancer Screening - "The U.S. Preventive Services Task Force (USPSTF) recommendsexternal icon yearly lung cancer screening with LDCT for people who--have a 20 pack-year or more smoking history, and smoke now or have quit within the past 15 years, and are between 50 and 80 years old. A pack-year is smoking an average of one pack of cigarettes per day for one year. For example, a person could have a 20 pack-year history by smoking one pack a day for 20 years or two packs a day for 10 years." [3]    Hypertension - Common high blood pressure meds may lower colorectal cancer risk-[facility], July 6, 2020 - Hypertension Journal Report Medications commonly prescribed to treat high blood pressure may also reduce patients' colorectal cancer risk, according to new research published today in Hypertension, an American Heart Association journal." [4].     Low HbA1c - "Higher HbA1c levels (within both the non?diabetic and diabetic ranges) were associated with the risk of colorectal cancer (Model 2; P linear?=?0.009), especially for colon cancer." [5].    A healthy diet, exercise, limitation of alcohol use, certain infections, avoidance of " radiation/environmental toxins, and staying lean lowers your cancer risk [6].     I want to empower you to make informed choices for your health. I have a listed below the most common blood components that we check for when you get blood work. I discuss what each component measures along with common reasons for why they can be abnormal. If you are interested in understanding your blood work better, please read the lab explanation attached below.     Explanation of Lab Results    Please note: This information is included as a reference to help you better understand your lab results and is not be used for diagnosis.     Lipid Panel:  Cholesterol is a measure of cardiac risk and stroke risk. A lipid panel measures total cholesterol and provides readings which are broken down into 3 subsections: Triglycerides, HDL and LDL.    Total Cholesterol: Your total blood cholesterol is a measure of LDL cholesterol, HDL cholesterol, and other lipid components.  If your individual lipid level components are in the normal range and you have an elevated total cholesterol level we are generally not to concerned since we primarily look at the LDL cholesterol which is considered the bad cholesterol which can lead to heart attacks and strokes.  Your calculated cardiac risk is the most important factor in deciding if you would benefit from a cholesterol-lowering medication that the total cholesterol level.    Triglycerides are most diet and weight sensitive. They are affected easily by lifestyle changes. Ideally, this reading should be less than 150, although if the remainder of the cholesterol panel is within normal limits, we will tolerate upwards of 250-300. For the most part, if triglycerides are the only part of your cholesterol panel reading as 'abnormal', it is best to lose weight and modify your diet, including less saturated fat and less simple sugars. We only start medication to lower this is the level is greater than 500 since  this can increase ones risk for pancreatitis. This is not the cholesterol type that leads to heart attacks.     HDL is your 'good cholesterol.' Ideally, the reading should be over 40 and is particularly helpful when it is greater than 60. Research indicates that high HDL is extremely protective; and if your HDL level is greater than 60, we tolerate far worse LDL or triglyceride levels. For the most part, HDL is responsive to aerobic activity and ideal weight. We do not have medicines that increase the HDL reading very easily.    LDL is your 'bad cholesterol.' Our goals depend on how many cardiac risk factors you have.     High LDL: If you are diabetic or have had a heart attack, we like the LDL level to be less than 100. If you have 2 cardiac risk factors (such as diabetes, hypertension, family history, a low HDL and smoking), we like your LDL to be less than 130. If you have 0-2 cardiac risk factors, we like your LDL level to be less than 160, but we may not necessarily initiate medications to 190 unless you cannot lower it. The latest guidelines recommend to consider taking a statin only if your ASCVD cardiac risk score is at least greater than 7.5% and a strong recommendation if your risk score is greater than 10%.     Low LDL: Normal or low LDL is considered good and having an LDL below the normal range is not of a concern.     Complete Blood Count (CBC):    White blood cells: These are infection fighting cells. Mild fluctuations may represent minor illness at the time of blood draw. Marked fluctuations can represent immune diseases. This can also be elevated from smoking.     High WBC: Elevation in wbc can commonly be caused by an infection, steroid use, or any cause of inflammation such as many rheumatologic diseases, surgery, or trauma.      Low WBC: Many different things can cause this such as infections, medications, rheumatologic disorders, malignancies, nutritional deficiencies, and a normal variant in  some individuals. If this occurs it is common practice to rule out a few viruses such as HIV, Hep C, B12, folate along with a a peripheral blood smear to look for abnormalities. If you are otherwise healthy with no concerning symptoms and the workup is negative we usually monitor it with yearly blood work.  If there are other abnormal cell line abnormalities sometimes we refer to hematology to further evaluate.    Hemoglobin: A key indicator for anemia. Ranges depend on age. If you are significantly out of this range, we may need to talk with you.    High Hemoglobin: This can be elevated in some blood disorders, sleep apnea, chronic lung disease, kidney disease, testosterone use, dehydration, smoking, along with some other rare causes.     Low Hemoglobin: Many things can lead to this but the most common cause is an iron deficiency in females who lose blood during their periods, decreased absorption due to antacids, poor diet, sickle cell, anemia of chronic disease, malignancy, bleeding from the gut, along with some other less common causes. If you are a young female who has periods it is usually assumed that this is from that blood loss unless other symptoms or abnormal blood work is present. If you are above 45 and have an iron deficiency it is always recommended to get a colonoscopy to ensure that there is no lesion causing blood loss and to exclude malignancy.     Hematocrit: Another way of looking at anemia, much like your hemoglobin. If you are significantly out of this range, we may need to talk with you.    MCV: This looks at the size of your red blood cells.     High MCV:  A large number may indicate a B-12 deficiency, folate deficiency, alcohol use, liver disease, medication-induced phenomenon, or a need for further workup.    Low MCV: A small number may imply iron anemia or genetic disorder such as alpha-thalassemia. We may check iron studies called to see if you are low.    MCH: Much like MCV  above.    Platelets: These are clotting factors. We tolerate a broad range in this. If your numbers are less than 100, we may need to work it up.     High Platelet Count: If your numbers are elevated, this could represent ongoing inflammation within the body which can be caused by any infection, illness, iron deficiency, or other malignancy. We usually recheck it to monitor for improvement and if it does not resolve will work it up with more blood work.     Low Platelet Count: Many things can cause this such as infections, alcohol use, medications, liver disease, vitamin deficiencies, aspleenia, and some other rare blood disorders. If it persists many times we refer to hematology if a cause is not identified.     Iron:  This is not a reliable blood marker since this fluctuates throughout the day and if you are fasting many times your iron level in your blood is low but this does not necessarily mean you have a deficiency.  There are more reliable ways to measure your iron stores and these are discussed below.    Transferrin:  Many things can cause an abnormal result and this is not as important as some other labs mentioned below.    TIBC:  This is the total iron-binding capacity and this measures the total amount of iron that can be bound by proteins in the blood.  If you have an elevated TIBC this is a good marker that you could be low on iron and this is useful blood marker.  A simple way to think of this is seats in a car. The TIBC is the number of open seats that iron can sit in. If you have a high TIBC (number of open seats) that means you have a low iron level.     Ferritin:  A low ferritin is almost always caused from an iron deficiency.  A normal ferritin level does not need necessarily exclude an iron deficiency since many inflammatory conditions such as kidney disease, diabetes, arthritis, and obesity can raise this level hiding an iron deficiency.  If you are healthy with no inflammatory conditions this  is a very reliable test for detecting an iron deficiency since nothing else lowers your ferritin level except a low iron level. Keep in mind that you can have an iron deficiency if your ferritin level is normal but your TIBC is elevated.  If you have a low iron level the next step is always to identify why you have a low iron level.    CMP (Comprehensive Metabolic Panel):  Broadly, this is a test of organ function including the kidneys, liver, and electrolyte levels.    Glucose: This is primarily looking for diabetes. We like your blood glucose level to be less than 100 when fasting. Readings of 100-125 indicate what we call 'pre-diabetes' or 'glucose intolerance.' This does not necessarily indicate diabetes, but we may check another test called a hemoglobin A1C for confirmation. This level puts you at great risk for becoming a true diabetic and we would encourage the reduction of simple sugars and processed white flour as well as appropriate weight loss. If this number is greater than 125, it is likely you are diabetic; we will get an additional hemoglobin A1C test and will likely schedule you for an appointment. If you notice that your blood glucose is above 125 and we have not scheduled a follow-up appointment, please call us. Patients who are known diabetics can have readings greater than 125.    Urea Nitrogen: This is a kidney function and maybe elevated because of mild dehydration or because of excessive muscle breakdown from aggressive exercise habits.    Creatinine: This also is a kidney function test. It may be mildly elevated if you have particularly large muscle bulk or taking a supplement like creatine. It is related to the GFR. It is a muscle product that we track to look at your kidney function. If this is elevated and new, we may need to talk with you. If you have had this mildly elevated in the past, it is likely that we will just track it to ensure that it does not worsen quickly. Some medications  may gently worsen this, namely blood pressure pills called ace inhibitors. To some degree, we will permit levels of up to 1.6.    Sodium: This is essentially the concentration of salt within the body. This may be mildly low because of dehydration, overhydration, diuretics, .    Potassium: This is an electrolyte that can cause muscular cramping or cardiac difficulties. It is sometimes lowered by diuretic medications.    Chloride: For the most part, this is only relevant if the other electrolytes are abnormal.    CO2: This is a function of acid balance within the body. For the most part, mild abnormalities are not important and may represent a starvation or dehydration state when blood was drawn. Many medications can change this level as well such as diuretics, acetazolamide, calcium carbonate, laxatives, aspirin, and many others.    High CO2: Many things can cause this which includes dehydration, sleep apnea, and COPD.     Low CO2: Many things can lead to a low level and if you are generally healthy we are usually  not concerned. Diarrhea, renal disease, diabetes, and many medications can cause this.     Anion Gap: Only relevant if your CO2 is abnormal.    Calcium: This is not related to dietary intake of calcium. It may fluctuate gently based on the amount of protein within your body. If it is above 10.9, we may need to do additional testing. Many times if low the albumin level is low and once the corrected calcium level is calculated the calcium is in a normal range.     Total protein: This looks at protein within the body. Markedly elevated levels can represent an immune response and may require further workup.    Albumin: This may be elevated because of particularly high level of fitness. If it is markedly suppressed, it may represent organ dysfunction, particularly in the liver or kidneys.    AST and ALT: These are enzymes in the liver and if elevated can indicate liver damage.     Elevated AST/ALT: Many things  can caused elevated liver enzymes and the most common reason is viral infections, alcohol use, medications, supplements, autoimmune, along with some other rare causes. One of the most common reasons for a mild elevation in liver enzymes (less than 3 times the upper limit) is fatty liver disease. Many individuals who have extra fat in their diet store this in the liver and this can build up and cause a mild elevation. This is usually diagnosed with a liver ultrasound and exclusion of other causes. The only treatment for this is diet and exercise along with avoidance of liver toxic medications and alcohol. It is standard of care to rule our viral hepatitis and get imaging of the liver if elevated. This is monitored and if I feel concerned will refer to hepatology.     Alk Phos: Part of liver function or bones    High Alk Phos: elevated levels may indicate a liver injury or obstruction of bile flow. Elevated levels can also be seen in vitamin D deficiency, drug induced, or bone disorders.     Low Alk Phos: In most cases having a low Alkaline Phosphatase enzyme activity is not due to any disease and is simply a normal variant.  Having an elevated Alk Phos is more concerning and associated with many diseases and thus I would not be overly concerned with a low level which is seen in many health individuals. The reasons for a low serum alkaline phosphatase activity were reviewed in a 1-yr retrospective study and in this study it was found that no cause was found in most cases.  Low activity values were recorded in several individuals in the absence of any obvious cause. This would suggest that the definition of the lower limit of the reference range for alkaline phosphatase is arbitrary, thus limiting the use of low serum activity as a marker of disease.  In some cases micronutrients like Zinc (Zn) and Magnesium (Mg) are causes of low ALP activity and you can take zinc or magnesium supplements. Unfortunately, the blood  work to measure zinc and magnesium levels are unreliable and not very accurate since it does not test for the intracellular zinc or magnesium levels.     Karri PATEL, Abel MOELLER, Edgard JACKSON. Clinical significance of a low serum alkaline phosphatase. Net J Med. 1992 Feb;40(1-2):9-14. PMID: [id number].  Timothy ZEPEDA S, Antony B, Christi I, Behera S, Timothy S. Low Alkaline Phosphatase (ALP) In Adult Population an Indicator of Zinc (Zn) and Magnesium (Mg) Deficiency. Curr Res Nutr Food Sci 2017;5(3). doi : http://[email or web address]    Total Bilirubin: This is also part of liver function.    High T Bili: If you have right upper quadrant pain an elevation in total bilirubin can be caused by a gallbladder stone that is blocking the biliary tract that leads to your gastrointestinal tract. If you have fever and right upper quadrant pain this can sometimes be elevated when your gallbladder is infected and most individuals have nausea with vomiting associated with it. If you have no symptoms and are otherwise healthy this can be caused by Gilbert syndrome which is a benign normal variant. There are other causes such as some anemias but there would be abnormal blood counts.     Low T Bili: Nothing to be concerned about.     Thyroid Stimulating Hormone (TSH): This reading is an indicator of your thyroid function. The thyroid regulates energy levels throughout the entire body, affecting almost every organ system. This is an inverse relationship so a high number actually presents a low thyroid. If this is abnormal, we will often check an additional lab called a Free T4 to evaluate this more carefully. Borderline elevations, those of 5-10, can be watched or worked up further. Please do not take the supplement Biotin for at least a week prior to getting your TSH checked since this can lead to false measurement levels.     Prostate Specific Antigen (PSA): (Men only.) This is a prostate cancer screening test, and is no longer a  "routine screening test. Levels are truly a function of age. Being less than 4 is typical for someone more in their 60s. If you are young, it should probably be less than 3. A higher PSA result does not necessarily mean that you have cancer, but may indicate a need for a discussion with your provider. Options include observation to look at rate of rise or prostate biopsy. Not only is the absolute value important, but how much it has changed from previous years. Please ensure that there is not a dramatic rise from previous years.    Please Note: This information is included as a reference to help you better understand your lab results and is not be used for diagnosis.    Frequently asked questions    When should I take my blood pressure medication?    The latest studies show that taking your blood pressure medication at night is the best time. A recent study showed that this prevents more heart attacks and strokes. See the answer below from [employer].     "Q. I've taken blood pressure medicines every morning for many years, and they keep my pressure under control. Recently, my doctor recommended taking them at bedtime, instead. Does that make sense?    A. It actually does make sense -- based on recent research. For many years, there have been at least three theoretical reasons for taking blood pressure medicines before bedtime. First, a body system that strongly affects blood pressure, called the renin-angiotensin system, has its peak activity during sleep. Second, circadian rhythms cause differences in the body chemistry at night compared with daytime. Third, most heart attacks occur in the morning, before medicines taken in the morning have a chance to "kick in."" [6].     When should I take my cholesterol medication?    It used to be recommended to take your cholesterol medication at night since the original statins that lowered cholesterol did not last 24 hours and most cholesterol synthesis is done at night. The " "long acting statins such as atorvastatin and rosuvastatin last 24 hours so they can be taken any time during the day. Simvastatin, pravastatin, fluvastatin, and lovastatin are shorter acting and should be taken at bed time.     Can supplements affect my blood work?    Yes they can. A very important supplement to not take for at least a week prior to your blood work is biotin. "Biotin supplement use is common and can lead to the false measurement of thyroid hormone in commonly used assays." [8.]    What are conditions that should not be addressed during a virtual visit?    There are some conditions that should not be evaluated via a virtual visit since optimal care is impossible. Chest pain, shortness of breath, lung conditions, abdominal pain, and any neurological complaint such as headache, dizziness, numbness ect.         When will I get commentary of my blood work?    I review all blood work that you get and I will send out commentary on this via [employer] within 72 hours. In most cases you will get a message from me sooner, but many times not all of the blood work is completed thus I usually wait until all results have returned. If there is a critical abnormality you should be contacted the same day you got blood work.     How frequently do I need to have visits to get controlled substances?    It is standard protocol to have a visit every 3 months if you are taking scheduled substances such as ADHD medications, psychiatric medications, and pain medications. This is to ensure your safety and monitor for any side effects.     When should I bring prior to a visit if I want to lose weight?    I recommend that you make a food diary for a week and fill out what you ate each day. You can bring this form in to your visit and I can look over it to suggest changes that you can make.     Which over the counter medications should I avoid if I have decreased kidney function?    NSAIDs which includes ibuprofen (Advil, Motrin, " Nuprin), naproxen (Aleve), meloxicam (Mobic) and diclofenac(Zorvolex, Voltaren) and ketorolac (Toradol) can damage your kidneys if you take this long term.Tylenol does not affect your kidney and thus is safe as long as you don't have liver disease.     Is there an [facility]?     Yes! The [facility] is located on the first floor of the [facility]. The address is listed below. You can get curbside pickup if you call their number at [phone]. One of the many benefits of using the [facility] pharmacy is that the pharmacists can contact me directly if a cheaper alternative is available to save you money. They also see your note to know more about what the medication was prescribed for. I recommend this pharmacy since communication with me is quick in case any confusion arises on your medications.     [address] [facility].  [address] [place] [id number]  Phone: [phone]    Hours:  Monday - Friday  8:00 a.m. - 5:30 a.m.    Why is it not in my best interest to call in order to get an antibiotic?    Medicine is a complex field and many times the correct diagnosis is critical in order to provide the correct care. One of the most important goals of a healthcare provider is to ensure that no dangerous condition is masquerading as a mild illness. Specific questions are very important to obtain during an examination that provide a wealth of information to understand your illness. Health care providers are trained to investigate for signs that can be dangerous to your health. Messaging or calling the office in order to get an antibiotic can be very dangerous.     For example, many urinary tract infections can lead to an infection in the kidney that can result in a serious blood stream infection that can lead to hospitalization if not recognized. A cough can be caused by many different things and not necessarily an infection. It is not uncommon that one assumes a cough is from an infection when it is actually caused  by a blood clot in the lungs. This can lead to death. Determining your risk can only be performed after a thorough history and examination. A few sentences through e-mail is not enough.     What are some common symptoms that should be evaluated by the emergency department and not by phone or e-mail?    This does not include every symptom, but common examples of symptoms that should prompt one to go to the emergency department are chest pain, chest pressure, shortness of breath, difficulty breathing, abdominal pain, weakness or numbness or an extremity, sudden weakness or drooping on one side of the body, speaking difficulty, unusual or bad headache (particularly if it started suddenly), head injury, confusion, seizure, passing out, lightheaded, pain in arm or jaw, suddenly not able to speak, see, walk, or move, dizziness, neck stiffness, suicidal thoughts, testicular pain, cuts and wounds, severe pain, along with many others. This is not an inclusive list.     Outside Records    It is common to have an colonoscopy, mammogram, PAP smear, or eye exam done outside the [facility] system. Many times we do not get the records automatically sent to us. Please call your provider's office to notify them to fax us your records so that we can have the most up to date information. Your provider will review your outside results in order to provide you with the complete care that you deserve. We appreciate that you decided to choose us to be serving on your healthcare team and the more information we have about your health is essential.     If I have a psychiatric crisis what should I do?    If you ever feel that there is a risk of a harm to yourself we recommend to go to the emergency room. There is a National Suicide Prevention lifeline number 1-466-777-TALK which route you to the nearest crisis center. There is also a suicide hotline 8-649-LHOZKEW (1-675.976.8120).    988 has been designated as the new three-digit dialing code  "that will route callers to the National Suicide Prevention Lifeline (now known as the 988 Suicide & Crisis Lifeline), and is now active across the [place].    When people call, text, or chat 988, they will be connected to trained counselors that are part of the existing Lifeline network. These trained counselors will listen, understand how their problems are affecting them, provide support, and connect them to resources if necessary.    The previous Lifeline phone number (1-148.726.1811) will always remain available to people in emotional distress or suicidal crisis.    The [employer] network of over 200 crisis centers has been in operation since 2005, and has been proven to be effective. Its the counselors at these local crisis centers who answer the contacts the Lifeline receives every day. Numerous studies have shown that callers feel less suicidal, less depressed, less overwhelmed and more hopeful after speaking with a Lifeline counselor.     Patient Education       Checking Your Blood Pressure at Home   The Basics   Written by the doctors and editors at UpToDate   How is blood pressure measured? -- Blood pressure is usually measured with a device that goes around your upper arm. This is often done in a doctor's office. But some people also check their blood pressure themselves, at home or at work.  Blood pressure is explained with 2 numbers. For instance, your blood pressure might be "140 over 90." The first (top) number is the pressure inside your arteries when your heart is elizabeth. The second (bottom) number is the pressure inside your arteries when your heart is relaxed. The table shows how doctors and nurses define high and normal blood pressure (table 1).  If your blood pressure gets too high, it puts you at risk for heart attack, stroke, and kidney disease. High blood pressure does not usually cause symptoms. But it can be serious.  What is a home blood pressure meter? -- A home blood pressure " "meter (or "monitor") is a device you can use to check your blood pressure yourself. It has a cuff that goes around your upper arm (figure 1). Some devices have a cuff that goes around your wrist instead. But doctors aren't sure if these work as well. The meter also has a small screen, or dial, that shows your blood pressure numbers.  There are also special meters you can wear for a day or 2. These are different because they automatically check your blood pressure throughout the day and night, even while you are sleeping. If your doctor thinks you should use one of these devices, they will talk to you about how to wear it.  Why do I need to check my blood pressure at home? -- If your doctor knows or suspects that you have high blood pressure, they might want you to check it at home. There are a few reasons for this. Your doctor might want to look at:  Whether your blood pressure measures the same at home as it did in the doctor's office  How well your blood pressure medicines are working  Changes in your blood pressure, for example, if it goes up and down  People who check their own blood pressure at home usually do better at keeping it low.  How do I choose a home blood pressure meter? -- When choosing a home blood pressure meter, you will probably want to think about:  Cost - Some devices cost more than others. You should also check to see if your insurance will help pay for your device.  Size - It's important to make sure the cuff fits your arm comfortably. Your doctor or nurse can help you with this.  How easy it is to use - You should make sure you understand how to use the device. You also need to be able to read the numbers on the screen.  You do not need a prescription to buy a home blood pressure meter. You can buy them at most pharmacies or over the internet. Your doctor or nurse can help you choose the right device for you.  How do I check my blood pressure at home? -- Once you have a home blood pressure " meter, your doctor or nurse should check it to make sure it fits you and works correctly.  When it's time to check your blood pressure:  Go to the bathroom and empty your bladder first. Having a full bladder can temporarily increase your blood pressure, making the results inaccurate.  Sit in a chair with your feet flat on the ground.  Try to breathe normally and stay calm.  Attach the cuff to your arm. Place the cuff directly on your skin, not over your clothing. The cuff should be tight enough to not slip down, but not uncomfortably tight.  Sit and relax for about 3 to 5 minutes with the cuff on.  Follow the directions that came with your device to start measuring your blood pressure. This might involve squeezing the bulb at the end of the tube to inflate the cuff (fill it with air). With some monitors, you just need to press a button to inflate the cuff. When the cuff fills with air, it feels like someone is squeezing your arm, but it should not hurt. Then you will slowly deflate the cuff (let the air out of it), or it will deflate by itself. The screen or dial will show your blood pressure numbers.  Stay seated and relax for 1 minute, then measure your blood pressure again.  How often should I check my blood pressure? -- It depends. Different people need to follow different schedules. Your doctor or nurse will tell you how often to check your blood pressure, and when. Some people need to check their blood pressure twice a day, in the morning and evening.  Your doctor or nurse will probably tell you to keep track of your blood pressure for at least a few days (table 2). Then they will look at the numbers. The reason for this is that it's normal for your blood pressure to change a bit from day to day. For example, the numbers might change depending on whether you recently had caffeine, just exercised, or feel stressed. Checking your blood pressure over several days - or longer - will give your doctor or nurse a  "better idea of what is average for you.  How should I keep track of my blood pressure? -- Some blood pressure meters will record your numbers for you, or send them to your computer or smartphone. If yours does not do this, you will need to write them down. Your doctor or nurse can help you figure out the best way to keep track of the numbers.  What if my blood pressure is high? -- Your doctor or nurse will tell you what to do if your blood pressure is high when you check it at home. If you get a number that is higher than normal, measure it again to see if it is still high. If it is very high (above a certain number, which your doctor or nurse will tell you to watch out for), you should call your doctor right away.  If your blood pressure is only a little high, your doctor or nurse might tell you to keep checking it for a few more days or weeks, and then call if it does not go back down. Then they can help you decide what to do next.  All topics are updated as new evidence becomes available and our peer review process is complete.  This topic retrieved from [employer] on: Sep 21, 2021.  Topic 217332 Version 4.0  Release: 29.4.2 - C29.263  © 2021 UpToDate, Inc. and/or its affiliates. All rights reserved.  table 1: Definition of normal and high blood pressure  Level  Top number  Bottom number    High 130 or above 80 or above   Elevated 120 to 129 79 or below   Normal 119 or below 79 or below   These definitions are from the American College of Cardiology/American Heart Association. Other expert groups might use slightly different definitions.  "Elevated blood pressure" is a term doctor or nurses use as a warning. It means you do not yet have high blood pressure, but your blood pressure is not as low as it should be for good health.  Graphic 95874 Version 6.0  figure 1: Using a home blood pressure meter     This is an example of a person using a home blood pressure meter.  Graphic 147869 Version 1.0    Consumer " Information Use and Disclaimer   This information is not specific medical advice and does not replace information you receive from your health care provider. This is only a brief summary of general information. It does NOT include all information about conditions, illnesses, injuries, tests, procedures, treatments, therapies, discharge instructions or life-style choices that may apply to you. You must talk with your health care provider for complete information about your health and treatment options. This information should not be used to decide whether or not to accept your health care provider's advice, instructions or recommendations. Only your health care provider has the knowledge and training to provide advice that is right for you. The use of this information is governed by the [employer] End User License Agreement, available at https://www.Novitaz/en/solutions/Connecture/about/shine.The use of [employer] content is governed by the [employer] Terms of Use. ©2021 UpToDate, Inc. All rights reserved.  Copyright   © 2021 UpToDate, Inc. and/or its affiliates. All rights reserved.      Daily Blood Pressure Log    Please print this form to assist you in keeping track of your blood pressure at home.      Name:  Date of Birth:       Average Blood Pressure:           Date: Time  (a.m.) Blood  Pressure: Pulse  Rate: Time  (p.m.) Blood  Pressure : Pulse  Rate: Comments:   Sample 8:37 127/83 84    Stressful morning                                                                                                                                                                                                     Wishing you good health,     Jered Siegel MD      References:  1-https://www.Clothes Horse/speakers/phoebe_coles  2-https://www.ClickToShop.com.au/news/jzyiq-cat-77-cancers-that-can-xi-cgyzco-km-smoking/  3-https://www.cdc.gov/cancer/lung/basic_info/screening.htm  4-https://newsroom.heart.org/news/common-hypertension-medications-may-reduce-colorectal-cancer-risk  5-Hardiko A, Umang M, Vikada N, et al. High hemoglobin A1c levels within the non-diabetic range are associated with the risk of all cancers. Int J Cancer. 2016;138(7):7106-0631. doi:10.1002/ijc.89821  6-https://www.health.[employer].edu/newsletter_article/the-10-commandments-of-cancer-prevention  7-https://www.health.[employer].edu/diseases-and-conditions/should-i-take-blood-pressure-medications-at-night  8-Rosana BADILLO et al 2018 Prevalence of biotin supplement usage in outpatients and plasma biotin concentrations in patients presenting to the emergency department. Clin Biochem. Epub 2018 Jul 20. PMID: [id number].

## 2022-09-22 NOTE — PROGRESS NOTES
[facility] Note    Subjective:    The HPI and pertinent ROS is included in the Diagnostic Impression Remarks section at the end of the note. Please see below for further details. Chief complaint is at end of note.     Albin is a pleasant intelligent patient who is here for evaluation.     Modified Medications    Modified Medication Previous Medication    LEVOTHYROXINE (SYNTHROID) 112 MCG TABLET levothyroxine (SYNTHROID) 112 MCG tablet       TAKE 1 TABLET BY MOUTH ONCE DAILY FIRST THING IN MORNING ON EMPTY STOMACH WITH FULL GLASS OF WATER  Strength: 112 mcg    TAKE 1 TABLET BY MOUTH ONCE DAILY FIRST THING IN MORNING ON EMPTY STOMACH WITH FULL GLASS OF WATER       Data reviewed 274}  Previous medical records reviewed and summarized in plan section at end of note.      If you are due for any health screening(s) below please notify me so we can arrange them to be ordered and scheduled to maintain your health.     All of your core healthy metrics are met.      The following portions of the patient's history were reviewed and updated as appropriate: allergies, current medications, past family history, past medical history, past social history, past surgical history and problem list.    He  has a past medical history of Arthritis, Atrial fibrillation, BPH with obstruction/lower urinary tract symptoms, Cardiomyopathy, dilated, Disorder of kidney and ureter, Hyperlipidemia, Hypertension, Hypothyroidism, and Sleep apnea.  He  has a past surgical history that includes Back surgery; Foot surgery; Kidney surgery; Nephrectomy; Arthroscopy of elbow (Left, 08/09/2019); Irrigation and debridement of upper extremity (08/09/2019); Transrectal ultrasound examination (N/A, 11/23/2020); Transurethral resection of prostate (N/A, 01/07/2022); and Cataract extraction (Right).    He  reports that he has never smoked. He has never been exposed to tobacco smoke. He has never used smokeless tobacco. He reports that he does not  "drink alcohol and does not use drugs.  He family history includes Breast cancer in his sister; Cirrhosis in his father; Pancreatic cancer in his mother.    Review of patient's allergies indicates:   Allergen Reactions    Antihistamines - alkylamine Other (See Comments)     Urinary retention       Tobacco Use: Low Risk     Smoking Tobacco Use: Never    Smokeless Tobacco Use: Never     Physical Examination  General appearance: alert, cooperative, no distress  Neck: no thyromegaly, no neck stiffness  Lungs: clear to auscultation, no wheezes, rales or rhonchi, symmetric air entry  Heart: normal rate, regular rhythm, normal S1, S2, no murmurs, rubs, clicks or gallops  Abdomen: soft, nontender, nondistended, no rigidity, rebound, or guarding.   Back: no point tenderness over spine  Extremities: peripheral pulses normal, no unilateral leg swelling or calf tenderness   Neurological:alert, oriented, normal speech, no new focal findings or movement disorder noted from baseline    BP Readings from Last 3 Encounters:   09/22/22 114/68   08/10/22 139/88   08/08/22 136/68     Wt Readings from Last 3 Encounters:   09/22/22 104.1 kg (229 lb 8 oz)   08/10/22 103.4 kg (227 lb 15.3 oz)   08/08/22 103.4 kg (227 lb 15.3 oz)     /68 (BP Location: Right arm, Patient Position: Sitting, BP Method: Large (Manual))   Pulse 86   Temp 98 °F (36.7 °C) (Oral)   Resp 15   Ht 5' 9" (1.753 m)   Wt 104.1 kg (229 lb 8 oz)   SpO2 97%   BMI 33.89 kg/m²    274}  Laboratory: I have reviewed old labs below:    274}    Lab Results   Component Value Date    WBC 6.06 01/07/2022    HGB 15.3 01/07/2022    HCT 45.8 01/07/2022    MCV 95 01/07/2022     01/07/2022     01/07/2022    K 3.9 01/07/2022     01/07/2022    CALCIUM 9.5 01/07/2022    CO2 29 01/07/2022    GLU 96 01/07/2022    BUN 13 01/07/2022    CREATININE 1.1 01/07/2022    ANIONGAP 8 01/07/2022    PROT 7.2 11/01/2021    ALBUMIN 4.0 11/01/2021    BILITOT 0.5 11/01/2021    " "ALKPHOS 73 11/01/2021    ALT 18 11/01/2021    AST 21 11/01/2021    INR 1.3 05/02/2022    CHOL 144 07/16/2021    TRIG 110 07/16/2021    HDL 33 (L) 07/16/2021    LDLCALC 89.0 07/16/2021    TSH 2.184 07/16/2021     Lab reviewed by me: Particular labs of significance that I will monitor, workup, or treat to improve are mentioned below in diagnostic impression remarks.    Imaging/EKG: I have reviewed the pertinent results and my findings are noted in remarks.  274}    CC:   Chief Complaint   Patient presents with    [facility]    Hyperlipidemia    Hypertension          274}    Assessment/Plan  Albin Fernandez is a 66 y.o. male who presents to clinic with:  1. Healthcare maintenance    2. Permanent atrial fibrillation    3. Hypothyroidism, unspecified type    4. Mixed hyperlipidemia    5. Benign prostatic hyperplasia with urinary frequency    6. Encounter for prostate cancer screening       274}  Diagnostic Impression Remarks + HPI     Documentation entered by me for this encounter may have been done in part using speech-recognition technology. Although I have made an effort to ensure accuracy, "sound like" errors may exist and should be interpreted in context.     Healthcare maintenance-recommend PSA after shared decision making will get  AFib-stable continue beta-blocker and apixaban no recent flares no chest pain shortness of breath or syncope  Hypothyroidism-well controlled continue current medication recheck thyroid levels  Hyperlipidemia-control uncertain repeat blood work continue statin recommend healthy diet    BPH-stable continue current meds follow-up with Urology    This is the extent of this pleasant patient's concerns at this present time. He did not feel chest pain upon exertion, dyspnea, nausea, vomiting, diaphoresis, or syncope. No pleuritic chest pain, unilateral leg swelling, calf tenderness, or calf pain. Negative for unintentional weight loss night sweats and fevers. Albin will return to clinic in " a few months for further workup and reassessment or sooner as needed. He was instructed to call the clinic or go to the emergency department if his symptoms do not improve, worsens, or if new symptoms develop. As we discussed that symptoms could worsen over the next 24 hours he was advised that if any increased swelling, pain, or numbness arise to go immediately to the ED. Patient knows to call any time if an emergency arises. Shared decision making occurred and he verbalized understanding in agreement with this plan. I discussed imaging findings, diagnosis, possibilities, treatment options, medications, risks, and benefits. He had many questions regarding the options and long-term effects. All questions were answered. He expressed understanding after counseling regarding the diagnosis and recommendations. He was capable and demonstrated competence with understanding of these options. Shared decision making was performed resulting in him choosing the current treatment plan. Patient handout was given with instructions and recommendations. Advised the patient that if they become pregnant to alert us immediately to assess for medication changes. I also discussed the importance of close follow up to discuss labs, change or modify his medications if needed, monitor side effects, and further evaluation of medical problems.     Additional workup planned: see labs ordered below.    See below for labs and meds ordered with associated diagnosis      1. Healthcare maintenance    2. Permanent atrial fibrillation    3. Hypothyroidism, unspecified type  - levothyroxine (SYNTHROID) 112 MCG tablet; TAKE 1 TABLET BY MOUTH ONCE DAILY FIRST THING IN MORNING ON EMPTY STOMACH WITH FULL GLASS OF WATER  Strength: 112 mcg  Dispense: 90 tablet; Refill: 3  - TSH; Future    4. Mixed hyperlipidemia  - Lipid Panel; Future    5. Benign prostatic hyperplasia with urinary frequency    6. Encounter for prostate cancer screening  - PSA, Screening;  Future    Jered Siegel MD   274}    All of your core healthy metrics are met.

## 2022-10-28 ENCOUNTER — PATIENT MESSAGE (OUTPATIENT)
Dept: FAMILY MEDICINE | Facility: CLINIC | Age: 67
End: 2022-10-28
Payer: MEDICARE

## 2022-10-29 ENCOUNTER — IMMUNIZATION (OUTPATIENT)
Dept: FAMILY MEDICINE | Facility: CLINIC | Age: 67
End: 2022-10-29
Payer: MEDICARE

## 2022-10-29 PROCEDURE — 90694 VACC AIIV4 NO PRSRV 0.5ML IM: CPT | Mod: S$GLB,,, | Performed by: FAMILY MEDICINE

## 2022-10-29 PROCEDURE — G0008 FLU VACCINE - QUADRIVALENT - ADJUVANTED: ICD-10-PCS | Mod: S$GLB,,, | Performed by: FAMILY MEDICINE

## 2022-10-29 PROCEDURE — G0008 ADMIN INFLUENZA VIRUS VAC: HCPCS | Mod: S$GLB,,, | Performed by: FAMILY MEDICINE

## 2022-10-29 PROCEDURE — 90694 FLU VACCINE - QUADRIVALENT - ADJUVANTED: ICD-10-PCS | Mod: S$GLB,,, | Performed by: FAMILY MEDICINE

## 2022-11-07 ENCOUNTER — LAB VISIT (OUTPATIENT)
Dept: LAB | Facility: HOSPITAL | Age: 67
End: 2022-11-07
Attending: STUDENT IN AN ORGANIZED HEALTH CARE EDUCATION/TRAINING PROGRAM
Payer: MEDICARE

## 2022-11-07 DIAGNOSIS — N40.1 BPH WITH OBSTRUCTION/LOWER URINARY TRACT SYMPTOMS: ICD-10-CM

## 2022-11-07 DIAGNOSIS — N13.8 BPH WITH OBSTRUCTION/LOWER URINARY TRACT SYMPTOMS: ICD-10-CM

## 2022-11-07 LAB — COMPLEXED PSA SERPL-MCNC: 0.64 NG/ML (ref 0–4)

## 2022-11-07 PROCEDURE — 36415 COLL VENOUS BLD VENIPUNCTURE: CPT | Performed by: STUDENT IN AN ORGANIZED HEALTH CARE EDUCATION/TRAINING PROGRAM

## 2022-11-07 PROCEDURE — 84153 ASSAY OF PSA TOTAL: CPT | Performed by: STUDENT IN AN ORGANIZED HEALTH CARE EDUCATION/TRAINING PROGRAM

## 2022-12-15 ENCOUNTER — PATIENT MESSAGE (OUTPATIENT)
Dept: UROLOGY | Facility: CLINIC | Age: 67
End: 2022-12-15
Payer: MEDICARE

## 2022-12-19 ENCOUNTER — PATIENT MESSAGE (OUTPATIENT)
Dept: CARDIOLOGY | Facility: CLINIC | Age: 67
End: 2022-12-19
Payer: MEDICARE

## 2022-12-20 ENCOUNTER — TELEPHONE (OUTPATIENT)
Dept: CARDIOLOGY | Facility: CLINIC | Age: 67
End: 2022-12-20
Payer: MEDICARE

## 2022-12-20 NOTE — TELEPHONE ENCOUNTER
----- Message from Cayden Farfan sent at 12/20/2022  9:03 AM CST -----  Type: Needs Medical Advice  Who Called:  pt  Symptoms (please be specific):  pt said he need to speak to the dr about his apixaban (ELIQUIS) 5 mg Tab--said he been without it for 3 day and he can not afford to be without his meds and he need to know what he need to do--please call and advise   Best Call Back Number: [phone] (home)     Additional Information: thank you

## 2022-12-29 ENCOUNTER — OFFICE VISIT (OUTPATIENT)
Dept: FAMILY MEDICINE | Facility: CLINIC | Age: 67
End: 2022-12-29
Payer: MEDICARE

## 2022-12-29 ENCOUNTER — HOSPITAL ENCOUNTER (OUTPATIENT)
Dept: RADIOLOGY | Facility: HOSPITAL | Age: 67
Discharge: HOME | End: 2022-12-29
Attending: NURSE PRACTITIONER
Payer: MEDICARE

## 2022-12-29 ENCOUNTER — NURSE TRIAGE (OUTPATIENT)
Dept: ADMINISTRATIVE | Facility: CLINIC | Age: 67
End: 2022-12-29
Payer: MEDICARE

## 2022-12-29 ENCOUNTER — PATIENT MESSAGE (OUTPATIENT)
Dept: FAMILY MEDICINE | Facility: CLINIC | Age: 67
End: 2022-12-29

## 2022-12-29 VITALS
HEART RATE: 97 BPM | DIASTOLIC BLOOD PRESSURE: 80 MMHG | HEIGHT: 69 IN | OXYGEN SATURATION: 98 % | SYSTOLIC BLOOD PRESSURE: 138 MMHG | TEMPERATURE: 98 F | WEIGHT: 227.31 LBS | BODY MASS INDEX: 33.67 KG/M2

## 2022-12-29 DIAGNOSIS — M79.672 LEFT FOOT PAIN: ICD-10-CM

## 2022-12-29 DIAGNOSIS — M79.673 HEEL PAIN, UNSPECIFIED LATERALITY: Primary | ICD-10-CM

## 2022-12-29 DIAGNOSIS — M79.672 LEFT FOOT PAIN: Primary | ICD-10-CM

## 2022-12-29 PROCEDURE — 99999 PR PBB SHADOW E&M-EST. PATIENT-LVL V: ICD-10-PCS | Mod: PBBFAC,,, | Performed by: NURSE PRACTITIONER

## 2022-12-29 PROCEDURE — 1159F MED LIST DOCD IN RCRD: CPT | Mod: CPTII,S$GLB,, | Performed by: NURSE PRACTITIONER

## 2022-12-29 PROCEDURE — 1160F RVW MEDS BY RX/DR IN RCRD: CPT | Mod: CPTII,S$GLB,, | Performed by: NURSE PRACTITIONER

## 2022-12-29 PROCEDURE — 96372 THER/PROPH/DIAG INJ SC/IM: CPT | Mod: S$GLB,,, | Performed by: NURSE PRACTITIONER

## 2022-12-29 PROCEDURE — 99213 OFFICE O/P EST LOW 20 MIN: CPT | Mod: 25,S$GLB,, | Performed by: NURSE PRACTITIONER

## 2022-12-29 PROCEDURE — 3288F FALL RISK ASSESSMENT DOCD: CPT | Mod: CPTII,S$GLB,, | Performed by: NURSE PRACTITIONER

## 2022-12-29 PROCEDURE — 1125F PR PAIN SEVERITY QUANTIFIED, PAIN PRESENT: ICD-10-PCS | Mod: CPTII,S$GLB,, | Performed by: NURSE PRACTITIONER

## 2022-12-29 PROCEDURE — 3288F PR FALLS RISK ASSESSMENT DOCUMENTED: ICD-10-PCS | Mod: CPTII,S$GLB,, | Performed by: NURSE PRACTITIONER

## 2022-12-29 PROCEDURE — 3075F SYST BP GE 130 - 139MM HG: CPT | Mod: CPTII,S$GLB,, | Performed by: NURSE PRACTITIONER

## 2022-12-29 PROCEDURE — 1101F PR PT FALLS ASSESS DOC 0-1 FALLS W/OUT INJ PAST YR: ICD-10-PCS | Mod: CPTII,S$GLB,, | Performed by: NURSE PRACTITIONER

## 2022-12-29 PROCEDURE — 3075F PR MOST RECENT SYSTOLIC BLOOD PRESS GE 130-139MM HG: ICD-10-PCS | Mod: CPTII,S$GLB,, | Performed by: NURSE PRACTITIONER

## 2022-12-29 PROCEDURE — 3008F PR BODY MASS INDEX (BMI) DOCUMENTED: ICD-10-PCS | Mod: CPTII,S$GLB,, | Performed by: NURSE PRACTITIONER

## 2022-12-29 PROCEDURE — 1125F AMNT PAIN NOTED PAIN PRSNT: CPT | Mod: CPTII,S$GLB,, | Performed by: NURSE PRACTITIONER

## 2022-12-29 PROCEDURE — 1160F PR REVIEW ALL MEDS BY PRESCRIBER/CLIN PHARMACIST DOCUMENTED: ICD-10-PCS | Mod: CPTII,S$GLB,, | Performed by: NURSE PRACTITIONER

## 2022-12-29 PROCEDURE — 3008F BODY MASS INDEX DOCD: CPT | Mod: CPTII,S$GLB,, | Performed by: NURSE PRACTITIONER

## 2022-12-29 PROCEDURE — 99999 PR PBB SHADOW E&M-EST. PATIENT-LVL V: CPT | Mod: PBBFAC,,, | Performed by: NURSE PRACTITIONER

## 2022-12-29 PROCEDURE — 73630 X-RAY EXAM OF FOOT: CPT | Mod: TC,FY,LT

## 2022-12-29 PROCEDURE — 3079F PR MOST RECENT DIASTOLIC BLOOD PRESSURE 80-89 MM HG: ICD-10-PCS | Mod: CPTII,S$GLB,, | Performed by: NURSE PRACTITIONER

## 2022-12-29 PROCEDURE — 96372 PR INJECTION,THERAP/PROPH/DIAG2ST, IM OR SUBCUT: ICD-10-PCS | Mod: S$GLB,,, | Performed by: NURSE PRACTITIONER

## 2022-12-29 PROCEDURE — 73630 X-RAY EXAM OF FOOT: CPT | Mod: 26,LT,, | Performed by: RADIOLOGY

## 2022-12-29 PROCEDURE — 3079F DIAST BP 80-89 MM HG: CPT | Mod: CPTII,S$GLB,, | Performed by: NURSE PRACTITIONER

## 2022-12-29 PROCEDURE — 73630 XR FOOT COMPLETE 3 VIEW LEFT: ICD-10-PCS | Mod: 26,LT,, | Performed by: RADIOLOGY

## 2022-12-29 PROCEDURE — 1159F PR MEDICATION LIST DOCUMENTED IN MEDICAL RECORD: ICD-10-PCS | Mod: CPTII,S$GLB,, | Performed by: NURSE PRACTITIONER

## 2022-12-29 PROCEDURE — 1101F PT FALLS ASSESS-DOCD LE1/YR: CPT | Mod: CPTII,S$GLB,, | Performed by: NURSE PRACTITIONER

## 2022-12-29 PROCEDURE — 99213 PR OFFICE/OUTPT VISIT, EST, LEVL III, 20-29 MIN: ICD-10-PCS | Mod: 25,S$GLB,, | Performed by: NURSE PRACTITIONER

## 2022-12-29 RX ORDER — BETAMETHASONE SODIUM PHOSPHATE AND BETAMETHASONE ACETATE 3; 3 MG/ML; MG/ML
6 INJECTION, SUSPENSION INTRA-ARTICULAR; INTRALESIONAL; INTRAMUSCULAR; SOFT TISSUE ONCE
Status: COMPLETED | OUTPATIENT
Start: 2022-12-29 | End: 2022-12-29

## 2022-12-29 RX ADMIN — BETAMETHASONE SODIUM PHOSPHATE AND BETAMETHASONE ACETATE 6 MG: 3; 3 INJECTION, SUSPENSION INTRA-ARTICULAR; INTRALESIONAL; INTRAMUSCULAR; SOFT TISSUE at 04:12

## 2022-12-29 NOTE — PROGRESS NOTES
Subjective:       Patient ID: Albin Fernandez is a 67 y.o. male.    Chief Complaint: Foot Injury    Pain  This is a new problem. The current episode started today (left foot). Pertinent negatives include no chest pain, headaches or rash.   Foot Injury   The incident occurred at home. There was no injury mechanism. The pain is present in the right foot and right heel. The quality of the pain is described as stabbing. The pain is at a severity of 9/10. Associated symptoms include muscle weakness. He reports no foreign bodies present. The symptoms are aggravated by weight bearing.     Past Medical History:   Diagnosis Date    Arthritis     Atrial fibrillation     BPH with obstruction/lower urinary tract symptoms     Cardiomyopathy, dilated     Disorder of kidney and ureter     Hyperlipidemia     Hypertension     Hypothyroidism     Sleep apnea        Review of patient's allergies indicates:   Allergen Reactions    Antihistamines - alkylamine Other (See Comments)     Urinary retention         Current Outpatient Medications:     apixaban (ELIQUIS) 5 mg Tab, Take 1 tablet (5 mg total) by mouth 2 (two) times daily., Disp: 180 tablet, Rfl: 3    atorvastatin (LIPITOR) 10 MG tablet, Take 1 tablet (10 mg total) by mouth every evening., Disp: 90 tablet, Rfl: 3    co-enzyme Q-10 30 mg capsule, Take 30 mg by mouth once daily., Disp: , Rfl:     CYANOCOBALAMIN, VITAMIN B-12, (VITAMIN B-12 ORAL), Take 1 mg by mouth once daily., Disp: , Rfl:     fluticasone propionate (FLONASE) 50 mcg/actuation nasal spray, INHALE 2 SPRAYS INTO EACH NOSTRIL ONCE DAILY, Disp: 16 g, Rfl: 11    HYDROcodone-acetaminophen (NORCO)  mg per tablet, Take 1 tablet by mouth 2 (two) times daily., Disp: , Rfl:     levothyroxine (SYNTHROID) 112 MCG tablet, TAKE 1 TABLET BY MOUTH ONCE DAILY FIRST THING IN MORNING ON EMPTY STOMACH WITH FULL GLASS OF WATER Strength: 112 mcg, Disp: 90 tablet, Rfl: 3    metoprolol succinate (TOPROL-XL) 50 MG 24 hr tablet, TAKE 1  TABLET (50 MG TOTAL) BY MOUTH ONCE DAILY., Disp: 30 tablet, Rfl: 11    senna-docusate 8.6-50 mg (SENNA WITH DOCUSATE SODIUM) 8.6-50 mg per tablet, Take 2 tablets by mouth 2 (two) times daily., Disp: 120 tablet, Rfl: 5    tadalafiL (CIALIS) 20 MG Tab, Take 1 tablet (20 mg total) by mouth daily as needed (intercourse)., Disp: 30 tablet, Rfl: 6    tamsulosin (FLOMAX) 0.4 mg Cap, Take 1 capsule (0.4 mg total) by mouth once daily., Disp: 90 capsule, Rfl: 3    furosemide (LASIX) 20 MG tablet, Take 1 tablet (20 mg total) by mouth daily as needed., Disp: 90 tablet, Rfl: 3    methylPREDNISolone (MEDROL DOSEPACK) 4 mg tablet, use as directed, Disp: 21 each, Rfl: 0    valACYclovir (VALTREX) 1000 MG tablet, Take 0.5 tablets (500 mg total) by mouth 3 (three) times daily. for 3 days, Disp: 5 tablet, Rfl: 4  No current facility-administered medications for this visit.    Facility-Administered Medications Ordered in Other Visits:     electrolyte-S (ISOLYTE), , Intravenous, Continuous, Casi Murillo MD, Last Rate: 10 mL/hr at 01/07/22 0829, New Bag at 01/07/22 1043    HYDROmorphone (PF) injection 0.2 mg, 0.2 mg, Intravenous, Q5 Min PRN, Casi Murillo MD    LIDOcaine (PF) 10 mg/ml (1%) injection 10 mg, 1 mL, Intradermal, Once PRN, Casi Murillo MD    LIDOcaine (PF) 10 mg/ml (1%) injection 10 mg, 1 mL, Intradermal, Once, Casi Murillo MD    oxyCODONE immediate release tablet 5 mg, 5 mg, Oral, Q3H PRN, Casi Murillo MD, 5 mg at 01/07/22 1110    Review of Systems   Constitutional:  Negative for unexpected weight change.   HENT:  Negative for trouble swallowing.    Eyes:  Negative for visual disturbance.   Respiratory:  Negative for shortness of breath.    Cardiovascular:  Negative for chest pain, palpitations and leg swelling.   Gastrointestinal:  Negative for blood in stool.   Genitourinary:  Negative for hematuria.   Skin:  Negative for rash.   Allergic/Immunologic: Negative for immunocompromised  "state.   Neurological:  Negative for headaches.   Hematological:  Does not bruise/bleed easily.   Psychiatric/Behavioral:  Negative for agitation. The patient is not nervous/anxious.      Objective:      /80 (BP Location: Right arm, Patient Position: Sitting, BP Method: Small (Manual))   Pulse 97   Temp 97.9 °F (36.6 °C) (Oral)   Ht 5' 9" (1.753 m)   Wt 103.1 kg (227 lb 4.7 oz)   SpO2 98%   BMI 33.57 kg/m²   Physical Exam  Constitutional:       Appearance: He is well-developed.   Eyes:      Conjunctiva/sclera: Conjunctivae normal.      Pupils: Pupils are equal, round, and reactive to light.   Cardiovascular:      Rate and Rhythm: Normal rate and regular rhythm.      Heart sounds: Normal heart sounds.   Pulmonary:      Effort: Pulmonary effort is normal.   Musculoskeletal:         General: Normal range of motion.   Neurological:      Mental Status: He is alert and oriented to person, place, and time.   Psychiatric:         Behavior: Behavior normal.         Thought Content: Thought content normal.         Judgment: Judgment normal.       Assessment:       1. Left foot pain        Plan:       Left foot pain  -     X-Ray Foot Complete Left; Future; Expected date: 12/29/2022  -     betamethasone acetate-betamethasone sodium phosphate injection 6 mg  Boot applied at visit by nurse    Time spent with patient: 20 minutes    Patient with be reevaluated in 4 weeks or sooner prn    Greater than 50% of this visit was spent counseling as described in above documentation:Yes   "

## 2022-12-29 NOTE — TELEPHONE ENCOUNTER
Caller states that he has been having problem with his L heel and is barley walking. Pt states that he has been  x 3 days. Pt unable to hear triage questions due to hearing defects. Pt advised to go to ED for severe pain, pt declined. Stated he just wanted an appt. Caller advised to call appt desk when open for further assistance.  Pt advised per protocol and verbalized understanding.     Additional Information   Negative: Entire foot is cool or blue in comparison to other foot   Negative: Purple or black skin on foot or toe    Protocols used: Foot Pain-A-[facility]

## 2022-12-29 NOTE — TELEPHONE ENCOUNTER
Reason for Disposition   Patient sounds very sick or weak to the triager    Additional Information   Negative: Entire foot is cool or blue in comparison to other foot   Negative: Purple or black skin on foot or toe   Negative: [1] Swollen foot AND [2] fever   Negative: [1] Red area or streak AND [2] fever    Protocols used: Foot Pain-A-[facility]

## 2022-12-30 ENCOUNTER — OFFICE VISIT (OUTPATIENT)
Dept: ORTHOPEDICS | Facility: CLINIC | Age: 67
End: 2022-12-30
Payer: MEDICARE

## 2022-12-30 ENCOUNTER — PATIENT MESSAGE (OUTPATIENT)
Dept: ORTHOPEDICS | Facility: CLINIC | Age: 67
End: 2022-12-30

## 2022-12-30 VITALS — BODY MASS INDEX: 33.67 KG/M2 | RESPIRATION RATE: 18 BRPM | HEIGHT: 69 IN | WEIGHT: 227.31 LBS

## 2022-12-30 DIAGNOSIS — M79.672 LEFT FOOT PAIN: Primary | ICD-10-CM

## 2022-12-30 DIAGNOSIS — M79.673 HEEL PAIN, UNSPECIFIED LATERALITY: Primary | ICD-10-CM

## 2022-12-30 DIAGNOSIS — M72.2 PLANTAR FASCIITIS OF LEFT FOOT: Primary | ICD-10-CM

## 2022-12-30 DIAGNOSIS — M77.32 HEEL SPUR, LEFT: ICD-10-CM

## 2022-12-30 PROCEDURE — 3008F PR BODY MASS INDEX (BMI) DOCUMENTED: ICD-10-PCS | Mod: CPTII,S$GLB,, | Performed by: ORTHOPAEDIC SURGERY

## 2022-12-30 PROCEDURE — 1125F AMNT PAIN NOTED PAIN PRSNT: CPT | Mod: CPTII,S$GLB,, | Performed by: ORTHOPAEDIC SURGERY

## 2022-12-30 PROCEDURE — 1101F PR PT FALLS ASSESS DOC 0-1 FALLS W/OUT INJ PAST YR: ICD-10-PCS | Mod: CPTII,S$GLB,, | Performed by: ORTHOPAEDIC SURGERY

## 2022-12-30 PROCEDURE — 99999 PR PBB SHADOW E&M-EST. PATIENT-LVL III: CPT | Mod: PBBFAC,,, | Performed by: ORTHOPAEDIC SURGERY

## 2022-12-30 PROCEDURE — 1159F PR MEDICATION LIST DOCUMENTED IN MEDICAL RECORD: ICD-10-PCS | Mod: CPTII,S$GLB,, | Performed by: ORTHOPAEDIC SURGERY

## 2022-12-30 PROCEDURE — 99203 OFFICE O/P NEW LOW 30 MIN: CPT | Mod: S$GLB,,, | Performed by: ORTHOPAEDIC SURGERY

## 2022-12-30 PROCEDURE — 99999 PR PBB SHADOW E&M-EST. PATIENT-LVL III: ICD-10-PCS | Mod: PBBFAC,,, | Performed by: ORTHOPAEDIC SURGERY

## 2022-12-30 PROCEDURE — 3288F FALL RISK ASSESSMENT DOCD: CPT | Mod: CPTII,S$GLB,, | Performed by: ORTHOPAEDIC SURGERY

## 2022-12-30 PROCEDURE — 1125F PR PAIN SEVERITY QUANTIFIED, PAIN PRESENT: ICD-10-PCS | Mod: CPTII,S$GLB,, | Performed by: ORTHOPAEDIC SURGERY

## 2022-12-30 PROCEDURE — 3288F PR FALLS RISK ASSESSMENT DOCUMENTED: ICD-10-PCS | Mod: CPTII,S$GLB,, | Performed by: ORTHOPAEDIC SURGERY

## 2022-12-30 PROCEDURE — 1160F PR REVIEW ALL MEDS BY PRESCRIBER/CLIN PHARMACIST DOCUMENTED: ICD-10-PCS | Mod: CPTII,S$GLB,, | Performed by: ORTHOPAEDIC SURGERY

## 2022-12-30 PROCEDURE — 1159F MED LIST DOCD IN RCRD: CPT | Mod: CPTII,S$GLB,, | Performed by: ORTHOPAEDIC SURGERY

## 2022-12-30 PROCEDURE — 3008F BODY MASS INDEX DOCD: CPT | Mod: CPTII,S$GLB,, | Performed by: ORTHOPAEDIC SURGERY

## 2022-12-30 PROCEDURE — 1101F PT FALLS ASSESS-DOCD LE1/YR: CPT | Mod: CPTII,S$GLB,, | Performed by: ORTHOPAEDIC SURGERY

## 2022-12-30 PROCEDURE — 99203 PR OFFICE/OUTPT VISIT, NEW, LEVL III, 30-44 MIN: ICD-10-PCS | Mod: S$GLB,,, | Performed by: ORTHOPAEDIC SURGERY

## 2022-12-30 PROCEDURE — 1160F RVW MEDS BY RX/DR IN RCRD: CPT | Mod: CPTII,S$GLB,, | Performed by: ORTHOPAEDIC SURGERY

## 2022-12-30 RX ORDER — MELOXICAM 15 MG/1
15 TABLET ORAL DAILY
Qty: 30 TABLET | Refills: 1 | Status: CANCELLED | OUTPATIENT
Start: 2022-12-30

## 2022-12-30 RX ORDER — METHYLPREDNISOLONE 4 MG/1
TABLET ORAL
Qty: 21 EACH | Refills: 0 | Status: SHIPPED | OUTPATIENT
Start: 2022-12-30 | End: 2023-01-20
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
## 2023-01-03 ENCOUNTER — PATIENT MESSAGE (OUTPATIENT)
Dept: CARDIOLOGY | Facility: CLINIC | Age: 68
End: 2023-01-03
Payer: MEDICARE

## 2023-01-03 ENCOUNTER — TELEPHONE (OUTPATIENT)
Dept: FAMILY MEDICINE | Facility: CLINIC | Age: 68
End: 2023-01-03
Payer: MEDICARE

## 2023-01-13 ENCOUNTER — OFFICE VISIT (OUTPATIENT)
Dept: PODIATRY | Facility: CLINIC | Age: 68
End: 2023-01-13
Payer: MEDICARE

## 2023-01-13 VITALS — HEIGHT: 69 IN | WEIGHT: 224 LBS | BODY MASS INDEX: 33.18 KG/M2 | RESPIRATION RATE: 16 BRPM

## 2023-01-13 DIAGNOSIS — M72.2 PLANTAR FASCIITIS: Primary | ICD-10-CM

## 2023-01-13 DIAGNOSIS — M79.672 LEFT FOOT PAIN: ICD-10-CM

## 2023-01-13 PROCEDURE — 99203 OFFICE O/P NEW LOW 30 MIN: CPT | Mod: S$GLB,,, | Performed by: PODIATRIST

## 2023-01-13 PROCEDURE — 1125F PR PAIN SEVERITY QUANTIFIED, PAIN PRESENT: ICD-10-PCS | Mod: CPTII,S$GLB,, | Performed by: PODIATRIST

## 2023-01-13 PROCEDURE — 1125F AMNT PAIN NOTED PAIN PRSNT: CPT | Mod: CPTII,S$GLB,, | Performed by: PODIATRIST

## 2023-01-13 PROCEDURE — 3008F BODY MASS INDEX DOCD: CPT | Mod: CPTII,S$GLB,, | Performed by: PODIATRIST

## 2023-01-13 PROCEDURE — 1159F PR MEDICATION LIST DOCUMENTED IN MEDICAL RECORD: ICD-10-PCS | Mod: CPTII,S$GLB,, | Performed by: PODIATRIST

## 2023-01-13 PROCEDURE — 1160F RVW MEDS BY RX/DR IN RCRD: CPT | Mod: CPTII,S$GLB,, | Performed by: PODIATRIST

## 2023-01-13 PROCEDURE — 1160F PR REVIEW ALL MEDS BY PRESCRIBER/CLIN PHARMACIST DOCUMENTED: ICD-10-PCS | Mod: CPTII,S$GLB,, | Performed by: PODIATRIST

## 2023-01-13 PROCEDURE — 3008F PR BODY MASS INDEX (BMI) DOCUMENTED: ICD-10-PCS | Mod: CPTII,S$GLB,, | Performed by: PODIATRIST

## 2023-01-13 PROCEDURE — 1101F PR PT FALLS ASSESS DOC 0-1 FALLS W/OUT INJ PAST YR: ICD-10-PCS | Mod: CPTII,S$GLB,, | Performed by: PODIATRIST

## 2023-01-13 PROCEDURE — 1159F MED LIST DOCD IN RCRD: CPT | Mod: CPTII,S$GLB,, | Performed by: PODIATRIST

## 2023-01-13 PROCEDURE — 99203 PR OFFICE/OUTPT VISIT, NEW, LEVL III, 30-44 MIN: ICD-10-PCS | Mod: S$GLB,,, | Performed by: PODIATRIST

## 2023-01-13 PROCEDURE — 1101F PT FALLS ASSESS-DOCD LE1/YR: CPT | Mod: CPTII,S$GLB,, | Performed by: PODIATRIST

## 2023-01-13 PROCEDURE — 3288F PR FALLS RISK ASSESSMENT DOCUMENTED: ICD-10-PCS | Mod: CPTII,S$GLB,, | Performed by: PODIATRIST

## 2023-01-13 PROCEDURE — 3288F FALL RISK ASSESSMENT DOCD: CPT | Mod: CPTII,S$GLB,, | Performed by: PODIATRIST

## 2023-01-13 NOTE — PROGRESS NOTES
"  [address] [facility] [address]  [place] [address]  Phone: [phone]   [phone]    Patient's PCP:Jered Siegel MD  Referring Provider: Gema Rodarte    Subjective:      Chief Complaint:: Foot Pain (Left foot heel pain)    CAREY Fernandez is a 67 y.o. male who presents today with a complaint of left heel pain lasting for three weeks. Onset of symptoms sharp pain and sticking sensation and reports no trauma.  Current symptoms include swelling from heel though ankle, pain ranging from aching to sharp and sticking.  Aggravating factors are walking, weightbearing, applied pressure. Symptoms have waxed and weaned. Treatment to date have included x-ray boot, cast, crutches, tylenol, and hydrocodone prescription  for back injury. Patient notes history of plantar fasciitis with previous plantar fascia release.    Systemic Doctor: Jered Siegel MD  Date Last Seen: 12/30/2022    Vitals:    01/13/23 1021   Resp: 16   Weight: 101.6 kg (224 lb)   Height: 5' 9" (1.753 m)   PainSc:   2      Shoe Size: 10    Past Surgical History:   Procedure Laterality Date    ARTHROSCOPY OF ELBOW Left 08/09/2019    Procedure: ARTHROSCOPY, ELBOW;  Surgeon: Giovany Marquez II, MD;  Location: [facility] OR;  Service: Orthopedics;  Laterality: Left;    BACK SURGERY      CATARACT EXTRACTION Right     FOOT SURGERY      IRRIGATION AND DEBRIDEMENT OF UPPER EXTREMITY  08/09/2019    Procedure: IRRIGATION AND DEBRIDEMENT, UPPER EXTREMITY;  Surgeon: Giovany Marquez II, MD;  Location: [facility] OR;  Service: Orthopedics;;    KIDNEY SURGERY      one kidney removed    NEPHRECTOMY      TRANSRECTAL ULTRASOUND EXAMINATION N/A 11/23/2020    Procedure: TRANSRECTAL ULTRASOUND;  Surgeon: Roselyn Shrestha MD;  Location: [facility] OR;  Service: Urology;  Laterality: N/A;    TRANSURETHRAL RESECTION OF PROSTATE N/A 01/07/2022    Procedure: TURP (TRANSURETHRAL RESECTION OF PROSTATE);  Surgeon: Cate Sharp MD;  Location: [facility] OR;  " Service: Urology;  Laterality: N/A;     Past Medical History:   Diagnosis Date    Arthritis     Atrial fibrillation     BPH with obstruction/lower urinary tract symptoms     Cardiomyopathy, dilated     Disorder of kidney and ureter     Hyperlipidemia     Hypertension     Hypothyroidism     Sleep apnea      Family History   Problem Relation Age of Onset    Cirrhosis Father     Pancreatic cancer Mother     Breast cancer Sister         Social History:   Marital Status:   Alcohol History:  reports no history of alcohol use.  Tobacco History:  reports that he has never smoked. He has never been exposed to tobacco smoke. He has never used smokeless tobacco.  Drug History:  reports no history of drug use.    Review of patient's allergies indicates:   Allergen Reactions    Antihistamines - alkylamine Other (See Comments)     Urinary retention       Current Outpatient Medications   Medication Sig Dispense Refill    apixaban (ELIQUIS) 5 mg Tab Take 1 tablet (5 mg total) by mouth 2 (two) times daily. 180 tablet 3    atorvastatin (LIPITOR) 10 MG tablet Take 1 tablet (10 mg total) by mouth every evening. 90 tablet 3    co-enzyme Q-10 30 mg capsule Take 30 mg by mouth once daily.      CYANOCOBALAMIN, VITAMIN B-12, (VITAMIN B-12 ORAL) Take 1 mg by mouth once daily.      fluticasone propionate (FLONASE) 50 mcg/actuation nasal spray INHALE 2 SPRAYS INTO EACH NOSTRIL ONCE DAILY 16 g 11    furosemide (LASIX) 20 MG tablet Take 1 tablet (20 mg total) by mouth daily as needed. 90 tablet 3    HYDROcodone-acetaminophen (NORCO)  mg per tablet Take 1 tablet by mouth 2 (two) times daily.      levothyroxine (SYNTHROID) 112 MCG tablet TAKE 1 TABLET BY MOUTH ONCE DAILY FIRST THING IN MORNING ON EMPTY STOMACH WITH FULL GLASS OF WATER Strength: 112 mcg 90 tablet 3    methylPREDNISolone (MEDROL DOSEPACK) 4 mg tablet use as directed 21 each 0    metoprolol succinate (TOPROL-XL) 50 MG 24 hr tablet TAKE 1 TABLET (50 MG TOTAL) BY MOUTH  ONCE DAILY. 30 tablet 11    senna-docusate 8.6-50 mg (SENNA WITH DOCUSATE SODIUM) 8.6-50 mg per tablet Take 2 tablets by mouth 2 (two) times daily. 120 tablet 5    tadalafiL (CIALIS) 20 MG Tab Take 1 tablet (20 mg total) by mouth daily as needed (intercourse). 30 tablet 6    tamsulosin (FLOMAX) 0.4 mg Cap Take 1 capsule (0.4 mg total) by mouth once daily. 90 capsule 3    valACYclovir (VALTREX) 1000 MG tablet Take 0.5 tablets (500 mg total) by mouth 3 (three) times daily. for 3 days 5 tablet 4     No current facility-administered medications for this visit.     Facility-Administered Medications Ordered in Other Visits   Medication Dose Route Frequency Provider Last Rate Last Admin    electrolyte-S (ISOLYTE)   Intravenous Continuous Casi Murillo MD 10 mL/hr at 01/07/22 0829 New Bag at 01/07/22 1043    HYDROmorphone (PF) injection 0.2 mg  0.2 mg Intravenous Q5 Min PRN Casi Murillo MD        LIDOcaine (PF) 10 mg/ml (1%) injection 10 mg  1 mL Intradermal Once PRN Casi Murillo MD        LIDOcaine (PF) 10 mg/ml (1%) injection 10 mg  1 mL Intradermal Once Casi Murillo MD        oxyCODONE immediate release tablet 5 mg  5 mg Oral Q3H PRN Casi Murillo MD   5 mg at 01/07/22 1110       Review of Systems   Constitutional:  Negative for chills, fatigue, fever and unexpected weight change.   HENT:  Negative for hearing loss and trouble swallowing.    Eyes:  Negative for photophobia and visual disturbance.   Respiratory:  Negative for cough, shortness of breath and wheezing.    Cardiovascular:  Positive for leg swelling. Negative for chest pain and palpitations.   Gastrointestinal:  Negative for abdominal pain and nausea.   Genitourinary:  Negative for dysuria and frequency.   Musculoskeletal:  Negative for arthralgias, back pain, gait problem, joint swelling and myalgias.   Skin:  Negative for rash and wound.   Neurological:  Negative for tremors, seizures, weakness, numbness and headaches.    Hematological:  Does not bruise/bleed easily.       Objective:        Physical Exam:   Foot Exam    General  General Appearance: appears stated age and healthy   Orientation: alert and oriented to person, place, and time   Affect: appropriate   Gait: unimpaired       Left Foot/Ankle      Inspection and Palpation  Ecchymosis: none  Tenderness: plantar fascia   Swelling: (Mild lower extremity edema)  Arch: normal  Hammertoes: absent  Claw toes: absent  Hallux valgus: no  Hallux limitus: no  Skin Exam: skin intact; no drainage, no ulcer and no erythema   Neurovascular  Dorsalis pedis: 2+  Posterior tibial: 2+  Capillary refill: 2+  Varicose veins: present  Saphenous nerve sensation: normal  Tibial nerve sensation: normal  Superficial peroneal nerve sensation: normal  Deep peroneal nerve sensation: normal  Sural nerve sensation: normal    Muscle Strength  Ankle dorsiflexion: 5  Ankle plantar flexion: 5  Ankle inversion: 5  Ankle eversion: 5  Great toe extension: 5  Great toe flexion: 5    Range of Motion    Passive  Ankle dorsiflexion: 5      Tests  Anterior drawer: negative   Talar tilt: negative   PT Tinel's sign: negative  Paresthesia: negative  Comments  Pain on palpation of the infeior medial heel and central plantar heel. No pain present  with side to side compression of the calcaneus. Negative tinnel's sign  at the tarsal tunnel. Negative Carlton's nerve pain. Negative Calcaneal nerve pain. No soft tissue masses. Pain absent  with dorsiflexion of the ankle. No edema, erythema, or ecchymosis noted.     Physical Exam  Cardiovascular:      Pulses:           Dorsalis pedis pulses are 2+ on the left side.        Posterior tibial pulses are 2+ on the left side.   Musculoskeletal:      Left foot: No bunion.   Feet:      Left foot:      Skin integrity: No ulcer or erythema.             Left Ankle/Foot Exam     Comments:  Pain on palpation of the infeior medial heel and central plantar heel. No pain present  with side to  side compression of the calcaneus. Negative tinnel's sign  at the tarsal tunnel. Negative Carlton's nerve pain. Negative Calcaneal nerve pain. No soft tissue masses. Pain absent  with dorsiflexion of the ankle. No edema, erythema, or ecchymosis noted.         Muscle Strength   Left Lower Extremity   Ankle Dorsiflexion:  5   Plantar flexion:  5/5     Vascular Exam       Left Pulses  Dorsalis Pedis:      2+  Posterior Tibial:      2+         Imaging: X-Ray Foot Complete Left  Narrative: EXAMINATION:  XR FOOT COMPLETE 3 VIEW LEFT    CLINICAL HISTORY:  .  Pain in left foot    TECHNIQUE:  AP, lateral and oblique views of the left foot were performed.    COMPARISON:  None    FINDINGS:  Large plantar calcaneal spur.  Mild degenerative change.  Small calcific densities projecting between the medial aspect of the talus and navicula.  No acute fracture or dislocation  Impression: As above.  Large plantar calcaneal spur.  Mild degenerative change.  Small soft tissue calcifications.    Electronically signed by: Zoey Vera MD  Date:    12/29/2022  Time:    17:41               Assessment:       1. Plantar fasciitis    2. Left foot pain      Plan:   Plantar fasciitis    Left foot pain  -     Ambulatory referral/consult to Podiatry  -     AIR CAST WALKER BOOT FOR HOME USE      Follow up if symptoms worsen or fail to improve.    Procedures        CAM walker boot with weight bearing  Ice to painful area, 15 minutes at a time  Ace-wrap to help with swelling  No barefoot   Keep affected extremity elevated while seated        Counseling:     I provided patient education verbally regarding:   Patient diagnosis, treatment options, as well as alternatives, risks, and benefits.     Discussed different treatment options for heel pain. I gave written and verbal instructions on heel cord stretching and this was demonstrated for the patient. Patient expressed understanding. Discussed wearing appropriate shoe gear and avoiding flats,  slippers, sandals, barefoot, and sockfeet. Recommended arch supports. My recommendation for OTC supports is Spenco polysorb replacement insoles or patient may elect more aggressive treatment with prescription arch supports. We also discussed cortisone injections and NSAID therapy.       This note was created using Dragon voice recognition software that occasionally misinterpreted phrases or words.

## 2023-01-13 NOTE — PATIENT INSTRUCTIONS

## 2023-01-23 ENCOUNTER — PATIENT MESSAGE (OUTPATIENT)
Dept: CARDIOLOGY | Facility: CLINIC | Age: 68
End: 2023-01-23
Payer: MEDICARE

## 2023-02-06 ENCOUNTER — PATIENT MESSAGE (OUTPATIENT)
Dept: CARDIOLOGY | Facility: CLINIC | Age: 68
End: 2023-02-06
Payer: MEDICARE

## 2023-02-22 ENCOUNTER — PATIENT MESSAGE (OUTPATIENT)
Dept: CARDIOLOGY | Facility: CLINIC | Age: 68
End: 2023-02-22
Payer: MEDICARE

## 2023-03-07 NOTE — PROGRESS NOTES
[facility] Note    PCP: Jered Siegel MD    Chief Complaint: BPH     SUBJECTIVE:       History of Present Illness:  Ablin Fernandez is a 67 y.o. male who presents to clinic for BPH. He is Established  to our clinic.     Has stopped Flomax.   Nocturia x 1.   Has been using his CPAP machine.   Symptoms are not bothersome during the day.     PSA 11/7/22: 0.64    PVR 64 cc    8/10/22  Stopped Flomax last week for cataract surgery. Feels like he is actually doing better without it.   Nocturia x 2-4. Now wearing a CPAP machine.   During the day he is doing pretty well. No straining. Does have frequency.   No hematuria or dysuria.    10 mg of cialis not working     PVR 78 cc    2/16/22  S/P TURP on 1/7/22. Pathology benign, 16 g.  Doing well from a urinary standpoint.   Nocturia improved to 1-2x.  No hematuria or dysuria.   No bothersome daytime complaints.     11/15/21  Previous patient of Dr. Shrestha. Here to discuss Urolift.   Hx of TURP in 2017. After this he did really well for a while and then his symptoms returned.  Had cysto/trus in Nov 2020 which showed a 22 g prostate with anterior obstructing tissue but otherwise open. He did undergo UDS which did not show ability to mount a detrusor pressure however, test was somewhat questionable as catheter may not have been all the way in the bladder. He did have a sensation to void.   Last PSA: 1.5 (8/25/20)    Has a solitary left kidney secondary to a traumatic fall when he was a child.     He is currently on Flomax 0.8 mg.   Today he is complaining of frequency, nocturia multiple times a night. He does use a CPAP machine. Stops drinking fluids after 6 pm. No sodas.   No hematuria. No dysuria.   Has a bowel movement every day, but has to take stool softner.     UA today: negative for blood, leuks, nitrite   PVR today: 84 cc    Last urine culture: no documented UTIs    Lab Results   Component Value Date    CREATININE 1.1 01/07/2022       Family  hx: grandfather and uncle had prostate cancer   Hx of COPD, a fib, HTN, HLD    Past medical, family, and social history reviewed as documented in chart with pertinent positive medical, family, and social history detailed in HPI.    Review of patient's allergies indicates:   Allergen Reactions    Antihistamines - alkylamine Other (See Comments)     Urinary retention       Past Medical History:   Diagnosis Date    Arthritis     Atrial fibrillation     BPH with obstruction/lower urinary tract symptoms     Cardiomyopathy, dilated     Disorder of kidney and ureter     Hyperlipidemia     Hypertension     Hypothyroidism     Sleep apnea      Past Surgical History:   Procedure Laterality Date    ARTHROSCOPY OF ELBOW Left 08/09/2019    Procedure: ARTHROSCOPY, ELBOW;  Surgeon: Giovany Marquez II, MD;  Location: [facility] OR;  Service: Orthopedics;  Laterality: Left;    BACK SURGERY      CATARACT EXTRACTION Right     FOOT SURGERY      IRRIGATION AND DEBRIDEMENT OF UPPER EXTREMITY  08/09/2019    Procedure: IRRIGATION AND DEBRIDEMENT, UPPER EXTREMITY;  Surgeon: Giovany Marquez II, MD;  Location: [facility] OR;  Service: Orthopedics;;    KIDNEY SURGERY      one kidney removed    NEPHRECTOMY      TRANSRECTAL ULTRASOUND EXAMINATION N/A 11/23/2020    Procedure: TRANSRECTAL ULTRASOUND;  Surgeon: Roselyn Shrestha MD;  Location: [facility] OR;  Service: Urology;  Laterality: N/A;    TRANSURETHRAL RESECTION OF PROSTATE N/A 01/07/2022    Procedure: TURP (TRANSURETHRAL RESECTION OF PROSTATE);  Surgeon: Cate Sharp MD;  Location: [facility] OR;  Service: Urology;  Laterality: N/A;     Family History   Problem Relation Age of Onset    Cirrhosis Father     Pancreatic cancer Mother     Breast cancer Sister      Social History     Tobacco Use    Smoking status: Never     Passive exposure: Never    Smokeless tobacco: Never   Substance Use Topics    Alcohol use: No    Drug use: No        Review of Systems    OBJECTIVE:     Anticoagulation:  "Coumadin 2 mg     Estimated body mass index is 33.08 kg/m² as calculated from the following:    Height as of 1/13/23: 5' 9" (1.753 m).    Weight as of 1/13/23: 101.6 kg (224 lb).    Vital Signs (Most Recent)  Pulse: 95 (03/09/23 1058)  BP: 124/79 (03/09/23 1058)    Physical Exam  Constitutional:       General: He is not in acute distress.     Appearance: Normal appearance. He is not ill-appearing.   HENT:      Head: Normocephalic and atraumatic.   Eyes:      General: No scleral icterus.  Cardiovascular:      Rate and Rhythm: Normal rate and regular rhythm.   Pulmonary:      Effort: Pulmonary effort is normal. No respiratory distress.   Abdominal:      General: There is no distension.   Skin:     Coloration: Skin is not jaundiced.   Neurological:      General: No focal deficit present.      Mental Status: He is alert and oriented to person, place, and time.   Psychiatric:         Mood and Affect: Mood normal.         Behavior: Behavior normal.         Thought Content: Thought content normal.       BMP   Lab Results   Component Value Date     01/07/2022    K 3.9 01/07/2022     01/07/2022    CO2 29 01/07/2022    BUN 13 01/07/2022    CREATININE 1.1 01/07/2022    CALCIUM 9.5 01/07/2022    ANIONGAP 8 01/07/2022    ESTGFRAFRICA >60 01/07/2022    EGFRNONAA >60 01/07/2022       Lab Results   Component Value Date    WBC 6.06 01/07/2022    HGB 15.3 01/07/2022    HCT 45.8 01/07/2022    MCV 95 01/07/2022     01/07/2022       Lab Results   Component Value Date    PSA 0.74 09/22/2022    PSADIAG 0.64 11/07/2022    PSADIAG 1.6 11/23/2021    PSATOTAL 1.5 08/25/2020    PSATOTAL 1.3 07/01/2019    PSAFREE 0.38 08/25/2020    PSAFREE 0.36 07/01/2019     Imaging:  No pertinent recent imaging available.    ASSESSMENT     1. Benign prostatic hyperplasia with urinary frequency    2. Erectile dysfunction, unspecified erectile dysfunction type    3. Herpes simplex infection of penis    4. Permanent atrial fibrillation    5. " Mixed hyperlipidemia    6. Atherosclerosis of aorta      PLAN:     - Doing very well.   - Flomax discontinued   - PVR is stable   - Continue using CPAP machine   - Refill on Cialis and Valtrex  - Follow up in 1 year or sooner if needed         Cate Sharp MD

## 2023-03-09 ENCOUNTER — OFFICE VISIT (OUTPATIENT)
Dept: UROLOGY | Facility: CLINIC | Age: 68
End: 2023-03-09
Payer: MEDICARE

## 2023-03-09 VITALS — HEART RATE: 95 BPM | DIASTOLIC BLOOD PRESSURE: 79 MMHG | SYSTOLIC BLOOD PRESSURE: 124 MMHG

## 2023-03-09 DIAGNOSIS — I48.21 PERMANENT ATRIAL FIBRILLATION: ICD-10-CM

## 2023-03-09 DIAGNOSIS — I70.0 ATHEROSCLEROSIS OF AORTA: ICD-10-CM

## 2023-03-09 DIAGNOSIS — N52.9 ERECTILE DYSFUNCTION, UNSPECIFIED ERECTILE DYSFUNCTION TYPE: ICD-10-CM

## 2023-03-09 DIAGNOSIS — N40.1 BENIGN PROSTATIC HYPERPLASIA WITH URINARY FREQUENCY: Primary | ICD-10-CM

## 2023-03-09 DIAGNOSIS — R35.0 BENIGN PROSTATIC HYPERPLASIA WITH URINARY FREQUENCY: Primary | ICD-10-CM

## 2023-03-09 DIAGNOSIS — A60.01 HERPES SIMPLEX INFECTION OF PENIS: ICD-10-CM

## 2023-03-09 DIAGNOSIS — E78.2 MIXED HYPERLIPIDEMIA: ICD-10-CM

## 2023-03-09 PROCEDURE — 3074F SYST BP LT 130 MM HG: CPT | Mod: CPTII,S$GLB,, | Performed by: STUDENT IN AN ORGANIZED HEALTH CARE EDUCATION/TRAINING PROGRAM

## 2023-03-09 PROCEDURE — 1125F AMNT PAIN NOTED PAIN PRSNT: CPT | Mod: CPTII,S$GLB,, | Performed by: STUDENT IN AN ORGANIZED HEALTH CARE EDUCATION/TRAINING PROGRAM

## 2023-03-09 PROCEDURE — 99214 PR OFFICE/OUTPT VISIT, EST, LEVL IV, 30-39 MIN: ICD-10-PCS | Mod: S$GLB,,, | Performed by: STUDENT IN AN ORGANIZED HEALTH CARE EDUCATION/TRAINING PROGRAM

## 2023-03-09 PROCEDURE — 3078F DIAST BP <80 MM HG: CPT | Mod: CPTII,S$GLB,, | Performed by: STUDENT IN AN ORGANIZED HEALTH CARE EDUCATION/TRAINING PROGRAM

## 2023-03-09 PROCEDURE — 1159F MED LIST DOCD IN RCRD: CPT | Mod: CPTII,S$GLB,, | Performed by: STUDENT IN AN ORGANIZED HEALTH CARE EDUCATION/TRAINING PROGRAM

## 2023-03-09 PROCEDURE — 3074F PR MOST RECENT SYSTOLIC BLOOD PRESSURE < 130 MM HG: ICD-10-PCS | Mod: CPTII,S$GLB,, | Performed by: STUDENT IN AN ORGANIZED HEALTH CARE EDUCATION/TRAINING PROGRAM

## 2023-03-09 PROCEDURE — 1159F PR MEDICATION LIST DOCUMENTED IN MEDICAL RECORD: ICD-10-PCS | Mod: CPTII,S$GLB,, | Performed by: STUDENT IN AN ORGANIZED HEALTH CARE EDUCATION/TRAINING PROGRAM

## 2023-03-09 PROCEDURE — 1125F PR PAIN SEVERITY QUANTIFIED, PAIN PRESENT: ICD-10-PCS | Mod: CPTII,S$GLB,, | Performed by: STUDENT IN AN ORGANIZED HEALTH CARE EDUCATION/TRAINING PROGRAM

## 2023-03-09 PROCEDURE — 3078F PR MOST RECENT DIASTOLIC BLOOD PRESSURE < 80 MM HG: ICD-10-PCS | Mod: CPTII,S$GLB,, | Performed by: STUDENT IN AN ORGANIZED HEALTH CARE EDUCATION/TRAINING PROGRAM

## 2023-03-09 PROCEDURE — 99214 OFFICE O/P EST MOD 30 MIN: CPT | Mod: S$GLB,,, | Performed by: STUDENT IN AN ORGANIZED HEALTH CARE EDUCATION/TRAINING PROGRAM

## 2023-03-09 PROCEDURE — 99999 PR PBB SHADOW E&M-EST. PATIENT-LVL II: CPT | Mod: PBBFAC,,, | Performed by: STUDENT IN AN ORGANIZED HEALTH CARE EDUCATION/TRAINING PROGRAM

## 2023-03-09 PROCEDURE — 99999 PR PBB SHADOW E&M-EST. PATIENT-LVL II: ICD-10-PCS | Mod: PBBFAC,,, | Performed by: STUDENT IN AN ORGANIZED HEALTH CARE EDUCATION/TRAINING PROGRAM

## 2023-03-09 RX ORDER — TADALAFIL 20 MG/1
20 TABLET ORAL DAILY PRN
Qty: 30 TABLET | Refills: 6 | Status: SHIPPED | OUTPATIENT
Start: 2023-03-09 | End: 2023-12-01

## 2023-03-09 RX ORDER — VALACYCLOVIR HYDROCHLORIDE 1 G/1
500 TABLET, FILM COATED ORAL 3 TIMES DAILY
Qty: 3 TABLET | Refills: 3 | Status: SHIPPED | OUTPATIENT
Start: 2023-03-09 | End: 2023-09-25

## 2023-04-26 RX ORDER — ATORVASTATIN CALCIUM 10 MG/1
10 TABLET, FILM COATED ORAL NIGHTLY
Qty: 90 TABLET | Refills: 3 | Status: SHIPPED | OUTPATIENT
Start: 2023-04-26 | End: 2024-04-25

## 2023-05-08 ENCOUNTER — PES CALL (OUTPATIENT)
Dept: ADMINISTRATIVE | Facility: CLINIC | Age: 68
End: 2023-05-08
Payer: MEDICARE

## 2023-07-17 ENCOUNTER — TELEPHONE (OUTPATIENT)
Dept: FAMILY MEDICINE | Facility: CLINIC | Age: 68
End: 2023-07-17
Payer: MEDICARE

## 2023-08-23 ENCOUNTER — PATIENT MESSAGE (OUTPATIENT)
Dept: FAMILY MEDICINE | Facility: CLINIC | Age: 68
End: 2023-08-23
Payer: MEDICARE

## 2023-09-25 ENCOUNTER — OFFICE VISIT (OUTPATIENT)
Dept: FAMILY MEDICINE | Facility: CLINIC | Age: 68
End: 2023-09-25
Payer: MEDICARE

## 2023-09-25 ENCOUNTER — LAB VISIT (OUTPATIENT)
Dept: LAB | Facility: HOSPITAL | Age: 68
End: 2023-09-25
Attending: STUDENT IN AN ORGANIZED HEALTH CARE EDUCATION/TRAINING PROGRAM
Payer: MEDICARE

## 2023-09-25 ENCOUNTER — PATIENT MESSAGE (OUTPATIENT)
Dept: FAMILY MEDICINE | Facility: CLINIC | Age: 68
End: 2023-09-25

## 2023-09-25 VITALS
TEMPERATURE: 98 F | SYSTOLIC BLOOD PRESSURE: 120 MMHG | DIASTOLIC BLOOD PRESSURE: 70 MMHG | HEART RATE: 90 BPM | HEIGHT: 69 IN | OXYGEN SATURATION: 98 % | BODY MASS INDEX: 33.31 KG/M2 | WEIGHT: 224.88 LBS | RESPIRATION RATE: 16 BRPM

## 2023-09-25 DIAGNOSIS — E03.8 OTHER SPECIFIED HYPOTHYROIDISM: ICD-10-CM

## 2023-09-25 DIAGNOSIS — Z00.00 HEALTHCARE MAINTENANCE: Primary | ICD-10-CM

## 2023-09-25 DIAGNOSIS — R79.9 ABNORMAL FINDING OF BLOOD CHEMISTRY, UNSPECIFIED: ICD-10-CM

## 2023-09-25 DIAGNOSIS — E03.8 OTHER SPECIFIED HYPOTHYROIDISM: Primary | ICD-10-CM

## 2023-09-25 DIAGNOSIS — E78.2 MIXED HYPERLIPIDEMIA: ICD-10-CM

## 2023-09-25 DIAGNOSIS — I48.21 PERMANENT ATRIAL FIBRILLATION: ICD-10-CM

## 2023-09-25 DIAGNOSIS — I50.30 HEART FAILURE WITH PRESERVED EJECTION FRACTION, UNSPECIFIED HF CHRONICITY: ICD-10-CM

## 2023-09-25 DIAGNOSIS — Z12.5 ENCOUNTER FOR PROSTATE CANCER SCREENING: ICD-10-CM

## 2023-09-25 DIAGNOSIS — M51.36 DDD (DEGENERATIVE DISC DISEASE), LUMBAR: ICD-10-CM

## 2023-09-25 PROBLEM — M51.369 DDD (DEGENERATIVE DISC DISEASE), LUMBAR: Status: ACTIVE | Noted: 2023-09-25

## 2023-09-25 PROBLEM — I50.20 HFREF (HEART FAILURE WITH REDUCED EJECTION FRACTION): Status: ACTIVE | Noted: 2023-09-25

## 2023-09-25 LAB
ALBUMIN SERPL BCP-MCNC: 3.8 G/DL (ref 3.5–5.2)
ALP SERPL-CCNC: 73 U/L (ref 55–135)
ALT SERPL W/O P-5'-P-CCNC: 26 U/L (ref 10–44)
ANION GAP SERPL CALC-SCNC: 8 MMOL/L (ref 8–16)
AST SERPL-CCNC: 25 U/L (ref 10–40)
BASOPHILS # BLD AUTO: 0.04 K/UL (ref 0–0.2)
BASOPHILS NFR BLD: 0.9 % (ref 0–1.9)
BILIRUB SERPL-MCNC: 0.5 MG/DL (ref 0.1–1)
BUN SERPL-MCNC: 18 MG/DL (ref 8–23)
CALCIUM SERPL-MCNC: 9.2 MG/DL (ref 8.7–10.5)
CHLORIDE SERPL-SCNC: 108 MMOL/L (ref 95–110)
CHOLEST SERPL-MCNC: 143 MG/DL (ref 120–199)
CHOLEST/HDLC SERPL: 4 {RATIO} (ref 2–5)
CO2 SERPL-SCNC: 26 MMOL/L (ref 23–29)
COMPLEXED PSA SERPL-MCNC: 1.9 NG/ML (ref 0–4)
CREAT SERPL-MCNC: 1 MG/DL (ref 0.5–1.4)
DIFFERENTIAL METHOD: ABNORMAL
EOSINOPHIL # BLD AUTO: 0.2 K/UL (ref 0–0.5)
EOSINOPHIL NFR BLD: 4.1 % (ref 0–8)
ERYTHROCYTE [DISTWIDTH] IN BLOOD BY AUTOMATED COUNT: 13.2 % (ref 11.5–14.5)
EST. GFR  (NO RACE VARIABLE): >60 ML/MIN/1.73 M^2
ESTIMATED AVG GLUCOSE: 111 MG/DL (ref 68–131)
GLUCOSE SERPL-MCNC: 132 MG/DL (ref 70–110)
HBA1C MFR BLD: 5.5 % (ref 4–5.6)
HCT VFR BLD AUTO: 39.7 % (ref 40–54)
HDLC SERPL-MCNC: 36 MG/DL (ref 40–75)
HDLC SERPL: 25.2 % (ref 20–50)
HGB BLD-MCNC: 13.8 G/DL (ref 14–18)
IMM GRANULOCYTES # BLD AUTO: 0.02 K/UL (ref 0–0.04)
IMM GRANULOCYTES NFR BLD AUTO: 0.4 % (ref 0–0.5)
LDLC SERPL CALC-MCNC: 86.8 MG/DL (ref 63–159)
LYMPHOCYTES # BLD AUTO: 1 K/UL (ref 1–4.8)
LYMPHOCYTES NFR BLD: 22 % (ref 18–48)
MCH RBC QN AUTO: 32.1 PG (ref 27–31)
MCHC RBC AUTO-ENTMCNC: 34.8 G/DL (ref 32–36)
MCV RBC AUTO: 92 FL (ref 82–98)
MONOCYTES # BLD AUTO: 0.6 K/UL (ref 0.3–1)
MONOCYTES NFR BLD: 12.4 % (ref 4–15)
NEUTROPHILS # BLD AUTO: 2.8 K/UL (ref 1.8–7.7)
NEUTROPHILS NFR BLD: 60.2 % (ref 38–73)
NONHDLC SERPL-MCNC: 107 MG/DL
NRBC BLD-RTO: 0 /100 WBC
PLATELET # BLD AUTO: 187 K/UL (ref 150–450)
PMV BLD AUTO: 10 FL (ref 9.2–12.9)
POTASSIUM SERPL-SCNC: 4 MMOL/L (ref 3.5–5.1)
PROT SERPL-MCNC: 6.7 G/DL (ref 6–8.4)
RBC # BLD AUTO: 4.3 M/UL (ref 4.6–6.2)
SODIUM SERPL-SCNC: 142 MMOL/L (ref 136–145)
T4 FREE SERPL-MCNC: 0.9 NG/DL (ref 0.71–1.51)
TRIGL SERPL-MCNC: 101 MG/DL (ref 30–150)
TSH SERPL DL<=0.005 MIU/L-ACNC: 7.68 UIU/ML (ref 0.4–4)
WBC # BLD AUTO: 4.69 K/UL (ref 3.9–12.7)

## 2023-09-25 PROCEDURE — 99214 OFFICE O/P EST MOD 30 MIN: CPT | Mod: 25,S$GLB,, | Performed by: STUDENT IN AN ORGANIZED HEALTH CARE EDUCATION/TRAINING PROGRAM

## 2023-09-25 PROCEDURE — 99999 PR PBB SHADOW E&M-EST. PATIENT-LVL III: CPT | Mod: PBBFAC,,, | Performed by: STUDENT IN AN ORGANIZED HEALTH CARE EDUCATION/TRAINING PROGRAM

## 2023-09-25 PROCEDURE — 3008F BODY MASS INDEX DOCD: CPT | Mod: CPTII,S$GLB,, | Performed by: STUDENT IN AN ORGANIZED HEALTH CARE EDUCATION/TRAINING PROGRAM

## 2023-09-25 PROCEDURE — 85025 COMPLETE CBC W/AUTO DIFF WBC: CPT | Performed by: STUDENT IN AN ORGANIZED HEALTH CARE EDUCATION/TRAINING PROGRAM

## 2023-09-25 PROCEDURE — 84439 ASSAY OF FREE THYROXINE: CPT | Performed by: STUDENT IN AN ORGANIZED HEALTH CARE EDUCATION/TRAINING PROGRAM

## 2023-09-25 PROCEDURE — 80061 LIPID PANEL: CPT | Performed by: STUDENT IN AN ORGANIZED HEALTH CARE EDUCATION/TRAINING PROGRAM

## 2023-09-25 PROCEDURE — 99999 PR PBB SHADOW E&M-EST. PATIENT-LVL III: ICD-10-PCS | Mod: PBBFAC,,, | Performed by: STUDENT IN AN ORGANIZED HEALTH CARE EDUCATION/TRAINING PROGRAM

## 2023-09-25 PROCEDURE — 3078F PR MOST RECENT DIASTOLIC BLOOD PRESSURE < 80 MM HG: ICD-10-PCS | Mod: CPTII,S$GLB,, | Performed by: STUDENT IN AN ORGANIZED HEALTH CARE EDUCATION/TRAINING PROGRAM

## 2023-09-25 PROCEDURE — G0008 ADMIN INFLUENZA VIRUS VAC: HCPCS | Mod: S$GLB,,, | Performed by: STUDENT IN AN ORGANIZED HEALTH CARE EDUCATION/TRAINING PROGRAM

## 2023-09-25 PROCEDURE — 3008F PR BODY MASS INDEX (BMI) DOCUMENTED: ICD-10-PCS | Mod: CPTII,S$GLB,, | Performed by: STUDENT IN AN ORGANIZED HEALTH CARE EDUCATION/TRAINING PROGRAM

## 2023-09-25 PROCEDURE — 90694 FLU VACCINE - QUADRIVALENT - ADJUVANTED: ICD-10-PCS | Mod: S$GLB,,, | Performed by: STUDENT IN AN ORGANIZED HEALTH CARE EDUCATION/TRAINING PROGRAM

## 2023-09-25 PROCEDURE — 1159F PR MEDICATION LIST DOCUMENTED IN MEDICAL RECORD: ICD-10-PCS | Mod: CPTII,S$GLB,, | Performed by: STUDENT IN AN ORGANIZED HEALTH CARE EDUCATION/TRAINING PROGRAM

## 2023-09-25 PROCEDURE — 1159F MED LIST DOCD IN RCRD: CPT | Mod: CPTII,S$GLB,, | Performed by: STUDENT IN AN ORGANIZED HEALTH CARE EDUCATION/TRAINING PROGRAM

## 2023-09-25 PROCEDURE — G0008 FLU VACCINE - QUADRIVALENT - ADJUVANTED: ICD-10-PCS | Mod: S$GLB,,, | Performed by: STUDENT IN AN ORGANIZED HEALTH CARE EDUCATION/TRAINING PROGRAM

## 2023-09-25 PROCEDURE — 83036 HEMOGLOBIN GLYCOSYLATED A1C: CPT | Performed by: STUDENT IN AN ORGANIZED HEALTH CARE EDUCATION/TRAINING PROGRAM

## 2023-09-25 PROCEDURE — 1125F AMNT PAIN NOTED PAIN PRSNT: CPT | Mod: CPTII,S$GLB,, | Performed by: STUDENT IN AN ORGANIZED HEALTH CARE EDUCATION/TRAINING PROGRAM

## 2023-09-25 PROCEDURE — 1101F PR PT FALLS ASSESS DOC 0-1 FALLS W/OUT INJ PAST YR: ICD-10-PCS | Mod: CPTII,S$GLB,, | Performed by: STUDENT IN AN ORGANIZED HEALTH CARE EDUCATION/TRAINING PROGRAM

## 2023-09-25 PROCEDURE — 84153 ASSAY OF PSA TOTAL: CPT | Performed by: STUDENT IN AN ORGANIZED HEALTH CARE EDUCATION/TRAINING PROGRAM

## 2023-09-25 PROCEDURE — 1101F PT FALLS ASSESS-DOCD LE1/YR: CPT | Mod: CPTII,S$GLB,, | Performed by: STUDENT IN AN ORGANIZED HEALTH CARE EDUCATION/TRAINING PROGRAM

## 2023-09-25 PROCEDURE — 3288F FALL RISK ASSESSMENT DOCD: CPT | Mod: CPTII,S$GLB,, | Performed by: STUDENT IN AN ORGANIZED HEALTH CARE EDUCATION/TRAINING PROGRAM

## 2023-09-25 PROCEDURE — 99214 PR OFFICE/OUTPT VISIT, EST, LEVL IV, 30-39 MIN: ICD-10-PCS | Mod: 25,S$GLB,, | Performed by: STUDENT IN AN ORGANIZED HEALTH CARE EDUCATION/TRAINING PROGRAM

## 2023-09-25 PROCEDURE — 3078F DIAST BP <80 MM HG: CPT | Mod: CPTII,S$GLB,, | Performed by: STUDENT IN AN ORGANIZED HEALTH CARE EDUCATION/TRAINING PROGRAM

## 2023-09-25 PROCEDURE — 3288F PR FALLS RISK ASSESSMENT DOCUMENTED: ICD-10-PCS | Mod: CPTII,S$GLB,, | Performed by: STUDENT IN AN ORGANIZED HEALTH CARE EDUCATION/TRAINING PROGRAM

## 2023-09-25 PROCEDURE — 90694 VACC AIIV4 NO PRSRV 0.5ML IM: CPT | Mod: S$GLB,,, | Performed by: STUDENT IN AN ORGANIZED HEALTH CARE EDUCATION/TRAINING PROGRAM

## 2023-09-25 PROCEDURE — 36415 COLL VENOUS BLD VENIPUNCTURE: CPT | Mod: PO | Performed by: STUDENT IN AN ORGANIZED HEALTH CARE EDUCATION/TRAINING PROGRAM

## 2023-09-25 PROCEDURE — 84443 ASSAY THYROID STIM HORMONE: CPT | Performed by: STUDENT IN AN ORGANIZED HEALTH CARE EDUCATION/TRAINING PROGRAM

## 2023-09-25 PROCEDURE — 80053 COMPREHEN METABOLIC PANEL: CPT | Performed by: STUDENT IN AN ORGANIZED HEALTH CARE EDUCATION/TRAINING PROGRAM

## 2023-09-25 PROCEDURE — 3074F PR MOST RECENT SYSTOLIC BLOOD PRESSURE < 130 MM HG: ICD-10-PCS | Mod: CPTII,S$GLB,, | Performed by: STUDENT IN AN ORGANIZED HEALTH CARE EDUCATION/TRAINING PROGRAM

## 2023-09-25 PROCEDURE — 3074F SYST BP LT 130 MM HG: CPT | Mod: CPTII,S$GLB,, | Performed by: STUDENT IN AN ORGANIZED HEALTH CARE EDUCATION/TRAINING PROGRAM

## 2023-09-25 PROCEDURE — 1125F PR PAIN SEVERITY QUANTIFIED, PAIN PRESENT: ICD-10-PCS | Mod: CPTII,S$GLB,, | Performed by: STUDENT IN AN ORGANIZED HEALTH CARE EDUCATION/TRAINING PROGRAM

## 2023-09-25 PROCEDURE — 82172 ASSAY OF APOLIPOPROTEIN: CPT | Performed by: STUDENT IN AN ORGANIZED HEALTH CARE EDUCATION/TRAINING PROGRAM

## 2023-09-25 RX ORDER — LEVOTHYROXINE SODIUM 125 UG/1
125 TABLET ORAL
Qty: 30 TABLET | Refills: 11 | Status: SHIPPED | OUTPATIENT
Start: 2023-09-25 | End: 2023-11-27

## 2023-09-25 NOTE — PROGRESS NOTES
Identified pts name and , high dose flu vaccine administered IM, pt tolerated well. Aseptic technique maintained. Pain scale 0 out of 10 on pain scale. Pt instructed to wait in clinic for 15 minutes after injection was given.

## 2023-09-25 NOTE — PROGRESS NOTES
[facility] Note    Subjective:    The HPI and pertinent ROS is included in the Diagnostic Impression Remarks section at the end of the note. Please see below for further details. Chief complaint is at end of note.     Albin is a pleasant intelligent patient who is here for evaluation.     Modified Medications    No medications on file       Data reviewed 274}  Previous medical records reviewed and summarized in plan section at end of note.      If you are due for any health screening(s) below please notify me so we can arrange them to be ordered and scheduled. Most healthy patients at your age complete them, but you are free to accept or refuse. If you can't do it, I'll definitely understand. If you can, I'd certainly appreciate it!     All of your core healthy metrics are met.      The following portions of the patient's history were reviewed and updated as appropriate: allergies, current medications, past family history, past medical history, past social history, past surgical history and problem list.    He  has a past medical history of Arthritis, Atrial fibrillation, BPH with obstruction/lower urinary tract symptoms, Cardiomyopathy, dilated, Disorder of kidney and ureter, Hyperlipidemia, Hypertension, Hypothyroidism, and Sleep apnea.  He  has a past surgical history that includes Back surgery; Foot surgery; Kidney surgery; Nephrectomy; Arthroscopy of elbow (Left, 08/09/2019); Irrigation and debridement of upper extremity (08/09/2019); Transrectal ultrasound examination (N/A, 11/23/2020); Transurethral resection of prostate (N/A, 01/07/2022); and Cataract extraction (Right).    He  reports that he has never smoked. He has never been exposed to tobacco smoke. He has never used smokeless tobacco. He reports that he does not drink alcohol and does not use drugs.  He family history includes Breast cancer in his sister; Cirrhosis in his father; Pancreatic cancer in his mother.    Review of patient's  "allergies indicates:   Allergen Reactions    Antihistimine Other (See Comments)    Antihistamines - alkylamine Other (See Comments)     Urinary retention       Tobacco Use: Low Risk  (9/25/2023)    Patient History     Smoking Tobacco Use: Never     Smokeless Tobacco Use: Never     Passive Exposure: Never     Physical Examination  General appearance: alert, cooperative, no distress  Neck: no thyromegaly, no neck stiffness  Lungs: clear to auscultation, no wheezes, rales or rhonchi, symmetric air entry  Heart: normal rate, ireg ireg rhythm,  no murmurs, rubs, clicks or gallops  Abdomen: soft, nontender, nondistended, no rigidity, rebound, or guarding.   Back: no point tenderness over spine  Extremities: peripheral pulses normal, no unilateral leg swelling or calf tenderness   Neurological:alert, oriented, normal speech, no new focal findings or movement disorder noted from baseline    BP Readings from Last 3 Encounters:   09/25/23 120/70   03/09/23 124/79   12/29/22 138/80     Wt Readings from Last 3 Encounters:   09/25/23 102 kg (224 lb 13.9 oz)   01/13/23 101.6 kg (224 lb)   12/30/22 103.1 kg (227 lb 4.7 oz)     /70 (BP Location: Right arm, Patient Position: Sitting, BP Method: Large (Manual))   Pulse 90   Temp 97.9 °F (36.6 °C) (Oral)   Resp 16   Ht 5' 9" (1.753 m)   Wt 102 kg (224 lb 13.9 oz)   SpO2 98%   BMI 33.21 kg/m²    274}  Laboratory: I have reviewed old labs below:    274}    Lab Results   Component Value Date    WBC 6.06 01/07/2022    HGB 15.3 01/07/2022    HCT 45.8 01/07/2022    MCV 95 01/07/2022     01/07/2022     01/07/2022    K 3.9 01/07/2022     01/07/2022    CALCIUM 9.5 01/07/2022    CO2 29 01/07/2022    GLU 96 01/07/2022    BUN 13 01/07/2022    CREATININE 1.1 01/07/2022    ANIONGAP 8 01/07/2022    PROT 7.2 11/01/2021    ALBUMIN 4.0 11/01/2021    BILITOT 0.5 11/01/2021    ALKPHOS 73 11/01/2021    ALT 18 11/01/2021    AST 21 11/01/2021    INR 1.3 05/02/2022    CHOL 140 " "09/22/2022    TRIG 131 09/22/2022    HDL 34 (L) 09/22/2022    LDLCALC 79.8 09/22/2022    TSH 3.672 09/22/2022    PSA 0.74 09/22/2022      Lab reviewed by me: Particular labs of significance that I will monitor, workup, or treat to improve are mentioned below in diagnostic impression remarks.    The 10-year ASCVD risk score (Demi LOZANO, et al., 2019) is: 14%    Values used to calculate the score:      Age: 67 years      Sex: Male      Is Non- : No      Diabetic: No      Tobacco smoker: No      Systolic Blood Pressure: 120 mmHg      Is BP treated: No      HDL Cholesterol: 34 mg/dL      Total Cholesterol: 140 mg/dL      Imaging/EKG: I have reviewed the pertinent results and my findings are noted in remarks.  274}    CC:   Chief Complaint   Patient presents with    Annual Exam    Back Pain        274}    Assessment/Plan  Albin Fernandez is a 67 y.o. male who presents to clinic with:  1. Healthcare maintenance    2. Other specified hypothyroidism    3. Permanent atrial fibrillation    4. Mixed hyperlipidemia    5. Heart failure with preserved ejection fraction, unspecified HF chronicity    6. Abnormal finding of blood chemistry, unspecified    7. DDD (degenerative disc disease), lumbar    8. Encounter for prostate cancer screening       274}  Diagnostic Impression Remarks + HPI     Documentation entered by me for this encounter may have been done in part using speech-recognition technology. Although I have made an effort to ensure accuracy, "sound like" errors may exist and should be interpreted in context.       Back pain-patient reports he is had chronic back pain for years reports some lower back pain radiates down both legs he would an MRI last year which showed some degenerative changes along with a herniated disc and some foraminal stenosis. He denies bladder or bowel incontinence, urinary retention, saddle anesthesia, and unilateral weakness. No weight loss, fever, chills, or illicit IV drug " use. He denies chest pain and diaphoresis. He does not endorse a ripping or tearing sensation. No dysuria or flank pain.  Patient is seen pain management did discuss gabapentin he wants to hold off on this recommend physical therapy avoid or NSAIDs    AFib stable pulse within normal limits is in AFib currently monitor continue current meds  HLD stable continue current meds monitor blood work rec mediterranean diet   Hypothyroidism-stable monitor blood work continue current dose     HFpEF stable monitor no symptoms     At this point in time the patient is considered a low risk as determined by his history, physical, and the absence of red flags. I have a low suspicion of cord compression since he does not have saddle anesthesia, bowel or bladder incontinen, weakness, or numbness in his arms or legs. Infection is low on my differential since he denies fever, chills, night sweats, IV drug use, dysuria, flank pain, and recent surgery. I did not appreciate bony tenderness, CVA tenderness, or an infectious source. My suspicion for cancer is low since he did not endorse fever, night sweats, or unintentional weight loss. Aortic dissection is low on the differential since he denies a ripping or tearing sensation chest pain, chest pain that radiates to the back, and sudden severe abdominal pain. On my exam there was no pulse deficit or pulsatile abdominal mass. Based on the history and examination, I feel that the likelihood of a high risk condition requiring emergent imaging is so low that no further testing in this regard is warranted at this point in time.     We discussed at length the warning symptoms in order for the patient to return to clinic and/or present to the ED if unable to reach the clinic within a timely manner including but not limited to: increased pain, change in gait, change in sensation around the rectum or perineum, bowel or bladder issues/incontinent, urinary retention, and fever. Via teach back  mechanism, the patient voiced understanding of the aforementioned recommendations/instructions.    This is the extent of this pleasant patient's concerns at this present time. He did not feel chest pain upon exertion, dyspnea, nausea, vomiting, diaphoresis, or syncope. No pleuritic chest pain, unilateral leg swelling, calf tenderness, or calf pain. Negative for unintentional weight loss night sweats, hematuria, and fevers. Albin will return to clinic in a few months for further workup and reassessment or sooner as needed. He was instructed to call the clinic or go to the emergency department or urgent care immediately if his symptoms do not improve, worsens, or if any new symptoms develop. As we discussed that symptoms could worsen over the next 24 hours he was advised that if any increased swelling, pain, or numbness arise to go immediately to the ED. Patient knows to call any time if an emergency arises. Shared decision making occurred and he verbalized understanding in agreement with this plan. I discussed imaging findings, diagnosis, possibilities, treatment options, medications, risks, and benefits. He had many questions regarding the options and long-term effects. All questions were answered. He expressed understanding after counseling regarding the diagnosis and recommendations. He was capable and demonstrated competence with understanding of these options. Shared decision making was performed resulting in him choosing the current treatment plan. Patient handout was given with instructions and recommendations. Advised the patient that if they become pregnant to alert us immediately to assess for medication changes. Having a healthy weight can decrease the risk of 13 cancers and is an important goal. I also discussed the importance of close follow up to discuss labs, change or modify his medications if needed, monitor side effects, and further evaluation of medical problems.     Additional workup planned: see  labs ordered below.    See below for labs and meds ordered with associated diagnosis      1. Healthcare maintenance    2. Other specified hypothyroidism  - TSH; Future    3. Permanent atrial fibrillation  - TSH; Future    4. Mixed hyperlipidemia  - TSH; Future  - Lipid Panel; Future  - Apolipoprotein B; Future    5. Heart failure with preserved ejection fraction, unspecified HF chronicity    6. Abnormal finding of blood chemistry, unspecified  - CBC Auto Differential; Future  - Comprehensive Metabolic Panel; Future  - Hemoglobin A1C; Future  - TSH; Future  - Lipid Panel; Future    7. DDD (degenerative disc disease), lumbar    8. Encounter for prostate cancer screening  - PSA, Screening; Future      Jered Siegel MD   274}    If you are due for any health screening(s) below please notify me so we can arrange them to be ordered and scheduled. Most healthy patients at your age complete them, but you are free to accept or refuse.     If you can't do it, I'll definitely understand. If you can, I'd certainly appreciate it!   All of your core healthy metrics are met.

## 2023-09-28 LAB — APO B SERPL-MCNC: 81 MG/DL

## 2023-11-06 ENCOUNTER — TELEPHONE (OUTPATIENT)
Dept: CARDIOLOGY | Facility: CLINIC | Age: 68
End: 2023-11-06
Payer: MEDICARE

## 2023-11-06 DIAGNOSIS — I48.21 PERMANENT ATRIAL FIBRILLATION: Primary | ICD-10-CM

## 2023-11-06 NOTE — TELEPHONE ENCOUNTER
----- Message from Vladimir Tafoya, Patient Care Assistant sent at 11/6/2023  8:41 AM CST -----  Contact: Pt  Type: Needs Medical Advice    Who Called: Pt  Best Call Back Number: [phone] (home)   Inquiry/Question: Pt is calling to get an alternative to apixaban (ELIQUIS) 5 mg Tab due to he is not able to pay 100+ for the medication. Pt states he is taking his last pill today. Please advise Thank you~           12/28/21 2011   Provider Notification   Reason for Communication Evaluate   Provider Name Dr. Alfredo Mora   Provider Notification Physician   Method of Communication Call   Response See orders   Notification Time 2009     Dr. Alfredo Mora called regarding pt manual BP 78/60 and asymptomatic, new order for 7.5 mg midodrine once. Refer to [facility]

## 2023-11-07 ENCOUNTER — PATIENT MESSAGE (OUTPATIENT)
Dept: CARDIOLOGY | Facility: CLINIC | Age: 68
End: 2023-11-07
Payer: MEDICARE

## 2023-11-07 ENCOUNTER — TELEPHONE (OUTPATIENT)
Dept: PHARMACY | Facility: CLINIC | Age: 68
End: 2023-11-07
Payer: MEDICARE

## 2023-11-07 DIAGNOSIS — I48.21 PERMANENT ATRIAL FIBRILLATION: Primary | ICD-10-CM

## 2023-11-07 NOTE — LETTER
December 14, 2023    Albin Fernandez  [address]  [place] [address]             [facility] - Pharmacy Assistance  [address]  [address]  [place] [address]  Phone: [phone]  Fax: [phone] Dear Mr. Fernandez    My Name is Torrey Rodarte. I am a Pharmacy Technician reaching out on behalf of [email or web address]. We last spoke on 11/21/23 about assistance with your medication. A letter was sent to your [facility] requesting documentation required to begin the Pharmacy Assistance Process. Unfortunately, we have not heard back from you. Please reach out to my phone number below if you are still in need of assistance with your medications. We look forward to hearing from you soon!     Thank you for choosing [facility] for your healthcare needs.      Torrey Rodarte  Pharmacy Patient Assistance      \

## 2023-11-07 NOTE — LETTER
November 21, 2023    Albin Fernandez  [address]  [place] [address]             [facility] - Pharmacy Assistance  [address]  [address]  [place] [address]  Phone: [phone]  Fax: [phone] Dear Mr. Albin Fernandez     It was a pleasure speaking with you. To follow up on our conversation on 11/21/2023, the Pharmacy Patient Assistance Program needs more information from you before we can submit your Eliquis application to the [employer]  Program. Please return the following documents to the Pharmacy Patient Assistance office (address, email and fax listed below) asap:      Proof of household Income( such as social security statement, 1099 form, pension statement or 3 consecutive pay stubs, Copy of all Insurance cards( front and back), Printout from your Insurance or Pharmacy that shows how much you have spent on prescriptions this year, and Signed and dated HIPAA /Patient Information Forms              Whats Next:     Once I receive your documentation and authorization from your Provider, your application will be submitted to the [facility] for review. Please be advised it will take 2 to 4 weeks for your application to be processed so you may have to purchase a month's supply of medication from your pharmacy to hold you over during the waiting period. You will be notified of approval or denial by The Program(mail) or myself.      If you have any questions or concerns, please give me a call         Sincerely   Torrey Rodarte

## 2023-11-07 NOTE — LETTER
November 14, 2023    Albin Fernandez  [address]  [place] [address]             [facility]  [address]  [address]  [place] [address]  Phone:[phone]  Fax: [phone] Dear Albin Fernandez         My name is Torrey Rodarte and I work with the Pharmacy Patient Assistance Program here at [facility].  We received a referral from your provider, inquiring about assistance with your medication. I tried to contact you to discuss your assistance options, sorry I missed you. If you are still in need of assistance, please reach out to my phone number listed below.       Please be advised enrollment in The Pharmacy Patient Assistance Program will require the following documentation.             Proof of household income (such as tax return, social security award letter, pension/MCFP statements)   Copy of Insurance (front and back) Cards   Printout from your pharmacy or insurance that shows how much money you have spent on prescription -pays this (2022) year   Signed and dated HIPAA /Patient Information Forms             Thank you for choosing [facility] for your healthcare needs      Sincerely  Torrey Rodarte

## 2023-11-07 NOTE — TELEPHONE ENCOUNTER
I have reached out to Albin Fernandez to inform him of the [employer]  application process for Eliquis and whats required to apply.  Albin Fernandez did not answer. I left a voicemail and mailed a letter introducing him to the pharmacy patient assistance program. I will follow up in 5 business days.

## 2023-11-08 ENCOUNTER — TELEPHONE (OUTPATIENT)
Dept: CARDIOLOGY | Facility: CLINIC | Age: 68
End: 2023-11-08
Payer: MEDICARE

## 2023-11-09 ENCOUNTER — TELEPHONE (OUTPATIENT)
Dept: CARDIOLOGY | Facility: CLINIC | Age: 68
End: 2023-11-09
Payer: MEDICARE

## 2023-11-14 NOTE — TELEPHONE ENCOUNTER
A 2nd attempt has been made to establish contact with Albin Fernandez  via LETTER. The final contact attempt will be made in 5 business days

## 2023-11-17 ENCOUNTER — OFFICE VISIT (OUTPATIENT)
Dept: CARDIOLOGY | Facility: CLINIC | Age: 68
End: 2023-11-17
Payer: MEDICARE

## 2023-11-17 VITALS
SYSTOLIC BLOOD PRESSURE: 120 MMHG | DIASTOLIC BLOOD PRESSURE: 70 MMHG | BODY MASS INDEX: 34.06 KG/M2 | OXYGEN SATURATION: 98 % | HEIGHT: 69 IN | WEIGHT: 229.94 LBS | HEART RATE: 86 BPM

## 2023-11-17 DIAGNOSIS — Z01.810 PREOP CARDIOVASCULAR EXAM: Primary | ICD-10-CM

## 2023-11-17 DIAGNOSIS — I48.21 PERMANENT ATRIAL FIBRILLATION: ICD-10-CM

## 2023-11-17 DIAGNOSIS — E78.2 MIXED HYPERLIPIDEMIA: ICD-10-CM

## 2023-11-17 PROCEDURE — 1126F PR PAIN SEVERITY QUANTIFIED, NO PAIN PRESENT: ICD-10-PCS | Mod: CPTII,S$GLB,, | Performed by: STUDENT IN AN ORGANIZED HEALTH CARE EDUCATION/TRAINING PROGRAM

## 2023-11-17 PROCEDURE — 93005 EKG 12-LEAD: ICD-10-PCS | Mod: S$GLB,,, | Performed by: STUDENT IN AN ORGANIZED HEALTH CARE EDUCATION/TRAINING PROGRAM

## 2023-11-17 PROCEDURE — 1159F PR MEDICATION LIST DOCUMENTED IN MEDICAL RECORD: ICD-10-PCS | Mod: CPTII,S$GLB,, | Performed by: STUDENT IN AN ORGANIZED HEALTH CARE EDUCATION/TRAINING PROGRAM

## 2023-11-17 PROCEDURE — 99214 PR OFFICE/OUTPT VISIT, EST, LEVL IV, 30-39 MIN: ICD-10-PCS | Mod: S$GLB,,, | Performed by: STUDENT IN AN ORGANIZED HEALTH CARE EDUCATION/TRAINING PROGRAM

## 2023-11-17 PROCEDURE — 93010 ELECTROCARDIOGRAM REPORT: CPT | Mod: S$GLB,,, | Performed by: GENERAL PRACTICE

## 2023-11-17 PROCEDURE — 99999 PR PBB SHADOW E&M-EST. PATIENT-LVL IV: CPT | Mod: PBBFAC,,, | Performed by: STUDENT IN AN ORGANIZED HEALTH CARE EDUCATION/TRAINING PROGRAM

## 2023-11-17 PROCEDURE — 1159F MED LIST DOCD IN RCRD: CPT | Mod: CPTII,S$GLB,, | Performed by: STUDENT IN AN ORGANIZED HEALTH CARE EDUCATION/TRAINING PROGRAM

## 2023-11-17 PROCEDURE — 1101F PR PT FALLS ASSESS DOC 0-1 FALLS W/OUT INJ PAST YR: ICD-10-PCS | Mod: CPTII,S$GLB,, | Performed by: STUDENT IN AN ORGANIZED HEALTH CARE EDUCATION/TRAINING PROGRAM

## 2023-11-17 PROCEDURE — 3044F PR MOST RECENT HEMOGLOBIN A1C LEVEL <7.0%: ICD-10-PCS | Mod: CPTII,S$GLB,, | Performed by: STUDENT IN AN ORGANIZED HEALTH CARE EDUCATION/TRAINING PROGRAM

## 2023-11-17 PROCEDURE — 3288F PR FALLS RISK ASSESSMENT DOCUMENTED: ICD-10-PCS | Mod: CPTII,S$GLB,, | Performed by: STUDENT IN AN ORGANIZED HEALTH CARE EDUCATION/TRAINING PROGRAM

## 2023-11-17 PROCEDURE — 3288F FALL RISK ASSESSMENT DOCD: CPT | Mod: CPTII,S$GLB,, | Performed by: STUDENT IN AN ORGANIZED HEALTH CARE EDUCATION/TRAINING PROGRAM

## 2023-11-17 PROCEDURE — 1101F PT FALLS ASSESS-DOCD LE1/YR: CPT | Mod: CPTII,S$GLB,, | Performed by: STUDENT IN AN ORGANIZED HEALTH CARE EDUCATION/TRAINING PROGRAM

## 2023-11-17 PROCEDURE — 3044F HG A1C LEVEL LT 7.0%: CPT | Mod: CPTII,S$GLB,, | Performed by: STUDENT IN AN ORGANIZED HEALTH CARE EDUCATION/TRAINING PROGRAM

## 2023-11-17 PROCEDURE — 1160F RVW MEDS BY RX/DR IN RCRD: CPT | Mod: CPTII,S$GLB,, | Performed by: STUDENT IN AN ORGANIZED HEALTH CARE EDUCATION/TRAINING PROGRAM

## 2023-11-17 PROCEDURE — 93010 EKG 12-LEAD: ICD-10-PCS | Mod: S$GLB,,, | Performed by: GENERAL PRACTICE

## 2023-11-17 PROCEDURE — 93005 ELECTROCARDIOGRAM TRACING: CPT | Mod: S$GLB,,, | Performed by: STUDENT IN AN ORGANIZED HEALTH CARE EDUCATION/TRAINING PROGRAM

## 2023-11-17 PROCEDURE — 99999 PR PBB SHADOW E&M-EST. PATIENT-LVL IV: ICD-10-PCS | Mod: PBBFAC,,, | Performed by: STUDENT IN AN ORGANIZED HEALTH CARE EDUCATION/TRAINING PROGRAM

## 2023-11-17 PROCEDURE — 3008F BODY MASS INDEX DOCD: CPT | Mod: CPTII,S$GLB,, | Performed by: STUDENT IN AN ORGANIZED HEALTH CARE EDUCATION/TRAINING PROGRAM

## 2023-11-17 PROCEDURE — 3008F PR BODY MASS INDEX (BMI) DOCUMENTED: ICD-10-PCS | Mod: CPTII,S$GLB,, | Performed by: STUDENT IN AN ORGANIZED HEALTH CARE EDUCATION/TRAINING PROGRAM

## 2023-11-17 PROCEDURE — 99214 OFFICE O/P EST MOD 30 MIN: CPT | Mod: S$GLB,,, | Performed by: STUDENT IN AN ORGANIZED HEALTH CARE EDUCATION/TRAINING PROGRAM

## 2023-11-17 PROCEDURE — 3078F PR MOST RECENT DIASTOLIC BLOOD PRESSURE < 80 MM HG: ICD-10-PCS | Mod: CPTII,S$GLB,, | Performed by: STUDENT IN AN ORGANIZED HEALTH CARE EDUCATION/TRAINING PROGRAM

## 2023-11-17 PROCEDURE — 1126F AMNT PAIN NOTED NONE PRSNT: CPT | Mod: CPTII,S$GLB,, | Performed by: STUDENT IN AN ORGANIZED HEALTH CARE EDUCATION/TRAINING PROGRAM

## 2023-11-17 PROCEDURE — 3074F SYST BP LT 130 MM HG: CPT | Mod: CPTII,S$GLB,, | Performed by: STUDENT IN AN ORGANIZED HEALTH CARE EDUCATION/TRAINING PROGRAM

## 2023-11-17 PROCEDURE — 3074F PR MOST RECENT SYSTOLIC BLOOD PRESSURE < 130 MM HG: ICD-10-PCS | Mod: CPTII,S$GLB,, | Performed by: STUDENT IN AN ORGANIZED HEALTH CARE EDUCATION/TRAINING PROGRAM

## 2023-11-17 PROCEDURE — 1160F PR REVIEW ALL MEDS BY PRESCRIBER/CLIN PHARMACIST DOCUMENTED: ICD-10-PCS | Mod: CPTII,S$GLB,, | Performed by: STUDENT IN AN ORGANIZED HEALTH CARE EDUCATION/TRAINING PROGRAM

## 2023-11-17 PROCEDURE — 3078F DIAST BP <80 MM HG: CPT | Mod: CPTII,S$GLB,, | Performed by: STUDENT IN AN ORGANIZED HEALTH CARE EDUCATION/TRAINING PROGRAM

## 2023-11-17 NOTE — LETTER
2023    Albin Fernandez  [address]  [place] [address]             [facility]  [address]  [address]  [place] [address]  Phone: [phone]  Fax: [phone] Patient: Albin Fernandez  : 1955  Referring Doctor:   Type of procedure: back surgery    Current Outpatient Medications   Medication Sig    apixaban (ELIQUIS) 5 mg Tab Take 1 tablet (5 mg total) by mouth 2 (two) times daily.    atorvastatin (LIPITOR) 10 MG tablet TAKE 1 TABLET (10 MG TOTAL) BY MOUTH EVERY EVENING.    co-enzyme Q-10 30 mg capsule Take 30 mg by mouth once daily.    HYDROcodone-acetaminophen (NORCO)  mg per tablet Take 1 tablet by mouth 2 (two) times daily.    levothyroxine (SYNTHROID) 125 MCG tablet Take 1 tablet (125 mcg total) by mouth before breakfast.    metoprolol succinate (TOPROL-XL) 50 MG 24 hr tablet TAKE 1 TABLET (50 MG TOTAL) BY MOUTH ONCE DAILY.    senna-docusate 8.6-50 mg (SENNA WITH DOCUSATE SODIUM) 8.6-50 mg per tablet Take 2 tablets by mouth 2 (two) times daily.    tadalafiL (CIALIS) 20 MG Tab Take 1 tablet (20 mg total) by mouth daily as needed (intercourse).     No current facility-administered medications for this visit.     Facility-Administered Medications Ordered in Other Visits   Medication    electrolyte-S (ISOLYTE)    HYDROmorphone (PF) injection 0.2 mg    LIDOcaine (PF) 10 mg/ml (1%) injection 10 mg    LIDOcaine (PF) 10 mg/ml (1%) injection 10 mg    oxyCODONE immediate release tablet 5 mg             Pre-op cardiovascular evaluation:    The patient is at moderate risk for topher-operative major cardiovascular events. He is optimized from cardiac standpoint at this time. No further diagnostic/therapeutic measures needed prior to surgery from cardiac standpoint at this time    Recommendations for antiplatelet/anticoagulant medications: ok to stop eliquis for 3 days prior to surgery.      If you have any questions  regarding the above, please contact my office at [phone]    Sincerely,

## 2023-11-21 NOTE — TELEPHONE ENCOUNTER
I have spoken with Albin Fernandez and informed him of the [employer]  application process for Eliquis and what's required to apply.  Albin Fernandez will provide the following documents: Proof of household Income( such as social security statement, 1099 form, pension statement or 3 consecutive pay stubs, Copy of all Insurance cards( front and back), Printout from your Insurance or Pharmacy that shows how much you have spent on prescriptions this year, and Signed and dated HIPAA /Patient Information Forms        I will follow up with the patient in 5 business days.

## 2023-11-24 ENCOUNTER — LAB VISIT (OUTPATIENT)
Dept: LAB | Facility: HOSPITAL | Age: 68
End: 2023-11-24
Attending: STUDENT IN AN ORGANIZED HEALTH CARE EDUCATION/TRAINING PROGRAM
Payer: MEDICARE

## 2023-11-24 DIAGNOSIS — E03.8 OTHER SPECIFIED HYPOTHYROIDISM: ICD-10-CM

## 2023-11-24 LAB
T4 FREE SERPL-MCNC: 0.88 NG/DL (ref 0.71–1.51)
TSH SERPL DL<=0.005 MIU/L-ACNC: 6.5 UIU/ML (ref 0.4–4)

## 2023-11-24 PROCEDURE — 84439 ASSAY OF FREE THYROXINE: CPT | Performed by: STUDENT IN AN ORGANIZED HEALTH CARE EDUCATION/TRAINING PROGRAM

## 2023-11-24 PROCEDURE — 36415 COLL VENOUS BLD VENIPUNCTURE: CPT | Mod: PO | Performed by: STUDENT IN AN ORGANIZED HEALTH CARE EDUCATION/TRAINING PROGRAM

## 2023-11-24 PROCEDURE — 84443 ASSAY THYROID STIM HORMONE: CPT | Performed by: STUDENT IN AN ORGANIZED HEALTH CARE EDUCATION/TRAINING PROGRAM

## 2023-11-25 DIAGNOSIS — I48.0 PAROXYSMAL ATRIAL FIBRILLATION: ICD-10-CM

## 2023-11-25 NOTE — TELEPHONE ENCOUNTER
No care due was identified.  [facility] Embedded Care Due Messages. Reference number: [id number].   11/25/2023 10:06:48 AM CST

## 2023-11-26 NOTE — TELEPHONE ENCOUNTER
Refill Routing Note   Medication(s) are not appropriate for processing by [facility] for the following reason(s):        No active prescription written by provider    [facility] action(s):  Defer               Appointments  past 12m or future 3m with PCP    Date Provider   Last Visit   9/25/2023 Jered Siegel MD   Next Visit   Visit date not found Jered Siegel MD   ED visits in past 90 days: 0        Note composed:1:55 PM 11/26/2023

## 2023-11-27 ENCOUNTER — PATIENT MESSAGE (OUTPATIENT)
Dept: FAMILY MEDICINE | Facility: CLINIC | Age: 68
End: 2023-11-27
Payer: MEDICARE

## 2023-11-27 DIAGNOSIS — E03.9 HYPOTHYROIDISM, UNSPECIFIED TYPE: Primary | ICD-10-CM

## 2023-11-27 RX ORDER — LEVOTHYROXINE SODIUM 137 UG/1
137 TABLET ORAL
Qty: 90 TABLET | Refills: 3 | Status: SHIPPED | OUTPATIENT
Start: 2023-11-27 | End: 2024-11-26

## 2023-11-27 RX ORDER — METOPROLOL SUCCINATE 50 MG/1
50 TABLET, EXTENDED RELEASE ORAL DAILY
Qty: 90 TABLET | Refills: 3 | Status: SHIPPED | OUTPATIENT
Start: 2023-11-27 | End: 2024-02-28 | Stop reason: SDUPTHER

## 2023-11-28 ENCOUNTER — TELEPHONE (OUTPATIENT)
Dept: FAMILY MEDICINE | Facility: CLINIC | Age: 68
End: 2023-11-28
Payer: MEDICARE

## 2023-11-28 DIAGNOSIS — E03.9 HYPOTHYROIDISM, UNSPECIFIED TYPE: Primary | ICD-10-CM

## 2023-11-30 ENCOUNTER — PATIENT MESSAGE (OUTPATIENT)
Dept: FAMILY MEDICINE | Facility: CLINIC | Age: 68
End: 2023-11-30
Payer: MEDICARE

## 2023-12-01 ENCOUNTER — OFFICE VISIT (OUTPATIENT)
Dept: UROLOGY | Facility: CLINIC | Age: 68
End: 2023-12-01
Payer: MEDICARE

## 2023-12-01 DIAGNOSIS — N40.1 BENIGN PROSTATIC HYPERPLASIA WITH URINARY FREQUENCY: Primary | ICD-10-CM

## 2023-12-01 DIAGNOSIS — R35.0 BENIGN PROSTATIC HYPERPLASIA WITH URINARY FREQUENCY: Primary | ICD-10-CM

## 2023-12-01 LAB
BILIRUBIN, UA POC OHS: NEGATIVE
BLOOD, UA POC OHS: NEGATIVE
CLARITY, UA POC OHS: CLEAR
COLOR, UA POC OHS: YELLOW
GLUCOSE, UA POC OHS: NEGATIVE
KETONES, UA POC OHS: NEGATIVE
LEUKOCYTES, UA POC OHS: NEGATIVE
NITRITE, UA POC OHS: NEGATIVE
PH, UA POC OHS: 5
POC RESIDUAL URINE VOLUME: 64 ML (ref 0–100)
PROTEIN, UA POC OHS: NEGATIVE
SPECIFIC GRAVITY, UA POC OHS: 1.02
UROBILINOGEN, UA POC OHS: 0.2

## 2023-12-01 PROCEDURE — 99214 OFFICE O/P EST MOD 30 MIN: CPT | Mod: S$GLB,,, | Performed by: NURSE PRACTITIONER

## 2023-12-01 PROCEDURE — 3044F HG A1C LEVEL LT 7.0%: CPT | Mod: CPTII,S$GLB,, | Performed by: NURSE PRACTITIONER

## 2023-12-01 PROCEDURE — 3288F PR FALLS RISK ASSESSMENT DOCUMENTED: ICD-10-PCS | Mod: CPTII,S$GLB,, | Performed by: NURSE PRACTITIONER

## 2023-12-01 PROCEDURE — 51798 POCT BLADDER SCAN: ICD-10-PCS | Mod: S$GLB,,, | Performed by: NURSE PRACTITIONER

## 2023-12-01 PROCEDURE — 99999 PR PBB SHADOW E&M-EST. PATIENT-LVL III: CPT | Mod: PBBFAC,,, | Performed by: NURSE PRACTITIONER

## 2023-12-01 PROCEDURE — 1160F PR REVIEW ALL MEDS BY PRESCRIBER/CLIN PHARMACIST DOCUMENTED: ICD-10-PCS | Mod: CPTII,S$GLB,, | Performed by: NURSE PRACTITIONER

## 2023-12-01 PROCEDURE — 1101F PR PT FALLS ASSESS DOC 0-1 FALLS W/OUT INJ PAST YR: ICD-10-PCS | Mod: CPTII,S$GLB,, | Performed by: NURSE PRACTITIONER

## 2023-12-01 PROCEDURE — 1159F PR MEDICATION LIST DOCUMENTED IN MEDICAL RECORD: ICD-10-PCS | Mod: CPTII,S$GLB,, | Performed by: NURSE PRACTITIONER

## 2023-12-01 PROCEDURE — 81003 URINALYSIS AUTO W/O SCOPE: CPT | Mod: QW,S$GLB,, | Performed by: NURSE PRACTITIONER

## 2023-12-01 PROCEDURE — 99214 PR OFFICE/OUTPT VISIT, EST, LEVL IV, 30-39 MIN: ICD-10-PCS | Mod: S$GLB,,, | Performed by: NURSE PRACTITIONER

## 2023-12-01 PROCEDURE — 51798 US URINE CAPACITY MEASURE: CPT | Mod: S$GLB,,, | Performed by: NURSE PRACTITIONER

## 2023-12-01 PROCEDURE — 99999 PR PBB SHADOW E&M-EST. PATIENT-LVL III: ICD-10-PCS | Mod: PBBFAC,,, | Performed by: NURSE PRACTITIONER

## 2023-12-01 PROCEDURE — 3288F FALL RISK ASSESSMENT DOCD: CPT | Mod: CPTII,S$GLB,, | Performed by: NURSE PRACTITIONER

## 2023-12-01 PROCEDURE — 1159F MED LIST DOCD IN RCRD: CPT | Mod: CPTII,S$GLB,, | Performed by: NURSE PRACTITIONER

## 2023-12-01 PROCEDURE — 1160F RVW MEDS BY RX/DR IN RCRD: CPT | Mod: CPTII,S$GLB,, | Performed by: NURSE PRACTITIONER

## 2023-12-01 PROCEDURE — 3044F PR MOST RECENT HEMOGLOBIN A1C LEVEL <7.0%: ICD-10-PCS | Mod: CPTII,S$GLB,, | Performed by: NURSE PRACTITIONER

## 2023-12-01 PROCEDURE — 1101F PT FALLS ASSESS-DOCD LE1/YR: CPT | Mod: CPTII,S$GLB,, | Performed by: NURSE PRACTITIONER

## 2023-12-01 PROCEDURE — 81003 POCT URINALYSIS(INSTRUMENT): ICD-10-PCS | Mod: QW,S$GLB,, | Performed by: NURSE PRACTITIONER

## 2023-12-01 RX ORDER — VALACYCLOVIR HYDROCHLORIDE 500 MG/1
500 TABLET, FILM COATED ORAL 3 TIMES DAILY
COMMUNITY
Start: 2023-11-27 | End: 2024-02-08 | Stop reason: SDUPTHER

## 2023-12-01 RX ORDER — TAMSULOSIN HYDROCHLORIDE 0.4 MG/1
0.4 CAPSULE ORAL DAILY
Qty: 90 CAPSULE | Refills: 4 | Status: SHIPPED | OUTPATIENT
Start: 2023-12-01 | End: 2024-02-29

## 2023-12-01 RX ORDER — DOXYCYCLINE HYCLATE 100 MG
100 TABLET ORAL 2 TIMES DAILY
Qty: 42 TABLET | Refills: 0 | Status: SHIPPED | OUTPATIENT
Start: 2023-12-01 | End: 2024-01-08

## 2023-12-01 NOTE — PROGRESS NOTES
"[facility] Note  Staff: ROXI Harden-C    PCP: SWATHI Siegel.  Urologist:  JADEN Sharp    Chief Complaint: Urinary Frequency    Subjective:        HPI: Albin Fernandez is a 68 y.o. male presents today with new onset increased urinary frequency x 5-6 mos.  Pt states today that after a bowel movement the LUTS will dissipate "a little".     History of Present Illness:  Albin Fernandez is a 68 y.o. male who presents to clinic for BPH. He is Established  to our clinic with Dr. Sharp, last office visit was 3/9/23 in which Flomax was discontinued at that time.    TODAY:  Pt RTC today with c/o increased urinary frequency  UA today showed normal findings.  PVR by bladder scan is 64 mL  PSA Level done on 23 was 1.9 from 0.74 one year ago.  No dysuria, no gross hematuria noted by pt.  Having Back Sx in [place] on 23.    AUA SS Today:  34 with QOL at (not filled out)  Feeling of ICBE: 5  Frequency:5  Intermittency:5  Urgency:5  Weak urine stream:5  Strainin  Nocturia:4    3/9/23:  Has stopped Flomax.   Nocturia x 1.   Has been using his CPAP machine.   Symptoms are not bothersome during the day.      PSA 22: 0.64     PVR 64 cc     8/10/22  Stopped Flomax last week for cataract surgery. Feels like he is actually doing better without it.   Nocturia x 2-4. Now wearing a CPAP machine.   During the day he is doing pretty well. No straining. Does have frequency.   No hematuria or dysuria.    10 mg of cialis not working      PVR 78 cc     22  S/P TURP on 22. Pathology benign, 16 g.  Doing well from a urinary standpoint.   Nocturia improved to 1-2x.  No hematuria or dysuria.   No bothersome daytime complaints.      11/15/21  Previous patient of Dr. Shrestha. Here to discuss Urolift.   Hx of TURP in . After this he did really well for a while and then his symptoms returned.  Had cysto/trus in 2020 which showed a 22 g prostate with anterior obstructing tissue but " otherwise open. He did undergo UDS which did not show ability to mount a detrusor pressure however, test was somewhat questionable as catheter may not have been all the way in the bladder. He did have a sensation to void.   Last PSA: 1.5 (8/25/20)     Has a solitary left kidney secondary to a traumatic fall when he was a child.      He is currently on Flomax 0.8 mg.   Today he is complaining of frequency, nocturia multiple times a night. He does use a CPAP machine. Stops drinking fluids after 6 pm. No sodas.   No hematuria. No dysuria.   Has a bowel movement every day, but has to take stool softner.      UA today: negative for blood, leuks, nitrite   PVR today: 84 cc     Last urine culture: no documented UTIs    Family  hx: grandfather and uncle had prostate cancer   Hx of COPD, a fib, HTN, HLD     Past medical, family, and social history reviewed as documented in chart with pertinent positive medical, family, and social history detailed in HPI.    REVIEW OF SYSTEMS:  A comprehensive 10 system review was performed and is negative except as noted above in HPI    PMHx:  Past Medical History:   Diagnosis Date    Arthritis     Atrial fibrillation     BPH with obstruction/lower urinary tract symptoms     Cardiomyopathy, dilated     Disorder of kidney and ureter     Hyperlipidemia     Hypertension     Hypothyroidism     Sleep apnea      PSHx:  Past Surgical History:   Procedure Laterality Date    ARTHROSCOPY OF ELBOW Left 08/09/2019    Procedure: ARTHROSCOPY, ELBOW;  Surgeon: Giovany Marquez II, MD;  Location: [facility] OR;  Service: Orthopedics;  Laterality: Left;    BACK SURGERY      CATARACT EXTRACTION Right     FOOT SURGERY      IRRIGATION AND DEBRIDEMENT OF UPPER EXTREMITY  08/09/2019    Procedure: IRRIGATION AND DEBRIDEMENT, UPPER EXTREMITY;  Surgeon: Giovany Marquez II, MD;  Location: [facility] OR;  Service: Orthopedics;;    KIDNEY SURGERY      one kidney removed    NEPHRECTOMY      TRANSRECTAL ULTRASOUND EXAMINATION N/A  11/23/2020    Procedure: TRANSRECTAL ULTRASOUND;  Surgeon: Roselyn Shrestha MD;  Location: [facility] OR;  Service: Urology;  Laterality: N/A;    TRANSURETHRAL RESECTION OF PROSTATE N/A 01/07/2022    Procedure: TURP (TRANSURETHRAL RESECTION OF PROSTATE);  Surgeon: Cate Sharp MD;  Location: [facility] OR;  Service: Urology;  Laterality: N/A;     Allergies:  Antihistimine and Antihistamines - alkylamine    Medications: reviewed   Objective:   There were no vitals filed for this visit.    General:WDWN in NAD  Eyes: PERRLA, normal conjunctiva  Respiratory: no increased work on breathing, clear to auscultation  Cardiovascular: regular rate and rhythm. No obvious extremity edema.  GI: palpation of masses. No tenderness. No hepatosplenomegaly to palpation.  Musculoskeletal: normal range of motion of bilateral upper extremities. Normal muscle strength and tone.  Skin: no obvious rashes or lesions. No tightening of skin noted.  Neurologic: CN grossly normal. Normal sensation.   Psychiatric: awake, alert and oriented x 3. Mood and affect normal. Cooperative.      Assessment:       1. Benign prostatic hyperplasia with urinary frequency          Plan:      Restart Flomax 0.4 mg one capsule daily at this time.  Take in evening   at least 2-3 hrs prior to bedtime.  2.    Doxycycline 100 mg BID x 21 days to rule out any potential prostate infection at this time.  3.     Discussed conservative measures to control urgency and frequency including avoiding bladder irritants, timed voiding, not postponing voiding, and bowel regimen (as distended bowel has extrinsic compressive effect on bladder. Discussed bladder irritants include coffe (even decaf), tea, alcohol, soda, spicy foods, acidic juices (orange, tomato), vinegar, and artificial sweeteners.     F/u RTC in one month to recheck LUTS.  Pt verbalized understanding.    [facility]: Active    Anitha Chavis, FARHAD

## 2023-12-03 PROBLEM — I50.20 HFREF (HEART FAILURE WITH REDUCED EJECTION FRACTION): Status: RESOLVED | Noted: 2023-09-25 | Resolved: 2023-12-03

## 2023-12-03 NOTE — PROGRESS NOTES
Patient ID:  Albin Fernandez  68 y.o.  male      Assessment/Plan:    Pre-op evaluation  Permanent atrial fibrillation  HLD    The patient is doing well from cardiac standpoint. No symptoms. Able to perform >4 METs without any angina/anginal equivalent symptoms. Heart rate is controlled with Toprol 50 mg daily from afib standpoint. On Eliquis 5 mg BID for CVA ppx. Will continue Toprol and Eliquis.  Last LDL 86 from sep, 2023. Continue Lipitor 10 mg daily.    He is at moderate risk for major adverse cardiovascular events otpher-operatively for non-cardiac surgery. He is optimized from cardiac standpoint at this time. No further cardiac diagnostic/therapeutic measures needed at this time prior to surgery. Hold Eliquis 3 days prior to surgery and restart after the surgery as soon as ok with the surgery team.    Subjective:     Chief Complaint:   Chief Complaint   Patient presents with    Pre-op Exam     Back surgery    Atrial Fibrillation       HPI:  Albin Fernandez is a 68 y.o. male who presents for preop evaluation for back surgery.  He has history of permanent atrial fibrillation treated with rate control message with Toprol 50 mg daily.  He is on Eliquis for CVA prophylaxis.  He has had multiple cardioversions in the past that were unsuccessful and it was decided to go with rate control strategy which has been working well.  He reports doing well from cardiac standpoint.  He denies any palpitations.  He walks about 2-3 miles regularly.  Able to climb more than 2 flights of stairs without any cardiac symptoms.  Denies any chest pain, shortness of breath, orthopnea, PND, peripheral edema, lightheadedness, syncope or near-syncope.  No issues with bleeding.  Last echo showed preserved LVEF, dilated atria and mild valvular insufficiency. He has chronic lower back pain with radiation to bilateral legs.  He has had back surgery before.  He follows with Neurosurgery at [facility] ([facility]  ).  He says they are planning on fixing two vertebral discs and put hardware.    Twelve lead EKG today shows atrial fibrillation, 84 beats per minute, normal QTC, narrow QRS complex and no significant ST-T abnormalities.    PREVIOUS CARDIAC TESTING/PROCEDURES HISTORY:    ECHOCARDIOGRAM: 3/31/2022  Summary    The left ventricle is normal in size with mild concentric hypertrophy and normal systolic function.  The estimated ejection fraction is 55%.  Atrial fibrillation observed.  Normal right ventricular size with normal right ventricular systolic function.  Moderate to severe left atrial enlargement.  Mild right atrial enlargement.  Mild aortic regurgitation.  Mild mitral regurgitation.  Mild tricuspid regurgitation.  Normal central venous pressure (3 mmHg).  The estimated PA systolic pressure is 19 mmHg    STRESS TEST: Outside facility in 2022: Normal perfusion scan as per documentation.      Review of patient's allergies indicates:   Allergen Reactions    Antihistimine Other (See Comments)    Antihistamines - alkylamine Other (See Comments)     Urinary retention       Past Medical History:   Diagnosis Date    Arthritis     Atrial fibrillation     BPH with obstruction/lower urinary tract symptoms     Cardiomyopathy, dilated     Disorder of kidney and ureter     Hyperlipidemia     Hypertension     Hypothyroidism     Sleep apnea      Past Surgical History:   Procedure Laterality Date    ARTHROSCOPY OF ELBOW Left 08/09/2019    Procedure: ARTHROSCOPY, ELBOW;  Surgeon: Giovany Marquez II, MD;  Location: [facility] OR;  Service: Orthopedics;  Laterality: Left;    BACK SURGERY      CATARACT EXTRACTION Right     FOOT SURGERY      IRRIGATION AND DEBRIDEMENT OF UPPER EXTREMITY  08/09/2019    Procedure: IRRIGATION AND DEBRIDEMENT, UPPER EXTREMITY;  Surgeon: Giovany Marquez II, MD;  Location: [facility] OR;  Service: Orthopedics;;    KIDNEY SURGERY      one kidney removed    NEPHRECTOMY      TRANSRECTAL ULTRASOUND EXAMINATION  N/A 11/23/2020    Procedure: TRANSRECTAL ULTRASOUND;  Surgeon: Roselyn Shrestha MD;  Location: [facility] OR;  Service: Urology;  Laterality: N/A;    TRANSURETHRAL RESECTION OF PROSTATE N/A 01/07/2022    Procedure: TURP (TRANSURETHRAL RESECTION OF PROSTATE);  Surgeon: Cate Sharp MD;  Location: [facility] OR;  Service: Urology;  Laterality: N/A;     Social History     Tobacco Use    Smoking status: Never     Passive exposure: Never    Smokeless tobacco: Never   Substance Use Topics    Alcohol use: No    Drug use: No          REVIEW OF SYSTEMS  CONSTITUTIONAL: No chills, no fatigue, no fever.   EYES: No double vision, no blurred vision  NEURO: No headaches, no dizziness  RESPIRATORY: No cough, no shortness of breath, no wheezing.    CARDIOVASCULAR: See HPI   GI: No abdominal pain, no melena, no diarrhea, no nausea or vomiting.   : No  dysuria and frequency, no hematuria  SKIN: no bruising, no discoloration  ENDOCRINE: no polyphagia, no heat intolerance, no cold intolerance  PSYCHIATRIC: no depression, no anxiety, no memory loss  MUSCULOSKELETAL: no  neck pain, no muscle weakness,no back pain          Objective:        Vitals:    11/17/23 1348   BP: 120/70   Pulse: 86       PHYSICAL EXAM  CONSTITUTIONAL: In no apparent distress  HEENT: Normocephalic. Pupils normal and conjunctivae normal. No pallor  NECK: No JVD  LUNGS: B/L air entry to the lungs, clear to auscultation. No rales, wheezing or rhonchi.  HEART: normal rate and irregular rhythm. Normal S1 and S2.  No murmur   ABDOMEN: soft, non-tender; bowel sounds normal  EXTREMITIES: No edema. Good palpable distal pulses.  NEURO: AAO X 3, no gross sensory or motor deficits  SKIN:  No rash  Psych:  Normal affect    Lab Results   Component Value Date    WBC 4.69 09/25/2023    HGB 13.8 (L) 09/25/2023    HCT 39.7 (L) 09/25/2023     09/25/2023    CHOL 143 09/25/2023    TRIG 101 09/25/2023    HDL 36 (L) 09/25/2023    ALT 26 09/25/2023    AST 25 09/25/2023    NA  "142 09/25/2023    K 4.0 09/25/2023     09/25/2023    CREATININE 1.0 09/25/2023    BUN 18 09/25/2023    CO2 26 09/25/2023    TSH 6.500 (H) 11/24/2023    PSA 1.9 09/25/2023    INR 1.3 05/02/2022    HGBA1C 5.5 09/25/2023        @  Lab Results   Component Value Date    CHOL 143 09/25/2023    CHOL 140 09/22/2022    CHOL 144 07/16/2021     Lab Results   Component Value Date    HDL 36 (L) 09/25/2023    HDL 34 (L) 09/22/2022    HDL 33 (L) 07/16/2021     Lab Results   Component Value Date    LDLCALC 86.8 09/25/2023    LDLCALC 79.8 09/22/2022    LDLCALC 89.0 07/16/2021     No results found for: "DLDL"  Lab Results   Component Value Date    TRIG 101 09/25/2023    TRIG 131 09/22/2022    TRIG 110 07/16/2021       f1   Lab Results   Component Value Date    CHOLHDL 25.2 09/25/2023    CHOLHDL 24.3 09/22/2022    CHOLHDL 22.9 07/16/2021              Medication List with Changes/Refills   New Medications    DOXYCYCLINE (VIBRA-TABS) 100 MG TABLET    Take 1 tablet (100 mg total) by mouth 2 (two) times daily. for 21 days    LEVOTHYROXINE (SYNTHROID) 137 MCG TAB TABLET    Take 1 tablet (137 mcg total) by mouth before breakfast.    TAMSULOSIN (FLOMAX) 0.4 MG CAP    Take 1 capsule (0.4 mg total) by mouth once daily. Take in evening.   Current Medications    APIXABAN (ELIQUIS) 5 MG TAB    Take 1 tablet (5 mg total) by mouth 2 (two) times daily.    ATORVASTATIN (LIPITOR) 10 MG TABLET    TAKE 1 TABLET (10 MG TOTAL) BY MOUTH EVERY EVENING.    CO-ENZYME Q-10 30 MG CAPSULE    Take 30 mg by mouth once daily.    HYDROCODONE-ACETAMINOPHEN (NORCO)  MG PER TABLET    Take 1 tablet by mouth 2 (two) times daily.    SENNA-DOCUSATE 8.6-50 MG (SENNA WITH DOCUSATE SODIUM) 8.6-50 MG PER TABLET    Take 2 tablets by mouth 2 (two) times daily.    VALACYCLOVIR (VALTREX) 500 MG TABLET    Take 500 mg by mouth 3 (three) times daily.   Changed and/or Refilled Medications    Modified Medication Previous Medication    METOPROLOL SUCCINATE (TOPROL-XL) 50 MG " 24 HR TABLET metoprolol succinate (TOPROL-XL) 50 MG 24 hr tablet       TAKE 1 TABLET (50 MG TOTAL) BY MOUTH ONCE DAILY.    TAKE 1 TABLET (50 MG TOTAL) BY MOUTH ONCE DAILY.   Discontinued Medications    LEVOTHYROXINE (SYNTHROID) 125 MCG TABLET    Take 1 tablet (125 mcg total) by mouth before breakfast.    TADALAFIL (CIALIS) 20 MG TAB    Take 1 tablet (20 mg total) by mouth daily as needed (intercourse).

## 2023-12-04 ENCOUNTER — PATIENT MESSAGE (OUTPATIENT)
Dept: FAMILY MEDICINE | Facility: CLINIC | Age: 68
End: 2023-12-04
Payer: MEDICARE

## 2023-12-04 NOTE — TELEPHONE ENCOUNTER
A 2nd attempt has been made to retrieve requested documents from Albin Fernandez  via [employer]. The final contact attempt will be made in 5 business days

## 2023-12-13 ENCOUNTER — TELEPHONE (OUTPATIENT)
Dept: CARDIOLOGY | Facility: CLINIC | Age: 68
End: 2023-12-13
Payer: MEDICARE

## 2023-12-13 NOTE — TELEPHONE ENCOUNTER
----- Message from Briana Huang, Patient Care Assistant sent at 12/13/2023  9:21 AM CST -----  Contact: self  Pt is calling to give the office the clinic number to get a clearance for his colonoscopy,  Gastro  # [phone]  Thanks

## 2023-12-13 NOTE — LETTER
2023    Albin Fernandez  [address]  [facility] [address]             [facility]  [address]  [address]  [facility] [address]  Phone: [phone]  Fax: [phone] Patient: Albin Fernandez  : 1955  Type of Procedure: colonoscopy    Current Outpatient Medications   Medication Sig    apixaban (ELIQUIS) 5 mg Tab Take 1 tablet (5 mg total) by mouth 2 (two) times daily.    atorvastatin (LIPITOR) 10 MG tablet TAKE 1 TABLET (10 MG TOTAL) BY MOUTH EVERY EVENING.    co-enzyme Q-10 30 mg capsule Take 30 mg by mouth once daily.    doxycycline (VIBRA-TABS) 100 MG tablet Take 1 tablet (100 mg total) by mouth 2 (two) times daily. for 21 days    HYDROcodone-acetaminophen (NORCO)  mg per tablet Take 1 tablet by mouth 2 (two) times daily.    levothyroxine (SYNTHROID) 137 MCG Tab tablet Take 1 tablet (137 mcg total) by mouth before breakfast.    metoprolol succinate (TOPROL-XL) 50 MG 24 hr tablet TAKE 1 TABLET (50 MG TOTAL) BY MOUTH ONCE DAILY.    senna-docusate 8.6-50 mg (SENNA WITH DOCUSATE SODIUM) 8.6-50 mg per tablet Take 2 tablets by mouth 2 (two) times daily.    tamsulosin (FLOMAX) 0.4 mg Cap Take 1 capsule (0.4 mg total) by mouth once daily. Take in evening.    valACYclovir (VALTREX) 500 MG tablet Take 500 mg by mouth 3 (three) times daily.     No current facility-administered medications for this visit.     Facility-Administered Medications Ordered in Other Visits   Medication    electrolyte-S (ISOLYTE)    HYDROmorphone (PF) injection 0.2 mg    LIDOcaine (PF) 10 mg/ml (1%) injection 10 mg    LIDOcaine (PF) 10 mg/ml (1%) injection 10 mg    oxyCODONE immediate release tablet 5 mg       This patient is scheduled for colonoscopy. He is on Eliquis.    Recommendations for antiplatelet/anticoagulant medications: ok to stop Eliquis for 3 days prior.    If you have any questions regarding the above, please contact my office at [phone]  [phone].    Sincerely,

## 2023-12-14 NOTE — TELEPHONE ENCOUNTER
Albin Fernandez was scheduled to provide necessary documents to start the application process by 11/28/23 . As of today, the following documents have not been received.        Proof of household Income( such as social security statement, 1099 form, pension statement or 3 consecutive pay stubs, Copy of all Insurance cards( front and back), Printout from your Insurance or Pharmacy that shows how much you have spent on prescriptions this year, and Signed and dated HIPAA /Patient Information Forms      Please instruct the patient to reach out to Torrey Rodarte [phone] or  [email or web address] if (he/she) is still in need of assistance.

## 2023-12-19 ENCOUNTER — TELEPHONE (OUTPATIENT)
Dept: CARDIOLOGY | Facility: CLINIC | Age: 68
End: 2023-12-19
Payer: MEDICARE

## 2023-12-19 NOTE — TELEPHONE ENCOUNTER
----- Message from Afia Darling sent at 12/19/2023  9:01 AM CST -----  Type: Needs Medical Advice  Who Called:  patient  Best Call Back Number: [phone] (home)   Additional Information: patient needs a clearance to have a colonoscopy with Dr Mei's office

## 2023-12-21 NOTE — TELEPHONE ENCOUNTER
----- Message from Silvia Whitmore sent at 12/21/2023 11:58 AM CST -----   MEDICAL CLEARANCE  Type: Need Medical Advice  Who Called: Dr orin Garcia callback number: [phone]     [phone]  Additional Information: Patient will need a signed medical clearance to stop eliquis for a procedure done by Dr Anthony's office, patient can not be scheduled until clearance is returned   Please call to further assist, Thanks.

## 2023-12-22 ENCOUNTER — PATIENT MESSAGE (OUTPATIENT)
Dept: CARDIOLOGY | Facility: CLINIC | Age: 68
End: 2023-12-22
Payer: MEDICARE

## 2023-12-26 ENCOUNTER — OFFICE VISIT (OUTPATIENT)
Dept: FAMILY MEDICINE | Facility: CLINIC | Age: 68
End: 2023-12-26
Payer: MEDICARE

## 2023-12-26 VITALS
HEIGHT: 69 IN | WEIGHT: 233.69 LBS | SYSTOLIC BLOOD PRESSURE: 124 MMHG | DIASTOLIC BLOOD PRESSURE: 64 MMHG | RESPIRATION RATE: 18 BRPM | HEART RATE: 97 BPM | BODY MASS INDEX: 34.61 KG/M2 | OXYGEN SATURATION: 98 % | TEMPERATURE: 98 F

## 2023-12-26 DIAGNOSIS — G47.33 OSA ON CPAP: ICD-10-CM

## 2023-12-26 DIAGNOSIS — Z01.818 PREOP EXAMINATION: Primary | ICD-10-CM

## 2023-12-26 DIAGNOSIS — M51.36 DDD (DEGENERATIVE DISC DISEASE), LUMBAR: ICD-10-CM

## 2023-12-26 DIAGNOSIS — E78.2 MIXED HYPERLIPIDEMIA: ICD-10-CM

## 2023-12-26 DIAGNOSIS — I48.21 PERMANENT ATRIAL FIBRILLATION: ICD-10-CM

## 2023-12-26 DIAGNOSIS — Z79.01 LONG TERM (CURRENT) USE OF ANTICOAGULANTS: ICD-10-CM

## 2023-12-26 DIAGNOSIS — E03.9 HYPOTHYROIDISM, UNSPECIFIED TYPE: ICD-10-CM

## 2023-12-26 PROCEDURE — 3078F DIAST BP <80 MM HG: CPT | Mod: CPTII,S$GLB,,

## 2023-12-26 PROCEDURE — 1101F PT FALLS ASSESS-DOCD LE1/YR: CPT | Mod: CPTII,S$GLB,,

## 2023-12-26 PROCEDURE — 99999 PR PBB SHADOW E&M-EST. PATIENT-LVL IV: CPT | Mod: PBBFAC,,,

## 2023-12-26 PROCEDURE — 3078F PR MOST RECENT DIASTOLIC BLOOD PRESSURE < 80 MM HG: ICD-10-PCS | Mod: CPTII,S$GLB,,

## 2023-12-26 PROCEDURE — 3074F SYST BP LT 130 MM HG: CPT | Mod: CPTII,S$GLB,,

## 2023-12-26 PROCEDURE — 1159F PR MEDICATION LIST DOCUMENTED IN MEDICAL RECORD: ICD-10-PCS | Mod: CPTII,S$GLB,,

## 2023-12-26 PROCEDURE — 1160F PR REVIEW ALL MEDS BY PRESCRIBER/CLIN PHARMACIST DOCUMENTED: ICD-10-PCS | Mod: CPTII,S$GLB,,

## 2023-12-26 PROCEDURE — 3008F PR BODY MASS INDEX (BMI) DOCUMENTED: ICD-10-PCS | Mod: CPTII,S$GLB,,

## 2023-12-26 PROCEDURE — 1159F MED LIST DOCD IN RCRD: CPT | Mod: CPTII,S$GLB,,

## 2023-12-26 PROCEDURE — 99214 OFFICE O/P EST MOD 30 MIN: CPT | Mod: S$GLB,,,

## 2023-12-26 PROCEDURE — 99999 PR PBB SHADOW E&M-EST. PATIENT-LVL IV: ICD-10-PCS | Mod: PBBFAC,,,

## 2023-12-26 PROCEDURE — 1126F AMNT PAIN NOTED NONE PRSNT: CPT | Mod: CPTII,S$GLB,,

## 2023-12-26 PROCEDURE — 3288F PR FALLS RISK ASSESSMENT DOCUMENTED: ICD-10-PCS | Mod: CPTII,S$GLB,,

## 2023-12-26 PROCEDURE — 3044F PR MOST RECENT HEMOGLOBIN A1C LEVEL <7.0%: ICD-10-PCS | Mod: CPTII,S$GLB,,

## 2023-12-26 PROCEDURE — 1126F PR PAIN SEVERITY QUANTIFIED, NO PAIN PRESENT: ICD-10-PCS | Mod: CPTII,S$GLB,,

## 2023-12-26 PROCEDURE — 99214 PR OFFICE/OUTPT VISIT, EST, LEVL IV, 30-39 MIN: ICD-10-PCS | Mod: S$GLB,,,

## 2023-12-26 PROCEDURE — 3288F FALL RISK ASSESSMENT DOCD: CPT | Mod: CPTII,S$GLB,,

## 2023-12-26 PROCEDURE — 3044F HG A1C LEVEL LT 7.0%: CPT | Mod: CPTII,S$GLB,,

## 2023-12-26 PROCEDURE — 1101F PR PT FALLS ASSESS DOC 0-1 FALLS W/OUT INJ PAST YR: ICD-10-PCS | Mod: CPTII,S$GLB,,

## 2023-12-26 PROCEDURE — 3008F BODY MASS INDEX DOCD: CPT | Mod: CPTII,S$GLB,,

## 2023-12-26 PROCEDURE — 3074F PR MOST RECENT SYSTOLIC BLOOD PRESSURE < 130 MM HG: ICD-10-PCS | Mod: CPTII,S$GLB,,

## 2023-12-26 PROCEDURE — 1160F RVW MEDS BY RX/DR IN RCRD: CPT | Mod: CPTII,S$GLB,,

## 2023-12-26 NOTE — PATIENT INSTRUCTIONS
Attempted PAT phone call; no answer; message left to return PAT phone call.   Branden Talamantes,     If you are due for any health screening(s) below please notify me so we can arrange them to be ordered and scheduled to maintain your health. Most healthy patients complete it. Don't lose out on improving your health.     All of your core healthy metrics are met.

## 2023-12-26 NOTE — PROGRESS NOTES
Patient ID: Albin Fernandez is a 68 y.o. male.    Chief Complaint: Pre-op Exam        Denies adverse reaction to general anesthesia previously. No excessive bleeding or bruising. Reviewed meds. Instructed to hold eliquis, any NSAIDs, ASA one week prior to procedure to limit bleeding risk. No experienced CP or SOB. He could walk city blocks or flights of stairs without experiencing CP or SOB. He has been cleared by cardiology already. He currently uses a CPAP when sleeping at night.           Past Medical History:   Diagnosis Date    Arthritis     Atrial fibrillation     BPH with obstruction/lower urinary tract symptoms     Cardiomyopathy, dilated     Disorder of kidney and ureter     Hyperlipidemia     Hypertension     Hypothyroidism     Sleep apnea                 Current Outpatient Medications:     apixaban (ELIQUIS) 5 mg Tab, Take 1 tablet (5 mg total) by mouth 2 (two) times daily., Disp: 180 tablet, Rfl: 3    atorvastatin (LIPITOR) 10 MG tablet, TAKE 1 TABLET (10 MG TOTAL) BY MOUTH EVERY EVENING., Disp: 90 tablet, Rfl: 3    co-enzyme Q-10 30 mg capsule, Take 30 mg by mouth once daily., Disp: , Rfl:     HYDROcodone-acetaminophen (NORCO)  mg per tablet, Take 1 tablet by mouth 2 (two) times daily., Disp: , Rfl:     levothyroxine (SYNTHROID) 137 MCG Tab tablet, Take 1 tablet (137 mcg total) by mouth before breakfast., Disp: 90 tablet, Rfl: 3    metoprolol succinate (TOPROL-XL) 50 MG 24 hr tablet, TAKE 1 TABLET (50 MG TOTAL) BY MOUTH ONCE DAILY., Disp: 90 tablet, Rfl: 3    senna-docusate 8.6-50 mg (SENNA WITH DOCUSATE SODIUM) 8.6-50 mg per tablet, Take 2 tablets by mouth 2 (two) times daily., Disp: 120 tablet, Rfl: 5    tamsulosin (FLOMAX) 0.4 mg Cap, Take 1 capsule (0.4 mg total) by mouth once daily. Take in evening., Disp: 90 capsule, Rfl: 4    valACYclovir (VALTREX) 500 MG tablet, Take 500 mg by mouth 3 (three) times daily., Disp: , Rfl:   No current facility-administered medications for this  visit.    Facility-Administered Medications Ordered in Other Visits:     electrolyte-S (ISOLYTE), , Intravenous, Continuous, Casi Murillo MD, Last Rate: 10 mL/hr at 01/07/22 0829, New Bag at 01/07/22 1043    HYDROmorphone (PF) injection 0.2 mg, 0.2 mg, Intravenous, Q5 Min PRN, Casi Murillo MD    LIDOcaine (PF) 10 mg/ml (1%) injection 10 mg, 1 mL, Intradermal, Once PRN, Casi Murillo MD    LIDOcaine (PF) 10 mg/ml (1%) injection 10 mg, 1 mL, Intradermal, Once, Casi Murillo MD    oxyCODONE immediate release tablet 5 mg, 5 mg, Oral, Q3H PRN, Casi Murillo MD, 5 mg at 01/07/22 1110    The 10-year ASCVD risk score (Demi DK, et al., 2019) is: 15.7%    Values used to calculate the score:      Age: 68 years      Sex: Male      Is Non- : No      Diabetic: No      Tobacco smoker: No      Systolic Blood Pressure: 124 mmHg      Is BP treated: No      HDL Cholesterol: 36 mg/dL      Total Cholesterol: 143 mg/dL     Wt Readings from Last 3 Encounters:   12/26/23 106 kg (233 lb 11 oz)   11/17/23 104.3 kg (229 lb 15 oz)   09/25/23 102 kg (224 lb 13.9 oz)     Temp Readings from Last 3 Encounters:   12/26/23 97.9 °F (36.6 °C) (Oral)   09/25/23 97.9 °F (36.6 °C) (Oral)   12/29/22 97.9 °F (36.6 °C) (Oral)     BP Readings from Last 3 Encounters:   12/26/23 124/64   11/17/23 120/70   09/25/23 120/70     Pulse Readings from Last 3 Encounters:   12/26/23 97   11/17/23 86   09/25/23 90     Resp Readings from Last 3 Encounters:   12/26/23 18   09/25/23 16   01/13/23 16     PF Readings from Last 3 Encounters:   No data found for PF     SpO2 Readings from Last 3 Encounters:   12/26/23 98%   11/17/23 98%   09/25/23 98%        Lab Results   Component Value Date    HGBA1C 5.5 09/25/2023     Lab Results   Component Value Date    LDLCALC 86.8 09/25/2023    CREATININE 1.0 09/25/2023       Review of Systems   Constitutional:  Positive for activity change. Negative for appetite change,  chills, diaphoresis, fatigue, fever and unexpected weight change.   HENT:  Negative for congestion, ear pain, postnasal drip, rhinorrhea, sinus pressure, sneezing, sore throat and trouble swallowing.    Eyes:  Negative for pain, itching and visual disturbance.   Respiratory:  Negative for cough, chest tightness, shortness of breath and wheezing.    Cardiovascular:  Negative for chest pain, palpitations and leg swelling.   Gastrointestinal:  Negative for abdominal distention, abdominal pain, blood in stool, constipation, diarrhea, nausea and vomiting.   Endocrine: Negative for cold intolerance and heat intolerance.   Genitourinary:  Negative for difficulty urinating, dysuria, frequency, hematuria and urgency.   Musculoskeletal:  Positive for back pain. Negative for arthralgias, myalgias and neck pain.   Skin:  Negative for color change, pallor, rash and wound.   Neurological:  Negative for dizziness, syncope, weakness, numbness and headaches.   Hematological:  Negative for adenopathy.   Psychiatric/Behavioral:  Negative for behavioral problems. The patient is not nervous/anxious.            Objective:      Physical Exam  Vitals reviewed.   Constitutional:       General: He is not in acute distress.     Appearance: Normal appearance. He is obese. He is not ill-appearing, toxic-appearing or diaphoretic.   HENT:      Head: Normocephalic.      Right Ear: External ear normal.      Left Ear: External ear normal.      Nose: Nose normal. No congestion or rhinorrhea.      Mouth/Throat:      Mouth: Mucous membranes are moist.      Pharynx: Oropharynx is clear.   Eyes:      General: No scleral icterus.        Right eye: No discharge.         Left eye: No discharge.      Extraocular Movements: Extraocular movements intact.      Conjunctiva/sclera: Conjunctivae normal.   Cardiovascular:      Rate and Rhythm: Normal rate. Rhythm irregular.      Pulses: Normal pulses.      Heart sounds: Normal heart sounds. No murmur heard.     No  friction rub. No gallop.   Pulmonary:      Effort: Pulmonary effort is normal. No respiratory distress.      Breath sounds: Normal breath sounds. No wheezing, rhonchi or rales.   Chest:      Chest wall: No tenderness.   Musculoskeletal:         General: No swelling or deformity. Normal range of motion.      Cervical back: Normal range of motion.      Right lower leg: No edema.      Left lower leg: No edema.   Skin:     General: Skin is warm and dry.      Capillary Refill: Capillary refill takes less than 2 seconds.      Coloration: Skin is not jaundiced.      Findings: No bruising, erythema, lesion or rash.   Neurological:      Mental Status: He is alert and oriented to person, place, and time.      Gait: Gait normal.   Psychiatric:         Mood and Affect: Mood normal.         Behavior: Behavior normal.         Thought Content: Thought content normal.         Judgment: Judgment normal.             Screening recommendations appropriate to age and health status were reviewed.    STOP BANG:    Pt has AYLEEN on CPAP at home. He is high risk for AYLEEN during procedure.       Preop examination        -    Pt is medically optimized for upcoming procedure. Proceed as scheduled.     DDD (degenerative disc disease), lumbar         -    Surgery via Dr. Heck     AYLEEN on CPAP        -   Continue CPAP. High risk for AYLEEN during procedure.     Long term (current) use of anticoagulants        -    Stable. Continue meds. Will continue to monitor.     Permanent atrial fibrillation        -    Stable. Continue meds. Will continue to monitor.     Hypothyroidism, unspecified type        -    Continue meds. Will continue to monitor.     Mixed hyperlipidemia        -    Stable. Continue meds. Will continue to monitor.         RCRI risk factors include: no known RCRI risk factors. As such, per RCRI the risk of cardiac death, nonfatal myocardial infarction, or nonfatal cardiac arrest is 0.4% and the risk of myocardial infarction, pulmonary  edema, ventricular fibrillation, primary cardiac arrest, or complete heart block is 0.5%.  Overall this patient can be considered low risk for this intermediate risk procedure. No further cardiac testing is recommended at this time.     Patient denies any symptoms (as per HPI) concerning for undiagnosed lung disease including AYLEEN. Would not recommend obtaining chest X-ray, sleep study, or PFTs at this time. Patient is a non-smoker. We discussed the benefits of early mobilization and deep breathing after surgery.      Screened patient for alcohol misuse, use of illicit drugs, and personal or family history of anesthetic complications or bleeding diathesis and no substantial concerns were identified.     All current medications were reviewed and at this time no changes to medications are recommended prior to surgery. One week prior hold eliquis, ASA, NSAIDs    I recommend use of standard pre-op and post-op precautions for this patient. In my opinion, he is medically optimized for this procedure, and can proceed without further evaluation.

## 2024-01-08 ENCOUNTER — OFFICE VISIT (OUTPATIENT)
Dept: UROLOGY | Facility: CLINIC | Age: 69
End: 2024-01-08
Payer: MEDICARE

## 2024-01-08 DIAGNOSIS — N40.1 BENIGN PROSTATIC HYPERPLASIA WITH URINARY FREQUENCY: Primary | ICD-10-CM

## 2024-01-08 DIAGNOSIS — R35.0 BENIGN PROSTATIC HYPERPLASIA WITH URINARY FREQUENCY: Primary | ICD-10-CM

## 2024-01-08 LAB
BILIRUBIN, UA POC OHS: NEGATIVE
BLOOD, UA POC OHS: NEGATIVE
CLARITY, UA POC OHS: CLEAR
COLOR, UA POC OHS: YELLOW
GLUCOSE, UA POC OHS: NEGATIVE
KETONES, UA POC OHS: NEGATIVE
LEUKOCYTES, UA POC OHS: NEGATIVE
NITRITE, UA POC OHS: NEGATIVE
PH, UA POC OHS: 5.5
POC RESIDUAL URINE VOLUME: 64 ML (ref 0–100)
PROTEIN, UA POC OHS: NEGATIVE
SPECIFIC GRAVITY, UA POC OHS: 1.01
UROBILINOGEN, UA POC OHS: 0.2

## 2024-01-08 PROCEDURE — 51798 US URINE CAPACITY MEASURE: CPT | Mod: S$GLB,,, | Performed by: NURSE PRACTITIONER

## 2024-01-08 PROCEDURE — 99999 PR PBB SHADOW E&M-EST. PATIENT-LVL III: CPT | Mod: PBBFAC,,, | Performed by: NURSE PRACTITIONER

## 2024-01-08 PROCEDURE — 99214 OFFICE O/P EST MOD 30 MIN: CPT | Mod: S$GLB,,, | Performed by: NURSE PRACTITIONER

## 2024-01-08 PROCEDURE — 81003 URINALYSIS AUTO W/O SCOPE: CPT | Mod: QW,S$GLB,, | Performed by: NURSE PRACTITIONER

## 2024-01-08 RX ORDER — FINASTERIDE 5 MG/1
5 TABLET, FILM COATED ORAL DAILY
Qty: 90 TABLET | Refills: 3 | Status: SHIPPED | OUTPATIENT
Start: 2024-01-08 | End: 2024-04-07

## 2024-01-08 NOTE — PROGRESS NOTES
[facility] Note  Staff: ROXI Harden-C    PCP: JADEN Siegel  Urology:  JADEN Sharp    Chief Complaint: F/UP-LUTS    Subjective:        HPI: Albin Fernandez is a 68 y.o. male presents today for routine recheck at this time.  Pt was last evaluated by me on 23 for LUTS-urinary frequency.    History of Present Illness:  Albin Fernandez is a 68 y.o. male who presents to clinic for BPH. He is Established  to our clinic with Dr. Sharp, last office visit was 3/9/23 in which Flomax was discontinued at that time.    24 OV:  UA in office today showed normal findings.  PVR by bladder scan is 64 mL  Nocturia 2x nightly.  Pt denies gross hematuria and dysuria at this time.  Pt states today since starting the Flomax, his symptoms have improved by over 50% at this time.  He is scheduled for back surgery later this month.    AUA SS:  22 with QOL at Mixed  Feeling of ICBE: 5  Frequency:5  Intermittency:5  Urgency:2  Weak urine stream:3  Strainin  Nocturia:2    23:  Pt c/o urinary frequency x 5-6 mos.  Pt RTC today with c/o increased urinary frequency  UA today showed normal findings.  PVR by bladder scan is 64 mL  PSA Level done on 23 was 1.9 from 0.74 one year ago.  No dysuria, no gross hematuria noted by pt.  Having Back Sx in [place] on 23.     AUA SS:  34 with QOL at (not filled out)  Feeling of ICBE: 5  Frequency:5  Intermittency:5  Urgency:5  Weak urine stream:5  Strainin  Nocturia:4     3/9/23:  Has stopped Flomax.   Nocturia x 1.   Has been using his CPAP machine.   Symptoms are not bothersome during the day.      PSA 22: 0.64     PVR 64 cc     8/10/22  Stopped Flomax last week for cataract surgery. Feels like he is actually doing better without it.   Nocturia x 2-4. Now wearing a CPAP machine.   During the day he is doing pretty well. No straining. Does have frequency.   No hematuria or dysuria.    10 mg of cialis not working      PVR 78 cc      2/16/22  S/P TURP on 1/7/22. Pathology benign, 16 g.  Doing well from a urinary standpoint.   Nocturia improved to 1-2x.  No hematuria or dysuria.   No bothersome daytime complaints.      11/15/21  Previous patient of Dr. Shrestha. Here to discuss Urolift.   Hx of TURP in 2017. After this he did really well for a while and then his symptoms returned.  Had cysto/trus in Nov 2020 which showed a 22 g prostate with anterior obstructing tissue but otherwise open. He did undergo UDS which did not show ability to mount a detrusor pressure however, test was somewhat questionable as catheter may not have been all the way in the bladder. He did have a sensation to void.   Last PSA: 1.5 (8/25/20)     Has a solitary left kidney secondary to a traumatic fall when he was a child.      He is currently on Flomax 0.8 mg.   Today he is complaining of frequency, nocturia multiple times a night. He does use a CPAP machine. Stops drinking fluids after 6 pm. No sodas.   No hematuria. No dysuria.   Has a bowel movement every day, but has to take stool softner.      UA today: negative for blood, leuks, nitrite   PVR today: 84 cc     Last urine culture: no documented UTIs     Family  hx: grandfather and uncle had prostate cancer   Hx of COPD, a fib, HTN, HLD     Past medical, family, and social history reviewed as documented in chart with pertinent positive medical, family, and social history detailed in HPI.     REVIEW OF SYSTEMS:  A comprehensive 10 system review was performed and is negative except as noted above in HPI    PMHx:  Past Medical History:   Diagnosis Date    Arthritis     Atrial fibrillation     BPH with obstruction/lower urinary tract symptoms     Cardiomyopathy, dilated     Disorder of kidney and ureter     Hyperlipidemia     Hypertension     Hypothyroidism     Sleep apnea      PSHx:  Past Surgical History:   Procedure Laterality Date    ARTHROSCOPY OF ELBOW Left 08/09/2019    Procedure: ARTHROSCOPY, ELBOW;   Surgeon: Giovany Marquez II, MD;  Location: [facility] OR;  Service: Orthopedics;  Laterality: Left;    BACK SURGERY      CATARACT EXTRACTION Right     FOOT SURGERY      IRRIGATION AND DEBRIDEMENT OF UPPER EXTREMITY  08/09/2019    Procedure: IRRIGATION AND DEBRIDEMENT, UPPER EXTREMITY;  Surgeon: Giovany Marquez II, MD;  Location: [facility] OR;  Service: Orthopedics;;    KIDNEY SURGERY      one kidney removed    NEPHRECTOMY      TRANSRECTAL ULTRASOUND EXAMINATION N/A 11/23/2020    Procedure: TRANSRECTAL ULTRASOUND;  Surgeon: Roselyn Shrestha MD;  Location: [facility] OR;  Service: Urology;  Laterality: N/A;    TRANSURETHRAL RESECTION OF PROSTATE N/A 01/07/2022    Procedure: TURP (TRANSURETHRAL RESECTION OF PROSTATE);  Surgeon: Cate Sharp MD;  Location: [facility] OR;  Service: Urology;  Laterality: N/A;     Allergies:  Antihistimine and Antihistamines - alkylamine    Medications: reviewed   Objective:   There were no vitals filed for this visit.    General:WDWN in NAD  Eyes: PERRLA, normal conjunctiva  Respiratory: no increased work on breathing, clear to auscultation  Cardiovascular: regular rate and rhythm. No obvious extremity edema.  GI: palpation of masses. No tenderness. No hepatosplenomegaly to palpation.  Musculoskeletal: normal range of motion of bilateral upper extremities. Normal muscle strength and tone.  Skin: no obvious rashes or lesions. No tightening of skin noted.  Neurologic: CN grossly normal. Normal sensation.   Psychiatric: awake, alert and oriented x 3. Mood and affect normal. Cooperative.      Assessment:       1. Benign prostatic hyperplasia with urinary frequency          Plan:     Continue Flomax 0.4 mg daily at this time.  Start Finasteride 5 mg one tablet daily in the am.    Meds prescribed to pt today as trial to see if med improves pt's current LUTS.  Benefits, risks and side affects were thoroughly explained to pt today in office with all questions answered.    Repeat PSA, total and free in  six months, then f/up with Dr. Sharp in 6 mos for recheck.  If at next visit pt is still bothered by LUTS, then may further evaluate with Cysto and TRUS per MDs discretion at that time.  Pt verbalized understanding.    [facility]: Active    Anitha Chavis, FNP-C

## 2024-01-11 DIAGNOSIS — Z00.00 ENCOUNTER FOR MEDICARE ANNUAL WELLNESS EXAM: ICD-10-CM

## 2024-01-23 ENCOUNTER — LAB VISIT (OUTPATIENT)
Dept: LAB | Facility: HOSPITAL | Age: 69
End: 2024-01-23
Attending: STUDENT IN AN ORGANIZED HEALTH CARE EDUCATION/TRAINING PROGRAM
Payer: MEDICARE

## 2024-01-23 DIAGNOSIS — E03.9 HYPOTHYROIDISM, UNSPECIFIED TYPE: ICD-10-CM

## 2024-01-23 LAB
T4 FREE SERPL-MCNC: 1.01 NG/DL (ref 0.71–1.51)
TSH SERPL DL<=0.005 MIU/L-ACNC: 3.83 UIU/ML (ref 0.4–4)

## 2024-01-23 PROCEDURE — 84443 ASSAY THYROID STIM HORMONE: CPT | Performed by: STUDENT IN AN ORGANIZED HEALTH CARE EDUCATION/TRAINING PROGRAM

## 2024-01-23 PROCEDURE — 84439 ASSAY OF FREE THYROXINE: CPT | Performed by: STUDENT IN AN ORGANIZED HEALTH CARE EDUCATION/TRAINING PROGRAM

## 2024-01-23 PROCEDURE — 36415 COLL VENOUS BLD VENIPUNCTURE: CPT | Mod: PO | Performed by: STUDENT IN AN ORGANIZED HEALTH CARE EDUCATION/TRAINING PROGRAM

## 2024-02-08 ENCOUNTER — PATIENT MESSAGE (OUTPATIENT)
Dept: UROLOGY | Facility: CLINIC | Age: 69
End: 2024-02-08
Payer: MEDICARE

## 2024-02-08 ENCOUNTER — PATIENT MESSAGE (OUTPATIENT)
Dept: FAMILY MEDICINE | Facility: CLINIC | Age: 69
End: 2024-02-08
Payer: MEDICARE

## 2024-02-08 RX ORDER — VALACYCLOVIR HYDROCHLORIDE 500 MG/1
500 TABLET, FILM COATED ORAL 3 TIMES DAILY
Qty: 90 TABLET | Refills: 2 | Status: SHIPPED | OUTPATIENT
Start: 2024-02-08 | End: 2024-03-22

## 2024-02-08 RX ORDER — VALACYCLOVIR HYDROCHLORIDE 500 MG/1
500 TABLET, FILM COATED ORAL 3 TIMES DAILY
Qty: 90 TABLET | Refills: 2 | Status: CANCELLED | OUTPATIENT
Start: 2024-02-08 | End: 2024-03-09

## 2024-02-28 DIAGNOSIS — I48.0 PAROXYSMAL ATRIAL FIBRILLATION: ICD-10-CM

## 2024-02-29 RX ORDER — METOPROLOL SUCCINATE 50 MG/1
50 TABLET, EXTENDED RELEASE ORAL DAILY
Qty: 90 TABLET | Refills: 3 | Status: SHIPPED | OUTPATIENT
Start: 2024-02-29

## 2024-02-29 NOTE — TELEPHONE ENCOUNTER
No care due was identified.  [facility] Embedded Care Due Messages. Reference number: [id number].   2/28/2024 7:09:51 PM CST

## 2024-03-04 ENCOUNTER — PATIENT MESSAGE (OUTPATIENT)
Dept: ADMINISTRATIVE | Facility: CLINIC | Age: 69
End: 2024-03-04
Payer: MEDICARE

## 2024-03-22 ENCOUNTER — OFFICE VISIT (OUTPATIENT)
Dept: FAMILY MEDICINE | Facility: CLINIC | Age: 69
End: 2024-03-22
Payer: MEDICARE

## 2024-03-22 VITALS
SYSTOLIC BLOOD PRESSURE: 132 MMHG | DIASTOLIC BLOOD PRESSURE: 70 MMHG | HEIGHT: 69 IN | BODY MASS INDEX: 33.99 KG/M2 | WEIGHT: 229.5 LBS | OXYGEN SATURATION: 98 % | TEMPERATURE: 98 F | HEART RATE: 89 BPM | RESPIRATION RATE: 16 BRPM

## 2024-03-22 DIAGNOSIS — I70.0 ATHEROSCLEROSIS OF AORTA: ICD-10-CM

## 2024-03-22 DIAGNOSIS — I48.21 PERMANENT ATRIAL FIBRILLATION: ICD-10-CM

## 2024-03-22 DIAGNOSIS — J01.40 ACUTE NON-RECURRENT PANSINUSITIS: Primary | ICD-10-CM

## 2024-03-22 PROCEDURE — 99214 OFFICE O/P EST MOD 30 MIN: CPT | Mod: S$GLB,,,

## 2024-03-22 PROCEDURE — 99999 PR PBB SHADOW E&M-EST. PATIENT-LVL V: CPT | Mod: PBBFAC,,,

## 2024-03-22 RX ORDER — FLUTICASONE PROPIONATE 50 MCG
1 SPRAY, SUSPENSION (ML) NASAL DAILY
Qty: 11.1 ML | Refills: 0 | Status: SHIPPED | OUTPATIENT
Start: 2024-03-22

## 2024-03-22 RX ORDER — PANTOPRAZOLE SODIUM 40 MG/1
40 TABLET, DELAYED RELEASE ORAL EVERY MORNING
COMMUNITY
Start: 2024-03-04

## 2024-03-22 RX ORDER — DOXYCYCLINE 100 MG/1
100 CAPSULE ORAL 2 TIMES DAILY
Qty: 20 CAPSULE | Refills: 0 | Status: SHIPPED | OUTPATIENT
Start: 2024-03-22 | End: 2024-04-02 | Stop reason: ALTCHOICE

## 2024-03-22 RX ORDER — PROMETHAZINE HYDROCHLORIDE AND DEXTROMETHORPHAN HYDROBROMIDE 6.25; 15 MG/5ML; MG/5ML
5 SYRUP ORAL EVERY 4 HOURS PRN
Qty: 180 ML | Refills: 0 | Status: SHIPPED | OUTPATIENT
Start: 2024-03-22 | End: 2024-04-01

## 2024-03-22 RX ORDER — BENZONATATE 100 MG/1
100 CAPSULE ORAL 3 TIMES DAILY PRN
Qty: 30 CAPSULE | Refills: 0 | Status: SHIPPED | OUTPATIENT
Start: 2024-03-22 | End: 2024-04-01

## 2024-03-22 NOTE — PATIENT INSTRUCTIONS
Thank you for allowing me to be part of your healthcare team at [facility]. It is a pleasure to care for you today.   Please take all of your medications as instructed and follow all new instructions from your visit today.  If you received labs or medical tests today you should hear information about results or scheduling either by phone or mychart within approximately a week.   If you have any questions or concerns please do not hesitate to call. Have a blessed day and I hope to see you again soon.  ROXI Rubio,     If you are due for any health screening(s) below please notify me so we can arrange them to be ordered and scheduled. Most healthy patients at your age complete them, but you are free to accept or refuse.     If you can't do it, I'll definitely understand. If you can, I'd certainly appreciate it!    Tests to Keep You Healthy    Colon Cancer Screening: DUE      Its time for your colon cancer screening     Colorectal cancer is one of the leading causes of cancer death for men and women but it doesnt have to be. Screenings can prevent colorectal cancer or find it early enough to treat and cure the disease.     Our records indicate that you may be overdue for colon cancer screening. A colonoscopy or stool screening test can help identify patients at risk for developing colon cancer. Cancer screenings save lives, so schedule yours today to stay healthy.     A colonoscopy is the preferred test for detecting colon cancer. It is needed only once every 10 years if results are negative. While you are sedated, a flexible, lighted tube with a tiny camera is inserted into the rectum and advanced through the colon to look for cancers.     An alternative screening test that is used at home and returned to the lab may also be used. It detects hidden blood in bowel movements which could indicate cancer in the colon. If results are positive, you will need a colonoscopy to determine if the blood is a  sign of cancer. This type of follow up (diagnostic) colonoscopy usually requires additional copays as required by your insurance provider.     If you recently had your colon cancer screening performed outside of [facility], please let your Health care team know so that they can update your health record. Please contact your PCP if you have any questions.

## 2024-03-22 NOTE — PROGRESS NOTES
Subjective:       Patient ID: Albin Fernandez is a 68 y.o. male.    Chief Complaint: Cough    Patient presents to the clinic with complaint of cough and congestion.     Symptoms started 1 week ago with cough, congestion, sinus pressure, and post nasal drip.   Has taken OTC cough suppressant without relief.     Cough  This is a new problem. The current episode started in the past 7 days. The problem has been gradually worsening. The problem occurs every few minutes. The cough is Productive of sputum. Associated symptoms include headaches, nasal congestion, postnasal drip and a sore throat. Pertinent negatives include no chest pain, chills, ear pain, eye redness, fever, rash, shortness of breath or wheezing. Nothing aggravates the symptoms. He has tried OTC cough suppressant for the symptoms. The treatment provided moderate relief.     Review of Systems   Constitutional:  Negative for activity change, appetite change, chills, diaphoresis and fever.   HENT:  Positive for congestion, postnasal drip, sinus pressure, sinus pain and sore throat. Negative for ear pain and sneezing.    Eyes:  Negative for pain, discharge, redness and itching.   Respiratory:  Positive for cough. Negative for apnea, chest tightness, shortness of breath and wheezing.    Cardiovascular:  Negative for chest pain and leg swelling.   Gastrointestinal:  Negative for abdominal distention, abdominal pain, constipation, diarrhea, nausea and vomiting.   Genitourinary:  Negative for difficulty urinating, dysuria, flank pain and frequency.   Skin:  Negative for color change, rash and wound.   Neurological:  Positive for headaches. Negative for dizziness.   All other systems reviewed and are negative.      Patient Active Problem List   Diagnosis    Atrial fibrillation    BPH w urinary obs/LUTS    Hypothyroidism    Hyperlipidemia    Sciatica of right side    Long term (current) use of anticoagulants    Closed displaced fracture of coronoid process of left  ulna with routine healing    Atherosclerosis of aorta    Benign prostatic hyperplasia with lower urinary tract symptoms    Benign prostatic hyperplasia with urinary frequency    Permanent atrial fibrillation    Absent kidney    DDD (degenerative disc disease), lumbar       Objective:      Physical Exam  Vitals and nursing note reviewed.   Constitutional:       Appearance: Normal appearance. He is not ill-appearing.   HENT:      Head: Normocephalic and atraumatic.      Nose: Nose normal.   Eyes:      General: Lids are normal.   Cardiovascular:      Rate and Rhythm: Normal rate and regular rhythm.      Pulses: Normal pulses.      Heart sounds: Normal heart sounds.   Pulmonary:      Effort: Pulmonary effort is normal. No tachypnea or respiratory distress.      Breath sounds: Normal breath sounds. No wheezing.   Abdominal:      General: Bowel sounds are normal. There is no distension.      Palpations: Abdomen is soft.      Tenderness: There is no abdominal tenderness.   Musculoskeletal:         General: Normal range of motion.      Cervical back: Full passive range of motion without pain and normal range of motion.      Left lower leg: No edema.   Skin:     General: Skin is warm and dry.   Neurological:      Mental Status: He is alert and oriented to person, place, and time.   Psychiatric:         Mood and Affect: Mood normal.         Behavior: Behavior normal.         Lab Results   Component Value Date    WBC 4.69 09/25/2023    HGB 13.8 (L) 09/25/2023    HCT 39.7 (L) 09/25/2023     09/25/2023    CHOL 143 09/25/2023    TRIG 101 09/25/2023    HDL 36 (L) 09/25/2023    ALT 26 09/25/2023    AST 25 09/25/2023     09/25/2023    K 4.0 09/25/2023     09/25/2023    CREATININE 1.0 09/25/2023    BUN 18 09/25/2023    CO2 26 09/25/2023    TSH 3.835 01/23/2024    PSA 1.9 09/25/2023    INR 1.3 05/02/2022    HGBA1C 5.5 09/25/2023     The 10-year ASCVD risk score (Demi LOZANO, et al., 2019) is: 17.3%    Values used to  "calculate the score:      Age: 68 years      Sex: Male      Is Non- : No      Diabetic: No      Tobacco smoker: No      Systolic Blood Pressure: 132 mmHg      Is BP treated: No      HDL Cholesterol: 36 mg/dL      Total Cholesterol: 143 mg/dL  Visit Vitals  /70 (BP Location: Right arm, Patient Position: Sitting, BP Method: Small (Manual))   Pulse 89   Temp 98.2 °F (36.8 °C) (Oral)   Resp 16   Ht 5' 9" (1.753 m)   Wt 104.1 kg (229 lb 8 oz)   SpO2 98%   BMI 33.89 kg/m²      Assessment:       1. Acute non-recurrent pansinusitis    2. Permanent atrial fibrillation    3. Atherosclerosis of aorta        Plan:       1. Acute non-recurrent pansinusitis  -     doxycycline (VIBRAMYCIN) 100 MG Cap; Take 1 capsule (100 mg total) by mouth 2 (two) times daily. for 10 days  Dispense: 20 capsule; Refill: 0  -     promethazine-dextromethorphan (PROMETHAZINE-DM) 6.25-15 mg/5 mL Syrp; Take 5 mLs by mouth every 4 (four) hours as needed (cough).  Dispense: 180 mL; Refill: 0  -     fluticasone propionate (FLONASE) 50 mcg/actuation nasal spray; 1 spray (50 mcg total) by Each Nostril route once daily.  Dispense: 11.1 mL; Refill: 0  -     benzonatate (TESSALON) 100 MG capsule; Take 1 capsule (100 mg total) by mouth 3 (three) times daily as needed for Cough.  Dispense: 30 capsule; Refill: 0   - The diagnosis, treatment plan, instructions for follow-up and reevaluation as well as ED precautions were discussed and understanding was verbalized. All questions or concerns have been addressed.     2. Permanent atrial fibrillation/Atherosclerosis of aorta   - Stable-Continue Eliquis, Toprol, Lipitor   - Continue current plan of care   - Follow up with Cardiology           Follow up if symptoms worsen or fail to improve.      Future Appointments       Date Provider Specialty Appt Notes    7/8/2024 Cate Sharp MD Urology 6n f/u    9/26/2024 Jered Siegel MD Family Medicine annual             "

## 2024-04-02 ENCOUNTER — OFFICE VISIT (OUTPATIENT)
Dept: FAMILY MEDICINE | Facility: CLINIC | Age: 69
End: 2024-04-02
Payer: MEDICARE

## 2024-04-02 VITALS
OXYGEN SATURATION: 98 % | TEMPERATURE: 98 F | HEART RATE: 90 BPM | WEIGHT: 230.19 LBS | BODY MASS INDEX: 34.09 KG/M2 | DIASTOLIC BLOOD PRESSURE: 68 MMHG | RESPIRATION RATE: 17 BRPM | HEIGHT: 69 IN | SYSTOLIC BLOOD PRESSURE: 118 MMHG

## 2024-04-02 DIAGNOSIS — I70.0 ATHEROSCLEROSIS OF AORTA: ICD-10-CM

## 2024-04-02 DIAGNOSIS — I48.21 PERMANENT ATRIAL FIBRILLATION: ICD-10-CM

## 2024-04-02 DIAGNOSIS — R09.82 PND (POST-NASAL DRIP): Primary | ICD-10-CM

## 2024-04-02 DIAGNOSIS — R05.1 ACUTE COUGH: ICD-10-CM

## 2024-04-02 PROCEDURE — 1160F RVW MEDS BY RX/DR IN RCRD: CPT | Mod: CPTII,S$GLB,,

## 2024-04-02 PROCEDURE — 3288F FALL RISK ASSESSMENT DOCD: CPT | Mod: CPTII,S$GLB,,

## 2024-04-02 PROCEDURE — 3074F SYST BP LT 130 MM HG: CPT | Mod: CPTII,S$GLB,,

## 2024-04-02 PROCEDURE — 3078F DIAST BP <80 MM HG: CPT | Mod: CPTII,S$GLB,,

## 2024-04-02 PROCEDURE — 99214 OFFICE O/P EST MOD 30 MIN: CPT | Mod: S$GLB,,,

## 2024-04-02 PROCEDURE — 99999 PR PBB SHADOW E&M-EST. PATIENT-LVL V: CPT | Mod: PBBFAC,,,

## 2024-04-02 PROCEDURE — 3008F BODY MASS INDEX DOCD: CPT | Mod: CPTII,S$GLB,,

## 2024-04-02 PROCEDURE — 1159F MED LIST DOCD IN RCRD: CPT | Mod: CPTII,S$GLB,,

## 2024-04-02 PROCEDURE — 1101F PT FALLS ASSESS-DOCD LE1/YR: CPT | Mod: CPTII,S$GLB,,

## 2024-04-02 PROCEDURE — 1126F AMNT PAIN NOTED NONE PRSNT: CPT | Mod: CPTII,S$GLB,,

## 2024-04-02 RX ORDER — AZELASTINE 1 MG/ML
1 SPRAY, METERED NASAL 2 TIMES DAILY PRN
Qty: 30 ML | Refills: 2 | Status: SHIPPED | OUTPATIENT
Start: 2024-04-02 | End: 2025-04-02

## 2024-04-02 RX ORDER — PROMETHAZINE HYDROCHLORIDE AND DEXTROMETHORPHAN HYDROBROMIDE 6.25; 15 MG/5ML; MG/5ML
5 SYRUP ORAL NIGHTLY PRN
Qty: 118 ML | Refills: 0 | Status: SHIPPED | OUTPATIENT
Start: 2024-04-02 | End: 2024-04-12

## 2024-04-02 RX ORDER — BENZONATATE 100 MG/1
100 CAPSULE ORAL 3 TIMES DAILY PRN
Qty: 30 CAPSULE | Refills: 0 | Status: SHIPPED | OUTPATIENT
Start: 2024-04-02 | End: 2024-04-12

## 2024-04-02 RX ORDER — METHYLPREDNISOLONE 4 MG/1
TABLET ORAL
Qty: 21 EACH | Refills: 0 | Status: SHIPPED | OUTPATIENT
Start: 2024-04-02 | End: 2024-04-23

## 2024-04-02 NOTE — PROGRESS NOTES
Subjective:       Patient ID: Albin Fernandez is a 68 y.o. male.    Chief Complaint: Cough    Cough  This is a recurrent problem. The current episode started 1 to 4 weeks ago. The problem has been waxing and waning. The problem occurs hourly. The cough is Productive of sputum. Associated symptoms include nasal congestion, postnasal drip and rhinorrhea. Pertinent negatives include no chest pain, chills, ear congestion, ear pain, fever, headaches, heartburn, hemoptysis, myalgias, rash, sore throat, shortness of breath, sweats, weight loss or wheezing. Nothing aggravates the symptoms. He has tried OTC cough suppressant and prescription cough suppressant for the symptoms. The treatment provided mild relief. His past medical history is significant for bronchitis and COPD. There is no history of asthma, bronchiectasis, emphysema, environmental allergies or pneumonia.       Past Medical History:   Diagnosis Date    Arthritis     Atrial fibrillation     BPH with obstruction/lower urinary tract symptoms     Cardiomyopathy, dilated     Disorder of kidney and ureter     Hyperlipidemia     Hypertension     Hypothyroidism     Sleep apnea        Review of patient's allergies indicates:   Allergen Reactions    Antihistimine Other (See Comments)    Antihistamines - alkylamine Other (See Comments)     Urinary retention         Current Outpatient Medications:     apixaban (ELIQUIS) 5 mg Tab, Take 1 tablet (5 mg total) by mouth 2 (two) times daily., Disp: 180 tablet, Rfl: 3    atorvastatin (LIPITOR) 10 MG tablet, TAKE 1 TABLET (10 MG TOTAL) BY MOUTH EVERY EVENING., Disp: 90 tablet, Rfl: 3    co-enzyme Q-10 30 mg capsule, Take 30 mg by mouth once daily., Disp: , Rfl:     finasteride (PROSCAR) 5 mg tablet, Take 1 tablet (5 mg total) by mouth once daily., Disp: 90 tablet, Rfl: 3    fluticasone propionate (FLONASE) 50 mcg/actuation nasal spray, 1 spray (50 mcg total) by Each Nostril route once daily., Disp: 11.1 mL, Rfl: 0     HYDROcodone-acetaminophen (NORCO)  mg per tablet, Take 1 tablet by mouth 2 (two) times daily., Disp: , Rfl:     levothyroxine (SYNTHROID) 137 MCG Tab tablet, Take 1 tablet (137 mcg total) by mouth before breakfast., Disp: 90 tablet, Rfl: 3    metoprolol succinate (TOPROL-XL) 50 MG 24 hr tablet, Take 1 tablet (50 mg total) by mouth once daily., Disp: 90 tablet, Rfl: 3    pantoprazole (PROTONIX) 40 MG tablet, Take 40 mg by mouth every morning., Disp: , Rfl:     senna-docusate 8.6-50 mg (SENNA WITH DOCUSATE SODIUM) 8.6-50 mg per tablet, Take 2 tablets by mouth 2 (two) times daily., Disp: 120 tablet, Rfl: 5    azelastine (ASTELIN) 137 mcg (0.1 %) nasal spray, 1 spray (137 mcg total) by Nasal route 2 (two) times daily as needed for Rhinitis (sinus congestion, post nasal drip)., Disp: 30 mL, Rfl: 2    benzonatate (TESSALON) 100 MG capsule, Take 1 capsule (100 mg total) by mouth 3 (three) times daily as needed for Cough., Disp: 30 capsule, Rfl: 0    methylPREDNISolone (MEDROL DOSEPACK) 4 mg tablet, use as directed, Disp: 21 each, Rfl: 0    promethazine-dextromethorphan (PROMETHAZINE-DM) 6.25-15 mg/5 mL Syrp, Take 5 mLs by mouth nightly as needed (cough)., Disp: 118 mL, Rfl: 0    tamsulosin (FLOMAX) 0.4 mg Cap, Take 1 capsule (0.4 mg total) by mouth once daily. Take in evening., Disp: 90 capsule, Rfl: 4    valACYclovir (VALTREX) 500 MG tablet, Take 1 tablet (500 mg total) by mouth 3 (three) times daily., Disp: 90 tablet, Rfl: 2  No current facility-administered medications for this visit.    Facility-Administered Medications Ordered in Other Visits:     electrolyte-S (ISOLYTE), , Intravenous, Continuous, Casi Murillo MD, Last Rate: 10 mL/hr at 01/07/22 0829, New Bag at 01/07/22 1043    HYDROmorphone (PF) injection 0.2 mg, 0.2 mg, Intravenous, Q5 Min PRN, Casi Murillo MD    LIDOcaine (PF) 10 mg/ml (1%) injection 10 mg, 1 mL, Intradermal, Once PRN, Casi Murillo MD    LIDOcaine (PF) 10 mg/ml  "(1%) injection 10 mg, 1 mL, Intradermal, Once, Casi Murillo MD    oxyCODONE immediate release tablet 5 mg, 5 mg, Oral, Q3H PRN, Casi Murillo MD, 5 mg at 01/07/22 1110    Review of Systems   Constitutional:  Negative for chills, fever and weight loss.   HENT:  Positive for postnasal drip and rhinorrhea. Negative for ear pain and sore throat.    Respiratory:  Positive for cough. Negative for hemoptysis, shortness of breath and wheezing.    Cardiovascular:  Negative for chest pain.   Gastrointestinal:  Negative for heartburn.   Musculoskeletal:  Negative for myalgias.   Skin:  Negative for rash.   Allergic/Immunologic: Negative for environmental allergies.   Neurological:  Negative for headaches.       Objective:      /68 (BP Location: Right arm, Patient Position: Sitting, BP Method: Large (Manual))   Pulse 90   Temp 98.1 °F (36.7 °C) (Oral)   Resp 17   Ht 5' 9" (1.753 m)   Wt 104.4 kg (230 lb 2.6 oz)   SpO2 98%   BMI 33.99 kg/m²   Physical Exam  Vitals reviewed.   Constitutional:       General: He is not in acute distress.     Appearance: Normal appearance. He is obese. He is not ill-appearing, toxic-appearing or diaphoretic.   HENT:      Head: Normocephalic.      Right Ear: External ear normal.      Left Ear: External ear normal.      Nose: Nose normal. No congestion or rhinorrhea.      Mouth/Throat:      Mouth: Mucous membranes are moist.      Pharynx: Oropharynx is clear.   Eyes:      General: No scleral icterus.        Right eye: No discharge.         Left eye: No discharge.      Extraocular Movements: Extraocular movements intact.      Conjunctiva/sclera: Conjunctivae normal.   Cardiovascular:      Rate and Rhythm: Normal rate and regular rhythm.      Pulses: Normal pulses.      Heart sounds: Normal heart sounds. No murmur heard.     No friction rub. No gallop.   Pulmonary:      Effort: Pulmonary effort is normal. No respiratory distress.      Breath sounds: Normal breath sounds. No " wheezing, rhonchi or rales.      Comments: Cough present   Chest:      Chest wall: No tenderness.   Musculoskeletal:         General: No swelling, tenderness or deformity. Normal range of motion.      Cervical back: Normal range of motion.      Right lower leg: No edema.      Left lower leg: No edema.   Skin:     General: Skin is warm and dry.      Capillary Refill: Capillary refill takes less than 2 seconds.      Coloration: Skin is not jaundiced.      Findings: No bruising, erythema, lesion or rash.   Neurological:      Mental Status: He is alert and oriented to person, place, and time.      Gait: Gait normal.   Psychiatric:         Mood and Affect: Mood normal.         Behavior: Behavior normal.         Thought Content: Thought content normal.         Judgment: Judgment normal.         Assessment:       1. PND (post-nasal drip)    2. Acute cough    3. Permanent atrial fibrillation    4. Atherosclerosis of aorta        Plan:       PND (post-nasal drip)  -     azelastine (ASTELIN) 137 mcg (0.1 %) nasal spray; 1 spray (137 mcg total) by Nasal route 2 (two) times daily as needed for Rhinitis (sinus congestion, post nasal drip).  Dispense: 30 mL; Refill: 2  -     methylPREDNISolone (MEDROL DOSEPACK) 4 mg tablet; use as directed  Dispense: 21 each; Refill: 0    Acute cough  -     methylPREDNISolone (MEDROL DOSEPACK) 4 mg tablet; use as directed  Dispense: 21 each; Refill: 0  -     promethazine-dextromethorphan (PROMETHAZINE-DM) 6.25-15 mg/5 mL Syrp; Take 5 mLs by mouth nightly as needed (cough).  Dispense: 118 mL; Refill: 0  -     benzonatate (TESSALON) 100 MG capsule; Take 1 capsule (100 mg total) by mouth 3 (three) times daily as needed for Cough.  Dispense: 30 capsule; Refill: 0    Permanent atrial fibrillation        -    Stable. Continue meds. Will continue to monitor.     Atherosclerosis of aorta        -    Stable. Continue meds. Will continue to monitor.                  Orlando Astorga PA-C  Family Medicine  Physician Assistant       Future Appointments       Date Provider Specialty Appt Notes    7/8/2024 Cate Sharp MD Urology 6n f/u    9/30/2024 Jered Siegel MD Family Medicine annual               I spent a total of 20 minutes on the day of the visit.This includes face to face time and non-face to face time preparing to see the patient (eg, review of tests), obtaining and/or reviewing separately obtained history, documenting clinical information in the electronic or other health record, independently interpreting results and communicating results to the patient/family/caregiver, or care coordinator.      We have addressed [4] Moderate: 1 or more chronic illnesses with exacerbation, progression, or side effects of treatment / 2 or more stable chronic illnesses / 1 undiagnosed new problem with uncertain prognosis / 1 acute illness with systemic symptoms / 1 acute complicated injury  The complexity of the data reviewed and analyzed for this visit was [2] Minimal or None  The risk of complications and/or morbidity or mortality are [4] Moderate risk (I.e. prescription drug management / decision regarding minor surgery with identified pt or procedure risk factors / decision regarding elective major surgery without identified pt or procedure risk factors / diagnosis or treatment significantly limited by social determinants of health)   The level of Medical Decision Making for this visit is [4] Moderate

## 2024-04-09 NOTE — H&P
[facility]    PCP: West Chang MD    Chief Complaint: BPH     SUBJECTIVE:       History of Present Illness:  Albin Fernandez is a 66 y.o. male who presents to clinic for BPH. He is Established  to our clinic.     Previous patient of Dr. Shrestha. Here to discuss Urol[facility].   Hx of TURP in 2017. After this he did really well for a while and then his symptoms returned.  Had cysto/trus in Nov 2020 which showed a 22 g prostate with anterior obstructing tissue but otherwise open. He did undergo UDS which did not show ability to mount a detrusor pressure however, test was somewhat questionable as catheter may not have been all the way in the bladder. He did have a sensation to void.   Last PSA: 1.5 (8/25/20)    Has a solitary left kidney secondary to a traumatic fall when he was a child.     He is currently on Flomax 0.8 mg.   Today he is complaining of frequency, nocturia multiple times a night. He does use a CPAP machine. Stops drinking fluids after 6 pm. No sodas.   No hematuria. No dysuria.   Has a bowel movement every day, but has to take stool softner.     UA today: negative for blood, leuks, nitrite   PVR today: 84 cc    Last urine culture: no documented UTIs    Lab Results   Component Value Date    CREATININE 1.1 01/07/2022      Family  hx: grandfather and uncle had prostate cancer   Hx of COPD, a fib, HTN, HLD    Past medical, family, and social history reviewed as documented in chart with pertinent positive medical, family, and social history detailed in HPI.    Review of patient's allergies indicates:   Allergen Reactions    Antihistamines - alkylamine Other (See Comments)     Urinary retention       Past Medical History:   Diagnosis Date    Arthritis     Atrial fibrillation     BPH with obstruction/lower urinary tract symptoms     Cardiomyopathy, dilated     Disorder of kidney and ureter     Hyperlipidemia     Hypertension     Hypothyroidism     Sleep apnea      Past  Dr Gracia extended patient's antibiotics another week, ending on 4/15. She would like to know if this will interfere with scheduled chemo on 4/16   "Surgical History:   Procedure Laterality Date    ARTHROSCOPY OF ELBOW Left 8/9/2019    Procedure: ARTHROSCOPY, ELBOW;  Surgeon: Giovany Marquez II, MD;  Location: [facility] OR;  Service: Orthopedics;  Laterality: Left;    BACK SURGERY      FOOT SURGERY      IRRIGATION AND DEBRIDEMENT OF UPPER EXTREMITY  8/9/2019    Procedure: IRRIGATION AND DEBRIDEMENT, UPPER EXTREMITY;  Surgeon: Giovany Marquez II, MD;  Location: [facility] OR;  Service: Orthopedics;;    KIDNEY SURGERY      one kidney removed    NEPHRECTOMY      TRANSRECTAL ULTRASOUND EXAMINATION N/A 11/23/2020    Procedure: TRANSRECTAL ULTRASOUND;  Surgeon: Roselyn Shrestha MD;  Location: [facility] OR;  Service: Urology;  Laterality: N/A;     Family History   Problem Relation Age of Onset    Cirrhosis Father     Pancreatic cancer Mother     Breast cancer Sister      Social History     Tobacco Use    Smoking status: Never Smoker    Smokeless tobacco: Never Used   Substance Use Topics    Alcohol use: No    Drug use: No        Review of Systems   Genitourinary: Positive for difficulty urinating, frequency, nocturia and urgency. Negative for dysuria and hematuria.       OBJECTIVE:     Anticoagulation: Coumadin 2 mg     Estimated body mass index is 33.97 kg/m² as calculated from the following:    Height as of this encounter: 5' 9" (1.753 m).    Weight as of this encounter: 104.3 kg (230 lb).    Vital Signs (Most Recent)  Temp: 97.7 °F (36.5 °C) (01/07/22 0803)  Pulse: 83 (01/07/22 0803)  Resp: 16 (01/07/22 0803)  BP: (!) 158/90 (01/07/22 0804)  SpO2: 97 % (01/07/22 0803)    Physical Exam  Constitutional:       General: He is not in acute distress.     Appearance: Normal appearance. He is not ill-appearing.   HENT:      Head: Normocephalic and atraumatic.   Eyes:      General: No scleral icterus.  Cardiovascular:      Rate and Rhythm: Normal rate and regular rhythm.   Pulmonary:      Effort: Pulmonary effort is normal. No respiratory distress.   Abdominal:      " General: There is no distension.   Skin:     Coloration: Skin is not jaundiced.   Neurological:      General: No focal deficit present.      Mental Status: He is alert and oriented to person, place, and time.   Psychiatric:         Mood and Affect: Mood normal.         Behavior: Behavior normal.         Thought Content: Thought content normal.         BMP  Lab Results   Component Value Date     01/07/2022    K 3.9 01/07/2022     01/07/2022    CO2 29 01/07/2022    BUN 13 01/07/2022    CREATININE 1.1 01/07/2022    CALCIUM 9.5 01/07/2022    ANIONGAP 8 01/07/2022    ESTGFRAFRICA >60 01/07/2022    EGFRNONAA >60 01/07/2022       Lab Results   Component Value Date    WBC 6.06 01/07/2022    HGB 15.3 01/07/2022    HCT 45.8 01/07/2022    MCV 95 01/07/2022     01/07/2022       Lab Results   Component Value Date    PSADIAG 1.6 11/23/2021    PSATOTAL 1.5 08/25/2020    PSATOTAL 1.3 07/01/2019    PSAFREE 0.38 08/25/2020    PSAFREE 0.36 07/01/2019     Imaging:  No pertinent recent imaging available.    ASSESSMENT     1. BPH w urinary obs/LUTS    2. BPH with obstruction/lower urinary tract symptoms    3. Preop examination        PLAN:     - To OR today for TURP  - I have explained the indication, risks, benefits, and alternatives of the procedure in detail. The patient voices understanding and all questions have been answered. The patient agrees to proceed as planned with bipolar TURP. Risks including but not limited to bleeding infection, injury to urethra, bladder, ureters, and rectum, possible bladder neck contracture, clot retention, need for additional procedure, erectile dysfunction, retrograde ejaculation, and urinary incontinence.      Cate Sharp MD

## 2024-05-16 ENCOUNTER — TELEPHONE (OUTPATIENT)
Dept: FAMILY MEDICINE | Facility: CLINIC | Age: 69
End: 2024-05-16
Payer: MEDICARE

## 2024-05-16 DIAGNOSIS — E78.2 MIXED HYPERLIPIDEMIA: Primary | ICD-10-CM

## 2024-05-16 NOTE — TELEPHONE ENCOUNTER
----- Message from Jered Siegel MD sent at 5/16/2024  6:54 AM CDT -----  Regarding: cholesterol lvl  Please let the patient know that I reviewed his recent cholesterol all levels and his LDL cholesterol is above 70 and I will recommend higher dose cholesterol and if you been to this can send it in if he wants to discuss can do a phone today

## 2024-05-17 RX ORDER — ATORVASTATIN CALCIUM 10 MG/1
10 TABLET, FILM COATED ORAL NIGHTLY
Qty: 90 TABLET | Refills: 3 | Status: SHIPPED | OUTPATIENT
Start: 2024-05-17 | End: 2025-05-17

## 2024-05-17 NOTE — TELEPHONE ENCOUNTER
I spoke to pt's wife and she believes the portal is best to discuss the cholesterol medication because pt is [facility].

## 2024-05-28 ENCOUNTER — OFFICE VISIT (OUTPATIENT)
Dept: FAMILY MEDICINE | Facility: CLINIC | Age: 69
End: 2024-05-28
Payer: MEDICARE

## 2024-05-28 VITALS
TEMPERATURE: 98 F | SYSTOLIC BLOOD PRESSURE: 130 MMHG | OXYGEN SATURATION: 96 % | WEIGHT: 230.63 LBS | RESPIRATION RATE: 16 BRPM | HEART RATE: 92 BPM | HEIGHT: 69 IN | BODY MASS INDEX: 34.16 KG/M2 | DIASTOLIC BLOOD PRESSURE: 60 MMHG

## 2024-05-28 DIAGNOSIS — R09.81 SINUS CONGESTION: Primary | ICD-10-CM

## 2024-05-28 DIAGNOSIS — E78.2 MIXED HYPERLIPIDEMIA: ICD-10-CM

## 2024-05-28 DIAGNOSIS — I48.21 PERMANENT ATRIAL FIBRILLATION: ICD-10-CM

## 2024-05-28 DIAGNOSIS — R09.82 PND (POST-NASAL DRIP): ICD-10-CM

## 2024-05-28 DIAGNOSIS — R05.1 ACUTE COUGH: ICD-10-CM

## 2024-05-28 PROCEDURE — 1159F MED LIST DOCD IN RCRD: CPT | Mod: CPTII,S$GLB,,

## 2024-05-28 PROCEDURE — 3008F BODY MASS INDEX DOCD: CPT | Mod: CPTII,S$GLB,,

## 2024-05-28 PROCEDURE — 3075F SYST BP GE 130 - 139MM HG: CPT | Mod: CPTII,S$GLB,,

## 2024-05-28 PROCEDURE — 1101F PT FALLS ASSESS-DOCD LE1/YR: CPT | Mod: CPTII,S$GLB,,

## 2024-05-28 PROCEDURE — 99999 PR PBB SHADOW E&M-EST. PATIENT-LVL V: CPT | Mod: PBBFAC,,,

## 2024-05-28 PROCEDURE — 3288F FALL RISK ASSESSMENT DOCD: CPT | Mod: CPTII,S$GLB,,

## 2024-05-28 PROCEDURE — 3078F DIAST BP <80 MM HG: CPT | Mod: CPTII,S$GLB,,

## 2024-05-28 PROCEDURE — 99214 OFFICE O/P EST MOD 30 MIN: CPT | Mod: S$GLB,,,

## 2024-05-28 PROCEDURE — 1125F AMNT PAIN NOTED PAIN PRSNT: CPT | Mod: CPTII,S$GLB,,

## 2024-05-28 PROCEDURE — 1160F RVW MEDS BY RX/DR IN RCRD: CPT | Mod: CPTII,S$GLB,,

## 2024-05-28 RX ORDER — PROMETHAZINE HYDROCHLORIDE AND DEXTROMETHORPHAN HYDROBROMIDE 6.25; 15 MG/5ML; MG/5ML
5 SYRUP ORAL NIGHTLY PRN
Qty: 118 ML | Refills: 0 | Status: SHIPPED | OUTPATIENT
Start: 2024-05-28 | End: 2024-06-03

## 2024-05-28 RX ORDER — AMOXICILLIN AND CLAVULANATE POTASSIUM 875; 125 MG/1; MG/1
1 TABLET, FILM COATED ORAL EVERY 12 HOURS
Qty: 20 TABLET | Refills: 0 | Status: SHIPPED | OUTPATIENT
Start: 2024-05-28 | End: 2024-06-03

## 2024-05-28 RX ORDER — MONTELUKAST SODIUM 10 MG/1
10 TABLET ORAL NIGHTLY
Qty: 30 TABLET | Refills: 2 | Status: SHIPPED | OUTPATIENT
Start: 2024-05-28 | End: 2024-06-27

## 2024-05-28 RX ORDER — BENZONATATE 100 MG/1
100 CAPSULE ORAL 3 TIMES DAILY PRN
Qty: 30 CAPSULE | Refills: 0 | Status: SHIPPED | OUTPATIENT
Start: 2024-05-28 | End: 2024-06-07

## 2024-05-28 NOTE — PROGRESS NOTES
Subjective:       Patient ID: Albin Fernandez is a 68 y.o. male.    Chief Complaint: Cough    Cough  This is a recurrent (Monthly over the last 3 months) problem. The current episode started 1 to 4 weeks ago. The problem has been unchanged. The problem occurs every few minutes. The cough is Productive of sputum. Associated symptoms include nasal congestion, rhinorrhea and a sore throat. Pertinent negatives include no chest pain, chills, ear congestion, ear pain, fever, headaches, heartburn, hemoptysis, myalgias, postnasal drip, rash, shortness of breath, sweats, weight loss or wheezing. Nothing aggravates the symptoms. Risk factors for lung disease include animal exposure (Sick contacts. His wife is a ). He has tried OTC cough suppressant and prescription cough suppressant for the symptoms. The treatment provided mild relief. His past medical history is significant for bronchitis and COPD. There is no history of asthma, bronchiectasis, emphysema, environmental allergies or pneumonia.       Past Medical History:   Diagnosis Date    Arthritis     Atrial fibrillation     BPH with obstruction/lower urinary tract symptoms     Cardiomyopathy, dilated     Disorder of kidney and ureter     Hyperlipidemia     Hypertension     Hypothyroidism     Sleep apnea        Review of patient's allergies indicates:   Allergen Reactions    Antihistimine Other (See Comments)    Antihistamines - alkylamine Other (See Comments)     Urinary retention         Current Outpatient Medications:     apixaban (ELIQUIS) 5 mg Tab, Take 1 tablet (5 mg total) by mouth 2 (two) times daily., Disp: 180 tablet, Rfl: 3    atorvastatin (LIPITOR) 10 MG tablet, TAKE 1 TABLET (10 MG TOTAL) BY MOUTH EVERY EVENING., Disp: 90 tablet, Rfl: 3    azelastine (ASTELIN) 137 mcg (0.1 %) nasal spray, 1 spray (137 mcg total) by Nasal route 2 (two) times daily as needed for Rhinitis (sinus congestion, post nasal drip)., Disp: 30 mL, Rfl: 2    co-enzyme Q-10  30 mg capsule, Take 30 mg by mouth once daily., Disp: , Rfl:     fluticasone propionate (FLONASE) 50 mcg/actuation nasal spray, 1 spray (50 mcg total) by Each Nostril route once daily., Disp: 11.1 mL, Rfl: 0    HYDROcodone-acetaminophen (NORCO)  mg per tablet, Take 1 tablet by mouth 2 (two) times daily., Disp: , Rfl:     levothyroxine (SYNTHROID) 137 MCG Tab tablet, Take 1 tablet (137 mcg total) by mouth before breakfast., Disp: 90 tablet, Rfl: 3    metoprolol succinate (TOPROL-XL) 50 MG 24 hr tablet, Take 1 tablet (50 mg total) by mouth once daily., Disp: 90 tablet, Rfl: 3    pantoprazole (PROTONIX) 40 MG tablet, Take 40 mg by mouth every morning., Disp: , Rfl:     senna-docusate 8.6-50 mg (SENNA WITH DOCUSATE SODIUM) 8.6-50 mg per tablet, Take 2 tablets by mouth 2 (two) times daily., Disp: 120 tablet, Rfl: 5    finasteride (PROSCAR) 5 mg tablet, Take 1 tablet (5 mg total) by mouth once daily., Disp: 90 tablet, Rfl: 3    tamsulosin (FLOMAX) 0.4 mg Cap, Take 1 capsule (0.4 mg total) by mouth once daily. Take in evening., Disp: 90 capsule, Rfl: 4    valACYclovir (VALTREX) 500 MG tablet, Take 1 tablet (500 mg total) by mouth 3 (three) times daily., Disp: 90 tablet, Rfl: 2  No current facility-administered medications for this visit.    Facility-Administered Medications Ordered in Other Visits:     electrolyte-S (ISOLYTE), , Intravenous, Continuous, Casi Murillo MD, Last Rate: 10 mL/hr at 01/07/22 0829, New Bag at 01/07/22 1043    HYDROmorphone (PF) injection 0.2 mg, 0.2 mg, Intravenous, Q5 Min PRN, aCsi Murillo MD    LIDOcaine (PF) 10 mg/ml (1%) injection 10 mg, 1 mL, Intradermal, Once PRN, Casi Murillo MD    LIDOcaine (PF) 10 mg/ml (1%) injection 10 mg, 1 mL, Intradermal, Once, Casi Murillo MD    oxyCODONE immediate release tablet 5 mg, 5 mg, Oral, Q3H PRN, Casi Murillo MD, 5 mg at 01/07/22 1110    Review of Systems   Constitutional:  Negative for chills, fever and  "weight loss.   HENT:  Positive for rhinorrhea and sore throat. Negative for ear pain and postnasal drip.    Respiratory:  Positive for cough. Negative for hemoptysis, shortness of breath and wheezing.    Cardiovascular:  Negative for chest pain.   Gastrointestinal:  Negative for heartburn.   Musculoskeletal:  Negative for myalgias.   Skin:  Negative for rash.   Allergic/Immunologic: Negative for environmental allergies.   Neurological:  Negative for headaches.       Objective:      /60 (BP Location: Right arm, Patient Position: Sitting, BP Method: Large (Manual))   Pulse 92   Temp 98.2 °F (36.8 °C) (Oral)   Resp 16   Ht 5' 9" (1.753 m)   Wt 104.6 kg (230 lb 9.6 oz)   SpO2 96%   BMI 34.05 kg/m²   Physical Exam  Vitals reviewed.   Constitutional:       General: He is not in acute distress.     Appearance: Normal appearance. He is obese. He is not ill-appearing, toxic-appearing or diaphoretic.   HENT:      Head: Normocephalic.      Right Ear: External ear normal.      Left Ear: External ear normal.      Nose: Congestion and rhinorrhea present.      Mouth/Throat:      Mouth: Mucous membranes are moist.      Pharynx: Oropharynx is clear.   Eyes:      General: No scleral icterus.        Right eye: No discharge.         Left eye: No discharge.      Extraocular Movements: Extraocular movements intact.      Conjunctiva/sclera: Conjunctivae normal.   Cardiovascular:      Rate and Rhythm: Normal rate and regular rhythm.      Pulses: Normal pulses.      Heart sounds: Normal heart sounds. No murmur heard.     No friction rub. No gallop.   Pulmonary:      Effort: Pulmonary effort is normal. No respiratory distress.      Breath sounds: Normal breath sounds. No wheezing, rhonchi or rales.      Comments: Cough present   Chest:      Chest wall: No tenderness.   Musculoskeletal:         General: No swelling, tenderness or deformity. Normal range of motion.      Cervical back: Normal range of motion.      Right lower leg: " No edema.      Left lower leg: No edema.   Skin:     General: Skin is warm and dry.      Capillary Refill: Capillary refill takes less than 2 seconds.      Coloration: Skin is not jaundiced.      Findings: No bruising, erythema, lesion or rash.   Neurological:      Mental Status: He is alert and oriented to person, place, and time.      Gait: Gait normal.   Psychiatric:         Mood and Affect: Mood normal.         Behavior: Behavior normal.         Thought Content: Thought content normal.         Judgment: Judgment normal.         Assessment:       1. Sinus congestion    2. PND (post-nasal drip)    3. Acute cough    4. Permanent atrial fibrillation    5. Mixed hyperlipidemia        Plan:       Sinus congestion  -     amoxicillin-clavulanate 875-125mg (AUGMENTIN) 875-125 mg per tablet; Take 1 tablet by mouth every 12 (twelve) hours. for 10 days  Dispense: 20 tablet; Refill: 0  -     montelukast (SINGULAIR) 10 mg tablet; Take 1 tablet (10 mg total) by mouth every evening.  Dispense: 30 tablet; Refill: 2        -     ENT referral if symptom reoccur or persist     PND (post-nasal drip)  -     amoxicillin-clavulanate 875-125mg (AUGMENTIN) 875-125 mg per tablet; Take 1 tablet by mouth every 12 (twelve) hours. for 10 days  Dispense: 20 tablet; Refill: 0  -     montelukast (SINGULAIR) 10 mg tablet; Take 1 tablet (10 mg total) by mouth every evening.  Dispense: 30 tablet; Refill: 2    Acute cough  -     benzonatate (TESSALON) 100 MG capsule; Take 1 capsule (100 mg total) by mouth 3 (three) times daily as needed for Cough.  Dispense: 30 capsule; Refill: 0  -     promethazine-dextromethorphan (PROMETHAZINE-DM) 6.25-15 mg/5 mL Syrp; Take 5 mLs by mouth nightly as needed (cough).  Dispense: 118 mL; Refill: 0  -     amoxicillin-clavulanate 875-125mg (AUGMENTIN) 875-125 mg per tablet; Take 1 tablet by mouth every 12 (twelve) hours. for 10 days  Dispense: 20 tablet; Refill: 0    Permanent atrial fibrillation        -    Stable.  Continue meds. Will continue to monitor.     Mixed hyperlipidemia        -    Stable. Continue meds. Will continue to monitor.                  Orlando Astorga PA-C  Family Medicine Physician Assistant       Future Appointments       Date Provider Specialty Appt Notes    7/8/2024 Cate Sharp MD Urology 6n f/u    9/30/2024 Jered Siegel MD Family Medicine annual               I spent a total of 20 minutes on the day of the visit.This includes face to face time and non-face to face time preparing to see the patient (eg, review of tests), obtaining and/or reviewing separately obtained history, documenting clinical information in the electronic or other health record, independently interpreting results and communicating results to the patient/family/caregiver, or care coordinator.      We have addressed [4] Moderate: 1 or more chronic illnesses with exacerbation, progression, or side effects of treatment / 2 or more stable chronic illnesses / 1 undiagnosed new problem with uncertain prognosis / 1 acute illness with systemic symptoms / 1 acute complicated injury  The complexity of the data reviewed and analyzed for this visit was [2] Minimal or None  The risk of complications and/or morbidity or mortality are [4] Moderate risk (I.e. prescription drug management / decision regarding minor surgery with identified pt or procedure risk factors / decision regarding elective major surgery without identified pt or procedure risk factors / diagnosis or treatment significantly limited by social determinants of health)   The level of Medical Decision Making for this visit is [4] Moderate

## 2024-05-28 NOTE — PATIENT INSTRUCTIONS
Branden Talamantes,     If you are due for any health screening(s) below please notify me so we can arrange them to be ordered and scheduled to maintain your health. Most healthy patients complete it. Don't lose out on improving your health.     All of your core healthy metrics are met.

## 2024-06-03 ENCOUNTER — HOSPITAL ENCOUNTER (OUTPATIENT)
Dept: RADIOLOGY | Facility: CLINIC | Age: 69
Discharge: HOME | End: 2024-06-03
Attending: STUDENT IN AN ORGANIZED HEALTH CARE EDUCATION/TRAINING PROGRAM
Payer: MEDICARE

## 2024-06-03 ENCOUNTER — OFFICE VISIT (OUTPATIENT)
Dept: FAMILY MEDICINE | Facility: CLINIC | Age: 69
End: 2024-06-03
Payer: MEDICARE

## 2024-06-03 VITALS
SYSTOLIC BLOOD PRESSURE: 110 MMHG | RESPIRATION RATE: 18 BRPM | WEIGHT: 229.25 LBS | DIASTOLIC BLOOD PRESSURE: 70 MMHG | HEIGHT: 69 IN | BODY MASS INDEX: 33.96 KG/M2 | OXYGEN SATURATION: 97 % | HEART RATE: 91 BPM | TEMPERATURE: 98 F

## 2024-06-03 DIAGNOSIS — R05.3 CHRONIC COUGH: ICD-10-CM

## 2024-06-03 DIAGNOSIS — J18.9 PNEUMONIA DUE TO INFECTIOUS ORGANISM, UNSPECIFIED LATERALITY, UNSPECIFIED PART OF LUNG: ICD-10-CM

## 2024-06-03 DIAGNOSIS — J01.00 SUBACUTE MAXILLARY SINUSITIS: Primary | ICD-10-CM

## 2024-06-03 DIAGNOSIS — I48.21 PERMANENT ATRIAL FIBRILLATION: ICD-10-CM

## 2024-06-03 PROCEDURE — 99999 PR PBB SHADOW E&M-EST. PATIENT-LVL V: CPT | Mod: PBBFAC,,, | Performed by: STUDENT IN AN ORGANIZED HEALTH CARE EDUCATION/TRAINING PROGRAM

## 2024-06-03 PROCEDURE — 1101F PT FALLS ASSESS-DOCD LE1/YR: CPT | Mod: CPTII,S$GLB,, | Performed by: STUDENT IN AN ORGANIZED HEALTH CARE EDUCATION/TRAINING PROGRAM

## 2024-06-03 PROCEDURE — 3008F BODY MASS INDEX DOCD: CPT | Mod: CPTII,S$GLB,, | Performed by: STUDENT IN AN ORGANIZED HEALTH CARE EDUCATION/TRAINING PROGRAM

## 2024-06-03 PROCEDURE — 1126F AMNT PAIN NOTED NONE PRSNT: CPT | Mod: CPTII,S$GLB,, | Performed by: STUDENT IN AN ORGANIZED HEALTH CARE EDUCATION/TRAINING PROGRAM

## 2024-06-03 PROCEDURE — 99214 OFFICE O/P EST MOD 30 MIN: CPT | Mod: S$GLB,,, | Performed by: STUDENT IN AN ORGANIZED HEALTH CARE EDUCATION/TRAINING PROGRAM

## 2024-06-03 PROCEDURE — 71046 X-RAY EXAM CHEST 2 VIEWS: CPT | Mod: 26,,, | Performed by: RADIOLOGY

## 2024-06-03 PROCEDURE — 3074F SYST BP LT 130 MM HG: CPT | Mod: CPTII,S$GLB,, | Performed by: STUDENT IN AN ORGANIZED HEALTH CARE EDUCATION/TRAINING PROGRAM

## 2024-06-03 PROCEDURE — 71046 X-RAY EXAM CHEST 2 VIEWS: CPT | Mod: TC,FY,PO

## 2024-06-03 PROCEDURE — 1159F MED LIST DOCD IN RCRD: CPT | Mod: CPTII,S$GLB,, | Performed by: STUDENT IN AN ORGANIZED HEALTH CARE EDUCATION/TRAINING PROGRAM

## 2024-06-03 PROCEDURE — 3078F DIAST BP <80 MM HG: CPT | Mod: CPTII,S$GLB,, | Performed by: STUDENT IN AN ORGANIZED HEALTH CARE EDUCATION/TRAINING PROGRAM

## 2024-06-03 PROCEDURE — 3288F FALL RISK ASSESSMENT DOCD: CPT | Mod: CPTII,S$GLB,, | Performed by: STUDENT IN AN ORGANIZED HEALTH CARE EDUCATION/TRAINING PROGRAM

## 2024-06-03 RX ORDER — DOXYCYCLINE HYCLATE 100 MG
100 TABLET ORAL 2 TIMES DAILY
Qty: 20 TABLET | Refills: 0 | Status: SHIPPED | OUTPATIENT
Start: 2024-06-03 | End: 2024-06-13

## 2024-06-03 RX ORDER — METHYLPREDNISOLONE 4 MG/1
TABLET ORAL
Qty: 21 TABLET | Refills: 0 | Status: SHIPPED | OUTPATIENT
Start: 2024-06-03

## 2024-06-03 RX ORDER — PROMETHAZINE HYDROCHLORIDE AND DEXTROMETHORPHAN HYDROBROMIDE 6.25; 15 MG/5ML; MG/5ML
5 SYRUP ORAL EVERY 6 HOURS PRN
Qty: 118 ML | Refills: 1 | Status: SHIPPED | OUTPATIENT
Start: 2024-06-03 | End: 2024-06-13

## 2024-06-03 NOTE — PROGRESS NOTES
[facility] Note    Subjective:    The HPI and pertinent ROS is included in the Diagnostic Impression Remarks section at the end of the note. Please see below for further details. Chief complaint is at end of note.     Albin is a pleasant intelligent patient who is here for evaluation.     Modified Medications    No medications on file       Data reviewed 274}  Previous medical records reviewed and summarized in plan section at end of note.      If you are due for any health screening(s) below please notify me so we can arrange them to be ordered and scheduled. Most healthy patients at your age complete them, but you are free to accept or refuse. If you can't do it, I'll definitely understand. If you can, I'd certainly appreciate it!     All of your core healthy metrics are met.      The following portions of the patient's history were reviewed and updated as appropriate: allergies, current medications, past family history, past medical history, past social history, past surgical history and problem list.    He  has a past medical history of Arthritis, Atrial fibrillation, BPH with obstruction/lower urinary tract symptoms, Cardiomyopathy, dilated, Disorder of kidney and ureter, Hyperlipidemia, Hypertension, Hypothyroidism, and Sleep apnea.  He  has a past surgical history that includes Back surgery; Foot surgery; Kidney surgery; Nephrectomy; Arthroscopy of elbow (Left, 08/09/2019); Irrigation and debridement of upper extremity (08/09/2019); Transrectal ultrasound examination (N/A, 11/23/2020); Transurethral resection of prostate (N/A, 01/07/2022); and Cataract extraction (Right).    He  reports that he has never smoked. He has never been exposed to tobacco smoke. He has never used smokeless tobacco. He reports that he does not drink alcohol and does not use drugs.  He family history includes Breast cancer in his sister; Cirrhosis in his father; Pancreatic cancer in his mother.    Review of patient's  "allergies indicates:   Allergen Reactions    Antihistimine Other (See Comments)    Antihistamines - alkylamine Other (See Comments)     Urinary retention       Tobacco Use: Low Risk  (6/3/2024)    Patient History     Smoking Tobacco Use: Never     Smokeless Tobacco Use: Never     Passive Exposure: Never     Physical Examination  General appearance: alert, cooperative, no distress  Neck: no thyromegaly, no neck stiffness  Lungs: clear to auscultation, no wheezes, rales or rhonchi, symmetric air entry  Heart: normal rate, regular rhythm, normal S1, S2, no murmurs, rubs, clicks or gallops  Abdomen: soft, nontender, nondistended, no rigidity, rebound, or guarding.   Back: no point tenderness over spine  Extremities: peripheral pulses normal, no unilateral leg swelling or calf tenderness   Neurological:alert, oriented, normal speech, no new focal findings or movement disorder noted from baseline    BP Readings from Last 3 Encounters:   06/03/24 110/70   05/28/24 130/60   04/02/24 118/68     Wt Readings from Last 3 Encounters:   06/03/24 104 kg (229 lb 4.5 oz)   05/28/24 104.6 kg (230 lb 9.6 oz)   04/02/24 104.4 kg (230 lb 2.6 oz)     /70 (BP Location: Right arm, Patient Position: Sitting, BP Method: Large (Manual))   Pulse 91   Temp 98.4 °F (36.9 °C) (Oral)   Resp 18   Ht 5' 9" (1.753 m)   Wt 104 kg (229 lb 4.5 oz)   SpO2 97%   BMI 33.86 kg/m²    274}  Laboratory: I have reviewed old labs below:    274}    Lab Results   Component Value Date    WBC 4.69 09/25/2023    HGB 13.8 (L) 09/25/2023    HCT 39.7 (L) 09/25/2023    MCV 92 09/25/2023     09/25/2023     09/25/2023    K 4.0 09/25/2023     09/25/2023    CALCIUM 9.2 09/25/2023    CO2 26 09/25/2023     (H) 09/25/2023    BUN 18 09/25/2023    CREATININE 1.0 09/25/2023    EGFRNORACEVR >60.0 09/25/2023    ANIONGAP 8 09/25/2023    PROT 6.7 09/25/2023    ALBUMIN 3.8 09/25/2023    BILITOT 0.5 09/25/2023    ALKPHOS 73 09/25/2023    ALT 26 " "09/25/2023    AST 25 09/25/2023    INR 1.3 05/02/2022    CHOL 143 09/25/2023    TRIG 101 09/25/2023    HDL 36 (L) 09/25/2023    LDLCALC 86.8 09/25/2023    TSH 3.835 01/23/2024    PSA 1.9 09/25/2023    HGBA1C 5.5 09/25/2023      Lab reviewed by me: Particular labs of significance that I will monitor, workup, or treat to improve are mentioned below in diagnostic impression remarks.    Imaging/EKG: I have reviewed the pertinent results and my findings are noted in remarks.  274}    CC:   Chief Complaint   Patient presents with    Cough    Sinus Problem        274}    Assessment/Plan  Albin Fernandez is a 68 y.o. male who presents to clinic with:  1. Subacute maxillary sinusitis    2. Chronic cough    3. Pneumonia due to infectious organism, unspecified laterality, unspecified part of lung    4. Permanent atrial fibrillation       274}  Diagnostic Impression Remarks + HPI     Documentation entered by me for this encounter may have been done in part using speech-recognition technology. Although I have made an effort to ensure accuracy, "sound like" errors may exist and should be interpreted in context.     Pneumonia-patient reports in a productive cough he has been called for weeks is taking Augmentin did have some wheezing no leg swelling no calf tenderness no long car ride your travel no fevers he reports that his wife was sick a got better no chest pain no shortness a breath at rest will send in antibiotics get x-ray will get D-dimer to rule out a PE even though this low on the differential  Sinusitis-can take antihistamine send in some steroids and and send in a antibiotics  AFib-stable no recent flares continue current medications monitor    This is the extent of this pleasant patient's concerns at this present time. He did not feel chest pain upon exertion, dyspnea, nausea, vomiting, diaphoresis, or syncope. No pleuritic chest pain, unilateral leg swelling, calf tenderness, or calf pain. Negative for unintentional " weight loss night sweats, hematuria, and fevers.     Additional workup planned: see labs ordered below.    See below for labs and meds ordered with associated diagnosis      1. Chronic cough  - X-Ray Chest PA And Lateral; Future  - CBC Auto Differential; Future  - Comprehensive Metabolic Panel; Future  - D-Dimer, Quantitative; Future  - QUANTIFERON GOLD TB; Future  - promethazine-dextromethorphan (PROMETHAZINE-DM) 6.25-15 mg/5 mL Syrp; Take 5 mLs by mouth every 6 (six) hours as needed (cough).  Dispense: 118 mL; Refill: 1    2. Subacute maxillary sinusitis  - methylPREDNISolone (MEDROL DOSEPACK) 4 mg tablet; follow package directions  Dispense: 21 tablet; Refill: 0    3. Pneumonia due to infectious organism, unspecified laterality, unspecified part of lung  - doxycycline (VIBRA-TABS) 100 MG tablet; Take 1 tablet (100 mg total) by mouth 2 (two) times daily. for 10 days  Dispense: 20 tablet; Refill: 0    4. Permanent atrial fibrillation      Jered Siegel MD   274}    If you are due for any health screening(s) below please notify me so we can arrange them to be ordered and scheduled. Most healthy patients at your age complete them, but you are free to accept or refuse.     If you can't do it, I'll definitely understand. If you can, I'd certainly appreciate it!   All of your core healthy metrics are met.

## 2024-06-03 NOTE — PATIENT INSTRUCTIONS
Branden Mayfieldy,     If you are due for any health screening(s) below please notify me so we can arrange them to be ordered and scheduled. Most healthy patients at your age complete them, but you are free to accept or refuse.     If you can't do it, I'll definitely understand. If you can, I'd certainly appreciate it!    All of your core healthy metrics are met.

## 2024-06-04 ENCOUNTER — LAB VISIT (OUTPATIENT)
Dept: LAB | Facility: HOSPITAL | Age: 69
End: 2024-06-04
Attending: STUDENT IN AN ORGANIZED HEALTH CARE EDUCATION/TRAINING PROGRAM
Payer: MEDICARE

## 2024-06-04 DIAGNOSIS — R05.3 CHRONIC COUGH: ICD-10-CM

## 2024-06-04 LAB
ALBUMIN SERPL BCP-MCNC: 3.6 G/DL (ref 3.5–5.2)
ALP SERPL-CCNC: 98 U/L (ref 55–135)
ALT SERPL W/O P-5'-P-CCNC: 12 U/L (ref 10–44)
ANION GAP SERPL CALC-SCNC: 8 MMOL/L (ref 8–16)
AST SERPL-CCNC: 16 U/L (ref 10–40)
BASOPHILS # BLD AUTO: 0.04 K/UL (ref 0–0.2)
BASOPHILS NFR BLD: 0.7 % (ref 0–1.9)
BILIRUB SERPL-MCNC: 0.5 MG/DL (ref 0.1–1)
BUN SERPL-MCNC: 16 MG/DL (ref 8–23)
CALCIUM SERPL-MCNC: 9.9 MG/DL (ref 8.7–10.5)
CHLORIDE SERPL-SCNC: 104 MMOL/L (ref 95–110)
CO2 SERPL-SCNC: 27 MMOL/L (ref 23–29)
CREAT SERPL-MCNC: 1.1 MG/DL (ref 0.5–1.4)
DIFFERENTIAL METHOD BLD: ABNORMAL
EOSINOPHIL # BLD AUTO: 0.2 K/UL (ref 0–0.5)
EOSINOPHIL NFR BLD: 3.4 % (ref 0–8)
ERYTHROCYTE [DISTWIDTH] IN BLOOD BY AUTOMATED COUNT: 13.8 % (ref 11.5–14.5)
EST. GFR  (NO RACE VARIABLE): >60 ML/MIN/1.73 M^2
GLUCOSE SERPL-MCNC: 145 MG/DL (ref 70–110)
HCT VFR BLD AUTO: 41.4 % (ref 40–54)
HGB BLD-MCNC: 13.4 G/DL (ref 14–18)
IMM GRANULOCYTES # BLD AUTO: 0.04 K/UL (ref 0–0.04)
IMM GRANULOCYTES NFR BLD AUTO: 0.7 % (ref 0–0.5)
LYMPHOCYTES # BLD AUTO: 0.9 K/UL (ref 1–4.8)
LYMPHOCYTES NFR BLD: 17.2 % (ref 18–48)
MCH RBC QN AUTO: 30.6 PG (ref 27–31)
MCHC RBC AUTO-ENTMCNC: 32.4 G/DL (ref 32–36)
MCV RBC AUTO: 95 FL (ref 82–98)
MONOCYTES # BLD AUTO: 0.6 K/UL (ref 0.3–1)
MONOCYTES NFR BLD: 11 % (ref 4–15)
NEUTROPHILS # BLD AUTO: 3.6 K/UL (ref 1.8–7.7)
NEUTROPHILS NFR BLD: 67 % (ref 38–73)
NRBC BLD-RTO: 0 /100 WBC
PLATELET # BLD AUTO: 223 K/UL (ref 150–450)
PMV BLD AUTO: 9.8 FL (ref 9.2–12.9)
POTASSIUM SERPL-SCNC: 4.2 MMOL/L (ref 3.5–5.1)
PROT SERPL-MCNC: 7.1 G/DL (ref 6–8.4)
RBC # BLD AUTO: 4.38 M/UL (ref 4.6–6.2)
SODIUM SERPL-SCNC: 139 MMOL/L (ref 136–145)
WBC # BLD AUTO: 5.34 K/UL (ref 3.9–12.7)

## 2024-06-04 PROCEDURE — 85025 COMPLETE CBC W/AUTO DIFF WBC: CPT | Performed by: STUDENT IN AN ORGANIZED HEALTH CARE EDUCATION/TRAINING PROGRAM

## 2024-06-04 PROCEDURE — 86480 TB TEST CELL IMMUN MEASURE: CPT | Performed by: STUDENT IN AN ORGANIZED HEALTH CARE EDUCATION/TRAINING PROGRAM

## 2024-06-04 PROCEDURE — 85379 FIBRIN DEGRADATION QUANT: CPT | Performed by: STUDENT IN AN ORGANIZED HEALTH CARE EDUCATION/TRAINING PROGRAM

## 2024-06-04 PROCEDURE — 36415 COLL VENOUS BLD VENIPUNCTURE: CPT | Mod: PO | Performed by: STUDENT IN AN ORGANIZED HEALTH CARE EDUCATION/TRAINING PROGRAM

## 2024-06-04 PROCEDURE — 80053 COMPREHEN METABOLIC PANEL: CPT | Performed by: STUDENT IN AN ORGANIZED HEALTH CARE EDUCATION/TRAINING PROGRAM

## 2024-06-05 ENCOUNTER — HOSPITAL ENCOUNTER (OUTPATIENT)
Dept: RADIOLOGY | Facility: HOSPITAL | Age: 69
Discharge: HOME | End: 2024-06-05
Attending: STUDENT IN AN ORGANIZED HEALTH CARE EDUCATION/TRAINING PROGRAM
Payer: MEDICARE

## 2024-06-05 ENCOUNTER — PATIENT MESSAGE (OUTPATIENT)
Dept: FAMILY MEDICINE | Facility: CLINIC | Age: 69
End: 2024-06-05
Payer: MEDICARE

## 2024-06-05 DIAGNOSIS — R07.9 CHEST PAIN, UNSPECIFIED TYPE: Primary | ICD-10-CM

## 2024-06-05 DIAGNOSIS — R07.9 CHEST PAIN, UNSPECIFIED TYPE: ICD-10-CM

## 2024-06-05 LAB
D DIMER PPP IA.FEU-MCNC: 0.68 MG/L FEU
GAMMA INTERFERON BACKGROUND BLD IA-ACNC: 0.05 IU/ML
M TB IFN-G CD4+ BCKGRND COR BLD-ACNC: 0.01 IU/ML
M TB IFN-G CD4+ BCKGRND COR BLD-ACNC: 0.01 IU/ML
MITOGEN IGNF BCKGRD COR BLD-ACNC: 4.63 IU/ML
TB GOLD PLUS: NEGATIVE

## 2024-06-05 PROCEDURE — 71275 CT ANGIOGRAPHY CHEST: CPT | Mod: 26,,, | Performed by: RADIOLOGY

## 2024-06-05 PROCEDURE — 25500020 PHARM REV CODE 255

## 2024-06-05 PROCEDURE — 71275 CT ANGIOGRAPHY CHEST: CPT | Mod: TC

## 2024-06-05 RX ADMIN — IOHEXOL 75 ML: 350 INJECTION, SOLUTION INTRAVENOUS at 04:06

## 2024-06-13 ENCOUNTER — OFFICE VISIT (OUTPATIENT)
Dept: FAMILY MEDICINE | Facility: CLINIC | Age: 69
End: 2024-06-13
Payer: MEDICARE

## 2024-06-13 VITALS
WEIGHT: 226.88 LBS | HEART RATE: 95 BPM | SYSTOLIC BLOOD PRESSURE: 134 MMHG | BODY MASS INDEX: 33.6 KG/M2 | HEIGHT: 69 IN | OXYGEN SATURATION: 97 % | RESPIRATION RATE: 16 BRPM | DIASTOLIC BLOOD PRESSURE: 70 MMHG

## 2024-06-13 DIAGNOSIS — J40 BRONCHITIS: ICD-10-CM

## 2024-06-13 DIAGNOSIS — R05.1 ACUTE COUGH: ICD-10-CM

## 2024-06-13 DIAGNOSIS — J01.40 ACUTE NON-RECURRENT PANSINUSITIS: ICD-10-CM

## 2024-06-13 DIAGNOSIS — R06.2 WHEEZING: Primary | ICD-10-CM

## 2024-06-13 DIAGNOSIS — R09.82 POST-NASAL DRIP: ICD-10-CM

## 2024-06-13 PROCEDURE — 3008F BODY MASS INDEX DOCD: CPT | Mod: CPTII,S$GLB,,

## 2024-06-13 PROCEDURE — 3075F SYST BP GE 130 - 139MM HG: CPT | Mod: CPTII,S$GLB,,

## 2024-06-13 PROCEDURE — 1126F AMNT PAIN NOTED NONE PRSNT: CPT | Mod: CPTII,S$GLB,,

## 2024-06-13 PROCEDURE — 3078F DIAST BP <80 MM HG: CPT | Mod: CPTII,S$GLB,,

## 2024-06-13 PROCEDURE — 99999 PR PBB SHADOW E&M-EST. PATIENT-LVL V: CPT | Mod: PBBFAC,,,

## 2024-06-13 PROCEDURE — 1101F PT FALLS ASSESS-DOCD LE1/YR: CPT | Mod: CPTII,S$GLB,,

## 2024-06-13 PROCEDURE — 99214 OFFICE O/P EST MOD 30 MIN: CPT | Mod: S$GLB,,,

## 2024-06-13 PROCEDURE — 3288F FALL RISK ASSESSMENT DOCD: CPT | Mod: CPTII,S$GLB,,

## 2024-06-13 RX ORDER — GUAIFENESIN 600 MG/1
600 TABLET, EXTENDED RELEASE ORAL 2 TIMES DAILY
Qty: 20 TABLET | Refills: 0 | Status: SHIPPED | OUTPATIENT
Start: 2024-06-13 | End: 2024-06-23

## 2024-06-13 RX ORDER — ALBUTEROL SULFATE 90 UG/1
2 AEROSOL, METERED RESPIRATORY (INHALATION) EVERY 6 HOURS PRN
Qty: 8 G | Refills: 0 | Status: SHIPPED | OUTPATIENT
Start: 2024-06-13

## 2024-06-13 NOTE — PROGRESS NOTES
Subjective:       Patient ID: Albin Fernandez is a 68 y.o. male.    Chief Complaint: Cough    Patient presents to the clinic with complaint of continued cough.     Symptoms started back in March. He has been on Doxycycline, Augmentin, and a Medrol dose pack x 2. States symptoms will start to improve then worsen again. He is currently finishing up his second round of Doxycycline.   He cannot take antihistamines due to urinary retention.           Cough  This is a recurrent problem. The current episode started more than 1 month ago. The problem has been waxing and waning. The problem occurs hourly. The cough is Productive of sputum. Associated symptoms include postnasal drip, a sore throat and wheezing. Pertinent negatives include no chest pain, chills, ear congestion, ear pain, eye redness, fever, headaches, heartburn, hemoptysis, myalgias, nasal congestion, rash, rhinorrhea, shortness of breath, sweats or weight loss. Nothing aggravates the symptoms. Risk factors for lung disease include animal exposure. He has tried OTC cough suppressant, oral steroids and prescription cough suppressant for the symptoms. The treatment provided mild relief. His past medical history is significant for bronchitis and COPD. There is no history of asthma, bronchiectasis, emphysema, environmental allergies or pneumonia.     Review of Systems   Constitutional:  Negative for activity change, appetite change, chills, diaphoresis, fever and weight loss.   HENT:  Positive for congestion, postnasal drip, sinus pressure, sinus pain and sore throat. Negative for ear pain, rhinorrhea and sneezing.    Eyes:  Negative for pain, discharge, redness and itching.   Respiratory:  Positive for cough and wheezing. Negative for apnea, hemoptysis, chest tightness and shortness of breath.    Cardiovascular:  Negative for chest pain and leg swelling.   Gastrointestinal:  Negative for abdominal distention, abdominal pain, constipation, diarrhea, heartburn,  nausea and vomiting.   Genitourinary:  Negative for difficulty urinating, dysuria, flank pain and frequency.   Musculoskeletal:  Negative for myalgias.   Skin:  Negative for color change, rash and wound.   Allergic/Immunologic: Negative for environmental allergies.   Neurological:  Negative for dizziness and headaches.   All other systems reviewed and are negative.      Patient Active Problem List   Diagnosis    Atrial fibrillation    BPH w urinary obs/LUTS    Hypothyroidism    Hyperlipidemia    Sciatica of right side    Long term (current) use of anticoagulants    Closed displaced fracture of coronoid process of left ulna with routine healing    Atherosclerosis of aorta    Benign prostatic hyperplasia with lower urinary tract symptoms    Benign prostatic hyperplasia with urinary frequency    Permanent atrial fibrillation    Absent kidney    DDD (degenerative disc disease), lumbar       Objective:      Physical Exam  Vitals and nursing note reviewed.   Constitutional:       Appearance: Normal appearance. He is not ill-appearing.   HENT:      Head: Normocephalic and atraumatic.      Nose: Nose normal.   Eyes:      General: Lids are normal.   Cardiovascular:      Rate and Rhythm: Normal rate and regular rhythm.      Pulses: Normal pulses.      Heart sounds: Normal heart sounds.   Pulmonary:      Effort: Pulmonary effort is normal. No tachypnea or respiratory distress.      Breath sounds: Wheezing present.   Abdominal:      General: Bowel sounds are normal. There is no distension.      Palpations: Abdomen is soft.      Tenderness: There is no abdominal tenderness.   Musculoskeletal:         General: Normal range of motion.      Cervical back: Full passive range of motion without pain and normal range of motion.      Left lower leg: No edema.   Skin:     General: Skin is warm and dry.   Neurological:      Mental Status: He is alert and oriented to person, place, and time.   Psychiatric:         Mood and Affect: Mood  "normal.         Behavior: Behavior normal.         Lab Results   Component Value Date    WBC 5.34 06/04/2024    HGB 13.4 (L) 06/04/2024    HCT 41.4 06/04/2024     06/04/2024    CHOL 143 09/25/2023    TRIG 101 09/25/2023    HDL 36 (L) 09/25/2023    ALT 12 06/04/2024    AST 16 06/04/2024     06/04/2024    K 4.2 06/04/2024     06/04/2024    CREATININE 1.1 06/04/2024    BUN 16 06/04/2024    CO2 27 06/04/2024    TSH 3.835 01/23/2024    PSA 1.9 09/25/2023    INR 1.3 05/02/2022    HGBA1C 5.5 09/25/2023     The 10-year ASCVD risk score (Demi LOZANO, et al., 2019) is: 17.7%    Values used to calculate the score:      Age: 68 years      Sex: Male      Is Non- : No      Diabetic: No      Tobacco smoker: No      Systolic Blood Pressure: 134 mmHg      Is BP treated: No      HDL Cholesterol: 36 mg/dL      Total Cholesterol: 143 mg/dL  Visit Vitals  /70 (BP Location: Right arm, Patient Position: Sitting, BP Method: Large (Manual))   Pulse 95   Resp 16   Ht 5' 9" (1.753 m)   Wt 102.9 kg (226 lb 13.7 oz)   SpO2 97%   BMI 33.50 kg/m²      Assessment:       1. Wheezing    2. Post-nasal drip    3. Acute cough    4. Acute non-recurrent pansinusitis    5. Bronchitis        Plan:       1. Wheezing/Post-nasal drip/Post-nasal drip/Acute cough/Acute non-recurrent pansinusitis/Bronchitis  -     albuterol (VENTOLIN HFA) 90 mcg/actuation inhaler; Inhale 2 puffs into the lungs every 6 (six) hours as needed for Wheezing. Rescue  Dispense: 8 g; Refill: 0  -     Ambulatory referral/consult to ENT; Future; Expected date: 06/20/2024  -     guaiFENesin (MUCINEX) 600 mg 12 hr tablet; Take 1 tablet (600 mg total) by mouth 2 (two) times daily. for 10 days  Dispense: 20 tablet; Refill: 0   - Finish Doxycycline   - The diagnosis, treatment plan, instructions for follow-up and reevaluation as well as ED precautions were discussed and understanding was verbalized. All questions or concerns have been addressed. "            Follow up if symptoms worsen or fail to improve.      Future Appointments       Date Provider Specialty Appt Notes    7/8/2024 Cate Sharp MD Urology 6n f/u    7/10/2024 Nathan Albert MD Otolaryngology 3 week fu    9/30/2024 Jered Siegel MD Family Medicine annual

## 2024-06-13 NOTE — PATIENT INSTRUCTIONS
Thank you for allowing me to be part of your healthcare team at [facility]. It is a pleasure to care for you today.   Please take all of your medications as instructed and follow all new instructions from your visit today.  If you received labs or medical tests today you should hear information about results or scheduling either by phone or mychart within approximately a week.   If you have any questions or concerns please do not hesitate to call. Have a blessed day and I hope to see you again soon.  ROXI Rubio      WE STRIVE FOR 5'S!!!        We strive for exceptional care. Please fill out a survey if you received 5 star service.

## 2024-06-14 ENCOUNTER — HOSPITAL ENCOUNTER (OUTPATIENT)
Dept: RADIOLOGY | Facility: HOSPITAL | Age: 69
Discharge: HOME | End: 2024-06-14
Attending: OTOLARYNGOLOGY
Payer: MEDICARE

## 2024-06-14 ENCOUNTER — OFFICE VISIT (OUTPATIENT)
Dept: OTOLARYNGOLOGY | Facility: CLINIC | Age: 69
End: 2024-06-14
Payer: MEDICARE

## 2024-06-14 VITALS — WEIGHT: 223 LBS | BODY MASS INDEX: 33.03 KG/M2 | HEIGHT: 69 IN

## 2024-06-14 DIAGNOSIS — R05.1 ACUTE COUGH: Primary | ICD-10-CM

## 2024-06-14 DIAGNOSIS — R09.82 POST-NASAL DRIP: ICD-10-CM

## 2024-06-14 DIAGNOSIS — J01.00 ACUTE NON-RECURRENT MAXILLARY SINUSITIS: ICD-10-CM

## 2024-06-14 PROCEDURE — 70220 X-RAY EXAM OF SINUSES: CPT | Mod: TC,PN

## 2024-06-14 PROCEDURE — 99999 PR PBB SHADOW E&M-EST. PATIENT-LVL IV: CPT | Mod: PBBFAC,,, | Performed by: OTOLARYNGOLOGY

## 2024-06-14 PROCEDURE — 1159F MED LIST DOCD IN RCRD: CPT | Mod: CPTII,S$GLB,, | Performed by: OTOLARYNGOLOGY

## 2024-06-14 PROCEDURE — 1125F AMNT PAIN NOTED PAIN PRSNT: CPT | Mod: CPTII,S$GLB,, | Performed by: OTOLARYNGOLOGY

## 2024-06-14 PROCEDURE — 70220 X-RAY EXAM OF SINUSES: CPT | Mod: 26,,, | Performed by: RADIOLOGY

## 2024-06-14 PROCEDURE — 99204 OFFICE O/P NEW MOD 45 MIN: CPT | Mod: S$GLB,,, | Performed by: OTOLARYNGOLOGY

## 2024-06-14 PROCEDURE — 3288F FALL RISK ASSESSMENT DOCD: CPT | Mod: CPTII,S$GLB,, | Performed by: OTOLARYNGOLOGY

## 2024-06-14 PROCEDURE — 1101F PT FALLS ASSESS-DOCD LE1/YR: CPT | Mod: CPTII,S$GLB,, | Performed by: OTOLARYNGOLOGY

## 2024-06-14 PROCEDURE — 1160F RVW MEDS BY RX/DR IN RCRD: CPT | Mod: CPTII,S$GLB,, | Performed by: OTOLARYNGOLOGY

## 2024-06-14 PROCEDURE — 3008F BODY MASS INDEX DOCD: CPT | Mod: CPTII,S$GLB,, | Performed by: OTOLARYNGOLOGY

## 2024-06-14 PROCEDURE — 87070 CULTURE OTHR SPECIMN AEROBIC: CPT | Performed by: OTOLARYNGOLOGY

## 2024-06-14 RX ORDER — IPRATROPIUM BROMIDE 42 UG/1
2 SPRAY, METERED NASAL 4 TIMES DAILY
Qty: 15 ML | Refills: 10 | Status: SHIPPED | OUTPATIENT
Start: 2024-06-14 | End: 2025-06-14

## 2024-06-14 RX ORDER — CEFUROXIME AXETIL 500 MG/1
500 TABLET ORAL 2 TIMES DAILY
Qty: 40 TABLET | Refills: 0 | Status: SHIPPED | OUTPATIENT
Start: 2024-06-14 | End: 2024-07-04

## 2024-06-14 NOTE — PROGRESS NOTES
Subjective:       Patient ID: Albin Fernandez is a 68 y.o. male.    Chief Complaint: Cough (Pt c/o chronic cough, post nasal drip, and wheezing since March)      So this gentleman in March developed a cough there was a workup that included an evaluation for pulmonary embolism and he has had a chest x-ray and a CT arteriogram which have not revealed an explanation for his cough.  He has a lot of drainage kind of the thick ropy mucus blows his nose a lot so there has a suspicion of a possible sinonasal issue.  Additionally he has a history of maybe a stomach ulcer and he has been on pantoprazole taken in the morning every day since March at least that has the date in the chart and they confirmed that he has been taking it consistently.          Objective:      ENT Physical Exam        Assessment:       1. Acute cough    2. Post-nasal drip    3. Acute non-recurrent maxillary sinusitis         Plan:          So his x-ray appears to have a little material in the right maxillary sinus that may or may not represent infection but it is least some nodular density both as read by me and the radiologist.      So I think we need to treat the two most likely explanations for his cough the possibility of sinusitis and since he has a lot of copious drainage that is uncommon for him I think we need to of course consider that.  In addition I wanted to double as PPIs for awhile in case reflux has a factor so I added and a before bed OTC Zegerid© to supplement his q.a.m. pantoprazole.      We also discussed his hearing he has a about a 5-year-old pair of audible over priced hearing aids and they want to replace those with the another 5000 dollar pair.  I do not know the level of his hearing he has not had audiograms done in the [facility] system but when I see him back we are going to do an audiogram and try to help him with some direction on is options for hearing aid replacement if in fact that is needed.

## 2024-06-17 LAB — BACTERIA SPEC AEROBE CULT: NORMAL

## 2024-07-01 ENCOUNTER — LAB VISIT (OUTPATIENT)
Dept: LAB | Facility: HOSPITAL | Age: 69
End: 2024-07-01
Attending: NURSE PRACTITIONER
Payer: MEDICARE

## 2024-07-01 DIAGNOSIS — R35.0 BENIGN PROSTATIC HYPERPLASIA WITH URINARY FREQUENCY: ICD-10-CM

## 2024-07-01 DIAGNOSIS — N40.1 BENIGN PROSTATIC HYPERPLASIA WITH URINARY FREQUENCY: ICD-10-CM

## 2024-07-01 LAB
PROSTATE SPECIFIC ANTIGEN, TOTAL: 0.22 NG/ML (ref 0–4)
PSA FREE MFR SERPL: NORMAL %
PSA FREE SERPL-MCNC: <0.1 NG/ML (ref 0–1.5)

## 2024-07-01 PROCEDURE — 36415 COLL VENOUS BLD VENIPUNCTURE: CPT | Performed by: NURSE PRACTITIONER

## 2024-07-01 PROCEDURE — 84153 ASSAY OF PSA TOTAL: CPT | Performed by: NURSE PRACTITIONER

## 2024-07-05 NOTE — PROGRESS NOTES
[facility] Note    PCP: Jered Siegel MD    Chief Complaint: BPH     SUBJECTIVE:       History of Present Illness:  Albin Fernandez is a 68 y.o. male who presents to clinic for BPH. He is Established  to our clinic.     PSA 7/1/24: 0.22    Now taking Flomax again. He is also taking Finasteride.   He feels as though he is doing very well with this regimen and has no voiding complaints today.     Recently underwent major back surgery.     PVR 28 cc    3/9/23  Has stopped Flomax.   Nocturia x 1.   Has been using his CPAP machine.   Symptoms are not bothersome during the day.     PSA 11/7/22: 0.64    PVR 64 cc    8/10/22  Stopped Flomax last week for cataract surgery. Feels like he is actually doing better without it.   Nocturia x 2-4. Now wearing a CPAP machine.   During the day he is doing pretty well. No straining. Does have frequency.   No hematuria or dysuria.    10 mg of cialis not working     PVR 78 cc    2/16/22  S/P TURP on 1/7/22. Pathology benign, 16 g.  Doing well from a urinary standpoint.   Nocturia improved to 1-2x.  No hematuria or dysuria.   No bothersome daytime complaints.     11/15/21  Previous patient of Dr. Shrestha. Here to discuss Urolift.   Hx of TURP in 2017. After this he did really well for a while and then his symptoms returned.  Had cysto/trus in Nov 2020 which showed a 22 g prostate with anterior obstructing tissue but otherwise open. He did undergo UDS which did not show ability to mount a detrusor pressure however, test was somewhat questionable as catheter may not have been all the way in the bladder. He did have a sensation to void.   Last PSA: 1.5 (8/25/20)    Has a solitary left kidney secondary to a traumatic fall when he was a child.     He is currently on Flomax 0.8 mg.   Today he is complaining of frequency, nocturia multiple times a night. He does use a CPAP machine. Stops drinking fluids after 6 pm. No sodas.   No hematuria. No dysuria.   Has a  bowel movement every day, but has to take stool softner.     UA today: negative for blood, leuks, nitrite   PVR today: 84 cc    Last urine culture: no documented UTIs    Lab Results   Component Value Date    CREATININE 1.1 06/04/2024      Family  hx: grandfather and uncle had prostate cancer   Hx of COPD, a fib, HTN, HLD    Past medical, family, and social history reviewed as documented in chart with pertinent positive medical, family, and social history detailed in HPI.    Review of patient's allergies indicates:   Allergen Reactions    Antihistimine Other (See Comments)    Antihistamines - alkylamine Other (See Comments)     Urinary retention       Past Medical History:   Diagnosis Date    Arthritis     Atrial fibrillation     BPH with obstruction/lower urinary tract symptoms     Cardiomyopathy, dilated     Disorder of kidney and ureter     Hyperlipidemia     Hypertension     Hypothyroidism     Sleep apnea      Past Surgical History:   Procedure Laterality Date    ARTHROSCOPY OF ELBOW Left 08/09/2019    Procedure: ARTHROSCOPY, ELBOW;  Surgeon: Giovany Marquez II, MD;  Location: [facility] OR;  Service: Orthopedics;  Laterality: Left;    BACK SURGERY      CATARACT EXTRACTION Right     FOOT SURGERY      IRRIGATION AND DEBRIDEMENT OF UPPER EXTREMITY  08/09/2019    Procedure: IRRIGATION AND DEBRIDEMENT, UPPER EXTREMITY;  Surgeon: Giovany Marquez II, MD;  Location: [facility] OR;  Service: Orthopedics;;    KIDNEY SURGERY      one kidney removed    NEPHRECTOMY      TRANSRECTAL ULTRASOUND EXAMINATION N/A 11/23/2020    Procedure: TRANSRECTAL ULTRASOUND;  Surgeon: Roselyn Shrestha MD;  Location: [facility] OR;  Service: Urology;  Laterality: N/A;    TRANSURETHRAL RESECTION OF PROSTATE N/A 01/07/2022    Procedure: TURP (TRANSURETHRAL RESECTION OF PROSTATE);  Surgeon: Cate Sharp MD;  Location: [facility] OR;  Service: Urology;  Laterality: N/A;     Family History   Problem Relation Name Age of Onset    Cirrhosis Father       "Pancreatic cancer Mother      Breast cancer Sister       Social History     Tobacco Use    Smoking status: Never     Passive exposure: Never    Smokeless tobacco: Never   Substance Use Topics    Alcohol use: No    Drug use: No        Review of Systems    OBJECTIVE:     Anticoagulation: Coumadin 2 mg     Estimated body mass index is 32.95 kg/m² as calculated from the following:    Height as of this encounter: 5' 9" (1.753 m).    Weight as of this encounter: 101.2 kg (223 lb 1.7 oz).    Vital Signs (Most Recent)       Physical Exam  Constitutional:       General: He is not in acute distress.     Appearance: Normal appearance. He is not ill-appearing.   HENT:      Head: Normocephalic and atraumatic.   Eyes:      General: No scleral icterus.  Cardiovascular:      Rate and Rhythm: Normal rate and regular rhythm.   Pulmonary:      Effort: Pulmonary effort is normal. No respiratory distress.   Abdominal:      General: There is no distension.   Skin:     Coloration: Skin is not jaundiced.   Neurological:      General: No focal deficit present.      Mental Status: He is alert and oriented to person, place, and time.   Psychiatric:         Mood and Affect: Mood normal.         Behavior: Behavior normal.         Thought Content: Thought content normal.         BMP   Lab Results   Component Value Date     06/04/2024    K 4.2 06/04/2024     06/04/2024    CO2 27 06/04/2024    BUN 16 06/04/2024    CREATININE 1.1 06/04/2024    CALCIUM 9.9 06/04/2024    ANIONGAP 8 06/04/2024    ESTGFRAFRICA >60 01/07/2022    EGFRNONAA >60 01/07/2022       Lab Results   Component Value Date    WBC 5.34 06/04/2024    HGB 13.4 (L) 06/04/2024    HCT 41.4 06/04/2024    MCV 95 06/04/2024     06/04/2024       Lab Results   Component Value Date    PSA 1.9 09/25/2023    PSA 0.74 09/22/2022    PSADIAG 0.64 11/07/2022    PSADIAG 1.6 11/23/2021    PSATOTAL 0.22 07/01/2024    PSATOTAL 1.5 08/25/2020    PSATOTAL 1.3 07/01/2019    PSAFREE <0.10 " 07/01/2024    PSAFREE 0.38 08/25/2020    PSAFREE 0.36 07/01/2019     Imaging:  No pertinent recent imaging available.    ASSESSMENT     1. Benign prostatic hyperplasia with urinary frequency      PLAN:     - Doing very well.   - Continue Flomax and Finasteride  - PVR is stable   - Continue using CPAP machine   - Refill on Cialis   - Follow up in 1 year or sooner if needed       Cate Sharp MD       Visit today included increased complexity associated with the care of the episodic problem BPH/ED addressed and managing the longitudinal care of the patient due to the serious and/or complex managed problem(s).

## 2024-07-08 ENCOUNTER — OFFICE VISIT (OUTPATIENT)
Dept: UROLOGY | Facility: CLINIC | Age: 69
End: 2024-07-08
Payer: MEDICARE

## 2024-07-08 VITALS — BODY MASS INDEX: 33.05 KG/M2 | HEIGHT: 69 IN | WEIGHT: 223.13 LBS

## 2024-07-08 DIAGNOSIS — N52.9 ERECTILE DYSFUNCTION, UNSPECIFIED ERECTILE DYSFUNCTION TYPE: ICD-10-CM

## 2024-07-08 DIAGNOSIS — Z12.5 ENCOUNTER FOR SCREENING FOR MALIGNANT NEOPLASM OF PROSTATE: ICD-10-CM

## 2024-07-08 DIAGNOSIS — R35.0 BENIGN PROSTATIC HYPERPLASIA WITH URINARY FREQUENCY: Primary | ICD-10-CM

## 2024-07-08 DIAGNOSIS — N40.1 BENIGN PROSTATIC HYPERPLASIA WITH URINARY FREQUENCY: Primary | ICD-10-CM

## 2024-07-08 LAB — POC RESIDUAL URINE VOLUME: 28 ML (ref 0–100)

## 2024-07-08 PROCEDURE — 1159F MED LIST DOCD IN RCRD: CPT | Mod: CPTII,S$GLB,, | Performed by: STUDENT IN AN ORGANIZED HEALTH CARE EDUCATION/TRAINING PROGRAM

## 2024-07-08 PROCEDURE — 3288F FALL RISK ASSESSMENT DOCD: CPT | Mod: CPTII,S$GLB,, | Performed by: STUDENT IN AN ORGANIZED HEALTH CARE EDUCATION/TRAINING PROGRAM

## 2024-07-08 PROCEDURE — 3008F BODY MASS INDEX DOCD: CPT | Mod: CPTII,S$GLB,, | Performed by: STUDENT IN AN ORGANIZED HEALTH CARE EDUCATION/TRAINING PROGRAM

## 2024-07-08 PROCEDURE — G2211 COMPLEX E/M VISIT ADD ON: HCPCS | Mod: S$GLB,,, | Performed by: STUDENT IN AN ORGANIZED HEALTH CARE EDUCATION/TRAINING PROGRAM

## 2024-07-08 PROCEDURE — 51798 US URINE CAPACITY MEASURE: CPT | Mod: S$GLB,,, | Performed by: STUDENT IN AN ORGANIZED HEALTH CARE EDUCATION/TRAINING PROGRAM

## 2024-07-08 PROCEDURE — 1101F PT FALLS ASSESS-DOCD LE1/YR: CPT | Mod: CPTII,S$GLB,, | Performed by: STUDENT IN AN ORGANIZED HEALTH CARE EDUCATION/TRAINING PROGRAM

## 2024-07-08 PROCEDURE — 99214 OFFICE O/P EST MOD 30 MIN: CPT | Mod: S$GLB,,, | Performed by: STUDENT IN AN ORGANIZED HEALTH CARE EDUCATION/TRAINING PROGRAM

## 2024-07-08 PROCEDURE — 99999 PR PBB SHADOW E&M-EST. PATIENT-LVL III: CPT | Mod: PBBFAC,,, | Performed by: STUDENT IN AN ORGANIZED HEALTH CARE EDUCATION/TRAINING PROGRAM

## 2024-07-08 PROCEDURE — 1126F AMNT PAIN NOTED NONE PRSNT: CPT | Mod: CPTII,S$GLB,, | Performed by: STUDENT IN AN ORGANIZED HEALTH CARE EDUCATION/TRAINING PROGRAM

## 2024-07-08 RX ORDER — SILDENAFIL 100 MG/1
100 TABLET, FILM COATED ORAL DAILY PRN
Qty: 30 TABLET | Refills: 5 | Status: SHIPPED | OUTPATIENT
Start: 2024-07-08 | End: 2025-07-08

## 2024-07-08 RX ORDER — TAMSULOSIN HYDROCHLORIDE 0.4 MG/1
0.4 CAPSULE ORAL DAILY
Qty: 90 CAPSULE | Refills: 3 | Status: SHIPPED | OUTPATIENT
Start: 2024-07-08 | End: 2025-07-08

## 2024-07-08 RX ORDER — FINASTERIDE 5 MG/1
5 TABLET, FILM COATED ORAL DAILY
Qty: 90 TABLET | Refills: 3 | Status: SHIPPED | OUTPATIENT
Start: 2024-07-08 | End: 2025-07-08

## 2024-07-10 ENCOUNTER — OFFICE VISIT (OUTPATIENT)
Dept: OTOLARYNGOLOGY | Facility: CLINIC | Age: 69
End: 2024-07-10
Payer: MEDICARE

## 2024-07-10 ENCOUNTER — PATIENT MESSAGE (OUTPATIENT)
Dept: OTOLARYNGOLOGY | Facility: CLINIC | Age: 69
End: 2024-07-10

## 2024-07-10 ENCOUNTER — CLINICAL SUPPORT (OUTPATIENT)
Dept: AUDIOLOGY | Facility: CLINIC | Age: 69
End: 2024-07-10
Payer: MEDICARE

## 2024-07-10 VITALS — HEIGHT: 69 IN | BODY MASS INDEX: 34.36 KG/M2 | WEIGHT: 232 LBS

## 2024-07-10 DIAGNOSIS — H90.3 SENSORINEURAL HEARING LOSS (SNHL), BILATERAL: Primary | ICD-10-CM

## 2024-07-10 DIAGNOSIS — H93.13 TINNITUS AURIUM, BILATERAL: ICD-10-CM

## 2024-07-10 DIAGNOSIS — H91.93 BILATERAL HEARING LOSS, UNSPECIFIED HEARING LOSS TYPE: Primary | ICD-10-CM

## 2024-07-10 PROCEDURE — 99213 OFFICE O/P EST LOW 20 MIN: CPT | Mod: S$GLB,,, | Performed by: OTOLARYNGOLOGY

## 2024-07-10 PROCEDURE — 1160F RVW MEDS BY RX/DR IN RCRD: CPT | Mod: CPTII,S$GLB,, | Performed by: OTOLARYNGOLOGY

## 2024-07-10 PROCEDURE — 1101F PT FALLS ASSESS-DOCD LE1/YR: CPT | Mod: CPTII,S$GLB,, | Performed by: OTOLARYNGOLOGY

## 2024-07-10 PROCEDURE — 1126F AMNT PAIN NOTED NONE PRSNT: CPT | Mod: CPTII,S$GLB,, | Performed by: OTOLARYNGOLOGY

## 2024-07-10 PROCEDURE — 3288F FALL RISK ASSESSMENT DOCD: CPT | Mod: CPTII,S$GLB,, | Performed by: OTOLARYNGOLOGY

## 2024-07-10 PROCEDURE — 99999 PR PBB SHADOW E&M-EST. PATIENT-LVL III: CPT | Mod: PBBFAC,,, | Performed by: OTOLARYNGOLOGY

## 2024-07-10 PROCEDURE — 3008F BODY MASS INDEX DOCD: CPT | Mod: CPTII,S$GLB,, | Performed by: OTOLARYNGOLOGY

## 2024-07-10 PROCEDURE — 92567 TYMPANOMETRY: CPT | Mod: S$GLB,,, | Performed by: AUDIOLOGIST

## 2024-07-10 PROCEDURE — 92557 COMPREHENSIVE HEARING TEST: CPT | Mod: S$GLB,,, | Performed by: AUDIOLOGIST

## 2024-07-10 PROCEDURE — 1159F MED LIST DOCD IN RCRD: CPT | Mod: CPTII,S$GLB,, | Performed by: OTOLARYNGOLOGY

## 2024-07-10 NOTE — PROGRESS NOTES
Subjective:       Patient ID: Albin Fernandez is a 68 y.o. male.    Chief Complaint: Hearing Loss (Pt c/o increased hearing loss and to discuss hearing aids that he bought online )        This gentleman comes in for an audiogram but they also gave a shot at over-the-counter aids that I discussed with them on our last visit but at that time I did not know his level of hearing loss there were no audiograms to examine and they were having problems with the expensive hearing aids from a franchise, [employer], and they were not getting good satisfaction from them and given that does hearing aids are proprietary they were locked in.          Objective:      ENT Physical Exam    His tympanic membranes and ear canals are normal I looked at his new over-the-counter Lesley hearing aids but it was time for him to get his audiogram and he did not know the past where it has to the thomas on his phone or any such information that would help me work with him on that             Assessment:       1. Bilateral hearing loss, unspecified hearing loss type         Plan:          So his audiogram shows more prominent hearing loss than I would have expected he has probably not a good candidate for over-the-counter hearing aids especially since he has not technologically competent and admits as much and would benefit from having a professional find the right fit for him and help him with any technology that is associated with that has such as an thomas on his phone.    We are going to get the audiologist at [place] to give them a call and try to set up a meeting.

## 2024-07-10 NOTE — PROGRESS NOTES
Albin Fernandez was seen 07/10/2024 by Dr. Albert and referred for an audiological evaluation. Pt was alone during today's visit. Pertinent complaints today include hearing loss and occasional tinnitus. Pt purchased Audibel hearing aids several years ago and does not like them. He recently purchased OTC Marichuy hearing aids, in which he is in the trial period. Pt confirms history of loud noise exposure. Otoscopy revealed minimal cerumen in both ears. The tympanic membrane was visualized AU prior to proceeding with the hearing testing.     Results reveal a moderately severe-to-severe sensorineural hearing loss for the right ear and  moderately severe-to-severe sensorineural hearing loss for the left ear.    Speech Reception Thresholds were  75 dBHL for the right ear and 75 dBHL for the left ear.    Word recognition scores were completed at [facility] and were good for the right ear and good for the left ear.   Tympanograms were Type A for the right ear and Type A for the left ear.    Audiogram results were reviewed in detail with patient and all questions were answered. Results will be reviewed by the referring provider at the completion of this note. Per pt request, I am forwarding his results to the audiologist in [place], [place], so that a hearing aid consultation can be scheduled. Pt is returning the Marichuy OTC hearing aids because they are not providing adequate amplification to his moderately severe to severe hearing loss. All complaints were addressed during this visit to the patient's satisfaction.    Recommendations:  Follow up with ENT  Annual audiogram  Hearing protection in noise  Hearing aid consultation

## 2024-07-22 ENCOUNTER — TELEPHONE (OUTPATIENT)
Dept: AUDIOLOGY | Facility: CLINIC | Age: 69
End: 2024-07-22
Payer: MEDICARE

## 2024-07-26 ENCOUNTER — CLINICAL SUPPORT (OUTPATIENT)
Dept: AUDIOLOGY | Facility: CLINIC | Age: 69
End: 2024-07-26
Payer: MEDICARE

## 2024-07-26 DIAGNOSIS — Z71.89 HEARING AID CONSULTATION: Primary | ICD-10-CM

## 2024-07-26 NOTE — PROGRESS NOTES
Albin Fernandez was seen on 07/26/2024 for a hearing aid consultation. Pt was alone during today's visit. Patient's audiogram was discussed in detail. We also discussed the different brands, styles and levels of technology. It was decided based on the patients lifestyle that the best choice would be [employer] AI 24 with size 3M receivers AU. The price quoted was $6255 with tax for Premium aids option 2 or $ 4587.00 for Premium aids option 1. Pt will pay the $250.00 non refundable fitting fee at the time he orders and will pay the remaining balance for the hearing aids at time of fitting. Pt was also informed that insurance reimbursement by their insurance company for any hearing aid benefits would need to be filed for by the patient since [facility] does not file for insurance benefits for hearing aids at this time. The pt is going to discuss with his wife and call back when he is ready to order.  Plan of care was discussed in detail with the patient, who agreed with the plan as above. All complaints were addressed during this visit to the patient's satisfaction.

## 2024-07-28 ENCOUNTER — PATIENT MESSAGE (OUTPATIENT)
Dept: OTOLARYNGOLOGY | Facility: CLINIC | Age: 69
End: 2024-07-28
Payer: MEDICARE

## 2024-08-08 ENCOUNTER — CLINICAL SUPPORT (OUTPATIENT)
Dept: AUDIOLOGY | Facility: CLINIC | Age: 69
End: 2024-08-08
Payer: MEDICARE

## 2024-08-08 DIAGNOSIS — Z71.89 HEARING AID CONSULTATION: Primary | ICD-10-CM

## 2024-08-08 PROCEDURE — 99499 UNLISTED E&M SERVICE: CPT | Mod: S$GLB,,, | Performed by: AUDIOLOGIST

## 2024-08-26 ENCOUNTER — CLINICAL SUPPORT (OUTPATIENT)
Dept: AUDIOLOGY | Facility: CLINIC | Age: 69
End: 2024-08-26
Payer: MEDICARE

## 2024-08-26 DIAGNOSIS — Z46.1 ENCOUNTER FOR HEARING AID FITTING OF BOTH EARS: Primary | ICD-10-CM

## 2024-08-26 PROCEDURE — 99999 PR PBB SHADOW E&M-EST. PATIENT-LVL I: CPT | Mod: PBBFAC,,, | Performed by: AUDIOLOGIST

## 2024-08-26 PROCEDURE — 99499 UNLISTED E&M SERVICE: CPT | Mod: S$GLB,,, | Performed by: AUDIOLOGIST

## 2024-08-26 NOTE — PROGRESS NOTES
HEARING AID FITTING    Albin Fernandez was seen today for a hearing aid fitting.  All parts of the hearing aid, including battery, domes, wax filters, and microphones, were discussed with the patient.  Battery charging was also reviewed and practiced with the patient.  Feedback measures were taken and hearing aid was fit to patient's satisfaction.  Counseled patient on daily wear and maintenance of the hearing aid.  Mr. Fernandez acknowledged that he understood.  The hearing aids were not connected to the patient's phone. The purchase agreement, 30-day trial period, and warranties were also reviewed with patient.  It is recommended Mr. Fernandez return to clinic in 2 weeks or as needed.      Hearing Aid Details     & Model: Ploonge Komal AI 24 ARAM  Color: beige  Rt SN: [id number]  Lt SN: [id number]  Battery: rechargeable   Rt  and Dome: #3 power  and 9mm occluded domes  Lt  and Dome: #3 power  and 9mm occluded domes  Repair Warranty Exp: 11/11/2027  L&D Warranty Exp: 11/11/2027     SN: [id number]

## 2024-08-28 ENCOUNTER — TELEPHONE (OUTPATIENT)
Dept: AUDIOLOGY | Facility: CLINIC | Age: 69
End: 2024-08-28
Payer: MEDICARE

## 2024-08-28 NOTE — TELEPHONE ENCOUNTER
----- Message from Lizzie Cherry sent at 8/27/2024  4:20 PM CDT -----  Type:  Needs Medical Advice    Who Called: pt    Symptoms (please be specific): na     How long has patient had these symptoms:  clem    Pharmacy name and phone #:  na    Would the patient rather a call back or a response via [facility]? Call back    Best Call Back Number: [phone]      Additional Information: pt is asking for someone in the office to please call him back. He said he only has one hearing aid connected and he can't go another 2-3 weeks with it like that      Please call Back to advise. Thanks!

## 2024-08-29 ENCOUNTER — CLINICAL SUPPORT (OUTPATIENT)
Dept: AUDIOLOGY | Facility: CLINIC | Age: 69
End: 2024-08-29
Payer: MEDICARE

## 2024-08-29 DIAGNOSIS — Z97.4 WEARS HEARING AID IN BOTH EARS: Primary | ICD-10-CM

## 2024-08-29 PROCEDURE — 99499 UNLISTED E&M SERVICE: CPT | Mod: S$GLB,,, | Performed by: AUDIOLOGIST

## 2024-08-29 NOTE — PROGRESS NOTES
Albin Fernandez came in on 08/29/2024 for a hearing aid follow up. Pt was alone during today's visit. Pt stated that he needed help with pairing the hearing aids to the thomas. The thomas was downloaded to the phone. Paired the hearing aids to the phone and checked the thomas was working. Pt will call if he needs anything. All complaints were addressed during this visit to the patient's satisfaction. Plan of care was discussed in detail with the patient, who agreed with the plan as above.

## 2024-09-13 ENCOUNTER — CLINICAL SUPPORT (OUTPATIENT)
Dept: AUDIOLOGY | Facility: CLINIC | Age: 69
End: 2024-09-13
Payer: MEDICARE

## 2024-09-13 DIAGNOSIS — Z97.4 WEARS HEARING AID IN BOTH EARS: Primary | ICD-10-CM

## 2024-09-13 NOTE — PROGRESS NOTES
Albin Fernandez came in on 09/13/2024 for a hearing aid follow up. Pt was alone during today's visit. Pt stated he is doing good with his hearing aids and will call if he has any problems. All complaints were addressed during this visit to the patient's satisfaction. Plan of care was discussed in detail with the patient, who agreed with the plan as above.

## 2024-10-07 ENCOUNTER — TELEPHONE (OUTPATIENT)
Dept: FAMILY MEDICINE | Facility: CLINIC | Age: 69
End: 2024-10-07
Payer: MEDICARE

## 2024-10-08 ENCOUNTER — OFFICE VISIT (OUTPATIENT)
Dept: FAMILY MEDICINE | Facility: CLINIC | Age: 69
End: 2024-10-08
Payer: MEDICARE

## 2024-10-08 VITALS
RESPIRATION RATE: 17 BRPM | OXYGEN SATURATION: 98 % | SYSTOLIC BLOOD PRESSURE: 134 MMHG | DIASTOLIC BLOOD PRESSURE: 76 MMHG | BODY MASS INDEX: 34.87 KG/M2 | HEART RATE: 89 BPM | WEIGHT: 235.44 LBS | HEIGHT: 69 IN

## 2024-10-08 DIAGNOSIS — H69.93 ETD (EUSTACHIAN TUBE DYSFUNCTION), BILATERAL: Primary | ICD-10-CM

## 2024-10-08 PROCEDURE — 99213 OFFICE O/P EST LOW 20 MIN: CPT | Mod: S$GLB,,, | Performed by: FAMILY MEDICINE

## 2024-10-08 PROCEDURE — 1125F AMNT PAIN NOTED PAIN PRSNT: CPT | Mod: CPTII,S$GLB,, | Performed by: FAMILY MEDICINE

## 2024-10-08 PROCEDURE — 3075F SYST BP GE 130 - 139MM HG: CPT | Mod: CPTII,S$GLB,, | Performed by: FAMILY MEDICINE

## 2024-10-08 PROCEDURE — 3008F BODY MASS INDEX DOCD: CPT | Mod: CPTII,S$GLB,, | Performed by: FAMILY MEDICINE

## 2024-10-08 PROCEDURE — 99999 PR PBB SHADOW E&M-EST. PATIENT-LVL IV: CPT | Mod: PBBFAC,,, | Performed by: FAMILY MEDICINE

## 2024-10-08 PROCEDURE — 1159F MED LIST DOCD IN RCRD: CPT | Mod: CPTII,S$GLB,, | Performed by: FAMILY MEDICINE

## 2024-10-08 PROCEDURE — 1101F PT FALLS ASSESS-DOCD LE1/YR: CPT | Mod: CPTII,S$GLB,, | Performed by: FAMILY MEDICINE

## 2024-10-08 PROCEDURE — 1160F RVW MEDS BY RX/DR IN RCRD: CPT | Mod: CPTII,S$GLB,, | Performed by: FAMILY MEDICINE

## 2024-10-08 PROCEDURE — 3288F FALL RISK ASSESSMENT DOCD: CPT | Mod: CPTII,S$GLB,, | Performed by: FAMILY MEDICINE

## 2024-10-08 PROCEDURE — 3078F DIAST BP <80 MM HG: CPT | Mod: CPTII,S$GLB,, | Performed by: FAMILY MEDICINE

## 2024-10-08 RX ORDER — METHYLPREDNISOLONE 4 MG/1
TABLET ORAL
Qty: 1 EACH | Refills: 0 | Status: SHIPPED | OUTPATIENT
Start: 2024-10-08

## 2024-10-08 NOTE — PATIENT INSTRUCTIONS
Astelin and Flonase nasal sprays - do each of these twice a day.    Contact your PCP if any worsening or for any new concerns as we discussed.

## 2024-10-08 NOTE — PROGRESS NOTES
Subjective:       Patient ID: Albin Fernandez is a 68 y.o. male.    Chief Complaint: No chief complaint on file.    New to me patient here for UC visit.  Onset few days after a flight - b/l ear pain/clogged/hearing change.  No discharge; wears hearing aides.  Hx of Chronic A Fib      Review of Systems   HENT:  Positive for congestion and ear pain.        Objective:      Physical Exam  Constitutional:       General: He is not in acute distress.     Appearance: He is well-developed.   HENT:      Right Ear: A middle ear effusion is present. Tympanic membrane is scarred.      Left Ear: A middle ear effusion is present. Tympanic membrane is scarred.      Nose: Mucosal edema present.      Mouth/Throat:      Pharynx: Posterior oropharyngeal erythema present.   Cardiovascular:      Rate and Rhythm: Normal rate and regular rhythm.      Heart sounds: No murmur heard.  Pulmonary:      Effort: Pulmonary effort is normal.      Breath sounds: Normal breath sounds.   Musculoskeletal:      Cervical back: Neck supple.   Lymphadenopathy:      Cervical: No cervical adenopathy.   Skin:     General: Skin is warm and dry.         Assessment:       1. ETD (Eustachian tube dysfunction), bilateral        Plan:       ETD (Eustachian tube dysfunction), bilateral  -     methylPREDNISolone (MEDROL DOSEPACK) 4 mg tablet; use as directed  Dispense: 1 each; Refill: 0      Patient Instructions   Astelin and Flonase nasal sprays - do each of these twice a day.    Contact your PCP if any worsening or for any new concerns as we discussed.

## 2024-10-14 ENCOUNTER — TELEPHONE (OUTPATIENT)
Dept: OTOLARYNGOLOGY | Facility: CLINIC | Age: 69
End: 2024-10-14
Payer: MEDICARE

## 2024-10-14 NOTE — TELEPHONE ENCOUNTER
----- Message from Frances sent at 10/14/2024  9:08 AM CDT -----  Regarding: call back  Type: Patient Call Back    Who called: pt     What is the request in detail: requesting call to discuss possible defective hearing aid, says one may have a bad speaker? Is unsure if the issue is with his ears or with the device.    Can the clinic reply by [facility]?no    Would the patient rather a call back or a response via [facility]? call    Best call back number: [phone]     Additional Information:

## 2024-10-15 ENCOUNTER — OFFICE VISIT (OUTPATIENT)
Dept: OTOLARYNGOLOGY | Facility: CLINIC | Age: 69
End: 2024-10-15
Payer: MEDICARE

## 2024-10-15 VITALS — WEIGHT: 232.88 LBS | BODY MASS INDEX: 34.49 KG/M2 | HEIGHT: 69 IN

## 2024-10-15 DIAGNOSIS — H91.93 BILATERAL HEARING LOSS, UNSPECIFIED HEARING LOSS TYPE: Primary | ICD-10-CM

## 2024-10-15 PROCEDURE — 1159F MED LIST DOCD IN RCRD: CPT | Mod: CPTII,S$GLB,, | Performed by: OTOLARYNGOLOGY

## 2024-10-15 PROCEDURE — 99213 OFFICE O/P EST LOW 20 MIN: CPT | Mod: S$GLB,,, | Performed by: OTOLARYNGOLOGY

## 2024-10-15 PROCEDURE — 1125F AMNT PAIN NOTED PAIN PRSNT: CPT | Mod: CPTII,S$GLB,, | Performed by: OTOLARYNGOLOGY

## 2024-10-15 PROCEDURE — 1160F RVW MEDS BY RX/DR IN RCRD: CPT | Mod: CPTII,S$GLB,, | Performed by: OTOLARYNGOLOGY

## 2024-10-15 PROCEDURE — 3008F BODY MASS INDEX DOCD: CPT | Mod: CPTII,S$GLB,, | Performed by: OTOLARYNGOLOGY

## 2024-10-15 PROCEDURE — 1101F PT FALLS ASSESS-DOCD LE1/YR: CPT | Mod: CPTII,S$GLB,, | Performed by: OTOLARYNGOLOGY

## 2024-10-15 PROCEDURE — 99999 PR PBB SHADOW E&M-EST. PATIENT-LVL II: CPT | Mod: PBBFAC,,, | Performed by: OTOLARYNGOLOGY

## 2024-10-15 PROCEDURE — 3288F FALL RISK ASSESSMENT DOCD: CPT | Mod: CPTII,S$GLB,, | Performed by: OTOLARYNGOLOGY

## 2024-10-15 NOTE — PROGRESS NOTES
Subjective:       Patient ID: Albin Fernandez is a 68 y.o. male.    Chief Complaint: Follow-up (Pt c/o water in both ears and hearing aids aren't working )      This 68-year-old gentleman obtained hearing aids through [facility] in [place] about a month ago initially they seemed to work okay he recently had an airplane flight and for some reason after the airplane flight they sound distorted or abnormal.  When he takes them out, he has severe hearing loss, his hearing seems about like usual for him but with them in it does not seem to be going well and he really has not had any ear pain or has a cold or any other problems          Objective:      ENT Physical Exam    So his tympanic membranes and ear canals are normal today even though they looks so normal simply for completion I did a tympanogram and they were both type A.        Assessment:       1. Bilateral hearing loss, unspecified hearing loss type         Plan:          So his ear exam looks fine his tympanograms are normal he seems to be at his baseline with his hearing aids out and while I can not explain why they seemed to work well for him initially and do not now he simply needs to have them rechecked by the audiologist

## 2024-10-16 ENCOUNTER — TELEPHONE (OUTPATIENT)
Dept: AUDIOLOGY | Facility: CLINIC | Age: 69
End: 2024-10-16
Payer: MEDICARE

## 2024-10-16 NOTE — TELEPHONE ENCOUNTER
----- Message from Daniels sent at 10/15/2024  1:32 PM CDT -----  Regarding: call back  Type: Patient Call Back    Who called: pt     What is the request in detail: requesting call to schedule a hearing aid f/u appt, was told to f/u with audiology after seeing his ENT this morning. Pt says hearing aids are malfunctioning.     Can the clinic reply by [facility]?no    Would the patient rather a call back or a response via [facility]? call    Best call back number: [phone]     Additional Information:

## 2024-10-17 ENCOUNTER — PATIENT MESSAGE (OUTPATIENT)
Dept: OTOLARYNGOLOGY | Facility: CLINIC | Age: 69
End: 2024-10-17
Payer: MEDICARE

## 2024-10-18 ENCOUNTER — PATIENT MESSAGE (OUTPATIENT)
Dept: AUDIOLOGY | Facility: CLINIC | Age: 69
End: 2024-10-18
Payer: MEDICARE

## 2024-10-25 ENCOUNTER — CLINICAL SUPPORT (OUTPATIENT)
Dept: AUDIOLOGY | Facility: CLINIC | Age: 69
End: 2024-10-25
Payer: MEDICARE

## 2024-10-25 DIAGNOSIS — Z97.4 WEARS HEARING AID IN BOTH EARS: Primary | ICD-10-CM

## 2024-10-25 NOTE — PROGRESS NOTES
Albin Fernandez came in on 10/25/2024 for a hearing aid follow up. Pt was alone during today's visit. Pt stated hearing aids are not holding the programming. Sending the hearing aids in for repair. Giving a pair of demo hearing aids to use until his come back from repair. All complaints were addressed during this visit to the patient's satisfaction. Plan of care was discussed in detail with the patient, who agreed with the plan as above.

## 2024-11-12 ENCOUNTER — CLINICAL SUPPORT (OUTPATIENT)
Dept: AUDIOLOGY | Facility: CLINIC | Age: 69
End: 2024-11-12
Payer: MEDICARE

## 2024-11-12 DIAGNOSIS — Z97.4 WEARS HEARING AID IN BOTH EARS: Primary | ICD-10-CM

## 2024-11-12 PROCEDURE — 99499 UNLISTED E&M SERVICE: CPT | Mod: S$GLB,,, | Performed by: AUDIOLOGIST

## 2024-11-12 NOTE — PROGRESS NOTES
Albin Fernandez came in on 11/12/2024 for a hearing aid follow up. Pt was alone during today's visit. The hearing aids were back from repair and [employer] issued replacement hearing aids with the same serial numbers under warranty. Pt stated the hearing aids sounded good once the programming was replaced with the program from Oct 25th and then was repaired with his phone. All complaints were addressed during this visit to the patient's satisfaction. Plan of care was discussed in detail with the patient, who agreed with the plan as above.

## 2024-11-19 DIAGNOSIS — E03.9 HYPOTHYROIDISM, UNSPECIFIED TYPE: ICD-10-CM

## 2024-11-19 RX ORDER — LEVOTHYROXINE SODIUM 137 UG/1
137 TABLET ORAL
Qty: 90 TABLET | Refills: 0 | Status: SHIPPED | OUTPATIENT
Start: 2024-11-19

## 2024-11-19 NOTE — TELEPHONE ENCOUNTER
Care Due:                  Date            Visit Type   Department     Provider  --------------------------------------------------------------------------------                                SAME DAY -                              ESTABLISHED   [facility]  Last Visit: 10-      PATIENT      MEDICINE       Giovany Camarena                              EP -                              PRIMARY      [facility]  Next Visit: 01-      CARE ([facility])   MEDICINE       Jered Siegel                                                            Last  Test          Frequency    Reason                     Performed    Due Date  --------------------------------------------------------------------------------    TSH.........  12 months..  levothyroxine............  01- 01-    Health [facility] Embedded Care Due Messages. Reference number: [id number].   11/19/2024 8:04:26 AM CST

## 2024-11-20 NOTE — TELEPHONE ENCOUNTER
Refill Decision Note   Albin Fernandez  is requesting a refill authorization.  Brief Assessment and Rationale for Refill:  Approve     Medication Therapy Plan:  FOVS ACCEPTABLE USE PER ORC PROTOCOL ; RISK MINIMAL WHEN THYROID LABS NORMAL , TSH WNL    Medication Reconciliation Completed: No   Comments:     Provider Staff:     Action is required for this patient.   Please see care gap opportunities below in Care Due Message.     Thanks!  [facility]     Appointments      Date Provider   Last Visit   6/3/2024 Jered Siegel MD   Next Visit   1/28/2025 Jered Siegel MD     Note composed:6:21 PM 11/19/2024           Note composed:6:21 PM 11/19/2024

## 2025-01-28 ENCOUNTER — TELEPHONE (OUTPATIENT)
Dept: FAMILY MEDICINE | Facility: CLINIC | Age: 70
End: 2025-01-28

## 2025-01-28 ENCOUNTER — OFFICE VISIT (OUTPATIENT)
Dept: FAMILY MEDICINE | Facility: CLINIC | Age: 70
End: 2025-01-28
Payer: MEDICARE

## 2025-01-28 DIAGNOSIS — M79.672 LEFT FOOT PAIN: Primary | ICD-10-CM

## 2025-01-28 DIAGNOSIS — I48.21 PERMANENT ATRIAL FIBRILLATION: ICD-10-CM

## 2025-01-28 DIAGNOSIS — R60.9 SWELLING: ICD-10-CM

## 2025-01-28 DIAGNOSIS — E78.2 MIXED HYPERLIPIDEMIA: ICD-10-CM

## 2025-01-28 DIAGNOSIS — R79.9 ABNORMAL FINDING OF BLOOD CHEMISTRY, UNSPECIFIED: ICD-10-CM

## 2025-01-28 RX ORDER — GABAPENTIN 300 MG/1
300 CAPSULE ORAL 3 TIMES DAILY
Qty: 90 CAPSULE | Refills: 11 | Status: SHIPPED | OUTPATIENT
Start: 2025-01-28 | End: 2026-01-28

## 2025-01-28 NOTE — TELEPHONE ENCOUNTER
----- Message from Jered Siegel MD sent at 1/28/2025  8:52 AM CST -----  Regarding: xray pod ref and blood work  Please call pt to set up xray blood work and podiatry ref

## 2025-01-28 NOTE — PROGRESS NOTES
Established Patient - Audio Only Telehealth Visit       274}  The patient location is: [place]   The chief complaint leading to consultation is:   Chief Complaint   Patient presents with    Foot Pain     Visit type: Virtual visit with audio only (telephone)  Visit type: Virtual visit with audio only (telephone)  Total time spent in medical discussion with patient: 23 min  minutes  Total time spent on date of the encounter:31 minutes     The reason for the audio only service rather than synchronous audio and video virtual visit was related to technical difficulties or patient preference/necessity.     Each patient to whom I provide medical services by telemedicine is:  (1) informed of the relationship between the physician and patient and the respective role of any other health care provider with respect to management of the patient; and (2) notified that they may decline to receive medical services by telemedicine and may withdraw from such care at any time. Patient verbally consented to receive this service via voice-only telephone call.     274}     HPI:   History of Present Illness  The patient presents via virtual visit for evaluation of left foot pain, hyperlipidemia, hypothyroidism, left foot swelling, benign prostatic hyperplasia, and atrial fibrillation.    He reports experiencing left foot pain for the past few weeks, accompanied by swelling. He describes the pain as severe. There is no history of falls, twisting, or locking of the foot. He recalls a previous diagnosis of a bone spur in the same foot. He does not report any rash, fever, calf pain, or tenderness.    He is compliant with his apixaban and Eliquis regimen, and his atrial fibrillation has remained stable.    His back condition is also stable, with adherence to his prescribed medications.    He was scheduled for routine blood work.    His prostate symptoms have been stable.    MEDICATIONS  Apixaban, Eliquis          Lab Results   Component  Value Date    WBC 5.34 06/04/2024    HGB 13.4 (L) 06/04/2024    HCT 41.4 06/04/2024    MCV 95 06/04/2024     06/04/2024     06/04/2024    K 4.2 06/04/2024     06/04/2024    CALCIUM 9.9 06/04/2024    CO2 27 06/04/2024     (H) 06/04/2024    BUN 16 06/04/2024    CREATININE 1.1 06/04/2024    EGFRNORACEVR >60.0 06/04/2024    ANIONGAP 8 06/04/2024    PROT 7.1 06/04/2024    ALBUMIN 3.6 06/04/2024    BILITOT 0.5 06/04/2024    ALKPHOS 98 06/04/2024    ALT 12 06/04/2024    AST 16 06/04/2024    INR 1.3 05/02/2022    CHOL 143 09/25/2023    TRIG 101 09/25/2023    HDL 36 (L) 09/25/2023    LDLCALC 86.8 09/25/2023    TSH 3.835 01/23/2024    PSA 1.9 09/25/2023    HGBA1C 5.5 09/25/2023          Assessment and plan:    Assessment & Plan  1. Left foot pain.  A referral to podiatry is recommended for further evaluation. An x-ray will be ordered to assess the underlying cause of the pain. Gabapentin will be prescribed for pain management, and his response to this treatment will be closely monitored.    2. Hyperlipidemia.  Blood work will be conducted to monitor lipid levels.    3. Hypothyroidism.  He will continue with his current medication regimen, and his condition will be closely monitored. Blood work will be rechecked to monitor thyroid function.    4. Left foot swelling.  Given that he is on a blood thinner, it is unlikely that a blood clot is causing the swelling. An ultrasound will be ordered to further evaluate the swelling, and his condition will be closely monitored.    5. Benign prostatic hyperplasia.  His condition is stable. He will continue with his current medication regimen, and his condition will be closely monitored. Screening for gout with uric acid will also be conducted.    6. Atrial fibrillation.  His condition is stable.      AFib-stable no recent flares continue current medications monitor    Anemia-stable will get blood work and follow-up on this check iron b12         This service was  not originating from a related E/M service provided within the previous 7 days nor will  to an E/M service or procedure within the next 24 hours or my soonest available appointment.  Prevailing standard of care was able to be met in this audio-only visit.

## 2025-01-28 NOTE — TELEPHONE ENCOUNTER
----- Message from Jered Siegel MD sent at 1/28/2025  8:57 AM CST -----  Regarding: ultrasound ordered as well  Please set up ultrasound thx

## 2025-01-29 ENCOUNTER — PATIENT MESSAGE (OUTPATIENT)
Dept: FAMILY MEDICINE | Facility: CLINIC | Age: 70
End: 2025-01-29
Payer: MEDICARE

## 2025-01-29 ENCOUNTER — HOSPITAL ENCOUNTER (OUTPATIENT)
Dept: RADIOLOGY | Facility: HOSPITAL | Age: 70
Discharge: HOME | End: 2025-01-29
Attending: STUDENT IN AN ORGANIZED HEALTH CARE EDUCATION/TRAINING PROGRAM
Payer: MEDICARE

## 2025-01-29 ENCOUNTER — TELEPHONE (OUTPATIENT)
Dept: FAMILY MEDICINE | Facility: CLINIC | Age: 70
End: 2025-01-29
Payer: MEDICARE

## 2025-01-29 DIAGNOSIS — M79.672 LEFT FOOT PAIN: ICD-10-CM

## 2025-01-29 DIAGNOSIS — R60.9 SWELLING: ICD-10-CM

## 2025-01-29 PROCEDURE — 73630 X-RAY EXAM OF FOOT: CPT | Mod: 26,LT,, | Performed by: RADIOLOGY

## 2025-01-29 PROCEDURE — 93971 EXTREMITY STUDY: CPT | Mod: TC,LT

## 2025-01-29 PROCEDURE — 73630 X-RAY EXAM OF FOOT: CPT | Mod: TC,LT

## 2025-01-29 PROCEDURE — 93971 EXTREMITY STUDY: CPT | Mod: 26,LT,, | Performed by: RADIOLOGY

## 2025-02-02 DIAGNOSIS — I48.0 PAROXYSMAL ATRIAL FIBRILLATION: ICD-10-CM

## 2025-02-02 NOTE — TELEPHONE ENCOUNTER
No care due was identified.  [facility] Embedded Care Due Messages. Reference number: [id number].   2/02/2025 8:02:04 AM CST

## 2025-02-03 ENCOUNTER — OFFICE VISIT (OUTPATIENT)
Dept: PODIATRY | Facility: CLINIC | Age: 70
End: 2025-02-03
Payer: MEDICARE

## 2025-02-03 VITALS — HEIGHT: 69 IN | WEIGHT: 234.56 LBS | BODY MASS INDEX: 34.74 KG/M2

## 2025-02-03 DIAGNOSIS — M25.572 CHRONIC PAIN OF BOTH ANKLES: ICD-10-CM

## 2025-02-03 DIAGNOSIS — M19.072 OSTEOARTHRITIS OF LEFT ANKLE OR FOOT: Primary | ICD-10-CM

## 2025-02-03 DIAGNOSIS — G89.29 CHRONIC PAIN OF BOTH ANKLES: ICD-10-CM

## 2025-02-03 DIAGNOSIS — B35.1 ONYCHOMYCOSIS DUE TO DERMATOPHYTE: ICD-10-CM

## 2025-02-03 DIAGNOSIS — M72.2 PLANTAR FASCIITIS: ICD-10-CM

## 2025-02-03 DIAGNOSIS — M79.672 LEFT FOOT PAIN: ICD-10-CM

## 2025-02-03 DIAGNOSIS — M25.571 CHRONIC PAIN OF BOTH ANKLES: ICD-10-CM

## 2025-02-03 DIAGNOSIS — M19.071 OSTEOARTHRITIS OF RIGHT ANKLE OR FOOT: ICD-10-CM

## 2025-02-03 PROCEDURE — 1160F RVW MEDS BY RX/DR IN RCRD: CPT | Mod: CPTII,S$GLB,, | Performed by: PODIATRIST

## 2025-02-03 PROCEDURE — 99214 OFFICE O/P EST MOD 30 MIN: CPT | Mod: S$GLB,,, | Performed by: PODIATRIST

## 2025-02-03 PROCEDURE — 1101F PT FALLS ASSESS-DOCD LE1/YR: CPT | Mod: CPTII,S$GLB,, | Performed by: PODIATRIST

## 2025-02-03 PROCEDURE — 1125F AMNT PAIN NOTED PAIN PRSNT: CPT | Mod: CPTII,S$GLB,, | Performed by: PODIATRIST

## 2025-02-03 PROCEDURE — 3288F FALL RISK ASSESSMENT DOCD: CPT | Mod: CPTII,S$GLB,, | Performed by: PODIATRIST

## 2025-02-03 PROCEDURE — 3008F BODY MASS INDEX DOCD: CPT | Mod: CPTII,S$GLB,, | Performed by: PODIATRIST

## 2025-02-03 PROCEDURE — 1159F MED LIST DOCD IN RCRD: CPT | Mod: CPTII,S$GLB,, | Performed by: PODIATRIST

## 2025-02-03 PROCEDURE — 99999 PR PBB SHADOW E&M-EST. PATIENT-LVL IV: CPT | Mod: PBBFAC,,, | Performed by: PODIATRIST

## 2025-02-03 RX ORDER — METHYLPREDNISOLONE 4 MG/1
TABLET ORAL
Qty: 21 EACH | Refills: 1 | Status: SHIPPED | OUTPATIENT
Start: 2025-02-03 | End: 2025-02-24

## 2025-02-03 RX ORDER — METOPROLOL SUCCINATE 50 MG/1
50 TABLET, EXTENDED RELEASE ORAL
Qty: 90 TABLET | Refills: 2 | Status: SHIPPED | OUTPATIENT
Start: 2025-02-03

## 2025-02-03 RX ORDER — FLUCONAZOLE 200 MG/1
200 TABLET ORAL WEEKLY
Qty: 28 TABLET | Refills: 0 | Status: SHIPPED | OUTPATIENT
Start: 2025-02-03 | End: 2025-08-12

## 2025-02-03 NOTE — TELEPHONE ENCOUNTER
Refill Decision Note   Albin Eastonzier  is requesting a refill authorization.  Brief Assessment and Rationale for Refill:  Approve     Medication Therapy Plan:         Comments:     Note composed:3:45 AM 02/03/2025

## 2025-02-03 NOTE — PROGRESS NOTES
"  [address] [facility] [address]  [place] [address]  Phone: [phone]   [phone]    Patient's PCP:Jered Siegel MD  Referring Provider: Dr. Jered Siegel    Subjective:      Chief Complaint:: Ankle Pain (Bilateral ankle pain, left worse)    Ankle Pain   Pertinent negatives include no numbness.     Albin Fernandez is a 69 y.o. male who presents today with a complaint of bilateral ankle pain, left worse. The current episode started several months.  The symptoms include aching, numbness, and stabbing pain. Probable cause of complaint unknown.  The symptoms are aggravated by pressure and flexing. The problem has stayed the same. Treatment to date have included ice and elevate which provided some relief.         Vitals:    02/03/25 1539   Weight: 106.4 kg (234 lb 9.1 oz)   Height: 5' 9" (1.753 m)   PainSc:   7      Shoe Size: 10.5    Past Surgical History:   Procedure Laterality Date    ARTHROSCOPY OF ELBOW Left 08/09/2019    Procedure: ARTHROSCOPY, ELBOW;  Surgeon: Giovany Marquez II, MD;  Location: [facility] OR;  Service: Orthopedics;  Laterality: Left;    BACK SURGERY      CATARACT EXTRACTION Right     FOOT SURGERY      IRRIGATION AND DEBRIDEMENT OF UPPER EXTREMITY  08/09/2019    Procedure: IRRIGATION AND DEBRIDEMENT, UPPER EXTREMITY;  Surgeon: Giovany Marquez II, MD;  Location: [facility] OR;  Service: Orthopedics;;    KIDNEY SURGERY      one kidney removed    NEPHRECTOMY      TRANSRECTAL ULTRASOUND EXAMINATION N/A 11/23/2020    Procedure: TRANSRECTAL ULTRASOUND;  Surgeon: Roselyn Shrestha MD;  Location: [facility] OR;  Service: Urology;  Laterality: N/A;    TRANSURETHRAL RESECTION OF PROSTATE N/A 01/07/2022    Procedure: TURP (TRANSURETHRAL RESECTION OF PROSTATE);  Surgeon: Cate Sharp MD;  Location: [facility] OR;  Service: Urology;  Laterality: N/A;     Past Medical History:   Diagnosis Date    Arthritis     Atrial fibrillation     BPH with obstruction/lower urinary tract symptoms     " Cardiomyopathy, dilated     Disorder of kidney and ureter     Hyperlipidemia     Hypertension     Hypothyroidism     Sleep apnea      Family History   Problem Relation Name Age of Onset    Cirrhosis Father      Pancreatic cancer Mother      Breast cancer Sister          Social History:   Marital Status:   Alcohol History:  reports no history of alcohol use.  Tobacco History:  reports that he has never smoked. He has never been exposed to tobacco smoke. He has never used smokeless tobacco.  Drug History:  reports no history of drug use.    Review of patient's allergies indicates:   Allergen Reactions    Antihistimine Other (See Comments)    Antihistamines - alkylamine Other (See Comments)     Urinary retention       Current Outpatient Medications   Medication Sig Dispense Refill    albuterol (VENTOLIN HFA) 90 mcg/actuation inhaler Inhale 2 puffs into the lungs every 6 (six) hours as needed for Wheezing. Rescue 8 g 0    apixaban (ELIQUIS) 5 mg Tab Take 1 tablet (5 mg total) by mouth 2 (two) times daily. 180 tablet 3    atorvastatin (LIPITOR) 10 MG tablet TAKE 1 TABLET (10 MG TOTAL) BY MOUTH EVERY EVENING. 90 tablet 3    azelastine (ASTELIN) 137 mcg (0.1 %) nasal spray 1 spray (137 mcg total) by Nasal route 2 (two) times daily as needed for Rhinitis (sinus congestion, post nasal drip). 30 mL 2    finasteride (PROSCAR) 5 mg tablet Take 1 tablet (5 mg total) by mouth once daily. 90 tablet 3    fluticasone propionate (FLONASE) 50 mcg/actuation nasal spray 1 spray (50 mcg total) by Each Nostril route once daily. 11.1 mL 0    gabapentin (NEURONTIN) 300 MG capsule Take 1 capsule (300 mg total) by mouth 3 (three) times daily. 90 capsule 11    HYDROcodone-acetaminophen (NORCO)  mg per tablet Take 1 tablet by mouth 2 (two) times daily.      ipratropium (ATROVENT) 42 mcg (0.06 %) nasal spray 2 sprays by Each Nostril route 4 (four) times daily. 15 mL 10    levothyroxine (SYNTHROID) 137 MCG Tab tablet TAKE ONE TABLET  BY MOUTH EVERY MORNING BEFORE breakfast 90 tablet 0    methylPREDNISolone (MEDROL DOSEPACK) 4 mg tablet use as directed 1 each 0    metoprolol succinate (TOPROL-XL) 50 MG 24 hr tablet TAKE one TABLET BY MOUTH ONCE DAILY 90 tablet 2    pantoprazole (PROTONIX) 40 MG tablet Take 40 mg by mouth every morning.      senna-docusate 8.6-50 mg (SENNA WITH DOCUSATE SODIUM) 8.6-50 mg per tablet Take 2 tablets by mouth 2 (two) times daily. 120 tablet 5    tamsulosin (FLOMAX) 0.4 mg Cap Take 1 capsule (0.4 mg total) by mouth once daily. Take in evening. 90 capsule 3    fluconazole (DIFLUCAN) 200 MG Tab Take 1 tablet (200 mg total) by mouth once a week. for 28 doses 28 tablet 0    methylPREDNISolone (MEDROL DOSEPACK) 4 mg tablet use as directed 21 each 1    sildenafiL (VIAGRA) 100 MG tablet Take 1 tablet (100 mg total) by mouth daily as needed for Erectile Dysfunction. (Patient not taking: Reported on 10/8/2024) 30 tablet 5    valACYclovir (VALTREX) 500 MG tablet Take 1 tablet (500 mg total) by mouth 3 (three) times daily. 90 tablet 2     No current facility-administered medications for this visit.     Facility-Administered Medications Ordered in Other Visits   Medication Dose Route Frequency Provider Last Rate Last Admin    electrolyte-S (ISOLYTE)   Intravenous Continuous Casi Murillo MD 10 mL/hr at 01/07/22 0829 New Bag at 01/07/22 1043    HYDROmorphone (PF) injection 0.2 mg  0.2 mg Intravenous Q5 Min PRN Casi Murillo MD        LIDOcaine (PF) 10 mg/ml (1%) injection 10 mg  1 mL Intradermal Once PRN Casi Murillo MD        LIDOcaine (PF) 10 mg/ml (1%) injection 10 mg  1 mL Intradermal Once Casi Murillo MD        oxyCODONE immediate release tablet 5 mg  5 mg Oral Q3H PRN Casi Murillo MD   5 mg at 01/07/22 1110       Review of Systems   Constitutional:  Negative for chills, fatigue, fever and unexpected weight change.   HENT:  Negative for hearing loss and trouble swallowing.    Eyes:   Negative for photophobia and visual disturbance.   Respiratory:  Negative for cough, shortness of breath and wheezing.    Cardiovascular:  Positive for leg swelling. Negative for chest pain and palpitations.   Gastrointestinal:  Negative for abdominal pain and nausea.   Genitourinary:  Negative for dysuria and frequency.   Musculoskeletal:  Positive for arthralgias. Negative for back pain, gait problem, joint swelling, myalgias and neck pain.   Skin:  Negative for rash and wound.   Neurological:  Negative for tremors, seizures, weakness, numbness and headaches.   Hematological:  Does not bruise/bleed easily.         Objective:        Physical Exam:   Foot Exam    General  General Appearance: appears stated age and healthy   Orientation: alert and oriented to person, place, and time   Affect: appropriate   Gait: antalgic       Right Foot/Ankle     Inspection and Palpation  Ecchymosis: none  Tenderness: ankle   Swelling: none   Arch: normal  Skin Exam: dry skin; no drainage, no ulcer and no erythema   Fungus Toenails: present    Neurovascular  Dorsalis pedis: 2+  Posterior tibial: 2+  Capillary Refill: 2+  Varicose veins: present  Saphenous nerve sensation: normal  Tibial nerve sensation: normal  Superficial peroneal nerve sensation: normal  Deep peroneal nerve sensation: normal  Sural nerve sensation: normal    Edema  Type of edema: non-pitting    Muscle Strength  Ankle dorsiflexion: 5  Ankle plantar flexion: 5  Ankle inversion: 5  Ankle eversion: 5  Great toe extension: 5  Great toe flexion: 5    Range of Motion    Passive  Ankle dorsiflexion: 5      Tests  Anterior drawer: negative   Talar tilt: negative   PT Tinel's sign: negative    Paresthesia: negative    Left Foot/Ankle      Inspection and Palpation  Ecchymosis: none  Tenderness: plantar fascia and ankle   Swelling: ankle   Arch: normal  Skin Exam: dry skin; no drainage, no ulcer and no erythema   Fungus Toenails: present    Neurovascular  Dorsalis pedis:  2+  Posterior tibial: 2+  Capillary refill: 2+  Varicose veins: present  Saphenous nerve sensation: normal  Tibial nerve sensation: normal  Superficial peroneal nerve sensation: normal  Deep peroneal nerve sensation: normal  Sural nerve sensation: normal    Edema  Type of edema: non-pitting    Muscle Strength  Ankle dorsiflexion: 5  Ankle plantar flexion: 5  Ankle inversion: 5  Ankle eversion: 5  Great toe extension: 5  Great toe flexion: 5    Range of Motion    Passive  Ankle dorsiflexion: 5      Tests  Anterior drawer: negative   Talar tilt: negative   PT Tinel's sign: negative  Paresthesia: negative  Comments  Pain on palpation of the central plantar heel. No pain present  with side to side compression of the calcaneus. Negative tinnel's sign  at the tarsal tunnel. Negative Carlton's nerve pain. Negative Calcaneal nerve pain. No soft tissue masses. Pain absent  with dorsiflexion of the ankle. No edema, erythema, or ecchymosis noted.       Physical Exam  Cardiovascular:      Pulses:           Dorsalis pedis pulses are 2+ on the right side and 2+ on the left side.        Posterior tibial pulses are 2+ on the right side and 2+ on the left side.   Musculoskeletal:      Right ankle: Tenderness present.      Left ankle: Swelling present. Tenderness present.   Feet:      Right foot:      Skin integrity: Dry skin present. No ulcer or erythema.      Toenail Condition: Fungal disease present.     Left foot:      Skin integrity: Dry skin present. No ulcer or erythema.      Toenail Condition: Fungal disease present.              Left Ankle/Foot Exam     Comments:  Pain on palpation of the central plantar heel. No pain present  with side to side compression of the calcaneus. Negative tinnel's sign  at the tarsal tunnel. Negative Carlton's nerve pain. Negative Calcaneal nerve pain. No soft tissue masses. Pain absent  with dorsiflexion of the ankle. No edema, erythema, or ecchymosis noted.         Muscle Strength   Right Lower  Extremity   Ankle Dorsiflexion:  5   Plantar flexion:  5/5  Left Lower Extremity   Ankle Dorsiflexion:  5   Plantar flexion:  5/5     Vascular Exam     Right Pulses  Dorsalis Pedis:      2+  Posterior Tibial:      2+        Left Pulses  Dorsalis Pedis:      2+  Posterior Tibial:      2+           Imaging: X-Ray Foot Complete 3 view Left  Narrative: CLINICAL HISTORY:  (MRN [id number] y/o  (1955) M    Pain in left foot    TECHNIQUE:  (A# [id number], Exam time 1/29/2025 10:02)    [id number] XR FOOT COMPLETE 3 VIEW LEFT 4 view(s) obtained.    COMPARISON:  Radiograph from a 12/29/2022.    FINDINGS:  No acute fracture or dislocation. The joints and interspaces appear to be maintained.  There is moderate stippled faint calcification along the proximal plantar calcaneal-fascial region, similar to the previous exam suggesting chronic fasciitis, with a moderate sized plantar-calcaneal bone spur.  There is faint calcification along the medial aspect of the navicular, possibly from old infection, trauma, chondrocalcinosis/CPPD, or os navicularis.  There is a tiny os peroneum present.  Mild soft tissue swelling is seen around the foot with no radiopaque foreign body identified.  Impression: No acute fracture or dislocation.    Electronically signed by: Valeriy Elam  Date:    01/29/2025  Time:    11:00  US Lower Extremity Veins Left  Narrative: EXAMINATION:  US LOWER EXTREMITY VEINS LEFT    CLINICAL HISTORY:  rule out dvt; Edema, unspecified    TECHNIQUE:  Duplex and color flow Doppler evaluation and graded compression of the left lower extremity veins was performed.    COMPARISON:  None    FINDINGS:  Left thigh veins: The common femoral, femoral, popliteal, upper greater saphenous, and deep femoral veins are patent and free of thrombus. The veins are normally compressible and have normal phasic flow and augmentation response.    Left calf veins: The visualized calf veins are patent.  Impression: No evidence of deep venous  thrombosis in the left lower extremity.    Electronically signed by: Berna Denise  Date:    01/29/2025  Time:    10:23               Assessment:       1. Osteoarthritis of left ankle or foot    2. Osteoarthritis of right ankle or foot    3. Plantar fasciitis    4. Left foot pain    5. Chronic pain of both ankles    6. Onychomycosis due to dermatophyte      Plan:   Osteoarthritis of left ankle or foot    Osteoarthritis of right ankle or foot    Plantar fasciitis    Left foot pain  -     methylPREDNISolone (MEDROL DOSEPACK) 4 mg tablet; use as directed  Dispense: 21 each; Refill: 1    Chronic pain of both ankles  -     methylPREDNISolone (MEDROL DOSEPACK) 4 mg tablet; use as directed  Dispense: 21 each; Refill: 1    Onychomycosis due to dermatophyte  -     fluconazole (DIFLUCAN) 200 MG Tab; Take 1 tablet (200 mg total) by mouth once a week. for 28 doses  Dispense: 28 tablet; Refill: 0      Follow up if symptoms worsen or fail to improve.    Procedures        Discussed different treatment options for heel pain. I gave written and verbal instructions on heel cord stretching and this was demonstrated for the patient. Patient expressed understanding. Discussed wearing appropriate shoe gear and avoiding flats, slippers, sandals, barefoot, and sockfeet. Recommended arch supports. My recommendation for OTC supports is Spenco polysorb replacement insoles or patient may elect more aggressive treatment with prescription arch supports. We also discussed cortisone injections and NSAID therapy.     I discussed the conservative treatent of arthritis consisiting of shoe modification, OTC NSAID, cortisone shots, avoiding painful activities and common sense.  I explained that the arthritis may become more painful causng more disability and the possibility of further treatment.     Fungal infection of toenails explained. Treatment options including no treatment, periodic debridement, topical medications, oral medications, and  removal of the nail were discussed, as well as success rates and risks of recurrence.         Counseling:     I provided patient education verbally regarding:   Patient diagnosis, treatment options, as well as alternatives, risks, and benefits.     This note was created using Dragon voice recognition software that occasionally misinterpreted phrases or words.

## 2025-02-04 NOTE — PATIENT INSTRUCTIONS
What Is Arthritis in the Foot?  Degenerative arthritis is a condition that slowly wears away joints, the area where bones meet and move. In the beginning, you may notice that the affected joint seems stiff. It may even ache. As the joint lining (cartilage) breaks down, the bones rub against each other, causing pain and swelling. Over time, small pieces of rough or splintered bone (bone spurs) develop, and the joints range of motion becomes limited. But movement doesnt have to cause pain. The effects of arthritis can be reduced.    The big-toe joint  When arthritis affects your big toe, your foot hurts when it pushes off the ground. Arthritis often appears in the big-toe joint along with a bunion (a bony bump at the side of the joint) or a bone spur on top of the joint.    Other joints  When arthritis affects the rear or midfoot joints, you feel pain when you put weight on your foot. Arthritis may affect the joint where the ankle and foot meet. It may also affect other joints nearby.  Date Last Reviewed: 7/1/2016 © 2000-2017 [employer]. [address] [place] [address], [place] [address]. All rights reserved. This information is not intended as a substitute for professional medical care. Always follow your healthcare professional's instructions.

## 2025-02-19 DIAGNOSIS — E03.9 HYPOTHYROIDISM, UNSPECIFIED TYPE: ICD-10-CM

## 2025-02-19 RX ORDER — LEVOTHYROXINE SODIUM 137 UG/1
137 TABLET ORAL
Qty: 90 TABLET | Refills: 3 | Status: SHIPPED | OUTPATIENT
Start: 2025-02-19

## 2025-02-19 NOTE — TELEPHONE ENCOUNTER
Refill Decision Note   Albin Rebecca  is requesting a refill authorization.  Brief Assessment and Rationale for Refill:  Approve     Medication Therapy Plan:         Comments:     Note composed:11:05 AM 02/19/2025

## 2025-02-19 NOTE — TELEPHONE ENCOUNTER
No care due was identified.  Health [facility] Embedded Care Due Messages. Reference number: [id number].   2/19/2025 8:30:32 AM CST

## 2025-03-12 ENCOUNTER — PATIENT MESSAGE (OUTPATIENT)
Dept: OTOLARYNGOLOGY | Facility: CLINIC | Age: 70
End: 2025-03-12
Payer: MEDICARE

## 2025-03-14 ENCOUNTER — CLINICAL SUPPORT (OUTPATIENT)
Dept: AUDIOLOGY | Facility: CLINIC | Age: 70
End: 2025-03-14
Payer: MEDICARE

## 2025-03-14 DIAGNOSIS — Z97.4 WEARS HEARING AID IN BOTH EARS: Primary | ICD-10-CM

## 2025-03-14 NOTE — PROGRESS NOTES
Albin Fernandez came in on 03/14/2025 for a hearing aid follow up. Pt was alone during today's visit. Pt stated the right hearing aid is not working and sending it off for repair with the left hearing aid. All complaints were addressed during this visit to the patient's satisfaction. Plan of care was discussed in detail with the patient, who agreed with the plan as above.

## 2025-03-24 DIAGNOSIS — Z00.00 ENCOUNTER FOR MEDICARE ANNUAL WELLNESS EXAM: ICD-10-CM

## 2025-03-30 ENCOUNTER — PATIENT MESSAGE (OUTPATIENT)
Dept: FAMILY MEDICINE | Facility: CLINIC | Age: 70
End: 2025-03-30
Payer: MEDICARE

## 2025-03-30 DIAGNOSIS — I48.21 PERMANENT ATRIAL FIBRILLATION: ICD-10-CM

## 2025-03-30 DIAGNOSIS — I48.0 PAROXYSMAL ATRIAL FIBRILLATION: Primary | ICD-10-CM

## 2025-03-31 NOTE — TELEPHONE ENCOUNTER
Care Due:                  Date            Visit Type   Department     Provider  --------------------------------------------------------------------------------                                ESTABLISHED                              PATIENT -    [facility]  Last Visit: 01-      [facility]      MEDICINE       Jered Siegel  Next Visit: None Scheduled  None         None Found                                                            Last  Test          Frequency    Reason                     Performed    Due Date  --------------------------------------------------------------------------------    Cr..........  12 months..  valACYclovir.............  06- 05-    [facility] Embedded Care Due Messages. Reference number: [id number].   3/30/2025 7:31:05 PM CDT

## 2025-04-01 ENCOUNTER — PATIENT MESSAGE (OUTPATIENT)
Dept: AUDIOLOGY | Facility: CLINIC | Age: 70
End: 2025-04-01
Payer: MEDICARE

## 2025-04-08 ENCOUNTER — CLINICAL SUPPORT (OUTPATIENT)
Dept: AUDIOLOGY | Facility: CLINIC | Age: 70
End: 2025-04-08
Payer: MEDICARE

## 2025-04-08 DIAGNOSIS — Z97.4 WEARS HEARING AID IN BOTH EARS: Primary | ICD-10-CM

## 2025-04-08 PROCEDURE — 99499 UNLISTED E&M SERVICE: CPT | Mod: S$GLB,,, | Performed by: AUDIOLOGIST

## 2025-04-08 NOTE — PROGRESS NOTES
Albin Fernandez came in on 04/08/2025 for a hearing aid follow up. Pt was alone during today's visit. Pt stated that he is having trouble with pairing the hearing aids to his phone. Paired the hearing aids to the phone.  All complaints were addressed during this visit to the patient's satisfaction. Plan of care was discussed in detail with the patient, who agreed with the plan as above.

## 2025-05-13 ENCOUNTER — OFFICE VISIT (OUTPATIENT)
Dept: FAMILY MEDICINE | Facility: CLINIC | Age: 70
End: 2025-05-13
Payer: MEDICARE

## 2025-05-13 VITALS
OXYGEN SATURATION: 97 % | DIASTOLIC BLOOD PRESSURE: 60 MMHG | SYSTOLIC BLOOD PRESSURE: 110 MMHG | HEIGHT: 69 IN | HEART RATE: 89 BPM | TEMPERATURE: 98 F | BODY MASS INDEX: 35.27 KG/M2 | RESPIRATION RATE: 16 BRPM | WEIGHT: 238.13 LBS

## 2025-05-13 DIAGNOSIS — S81.801A WOUND OF RIGHT LOWER EXTREMITY, INITIAL ENCOUNTER: Primary | ICD-10-CM

## 2025-05-13 DIAGNOSIS — E03.8 OTHER SPECIFIED HYPOTHYROIDISM: ICD-10-CM

## 2025-05-13 DIAGNOSIS — I48.0 PAROXYSMAL ATRIAL FIBRILLATION: ICD-10-CM

## 2025-05-13 DIAGNOSIS — D64.9 ANEMIA, UNSPECIFIED TYPE: ICD-10-CM

## 2025-05-13 PROCEDURE — 99999 PR PBB SHADOW E&M-EST. PATIENT-LVL IV: CPT | Mod: PBBFAC,,, | Performed by: STUDENT IN AN ORGANIZED HEALTH CARE EDUCATION/TRAINING PROGRAM

## 2025-05-13 RX ORDER — DOXYCYCLINE HYCLATE 100 MG
100 TABLET ORAL 2 TIMES DAILY
Qty: 14 TABLET | Refills: 0 | Status: SHIPPED | OUTPATIENT
Start: 2025-05-13 | End: 2025-05-20

## 2025-05-13 RX ORDER — MUPIROCIN 20 MG/G
OINTMENT TOPICAL 3 TIMES DAILY
Qty: 3 G | Refills: 1 | Status: SHIPPED | OUTPATIENT
Start: 2025-05-13 | End: 2025-05-20

## 2025-05-13 NOTE — PATIENT INSTRUCTIONS
Branden Talamantes,     If you are due for any health screening(s) below please notify me so we can arrange them to be ordered and scheduled. Most healthy patients at your age complete them, but you are free to accept or refuse.     If you can't do it, I'll definitely understand. If you can, I'd certainly appreciate it!    Tests to Keep You Healthy    Colon Cancer Screening: DUE      Its time for your colon cancer screening     Colorectal cancer is one of the leading causes of cancer death for men and women but it doesnt have to be. Screenings can prevent colorectal cancer or find it early enough to treat and cure the disease.     Our records indicate that you may be overdue for colon cancer screening. A colonoscopy or stool screening test can help identify patients at risk for developing colon cancer. Cancer screenings save lives, so schedule yours today to stay healthy.     A colonoscopy is the preferred test for detecting colon cancer. It is needed only once every 10 years if results are negative. While you are sedated, a flexible, lighted tube with a tiny camera is inserted into the rectum and advanced through the colon to look for cancers.     An alternative screening test that is used at home and returned to the lab may also be used. It detects hidden blood in bowel movements which could indicate cancer in the colon. If results are positive, you will need a colonoscopy to determine if the blood is a sign of cancer. This type of follow up (diagnostic) colonoscopy usually requires additional copays as required by your insurance provider.     If you recently had your colon cancer screening performed outside of [facility], please let your Health care team know so that they can update your health record. Please contact your PCP if you have any questions.

## 2025-05-13 NOTE — PROGRESS NOTES
Identified pts name and , TDap vaccine administered IM, pt tolerated well. Aseptic technique maintained. Pain scale 0 out of 10 on pain scale. Pt instructed to wait in clinic for 15 minutes after injection was given.

## 2025-05-13 NOTE — PROGRESS NOTES
[facility] Note    Subjective:    The HPI and pertinent ROS is included in the Diagnostic Impression Remarks section at the end of the note. Please see below for further details. Chief complaint is at end of note.     Albin is a pleasant intelligent patient who is here for evaluation.     Modified Medications    No medications on file       Data reviewed 274}  Previous medical records reviewed and summarized in plan section at end of note.      If you are due for any health screening(s) below please notify me so we can arrange them to be ordered and scheduled. Most healthy patients at your age complete them, but you are free to accept or refuse. If you can't do it, I'll definitely understand. If you can, I'd certainly appreciate it!     Tests to Keep You Healthy    Colon Cancer Screening: DUE      The following portions of the patient's history were reviewed and updated as appropriate: allergies, current medications, past family history, past medical history, past social history, past surgical history and problem list.    He  has a past medical history of Arthritis, Atrial fibrillation, BPH with obstruction/lower urinary tract symptoms, Cardiomyopathy, dilated, Disorder of kidney and ureter, Hyperlipidemia, Hypertension, Hypothyroidism, and Sleep apnea.  He  has a past surgical history that includes Back surgery; Foot surgery; Kidney surgery; Nephrectomy; Arthroscopy of elbow (Left, 08/09/2019); Irrigation and debridement of upper extremity (08/09/2019); Transrectal ultrasound examination (N/A, 11/23/2020); Transurethral resection of prostate (N/A, 01/07/2022); and Cataract extraction (Right).    He  reports that he has never smoked. He has never been exposed to tobacco smoke. He has never used smokeless tobacco. He reports that he does not drink alcohol and does not use drugs.  He family history includes Breast cancer in his sister; Cirrhosis in his father; Pancreatic cancer in his  "mother.    Review of patient's allergies indicates:   Allergen Reactions    Antihistimine Other (See Comments)    Antihistamines - alkylamine Other (See Comments)     Urinary retention       Tobacco Use: Low Risk  (5/13/2025)    Patient History     Smoking Tobacco Use: Never     Smokeless Tobacco Use: Never     Passive Exposure: Never     Physical Examination  Physical Exam  Extremities: There is some erythema on the right lower leg wound, but it is healing.     General appearance: alert, cooperative, no distress  Neck: no thyromegaly, no neck stiffness  Lungs: clear to auscultation, no wheezes, rales or rhonchi, symmetric air entry  Heart: normal rate, regular rhythm, normal S1, S2, no murmurs, rubs, clicks or gallops  Abdomen: soft, nontender, nondistended, no rigidity, rebound, or guarding.   Back: no point tenderness over spine  Extremities: peripheral pulses normal, no unilateral leg swelling or calf tenderness   Neurological:alert, oriented, normal speech, no new focal findings or movement disorder noted from baseline      BP Readings from Last 3 Encounters:   05/13/25 110/60   10/08/24 134/76   06/13/24 134/70     Wt Readings from Last 3 Encounters:   05/13/25 108 kg (238 lb 1.6 oz)   02/03/25 106.4 kg (234 lb 9.1 oz)   10/15/24 105.6 kg (232 lb 14.4 oz)     /60 (BP Location: Right arm, Patient Position: Sitting)   Pulse 89   Temp 98.3 °F (36.8 °C) (Oral)   Resp 16   Ht 5' 9" (1.753 m)   Wt 108 kg (238 lb 1.6 oz)   SpO2 97%   BMI 35.16 kg/m²    274}  Laboratory: I have reviewed old labs below:    274}    Lab Results   Component Value Date    WBC 5.05 01/29/2025    HGB 12.6 (L) 01/29/2025    HCT 38.7 (L) 01/29/2025    MCV 94 01/29/2025     01/29/2025     06/04/2024    K 4.2 06/04/2024     06/04/2024    CALCIUM 9.9 06/04/2024    CO2 27 06/04/2024    BUN 16 06/04/2024    CREATININE 1.1 06/04/2024    EGFRNORACEVR >60.0 06/04/2024    LABPROT 10.6 01/07/2022    ALBUMIN 3.6 06/04/2024 "    BILITOT 0.5 06/04/2024    ALKPHOS 98 06/04/2024    ALT 12 06/04/2024    AST 16 06/04/2024    INR 1.3 05/02/2022    CHOL 127 01/29/2025    TRIG 147 01/29/2025    HDL 34 (L) 01/29/2025    LDLCALC 63.6 01/29/2025    TSH 2.181 01/29/2025    PSA 1.9 09/25/2023    HGBA1C 5.5 09/25/2023      Lab reviewed by me: Particular labs of significance that I will monitor, workup, or treat to improve are mentioned below in diagnostic impression remarks.    Results         Imaging/EKG: I have reviewed the pertinent results and my findings are noted in remarks.  274}    CC:   Chief Complaint   Patient presents with    Wound Check        274}    History of Present Illness  The patient is an 86-year-old male who presents for a follow-up visit.    He reports a minor incident that occurred last week during a trip to [place] to  a dog. He accidentally hit his leg on the side of a step and a piece of clean metal, resulting in a wound. A few days later, he noticed the wound appeared inflamed. He has been applying Neosporin to the area and believes there is no residual metal in the wound. The redness has slightly improved, but the area remains tender. He also reports a pulsating stinging sensation. He is currently on anticoagulant therapy and experienced significant bleeding from the wound. His last tetanus vaccination was administered in 2017.    He is due for a colonoscopy. He had one in 2021.    He has been waiting on a prescription for stomach irritation for a few months.       Assessment/Plan  Albin Fernandez is a 69 y.o. male who presents to clinic with:  1. Wound of right lower extremity, initial encounter    2. Anemia, unspecified type    3. Paroxysmal atrial fibrillation    4. Other specified hypothyroidism       274}    Assessment & Plan  1. Wound on the right lower leg.  The wound was caused by a clean metal object approximately a week ago. It is currently healing but exhibits some erythema. There is no discharge, and  the pain is described as a mild pulsing sting. He is on a blood thinner, which should prevent any blood clot. Mupirocin ointment will be prescribed for topical application. Doxycycline will be prescribed as a prophylactic measure to prevent infection. A tetanus shot will be administered today.    2. Health maintenance.  He is due for a colonoscopy, as his last one was in 2021.        AFib-stable no recent flares continue current medications monitor  Anemia-stable will get blood work and follow-up on this  Hypothyroidism-stable monitor blood work continue current dose     This note was generated with the assistance of ambient listening technology. Verbal consent was obtained by the patient and accompanying visitor(s) for the recording of patient appointment to facilitate this note. I attest to having reviewed and edited the generated note for accuracy, though some syntax or spelling errors may persist. Please contact the author of this note for any clarification.      1. Wound of right lower extremity, initial encounter  - mupirocin (BACTROBAN) 2 % ointment; Apply topically 3 (three) times daily. for 7 days  Dispense: 3 g; Refill: 1  - doxycycline (VIBRA-TABS) 100 MG tablet; Take 1 tablet (100 mg total) by mouth 2 (two) times daily. for 7 days  Dispense: 14 tablet; Refill: 0  - DIPH,PERTUSS(ACEL),TET VAC(PF)(ADULT)(ADACEL)(TDaP)    2. Anemia, unspecified type    3. Paroxysmal atrial fibrillation    4. Other specified hypothyroidism      Jered Siegel MD   274}    If you are due for any health screening(s) below please notify me so we can arrange them to be ordered and scheduled. Most healthy patients at your age complete them, but you are free to accept or refuse.     If you can't do it, I'll definitely understand. If you can, I'd certainly appreciate it!   Tests to Keep You Healthy    Colon Cancer Screening: DUE

## 2025-05-26 ENCOUNTER — OFFICE VISIT (OUTPATIENT)
Dept: URGENT CARE | Facility: CLINIC | Age: 70
End: 2025-05-26
Payer: MEDICARE

## 2025-05-26 VITALS
OXYGEN SATURATION: 98 % | SYSTOLIC BLOOD PRESSURE: 151 MMHG | HEIGHT: 69 IN | RESPIRATION RATE: 20 BRPM | TEMPERATURE: 97 F | WEIGHT: 230 LBS | HEART RATE: 82 BPM | BODY MASS INDEX: 34.07 KG/M2 | DIASTOLIC BLOOD PRESSURE: 94 MMHG

## 2025-05-26 DIAGNOSIS — R09.81 CONGESTION OF NASAL SINUS: ICD-10-CM

## 2025-05-26 DIAGNOSIS — J01.40 ACUTE NON-RECURRENT PANSINUSITIS: Primary | ICD-10-CM

## 2025-05-26 LAB
CTP QC/QA: YES
CTP QC/QA: YES
FLUAV AG NPH QL: NEGATIVE
FLUBV AG NPH QL: NEGATIVE
SARS-COV+SARS-COV-2 AG RESP QL IA.RAPID: NEGATIVE

## 2025-05-26 PROCEDURE — 87804 INFLUENZA ASSAY W/OPTIC: CPT | Mod: QW,,,

## 2025-05-26 PROCEDURE — 99214 OFFICE O/P EST MOD 30 MIN: CPT | Mod: S$GLB,,,

## 2025-05-26 PROCEDURE — 87811 SARS-COV-2 COVID19 W/OPTIC: CPT | Mod: QW,S$GLB,,

## 2025-05-26 RX ORDER — TRIAMCINOLONE ACETONIDE 55 UG/1
2 SPRAY, METERED NASAL DAILY
Qty: 6.7 ML | Refills: 0 | Status: SHIPPED | OUTPATIENT
Start: 2025-05-26 | End: 2025-06-09

## 2025-05-26 RX ORDER — AZELASTINE 1 MG/ML
1 SPRAY, METERED NASAL 2 TIMES DAILY
Qty: 30 ML | Refills: 0 | Status: SHIPPED | OUTPATIENT
Start: 2025-05-26 | End: 2026-05-26

## 2025-05-26 RX ORDER — METHYLPREDNISOLONE 4 MG/1
TABLET ORAL
Qty: 21 EACH | Refills: 0 | Status: SHIPPED | OUTPATIENT
Start: 2025-05-26

## 2025-05-26 RX ORDER — METHYLPREDNISOLONE 4 MG/1
TABLET ORAL
Qty: 21 EACH | Refills: 0 | Status: SHIPPED | OUTPATIENT
Start: 2025-05-26 | End: 2025-05-26

## 2025-05-26 NOTE — PROGRESS NOTES
"Subjective:      Patient ID: Albin Fernandez is a 69 y.o. male.    Vitals:  height is 5' 9" (1.753 m) and weight is 104.3 kg (230 lb). His oral temperature is 97.2 °F (36.2 °C). His blood pressure is 151/94 (abnormal) and his pulse is 82. His respiration is 20 and oxygen saturation is 98%.     Chief Complaint: Sinus Problem    In clinic with a complaint of nasal congestion for 2 days.  He has been using leftover antibiotics from previous infection, nasal irrigation, and Zyrtec.  He denies fever, but does reported 99.0 T-max.  Denies headache, sore throat, coughing, chest pain, GI, or  symptoms.  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    Sinus Problem  This is a new problem. The current episode started in the past 7 days. The problem has been gradually worsening since onset. There has been no fever. His pain is at a severity of 0/10. He is experiencing no pain. Associated symptoms include congestion and sinus pressure. Pertinent negatives include no coughing or headaches. (Bilateral ear congestion) Past treatments include oral decongestants. The treatment provided no relief.       Constitution: Negative for fever.   HENT:  Positive for congestion and sinus pressure.    Neck: neck negative.   Cardiovascular:  Negative for palpitations.   Eyes: Negative.    Respiratory:  Negative for cough.    Gastrointestinal:  Negative for abdominal pain, nausea, vomiting, constipation, diarrhea and bowel incontinence.   Endocrine: negative.   Musculoskeletal:  Negative for pain and muscle ache.   Skin: Negative.    Allergic/Immunologic: Negative for recurrent sinus infections.   Neurological:  Negative for headaches.   Hematologic/Lymphatic: Negative.    Psychiatric/Behavioral: Negative.        Objective:     Physical Exam   Constitutional: He is oriented to person, place, and time. He is cooperative.   HENT:   Head: Normocephalic and atraumatic.   Ears:   Right Ear: External ear " normal.   Left Ear: External ear normal.      Comments: Bilateral hearing aids  Nose: Nose normal.   Mouth/Throat: Uvula is midline, oropharynx is clear and moist and mucous membranes are normal. Mucous membranes are moist. Oropharynx is clear.   Eyes: Conjunctivae and lids are normal. Pupils are equal, round, and reactive to light. Extraocular movement intact   Neck: Trachea normal and phonation normal. Neck supple.   Cardiovascular: Normal rate, normal heart sounds and normal pulses. An irregular rhythm present.   Pulmonary/Chest: Effort normal and breath sounds normal. No stridor. He has no wheezes. He has no rhonchi. He has no rales.   Abdominal: Normal appearance. Soft. flat abdomen   Musculoskeletal: Normal range of motion.         General: Normal range of motion.   Neurological: no focal deficit. He is alert, oriented to person, place, and time and at baseline. He has normal motor skills, normal sensation and intact cranial nerves (2-12). Gait and coordination normal. GCS eye subscore is 4. GCS verbal subscore is 5. GCS motor subscore is 6.   Skin: Skin is warm, dry and no rash. Capillary refill takes 2 to 3 seconds.   Psychiatric: He experiences Normal attention and Normal perception. His speech is normal and behavior is normal. Mood, judgment and thought content normal.   Nursing note and vitals reviewed.      Assessment:     1. Acute non-recurrent pansinusitis    2. Congestion of nasal sinus        Plan:       Acute non-recurrent pansinusitis  -     methylPREDNISolone (MEDROL DOSEPACK) 4 mg tablet; use as directed  Dispense: 21 each; Refill: 0  -     triamcinolone (NASACORT) 55 mcg nasal inhaler; 2 sprays by Nasal route once daily. for 14 days  Dispense: 6.7 mL; Refill: 0  -     azelastine (ASTELIN) 137 mcg (0.1 %) nasal spray; 1 spray (137 mcg total) by Nasal route 2 (two) times daily.  Dispense: 30 mL; Refill: 0    Congestion of nasal sinus  -     SARS Coronavirus 2 Antigen, POCT Manual Read  -     POCT  Influenza A/B          Medical Decision Making:   History:   Old Medical Records: I decided to obtain old medical records.  Old Records Summarized: records from clinic visits.  Initial Assessment:   Well-appearing in no distress.  Two days of symptoms.  Afebrile.  Reassuring exam.  Likely viral etiology.  We will use steroids, and nasal sprays to manage symptoms.  Return indications discussed in depth  Differential Diagnosis:   COVID, influenza, bacterial sinusitis  Clinical Tests:   Lab Tests: Ordered and Reviewed

## 2025-05-26 NOTE — PATIENT INSTRUCTIONS
If illness exceeds 10 days since onset follow up with your primary care doctor return to clinic    Avoid hot showers, or placed ice over sinuses to avoid rebound    When using Astelin, and Nasacort rinse between sprays.  For instance, use Astelin, wait 5-15 minutes, use nasal saline, wait 5-15 minutes, and then use Nasacort.  This will ensure adequate absorption of medications.

## 2025-06-05 NOTE — TELEPHONE ENCOUNTER
Patient Education     Naloxone (nal OKS one)   Brand Names: US Evzio [DSC]; Kloxxado; LifEMS Naloxone [DSC]; Narcan; Narcan [OTC]; Rextovy; RiVive [OTC]; Zimhi   Brand Names: Yousif Narcan; S.O.S. Naloxone Hydrochloride; TEVA Naloxone   What is this drug used for?   It is used to avoid side effects from some drugs.  It is used to treat some overdoses.  It may be given to you for other reasons. Talk with the doctor.  What do I need to tell my doctor BEFORE I take this drug?   If you are allergic to this drug; any part of this drug; or any other drugs, foods, or substances. Tell your doctor about the allergy and what signs you had.  This drug may interact with other drugs or health problems.  Tell your doctor and pharmacist about all of your drugs (prescription or OTC, natural products, vitamins) and health problems. You must check to make sure that it is safe for you to take this drug with all of your drugs and health problems. Do not start, stop, or change the dose of any drug without checking with your doctor.  What are some things I need to know or do while I take this drug?   All products:   Tell all of your health care providers that you take this drug. This includes your doctors, nurses, pharmacists, and dentists.  If you have opioid use disorder and are given this drug, you may have signs of opioid withdrawal. Some of these signs are sweating, shaking, fever, chills, diarrhea, upset stomach, throwing up, stomach cramps, goosebumps, body aches, anxiety, feeling irritable, yawning, or fast heartbeat.  In infants younger than 4 weeks old who have been getting opioid drugs on a regular basis, sudden withdrawal may be life-threatening if not treated right away. Get medical help right away if your child has a seizure, is crying more than normal, or has increased reflexes.  Tell your doctor if you are pregnant, plan on getting pregnant, or are breast-feeding. You will need to talk about the benefits and risks to you  Refill request sent to provider.   and the baby.  If this drug is used during pregnancy, it may cause withdrawal in the unborn baby. A doctor will need to check on the unborn baby after this drug is used.  Injection products other than auto-injectors and prefilled syringes:   Very bad side effects have happened when this drug has been given after surgery. This includes high or low blood pressure, abnormal heartbeats, and certain lung or heart problems. Sometimes, these side effects have led to brain problems, coma, and death. Talk with the doctor.  What are some side effects that I need to call my doctor about right away?   WARNING/CAUTION: Even though it may be rare, some people may have very bad and sometimes deadly side effects when taking a drug. Tell your doctor or get medical help right away if you have any of the following signs or symptoms that may be related to a very bad side effect:  Signs of an allergic reaction, like rash; hives; itching; red, swollen, blistered, or peeling skin with or without fever; wheezing; tightness in the chest or throat; trouble breathing, swallowing, or talking; unusual hoarseness; or swelling of the mouth, face, lips, tongue, or throat.  Signs of high or low blood pressure like very bad headache or dizziness, passing out, or change in eyesight.  Seizures.  Shortness of breath.  Chest pain or pressure, a fast heartbeat, or an abnormal heartbeat.  A burning, numbness, or tingling feeling that is not normal.  Mood changes.  Hallucinations (seeing or hearing things that are not there).  What are some other side effects of this drug?   All drugs may cause side effects. However, many people have no side effects or only have minor side effects. Call your doctor or get medical help if any of these side effects or any other side effects bother you or do not go away:  Nose spray:   Muscle pain.  Muscle spasm.  Headache.  Dry nose.  Runny or stuffy nose.  Swelling in the nose.  Constipation.  Tooth pain.  Dry  skin.  Stomach pain.  Dizziness.  Feeling tired or weak.  Some people may have signs like sweating, shaking, upset stomach, or feeling angry when they wake up after getting this drug. This is normal.  All injection products:   Upset stomach or throwing up.  Dizziness.  Irritation where the shot is given.  Hot flashes.  Flushing.  These are not all of the side effects that may occur. If you have questions about side effects, call your doctor. Call your doctor for medical advice about side effects.  You may report side effects to your national health agency.  You may report side effects to the FDA at 1-923.254.2715. You may also report side effects at https://www.fda.gov/medwatch.  How is this drug best taken?   Use this drug as ordered by your doctor. Read all information given to you. Follow all instructions closely.  Nose spray, auto-injector shot , and prefilled syringes :   Be sure you know the signs of an overdose and when to use this drug. If you are not sure, talk with your doctor.  Be sure you know how to use before an emergency happens. Read the package insert and instructions for use that come with this drug. If you have any questions about how to use this drug, talk with the doctor or pharmacist.  Someone else may have to give this drug. Be sure others know where this drug is stored and how to give it if needed.  After using this drug, overdose symptoms may go away and come back. It is important to get medical help right away after using this drug.  Each device has only 1 dose and cannot be reused. If another dose is needed, you will need to use a new device.  If this drug expires, get a refill before a dose is needed.  Nose spray:   For the nose only.  If using more than 1 dose, switch nostrils with each dose.  Do not take this drug out of the pouch until you are ready to use it. Do not prime or test the spray before using it.  If this drug freezes, some brands may be thawed at room temperature before  use. If an emergency happens and this drug is frozen, do not wait for it to thaw. Get medical help right away.  Prefilled syringes and auto-injectors:   It is given as a shot into a muscle or into the fatty part of the skin.  Jab straight into the outer thigh as you have been told. This drug may be given through clothes if needed. Inject and hold for as long as you were told.  Do not use this drug if the solution changes color, is cloudy, or has particles. Get a new one.  All other injection products:   It is given as a shot into a muscle, vein, or into the fatty part of the skin.  What do I do if I miss a dose?   Get medical help right away.  How do I store and/or throw out this drug?   Nose spray:   Store in the original container at room temperature.  Do not store above 104°F (40°C ).  Do not freeze.  Protect from heat and light.  Prefilled syringes and auto-injectors:   Store at room temperature. Do not freeze.  Protect from heat.  Store in the case you were given to protect from light.  All other injection products:   If you need to store this drug at home, talk with your doctor, nurse, or pharmacist about how to store it.  All products:   Keep all drugs in a safe place. Keep all drugs out of the reach of children and pets.  Throw away unused or  drugs. Do not flush down a toilet or pour down a drain unless you are told to do so. Check with your pharmacist if you have questions about the best way to throw out drugs. There may be drug take-back programs in your area.  General drug facts   If your symptoms or health problems do not get better or if they become worse, call your doctor.  Do not share your drugs with others and do not take anyone else's drugs.  Some drugs may have another patient information leaflet. If you have any questions about this drug, please talk with your doctor, nurse, pharmacist, or other health care provider.  Some drugs may have another patient information leaflet. Check with  your pharmacist. If you have any questions about this drug, please talk with your doctor, nurse, pharmacist, or other health care provider.  If you think there has been an overdose, call your poison control center or get medical care right away. Be ready to tell or show what was taken, how much, and when it happened.  Consumer Information Use and Disclaimer   This generalized information is a limited summary of diagnosis, treatment, and/or medication information. It is not meant to be comprehensive and should be used as a tool to help the user understand and/or assess potential diagnostic and treatment options. It does NOT include all information about conditions, treatments, medications, side effects, or risks that may apply to a specific patient. It is not intended to be medical advice or a substitute for the medical advice, diagnosis, or treatment of a health care provider based on the health care provider's examination and assessment of a patient's specific and unique circumstances. Patients must speak with a health care provider for complete information about their health, medical questions, and treatment options, including any risks or benefits regarding use of medications. This information does not endorse any treatments or medications as safe, effective, or approved for treating a specific patient. Up[employer] and its affiliates disclaim any warranty or liability relating to this information or the use thereof. The use of this information is governed by the Terms of Use, available at https://www.wolters[employer] Solutionsuwer.com/en/know/clinical-effectiveness-terms.  Last Reviewed Date   2023-09-21  Copyright   © 2024 [employer] Inc[employer] and its affiliates and/or licensors. All rights reserved.  Patient Education     Buprenorphine and Naloxone (byoo pre NOR feen & nal OKS one)   Brand Names: US Bunavail [DSC]; Suboxone; Zubsolv   Brand Names: Yousif PMS-Buprenorphine-Naloxone; Suboxone; TEVA-Buprenorphine/Naloxone   What is  this drug used for?   It is used to treat opioid use disorder. Opioid drugs include heroin and prescription pain drugs like oxycodone and morphine.  Do not use for pain relief or on an as needed basis.  What do I need to tell my doctor BEFORE I take this drug?   If you are allergic to this drug; any part of this drug; or any other drugs, foods, or substances. Tell your doctor about the allergy and what signs you had.  If you have liver disease.  If you have not been taking an opioid drug.  If you have taken certain drugs for depression or Parkinson's disease in the last 14 days. This includes isocarboxazid, phenelzine, tranylcypromine, selegiline, or rasagiline. Very high blood pressure may happen.  If you are taking any of these drugs: Linezolid or methylene blue.  This is not a list of all drugs or health problems that interact with this drug.  Tell your doctor and pharmacist about all of your drugs (prescription or OTC, natural products, vitamins) and health problems. You must check to make sure that it is safe for you to take this drug with all of your drugs and health problems. Do not start, stop, or change the dose of any drug without checking with your doctor.  What are some things I need to know or do while I take this drug?   Tell all of your health care providers that you take this drug. This includes your doctors, nurses, pharmacists, and dentists.  Avoid driving and doing other tasks or actions that call for you to be alert until you see how this drug affects you.  To lower the chance of feeling dizzy or passing out, rise slowly if you have been sitting or lying down. Be careful going up and down stairs.  Your doctor may order naloxone to keep with you to help treat an opioid overdose if needed. Opioid overdose may happen if you start taking opioid drugs again or if too much of this drug is taken. If you have questions about how to get or use naloxone, talk with your doctor or pharmacist. If you think  there has been an opioid overdose, get medical care right away even if naloxone has been used.  Long-term use of an opioid drug may lead to lower sex hormone levels. Call your doctor if you have a lowered interest in sex, fertility problems, no menstrual period, or ejaculation problems.  Even one dose of this drug may be deadly if it is taken by someone else or by accident, especially in children. If this drug is taken by someone else or by accident, get medical help right away.  Signs of opioid withdrawal have happened with this drug. Call your doctor right away if you have sweating, chills, diarrhea or stomach pain that is not normal, anxiety, feeling irritable, or yawning.  Liver problems have rarely happened with this drug. Sometimes, this has been deadly. Call your doctor right away if you have signs of liver problems like dark urine, tiredness, decreased appetite, upset stomach or stomach pain, light-colored stools, throwing up, or yellow skin or eyes.  This drug has an opioid in it. Severe side effects have happened when opioid drugs were used with benzodiazepines, alcohol, marijuana, other forms of cannabis, or street drugs. This includes severe drowsiness, breathing problems, and death. Benzodiazepines include drugs like alprazolam, diazepam, and lorazepam. If you have questions, talk with the doctor.  Do not take with alcohol or products that have alcohol. Unsafe and sometimes deadly effects may happen.  Dental problems like cavities, infections, and loss of teeth have happened with buprenorphine products that are dissolved in the mouth. This has even happened in people who did not already have dental problems. Call your doctor and your dentist right away if you have any problems with your teeth or gums.  If you are pregnant or plan to get pregnant, talk with your doctor right away. Using this drug for a long time during pregnancy may lead to withdrawal in the  baby. Withdrawal in the  can be  life-threatening if not treated.  Tell your doctor if you are breast-feeding or plan to breast-feed. This drug passes into breast milk and may harm your baby.  What are some side effects that I need to call my doctor about right away?   WARNING/CAUTION: Even though it may be rare, some people may have very bad and sometimes deadly side effects when taking a drug. Tell your doctor or get medical help right away if you have any of the following signs or symptoms that may be related to a very bad side effect:  Signs of an allergic reaction, like rash; hives; itching; red, swollen, blistered, or peeling skin with or without fever; wheezing; tightness in the chest or throat; trouble breathing, swallowing, or talking; unusual hoarseness; or swelling of the mouth, face, lips, tongue, or throat.  Signs of low blood sugar like dizziness, headache, feeling sleepy, feeling weak, shaking, a fast heartbeat, confusion, hunger, or sweating.  Change in eyesight.  Feeling nervous and excitable.  Change in balance.  Depression or other mood changes.  Feeling confused, not able to focus, or change in behavior.  Extra muscle action or slow movement.  Slurred speech.  Feeling sluggish, drunk, or out of sorts.  A heartbeat that does not feel normal.  Noisy breathing.  Breathing problems during sleep (sleep apnea).  This drug may cause very bad and sometimes deadly breathing problems. Call your doctor right away if you have slow, shallow, or trouble breathing.  Get medical help right away if you feel very sleepy, very dizzy, or if you pass out. Caregivers or others need to get medical help right away if the patient does not respond, does not answer or react like normal, or will not wake up.  Taking an opioid drug like this drug may lead to a rare but severe adrenal gland problem. Call your doctor right away if you feel very tired or weak, you pass out, or you have severe dizziness, very upset stomach, throwing up, or decreased  appetite.  A severe and sometimes deadly problem called serotonin syndrome may happen if you take this drug with certain other drugs. Call your doctor right away if you have agitation; change in balance; confusion; hallucinations; fever; fast or abnormal heartbeat; flushing; muscle twitching or stiffness; seizures; shivering or shaking; sweating a lot; severe diarrhea, upset stomach, or throwing up; or severe headache.  What are some other side effects of this drug?   All drugs may cause side effects. However, many people have no side effects or only have minor side effects. Call your doctor or get medical help if any of these side effects or any other side effects bother you or do not go away:  All products:   Feeling dizzy, sleepy, tired, or weak.  Constipation, diarrhea, stomach pain, upset stomach, or throwing up.  Headache.  Trouble sleeping.  Sweating a lot.  Flushing.  Back pain.  Under the tongue (sublingual) film:   Burning.  Numbness or tingling in the mouth.  Pain where it was placed.  Redness.  These are not all of the side effects that may occur. If you have questions about side effects, call your doctor. Call your doctor for medical advice about side effects.  You may report side effects to your national health agency.  You may report side effects to the FDA at 1-964.986.2625. You may also report side effects at https://www.fda.gov/medwatch.  How is this drug best taken?   Use this drug as ordered by your doctor. Read all information given to you. Follow all instructions closely.  All products:   Take this drug at the same time of day.  Do not chew, cut, or swallow this drug.  Do not eat, drink, smoke, or talk while this drug is dissolving.  Take by mouth only. Very bad and sometimes deadly side effects may happen if this drug is injected.  After this drug has dissolved, take a large sip of water, swish it around in your mouth, and swallow. Wait at least 1 hour before brushing your teeth.  Take good  care of your teeth. See a dentist often.  Have blood work checked as you have been told by the doctor. Talk with the doctor.  If you are 65 or older, use this drug with care. You could have more side effects.  This drug may be habit-forming with long-term use.  This drug has a risk of misuse. Use this drug only as you were told by your doctor. Tell your doctor if you have ever had alcohol or drug use disorder.  You will be watched closely to make sure you do not misuse this drug or develop drug use disorder.  Do not stop taking this drug all of a sudden without calling your doctor. You may have a greater risk of signs of withdrawal. If you need to stop this drug, you will want to slowly stop it as ordered by your doctor.  Many drugs interact with this drug and can raise the chance of side effects like deadly breathing problems. Talk with your doctor and pharmacist to make sure it is safe to use this drug with all of your drugs.  All film products:   Open right before use.  Be sure your hands are dry before you touch this drug.  This drug must be taken whole. Do not cut or tear this drug. Do not touch the film with your tongue or finger once it has been placed.  Under the tongue (sublingual) film:   Some products need to be placed under the tongue. Some products may be placed under the tongue or on the inside of the cheek. Be sure you know how this drug needs to be taken. If you are not sure, check with the pharmacist.  If using under the tongue, wet mouth with water. Place film under the tongue close to the base on the left or right side and let it dissolve.  If using on the inside of the cheek, wet the inside of your cheek with your tongue or water. Place the film inside the mouth on a wet cheek and let it dissolve.  If using 2 films, place on opposite sides. Try not to let films touch.  If using 3 films, place the third film under the tongue after the first 2 films have dissolved.  Cheek film:   Wet the inside of  your cheek with your tongue or water.  Place the film inside the mouth on a wet cheek. Hold for 5 seconds so it sticks to the cheek. Let it dissolve.  Place the side of the film with the writing against the inside of the cheek.  If using 2 films, place on opposite sides. If using many films, do not place more than 2 films on the inside of 1 cheek at a time.  Under the tongue (sublingual) tablet:   Place tablet under the tongue and let dissolve.  If your doctor tells you to use more than 1 tablet at a time, ask your doctor how to take them.  What do I do if I miss a dose?   Take a missed dose as soon as you think about it.  If it is close to the time for your next dose, skip the missed dose and go back to your normal time.  Do not take 2 doses at the same time or extra doses.  If you are not sure what to do if you miss a dose, call your doctor.  How do I store and/or throw out this drug?   All products:   Store at room temperature in a dry place. Do not store in a bathroom.  Store this drug in a safe place where children cannot see or reach it, and where other people cannot get to it. A locked box or area may help keep this drug safe. Keep all drugs away from pets.  All film products:   Do not freeze.  Store in foil pouch until ready for use.  General drug facts   Throw away unused or  drugs. Do not flush down a toilet or pour down a drain unless you are told to do so. Check with your pharmacist if you have questions about the best way to throw out drugs. There may be drug take-back programs in your area.  If your symptoms or health problems do not get better or if they become worse, call your doctor.  Do not share your drugs with others and do not take anyone else's drugs.  Some drugs may have another patient information leaflet. If you have any questions about this drug, please talk with your doctor, nurse, pharmacist, or other health care provider.  This drug comes with an extra patient fact sheet called a  Medication Guide. Read it with care. Read it again each time this drug is refilled. If you have any questions about this drug, please talk with the doctor, pharmacist, or other health care provider.  If you think there has been an overdose, call your poison control center or get medical care right away. Be ready to tell or show what was taken, how much, and when it happened.  Consumer Information Use and Disclaimer   This generalized information is a limited summary of diagnosis, treatment, and/or medication information. It is not meant to be comprehensive and should be used as a tool to help the user understand and/or assess potential diagnostic and treatment options. It does NOT include all information about conditions, treatments, medications, side effects, or risks that may apply to a specific patient. It is not intended to be medical advice or a substitute for the medical advice, diagnosis, or treatment of a health care provider based on the health care provider's examination and assessment of a patient's specific and unique circumstances. Patients must speak with a health care provider for complete information about their health, medical questions, and treatment options, including any risks or benefits regarding use of medications. This information does not endorse any treatments or medications as safe, effective, or approved for treating a specific patient. Up[employer], [employer]. and its affiliates disclaim any warranty or liability relating to this information or the use thereof. The use of this information is governed by the Terms of Use, available at https://www.wolters[employer] Technologiesuwer.com/en/know/clinical-effectiveness-terms.  Last Reviewed Date   2024-01-29  Copyright   © 2024 [employer], [employer]. and its affiliates and/or licensors. All rights reserved.

## 2025-06-06 ENCOUNTER — OFFICE VISIT (OUTPATIENT)
Dept: FAMILY MEDICINE | Facility: CLINIC | Age: 70
End: 2025-06-06
Payer: MEDICARE

## 2025-06-06 ENCOUNTER — HOSPITAL ENCOUNTER (OUTPATIENT)
Dept: RADIOLOGY | Facility: CLINIC | Age: 70
Discharge: HOME | End: 2025-06-06
Attending: PHYSICIAN ASSISTANT
Payer: MEDICARE

## 2025-06-06 ENCOUNTER — RESULTS FOLLOW-UP (OUTPATIENT)
Dept: FAMILY MEDICINE | Facility: CLINIC | Age: 70
End: 2025-06-06

## 2025-06-06 VITALS
HEART RATE: 72 BPM | SYSTOLIC BLOOD PRESSURE: 110 MMHG | DIASTOLIC BLOOD PRESSURE: 68 MMHG | HEIGHT: 69 IN | WEIGHT: 236.75 LBS | OXYGEN SATURATION: 97 % | RESPIRATION RATE: 17 BRPM | TEMPERATURE: 98 F | BODY MASS INDEX: 35.07 KG/M2

## 2025-06-06 DIAGNOSIS — R05.9 COUGH, UNSPECIFIED TYPE: ICD-10-CM

## 2025-06-06 DIAGNOSIS — R05.9 COUGH, UNSPECIFIED TYPE: Primary | ICD-10-CM

## 2025-06-06 DIAGNOSIS — E78.2 MIXED HYPERLIPIDEMIA: ICD-10-CM

## 2025-06-06 DIAGNOSIS — E03.8 OTHER SPECIFIED HYPOTHYROIDISM: ICD-10-CM

## 2025-06-06 DIAGNOSIS — J01.00 ACUTE MAXILLARY SINUSITIS, RECURRENCE NOT SPECIFIED: ICD-10-CM

## 2025-06-06 DIAGNOSIS — I48.21 PERMANENT ATRIAL FIBRILLATION: ICD-10-CM

## 2025-06-06 DIAGNOSIS — J34.89 SINUS PRESSURE: ICD-10-CM

## 2025-06-06 DIAGNOSIS — M51.360 DEGENERATION OF INTERVERTEBRAL DISC OF LUMBAR REGION WITH DISCOGENIC BACK PAIN: ICD-10-CM

## 2025-06-06 DIAGNOSIS — S81.801D WOUND OF RIGHT LOWER EXTREMITY, SUBSEQUENT ENCOUNTER: ICD-10-CM

## 2025-06-06 PROCEDURE — 1101F PT FALLS ASSESS-DOCD LE1/YR: CPT | Mod: CPTII,S$GLB,, | Performed by: PHYSICIAN ASSISTANT

## 2025-06-06 PROCEDURE — 3078F DIAST BP <80 MM HG: CPT | Mod: CPTII,S$GLB,, | Performed by: PHYSICIAN ASSISTANT

## 2025-06-06 PROCEDURE — 71046 X-RAY EXAM CHEST 2 VIEWS: CPT | Mod: TC,FY,PO

## 2025-06-06 PROCEDURE — 99999 PR PBB SHADOW E&M-EST. PATIENT-LVL V: CPT | Mod: PBBFAC,,, | Performed by: PHYSICIAN ASSISTANT

## 2025-06-06 PROCEDURE — 99214 OFFICE O/P EST MOD 30 MIN: CPT | Mod: S$GLB,,, | Performed by: PHYSICIAN ASSISTANT

## 2025-06-06 PROCEDURE — 3074F SYST BP LT 130 MM HG: CPT | Mod: CPTII,S$GLB,, | Performed by: PHYSICIAN ASSISTANT

## 2025-06-06 PROCEDURE — 3288F FALL RISK ASSESSMENT DOCD: CPT | Mod: CPTII,S$GLB,, | Performed by: PHYSICIAN ASSISTANT

## 2025-06-06 PROCEDURE — G2211 COMPLEX E/M VISIT ADD ON: HCPCS | Mod: S$GLB,,, | Performed by: PHYSICIAN ASSISTANT

## 2025-06-06 PROCEDURE — 3008F BODY MASS INDEX DOCD: CPT | Mod: CPTII,S$GLB,, | Performed by: PHYSICIAN ASSISTANT

## 2025-06-06 PROCEDURE — 1126F AMNT PAIN NOTED NONE PRSNT: CPT | Mod: CPTII,S$GLB,, | Performed by: PHYSICIAN ASSISTANT

## 2025-06-06 PROCEDURE — 71046 X-RAY EXAM CHEST 2 VIEWS: CPT | Mod: 26,,, | Performed by: STUDENT IN AN ORGANIZED HEALTH CARE EDUCATION/TRAINING PROGRAM

## 2025-06-06 RX ORDER — AMOXICILLIN AND CLAVULANATE POTASSIUM 875; 125 MG/1; MG/1
1 TABLET, FILM COATED ORAL 2 TIMES DAILY
Qty: 20 TABLET | Refills: 0 | Status: SHIPPED | OUTPATIENT
Start: 2025-06-06 | End: 2025-06-16

## 2025-06-06 RX ORDER — BENZONATATE 100 MG/1
100 CAPSULE ORAL 3 TIMES DAILY PRN
Qty: 30 CAPSULE | Refills: 0 | Status: SHIPPED | OUTPATIENT
Start: 2025-06-06 | End: 2025-06-16

## 2025-06-06 NOTE — PROGRESS NOTES
Patient ID: Albin Fernandez is a 69 y.o. male.        History of Present Illness    Mr. Fernandez presents today with respiratory symptoms and cough for one week. He reports previous fever that has now resolved. He describes significant sinus pressure and congestion, noting the pressure is more intense than he has ever experienced. He recently received steroids from urgent care which provided noticeable improvement the following night. He has completed the course of steroids. He injured his leg six weeks ago by hitting it on a bed while in [place]. The injury developed an infection and has been slow to heal. Despite treatment with antibiotics and topical salve prescribed by Dr. Siegel, the injury has not significantly improved. Yesterday, he re-injured the same area, causing bleeding, which he attributes to being on Eliquis. He denies history of diabetes. He currently takes Flonase and Eliquis. He reports allergies to antihistamines, which limits his ability to take OTC medications.         Review of patient's allergies indicates:   Allergen Reactions    Antihistimine Other (See Comments)    Antihistamines - alkylamine Other (See Comments)     Urinary retention       Current Medications[1]    Lab Results   Component Value Date    WBC 5.05 01/29/2025    HGB 12.6 (L) 01/29/2025    HCT 38.7 (L) 01/29/2025     01/29/2025    CHOL 127 01/29/2025    TRIG 147 01/29/2025    HDL 34 (L) 01/29/2025    ALT 12 06/04/2024    AST 16 06/04/2024     06/04/2024    K 4.2 06/04/2024     06/04/2024    CREATININE 1.1 06/04/2024    BUN 16 06/04/2024    CO2 27 06/04/2024    TSH 2.181 01/29/2025    PSA 1.9 09/25/2023    INR 1.3 05/02/2022    HGBA1C 5.5 09/25/2023       Review of Systems   Constitutional:  Negative for activity change, appetite change and fever.   HENT:  Positive for postnasal drip, sinus pressure and sinus pain. Negative for rhinorrhea.    Eyes:  Negative for visual disturbance.   Respiratory:  Positive for  cough. Negative for shortness of breath.    Cardiovascular:  Negative for chest pain.   Gastrointestinal:  Negative for abdominal distention and abdominal pain.   Genitourinary:  Negative for difficulty urinating and dysuria.   Musculoskeletal:  Negative for arthralgias and myalgias.   Neurological:  Negative for headaches.   Hematological:  Negative for adenopathy.   Psychiatric/Behavioral:  The patient is not nervous/anxious.        Objective:   Physical Exam  Constitutional:       General: He is not in acute distress.     Appearance: Normal appearance.   HENT:      Head: Normocephalic and atraumatic.      Comments: Maxillary sinuses TTP     Right Ear: External ear normal.      Left Ear: External ear normal.      Ears:      Comments: Hearing aids in place     Mouth/Throat:      Pharynx: No oropharyngeal exudate or posterior oropharyngeal erythema.   Eyes:      Conjunctiva/sclera: Conjunctivae normal.   Cardiovascular:      Rate and Rhythm: Normal rate and regular rhythm.   Pulmonary:      Effort: Pulmonary effort is normal. No respiratory distress.      Breath sounds: Normal breath sounds. No wheezing.   Abdominal:      General: Bowel sounds are normal. There is no distension.      Palpations: There is no mass.      Tenderness: There is no abdominal tenderness.   Musculoskeletal:      Right lower leg: No edema.      Left lower leg: No edema.   Lymphadenopathy:      Cervical: No cervical adenopathy.   Skin:     Findings: Erythema and lesion present.      Comments: Right lateral distal lower extremity- superficial wound with mild surrounding erythema- resolving   Neurological:      Mental Status: He is alert and oriented to person, place, and time.   Psychiatric:         Behavior: Behavior normal.                Assessment/Plan     1. Cough, unspecified type    2. Sinus pressure    3. Permanent atrial fibrillation    4. Degeneration of intervertebral disc of lumbar region with discogenic back pain    5. Other  specified hypothyroidism    6. Mixed hyperlipidemia    7. Wound of right lower extremity, subsequent encounter       Assessment & Plan    Assessed week-long respiratory symptoms, no current fever.  Vital signs and oxygen levels normal, reassuring.  Considered recent steroid treatment from urgent care in overall assessment.  Evaluated leg wound from previous injury, noted slow healing but improving.  Determined additional antibiotic treatment may address both respiratory infection and leg wound.       Albin was seen today for cough.    Diagnoses and all orders for this visit:      Cough, unspecified type  -     X-Ray Chest PA And Lateral; Future  -     benzonatate (TESSALON) 100 MG capsule; Take 1 capsule (100 mg total) by mouth 3 (three) times daily as needed for Cough.    Sinus pressure  Discussed OTC medication  Permanent atrial fibrillation  Stable  Continue current medication  Degeneration of intervertebral disc of lumbar region with discogenic back pain  stable  Other specified hypothyroidism  Stable  Continue current medication  Mixed hyperlipidemia  Stable  Continue current medication  Wound of right lower extremity, subsequent encounter  Healing well  Acute maxillary sinusitis, recurrence not specified  -     amoxicillin-clavulanate 875-125mg (AUGMENTIN) 875-125 mg per tablet; Take 1 tablet by mouth 2 (two) times daily. for 10 days                   This note was generated with the assistance of ambient listening technology. Verbal consent was obtained by the patient and accompanying visitor(s) for the recording of patient appointment to facilitate this note. I attest to having reviewed and edited the generated note for accuracy, though some syntax or spelling errors may persist. Please contact the author of this note for any clarification.           [1]   Current Outpatient Medications:     apixaban (ELIQUIS) 5 mg Tab, Take 1 tablet (5 mg total) by mouth 2 (two) times daily., Disp: 180 tablet, Rfl: 3     azelastine (ASTELIN) 137 mcg (0.1 %) nasal spray, 1 spray (137 mcg total) by Nasal route 2 (two) times daily., Disp: 30 mL, Rfl: 0    finasteride (PROSCAR) 5 mg tablet, Take 1 tablet (5 mg total) by mouth once daily., Disp: 90 tablet, Rfl: 3    fluticasone propionate (FLONASE) 50 mcg/actuation nasal spray, 1 spray (50 mcg total) by Each Nostril route once daily., Disp: 11.1 mL, Rfl: 0    gabapentin (NEURONTIN) 300 MG capsule, Take 1 capsule (300 mg total) by mouth 3 (three) times daily., Disp: 90 capsule, Rfl: 11    HYDROcodone-acetaminophen (NORCO)  mg per tablet, Take 1 tablet by mouth 2 (two) times daily., Disp: , Rfl:     ipratropium (ATROVENT) 42 mcg (0.06 %) nasal spray, 2 sprays by Each Nostril route 4 (four) times daily., Disp: 15 mL, Rfl: 10    levothyroxine (SYNTHROID) 137 MCG Tab tablet, TAKE ONE TABLET BY MOUTH EVERY MORNING BEFORE breakfast, Disp: 90 tablet, Rfl: 3    metoprolol succinate (TOPROL-XL) 50 MG 24 hr tablet, TAKE one TABLET BY MOUTH ONCE DAILY, Disp: 90 tablet, Rfl: 2    pantoprazole (PROTONIX) 40 MG tablet, Take 40 mg by mouth every morning., Disp: , Rfl:     senna-docusate 8.6-50 mg (SENNA WITH DOCUSATE SODIUM) 8.6-50 mg per tablet, Take 2 tablets by mouth 2 (two) times daily., Disp: 120 tablet, Rfl: 5    sildenafiL (VIAGRA) 100 MG tablet, Take 1 tablet (100 mg total) by mouth daily as needed for Erectile Dysfunction., Disp: 30 tablet, Rfl: 5    tamsulosin (FLOMAX) 0.4 mg Cap, Take 1 capsule (0.4 mg total) by mouth once daily. Take in evening., Disp: 90 capsule, Rfl: 3    triamcinolone (NASACORT) 55 mcg nasal inhaler, 2 sprays by Nasal route once daily. for 14 days, Disp: 6.7 mL, Rfl: 0    albuterol (VENTOLIN HFA) 90 mcg/actuation inhaler, Inhale 2 puffs into the lungs every 6 (six) hours as needed for Wheezing. Rescue (Patient not taking: Reported on 6/6/2025), Disp: 8 g, Rfl: 0    atorvastatin (LIPITOR) 10 MG tablet, TAKE 1 TABLET (10 MG TOTAL) BY MOUTH EVERY EVENING., Disp: 90  tablet, Rfl: 3    fluconazole (DIFLUCAN) 200 MG Tab, Take 1 tablet (200 mg total) by mouth once a week. for 28 doses (Patient not taking: Reported on 6/6/2025), Disp: 28 tablet, Rfl: 0    valACYclovir (VALTREX) 500 MG tablet, Take 1 tablet (500 mg total) by mouth 3 (three) times daily., Disp: 90 tablet, Rfl: 2  No current facility-administered medications for this visit.    Facility-Administered Medications Ordered in Other Visits:     electrolyte-S (ISOLYTE), , Intravenous, Continuous, Casi Murillo MD, Last Rate: 10 mL/hr at 01/07/22 0829, New Bag at 01/07/22 1043    HYDROmorphone (PF) injection 0.2 mg, 0.2 mg, Intravenous, Q5 Min PRN, Casi Murillo MD    LIDOcaine (PF) 10 mg/ml (1%) injection 10 mg, 1 mL, Intradermal, Once PRN, Casi Murillo MD    LIDOcaine (PF) 10 mg/ml (1%) injection 10 mg, 1 mL, Intradermal, Once, Casi Murillo MD    oxyCODONE immediate release tablet 5 mg, 5 mg, Oral, Q3H PRN, Casi Murillo MD, 5 mg at 01/07/22 1110

## 2025-06-13 ENCOUNTER — OFFICE VISIT (OUTPATIENT)
Dept: OTOLARYNGOLOGY | Facility: CLINIC | Age: 70
End: 2025-06-13
Payer: MEDICARE

## 2025-06-13 VITALS — WEIGHT: 239 LBS | HEIGHT: 69 IN | BODY MASS INDEX: 35.4 KG/M2

## 2025-06-13 DIAGNOSIS — H90.3 SENSORINEURAL HEARING LOSS (SNHL) OF BOTH EARS: Primary | ICD-10-CM

## 2025-06-13 DIAGNOSIS — H69.93 DYSFUNCTION OF BOTH EUSTACHIAN TUBES: ICD-10-CM

## 2025-06-13 DIAGNOSIS — H65.03 BILATERAL ACUTE SEROUS OTITIS MEDIA, RECURRENCE NOT SPECIFIED: ICD-10-CM

## 2025-06-13 PROCEDURE — 99999 PR PBB SHADOW E&M-EST. PATIENT-LVL IV: CPT | Mod: PBBFAC,,, | Performed by: PHYSICIAN ASSISTANT

## 2025-06-13 RX ORDER — AZELASTINE 1 MG/ML
1 SPRAY, METERED NASAL 2 TIMES DAILY
Qty: 30 ML | Refills: 2 | Status: SHIPPED | OUTPATIENT
Start: 2025-06-13 | End: 2026-06-13

## 2025-06-13 RX ORDER — FLUTICASONE PROPIONATE 50 MCG
1 SPRAY, SUSPENSION (ML) NASAL 2 TIMES DAILY
Qty: 16 G | Refills: 2 | Status: SHIPPED | OUTPATIENT
Start: 2025-06-13

## 2025-06-13 RX ORDER — PREDNISONE 20 MG/1
40 TABLET ORAL DAILY
Qty: 10 TABLET | Refills: 0 | Status: SHIPPED | OUTPATIENT
Start: 2025-06-13 | End: 2025-06-18

## 2025-06-13 NOTE — PATIENT INSTRUCTIONS
Disp Refills Start End FRANKLIN   predniSONE (DELTASONE) 20 MG tablet 10 tablet 0 6/13/2025 6/18/2025 --   Sig - Route: Take 2 tablets (40 mg total) by mouth once daily. for 5 days - Oral     Use flonase 1 spray (50mcg total) by Each Nostril route 2 (two) times daily and astelin 1 spray (137mcg total) by Nasal route (two) times daily. Point up and slightly outward toward ear when using medicated nasal sprays as to avoid irritating nasal septum. Pop ears gently for 5 seconds 6 times a day. Stop popping ears if it causes ear pain. Follow up in 6 weeks with comprehensive audiogram.      Follow up in 1 month with comprehensive audiogram.

## 2025-06-13 NOTE — PROGRESS NOTES
[facility]    Subjective:      Patient: Albin Fernandez Patient PCP: Jered Siegel MD         :  1955     Sex:  male      MRN:  [id number]          Date of Visit: 2025      Chief Complaint: Ear issues    Patient ID: Albin Fernandez is a 69 y.o. male with PMHx of bilateral SNHL (wears hearing aids) and tinnitus who presents to clinic for evaluation of echoing in his ears. Pt was recently treated at  2025 for acute pansinusitis with 4mg medrol dosepack, nasacort and astelin. Covid19 and influenza testing negative. He was seen by family medicine 2025 and treated for maxillary sinusitis with Augmentin 875mg BID x 10 days.     History of Present Illness    CHIEF COMPLAINT:  Patient presents today for hearing issues and ear pressure    HISTORY OF PRESENT ILLNESS:  He recently experienced a severe cold with significant sinus problems requiring Augmentin. He subsequently developed a cough requiring additional antibiotics and cough medication. He currently reports bilateral ear pressure and difficulty understanding television audio despite being able to hear it. He states that sounds have been making echoes when he hears them. He denies ear pain or discharge.     PAST MEDICAL HISTORY:  He has a history of bilateral ear tube placement as a child, initially glass tubes later replaced with temporary ones around age 11. History of tonsillectomy and adenoidectomy in childhood.    MEDICATIONS:  Currently using Flonase    ALLERGIES:  Oral antihistamines cause urinary retention.        Past Medical History  He has a past medical history of Arthritis, Atrial fibrillation, BPH with obstruction/lower urinary tract symptoms, Cardiomyopathy, dilated, Disorder of kidney and ureter, Hyperlipidemia, Hypertension, Hypothyroidism, and Sleep apnea.    Family History  His family history includes Breast cancer in his sister; Cirrhosis in his father; Pancreatic cancer in his mother.    Past Surgical History:    Procedure Laterality Date    ARTHROSCOPY OF ELBOW Left 08/09/2019    Procedure: ARTHROSCOPY, ELBOW;  Surgeon: Giovany Marquez II, MD;  Location: [facility] OR;  Service: Orthopedics;  Laterality: Left;    BACK SURGERY      CATARACT EXTRACTION Right     FOOT SURGERY      IRRIGATION AND DEBRIDEMENT OF UPPER EXTREMITY  08/09/2019    Procedure: IRRIGATION AND DEBRIDEMENT, UPPER EXTREMITY;  Surgeon: Giovany Marquez II, MD;  Location: [facility] OR;  Service: Orthopedics;;    KIDNEY SURGERY      one kidney removed    NEPHRECTOMY      TRANSRECTAL ULTRASOUND EXAMINATION N/A 11/23/2020    Procedure: TRANSRECTAL ULTRASOUND;  Surgeon: Roselyn Shrestha MD;  Location: [facility] OR;  Service: Urology;  Laterality: N/A;    TRANSURETHRAL RESECTION OF PROSTATE N/A 01/07/2022    Procedure: TURP (TRANSURETHRAL RESECTION OF PROSTATE);  Surgeon: Cate Sharp MD;  Location: [facility] OR;  Service: Urology;  Laterality: N/A;     Social History     Tobacco Use    Smoking status: Never     Passive exposure: Never    Smokeless tobacco: Never   Substance and Sexual Activity    Alcohol use: No    Drug use: No    Sexual activity: Yes     Partners: Female     Medications  He has a current medication list which includes the following prescription(s): amoxicillin-clavulanate 875-125mg, apixaban, benzonatate, finasteride, gabapentin, hydrocodone-acetaminophen, ipratropium, levothyroxine, metoprolol succinate, pantoprazole, senna-docusate, sildenafil, tamsulosin, albuterol, atorvastatin, azelastine, fluconazole, fluticasone propionate, prednisone, and valacyclovir, and the following Facility-Administered Medications: electrolyte-s (ph 7.4), hydromorphone (pf), lidocaine (pf) 10 mg/ml (1%), lidocaine (pf) 10 mg/ml (1%), and oxycodone.    Review of patient's allergies indicates:   Allergen Reactions    Antihistimine Other (See Comments)    Antihistamines - alkylamine Other (See Comments)     Urinary retention     All medications, allergies, and past  "history have been reviewed.    Objective:      Vitals:      5/26/2025     9:55 AM 6/6/2025     8:58 AM 6/13/2025    10:08 AM   Vitals - 1 value per visit   SYSTOLIC 151 110    DIASTOLIC 94 68    Pulse 82 72    Temp 97.2 °F (36.2 °C) 98.3 °F (36.8 °C)    Resp 20 17    SPO2 98 % 97 %    Weight (lb) 230 236.77 238.98   Weight (kg) 104.327 107.4 108.4   Height 5' 9" (1.753 m) 5' 9" (1.753 m) 5' 9" (1.753 m)   BMI (Calculated) 33.9 34.9 35.3   Pain Score  Zero Zero       Body surface area is 2.3 meters squared.    Physical Exam  Constitutional:       General: He is not in acute distress.     Appearance: Normal appearance. He is not ill-appearing.   HENT:      Head: Normocephalic and atraumatic.      Right Ear: Ear canal and external ear normal. A middle ear effusion (serous) is present. Tympanic membrane is scarred and retracted.      Left Ear: Ear canal and external ear normal. A middle ear effusion (serous) is present. Tympanic membrane is scarred and retracted.      Nose: Nose normal.      Mouth/Throat:      Lips: Pink. No lesions.      Mouth: Mucous membranes are moist. No oral lesions.      Tongue: No lesions.      Palate: No lesions.      Pharynx: Oropharynx is clear. Uvula midline. No pharyngeal swelling, oropharyngeal exudate, posterior oropharyngeal erythema or uvula swelling.   Eyes:      General:         Right eye: No discharge.         Left eye: No discharge.      Extraocular Movements: Extraocular movements intact.      Conjunctiva/sclera: Conjunctivae normal.   Pulmonary:      Effort: Pulmonary effort is normal.   Neurological:      General: No focal deficit present.      Mental Status: He is alert and oriented to person, place, and time. Mental status is at baseline.   Psychiatric:         Mood and Affect: Mood normal.         Behavior: Behavior normal.         Thought Content: Thought content normal.         Judgment: Judgment normal.       Labs:  WBC   Date Value Ref Range Status   01/29/2025 5.05 3.90 - " 12.70 K/uL Final     Platelets   Date Value Ref Range Status   01/29/2025 182 150 - 450 K/uL Final     Creatinine   Date Value Ref Range Status   06/04/2024 1.1 0.5 - 1.4 mg/dL Final     TSH   Date Value Ref Range Status   01/29/2025 2.181 0.340 - 5.600 uIU/mL Final     Glucose   Date Value Ref Range Status   06/04/2024 145 (H) 70 - 110 mg/dL Final     Hemoglobin A1C   Date Value Ref Range Status   09/25/2023 5.5 4.0 - 5.6 % Final     Comment:     ADA Screening Guidelines:  5.7-6.4%  Consistent with prediabetes  >or=6.5%  Consistent with diabetes    High levels of fetal hemoglobin interfere with the HbA1C  assay. Heterozygous hemoglobin variants (HbS, HgC, etc)do  not significantly interfere with this assay.   However, presence of multiple variants may affect accuracy.         Audiogram Summary:    All lab results and imaging results have been reviewed.    Assessment:        ICD-10-CM ICD-9-CM   1. Sensorineural hearing loss (SNHL) of both ears  H90.3 389.18   2. Bilateral acute serous otitis media, recurrence not specified  H65.03 381.01   3. Dysfunction of both eustachian tubes  H69.93 381.81            Plan:      SEROUS OTITIS MEDIA AND EUSTACHIAN TUBE DYSFUNCTION:  - Identified fluid in both ears, likely due to Eustachian tube dysfunction following recent illness.  - Explained that fluid in ears can cause echoing and distorted hearing along with aural fullness.  - Informed patient about Eustachian tube dysfunction and its relation to fluid retention after illness.  - Recommend performing gentle ear popping exercises: 5 seconds, 6 times daily for 1 month.  - Patient to discontinue nasal irrigations temporarily.  - Started combination of nasal sprays: Astelin (1 spray each nostril twice daily) and continued Flonase 1 SEN BID to address ongoing symptoms.  - Started prednisone 40mg PO once daily x 5 days.   - Advised on proper technique for nasal spray administration (head down, spray up and slightly out towards  ear. Do not have to sniff up. Just spray).  - Advised patient that astelin is topical nasal anti-histamine and should have very limited systemic effect. Stop Astelin if experiencing urinary retention.    FOLLOW-UP AND HEARING ASSESSMENT:  - Ordered hearing test in 1 month.  - Follow up in 1 month, coinciding with scheduled hearing test.         Pt will be due for updated audiogram on or after 07/10/2024.     This note was generated with the assistance of ambient listening technology. Verbal consent was obtained by the patient and accompanying visitor(s) for the recording of patient appointment to facilitate this note. I attest to having reviewed and edited the generated note for accuracy, though some syntax or spelling errors may persist. Please contact the author of this note for any clarification.

## 2025-06-16 ENCOUNTER — PATIENT OUTREACH (OUTPATIENT)
Dept: ADMINISTRATIVE | Facility: HOSPITAL | Age: 70
End: 2025-06-16
Payer: MEDICARE

## 2025-06-16 RX ORDER — ATORVASTATIN CALCIUM 10 MG/1
10 TABLET, FILM COATED ORAL NIGHTLY
Qty: 90 TABLET | Refills: 3 | Status: SHIPPED | OUTPATIENT
Start: 2025-06-16 | End: 2026-06-16

## 2025-06-16 NOTE — PROGRESS NOTES
Pts order for statin therapy has .     REFILL ENCOUNTER SENT TO PCP   Statin Use in Persons with Diabetes -Patients aged 40-75 years with a diagnosis of diabetes: Type 1 or Type 2.  Needs any Intensity Statin during the calendar year        Moderate-Intensity Statin Therapy  Atorvastatin 10-20 mg  Pitavastatin 1-4 mg  Simvastatin 20-40 mg  Rosuvastatin 5-10 mg  Pravastatin 40-80 mg  Lovastatin 40 mg  Fluvastatin 40 mg  Fluvastatin XL 80 mg    High-intensity Statin Therapy  Atorvastatin 40-80 mg  Rosuvastatin 20-40 mg  Simvastatin 80 mg    Exclusions: Intolerance needs a  code (myalgia, myositis, myopathy, or rhabdomyolysis) during the current measurement year AND problem list updated and reviewed IF APPLIES.   G72.0  Drug-induced myopathy or rhabdomyolysis   G72.9  Myopathy, unspecified  M60.9  Myositis, unspecified  M79.10 Myalgia, unspecified site

## 2025-07-01 ENCOUNTER — OFFICE VISIT (OUTPATIENT)
Dept: FAMILY MEDICINE | Facility: CLINIC | Age: 70
End: 2025-07-01
Payer: MEDICARE

## 2025-07-01 VITALS
OXYGEN SATURATION: 98 % | BODY MASS INDEX: 35.43 KG/M2 | HEIGHT: 69 IN | WEIGHT: 239.19 LBS | TEMPERATURE: 98 F | DIASTOLIC BLOOD PRESSURE: 68 MMHG | HEART RATE: 83 BPM | SYSTOLIC BLOOD PRESSURE: 116 MMHG

## 2025-07-01 DIAGNOSIS — Z00.00 ENCOUNTER FOR MEDICARE ANNUAL WELLNESS EXAM: ICD-10-CM

## 2025-07-01 DIAGNOSIS — E66.01 SEVERE OBESITY (BMI 35.0-39.9) WITH COMORBIDITY: Primary | ICD-10-CM

## 2025-07-01 DIAGNOSIS — I48.21 PERMANENT ATRIAL FIBRILLATION: ICD-10-CM

## 2025-07-01 PROCEDURE — 99999 PR PBB SHADOW E&M-EST. PATIENT-LVL IV: CPT | Mod: PBBFAC,,, | Performed by: NURSE PRACTITIONER

## 2025-07-01 NOTE — PATIENT INSTRUCTIONS
Counseling and Referral of Other Preventative  (Italic type indicates deductible and co-insurance are waived)    Patient Name: Albin Fernandez  Today's Date: 7/1/2025    Health Maintenance       Date Due Completion Date    Shingles Vaccine (1 of 2) Never done ---    RSV Vaccine (Age 60+ and Pregnant patients) (1 - Risk 60-74 years 1-dose series) Never done ---    COVID-19 Vaccine (3 - 2024-25 season) 09/01/2024 3/27/2021    PROSTATE-SPECIFIC ANTIGEN 07/01/2025 7/1/2024    Influenza Vaccine (1) 09/01/2025 9/25/2023    Lipid Panel 01/29/2026 1/29/2025    High Dose Statin 07/01/2026 7/1/2025    Hemoglobin A1c (Diabetic Prevention Screening) 09/25/2026 9/25/2023    Colorectal Cancer Screening 01/17/2027 1/17/2024    TETANUS VACCINE 05/13/2035 5/13/2025        No orders of the defined types were placed in this encounter.      The following information is provided to all patients.  This information is to help you find resources for any of the problems found today that may be affecting your health:                  Living healthy guide: www.[facility].[place].gov      Understanding Diabetes: www.diabetes.org      Eating healthy: www.cdc.gov/healthyweight      CDC home safety checklist: www.cdc.gov/steadi/patient.html      Agency on Aging: www.goea.[place].[facility]      Alcoholics anonymous (AA): www.aa.org      Physical Activity: www.sky.nih.gov/ba6tbur      Tobacco use: www.quitwithusla.org

## 2025-07-01 NOTE — PROGRESS NOTES
"  Albin Fernandez presented for a  Medicare AWV and comprehensive Health Risk Assessment today. The following components were reviewed and updated:    Medical history  Family History  Social history  Allergies and Current Medications  Health Risk Assessment  Health Maintenance  Care Team         ** See Completed Assessments for Annual Wellness Visit within the encounter summary.**         The following assessments were completed:  Living Situation  CAGE  Depression Screening  Timed Get Up and Go  Whisper Test  Cognitive Function Screening  Nutrition Screening  ADL Screening  PAQ Screening    Clock in media   Opioid documentation:      Patient does have a current opioid prescription.      Patient accepted further discussion regarding opioid medication use.      Patient is currently taking hydrocodone narcotic for back and neck pain.        Pain level today is 7/10.       In addition to narcotic pain medications, patient is also using acetaminophen for pain control.       Patient is followed by a specialist currently for their pain and will not be referred today.       Patient's opioid risk potential based on ORT-OUD tool:       Ashu each box that applies   No   Yes     Family history of substance abuse   Alcohol [] [x]   Illegal drugs [x] []   Rx drugs [x] []     Personal history of substance abuse   Alcohol [x] []   Illegal drugs [x] []   Rx drugs [x] []     Age between 16-45 years   [x]   []     Patient with ADD, OCD, Bipolar disorder, schizoprenia   [x]   []     Patient with depression   [x]   []   1                      Scoring total                                                                 Non-opioid treatment options have been discussed today and added to the patient's after visit summary.        Vitals:    07/01/25 0809   BP: 116/68   Pulse: 83   Temp: 98 °F (36.7 °C)   TempSrc: Oral   SpO2: 98%   Weight: 108.5 kg (239 lb 3.2 oz)   Height: 5' 9" (1.753 m)     Body mass index is 35.32 kg/m².  Physical " "Exam  Constitutional:       Appearance: He is well-developed.   HENT:      Head: Normocephalic and atraumatic.      Right Ear: Hearing normal.      Left Ear: Hearing normal.      Nose: Nose normal.   Eyes:      General: Lids are normal.      Conjunctiva/sclera: Conjunctivae normal.      Pupils: Pupils are equal, round, and reactive to light.   Cardiovascular:      Rate and Rhythm: Normal rate.   Pulmonary:      Effort: Pulmonary effort is normal.   Abdominal:      Palpations: Abdomen is soft.   Musculoskeletal:         General: Normal range of motion.      Cervical back: Normal range of motion and neck supple.   Skin:     General: Skin is warm and dry.   Neurological:      Mental Status: He is alert and oriented to person, place, and time.               Diagnoses and health risks identified today and associated recommendations/orders:    1. Encounter for Medicare annual wellness exam  Discussed health maintenance guidelines appropriate for age.      - Referral to Enhanced Annual Wellness Visit (eAWV) W+1    2. Severe obesity (BMI 35.0-39.9) with comorbidity  Uncontrolled, Counseled patient on his ideal body weight, health consequences of being obese and current recommendations including weekly exercise and a heart healthy diet.  Current BMI is:Estimated body mass index is 35.32 kg/m² as calculated from the following:    Height as of this encounter: 5' 9" (1.753 m).    Weight as of this encounter: 108.5 kg (239 lb 3.2 oz)..  Patient is aware that ideal BMI < 25 or Weight in (lb) to have BMI = 25: 168.9.      3. Permanent atrial fibrillation  Stable, continue current medication  Followed by cardiology      Provided Albin with a 5-10 year written screening schedule and personal prevention plan. Recommendations were developed using the USPSTF age appropriate recommendations. Education, counseling, and referrals were provided as needed. After Visit Summary printed and given to patient which includes a list of additional " screenings\tests needed.    Follow up for One year for Annual Wellness Visit.    Shani Vincent, NP    I offered to discuss advanced care planning, including how to pick a person who would make decisions for you if you were unable to make them for yourself, called a health care power of , and what kind of decisions you might make such as use of life sustaining treatments such as ventilators and tube feeding when faced with a life limiting illness recorded on a living will that they will need to know. (How you want to be cared for as you near the end of your natural life)     X Patient is interested in learning more about how to make advanced directives.  I provided them paperwork and offered to discuss this with them.

## 2025-07-16 ENCOUNTER — OFFICE VISIT (OUTPATIENT)
Dept: OTOLARYNGOLOGY | Facility: CLINIC | Age: 70
End: 2025-07-16
Payer: MEDICARE

## 2025-07-16 ENCOUNTER — CLINICAL SUPPORT (OUTPATIENT)
Dept: AUDIOLOGY | Facility: CLINIC | Age: 70
End: 2025-07-16
Payer: MEDICARE

## 2025-07-16 VITALS — BODY MASS INDEX: 34.94 KG/M2 | WEIGHT: 235.88 LBS | HEIGHT: 69 IN

## 2025-07-16 DIAGNOSIS — H90.3 SENSORINEURAL HEARING LOSS (SNHL) OF BOTH EARS: Primary | ICD-10-CM

## 2025-07-16 DIAGNOSIS — H90.3 SENSORINEURAL HEARING LOSS (SNHL), BILATERAL: ICD-10-CM

## 2025-07-16 DIAGNOSIS — Z97.4 WEARS HEARING AID IN BOTH EARS: ICD-10-CM

## 2025-07-16 DIAGNOSIS — H93.13 TINNITUS AURIUM, BILATERAL: Primary | ICD-10-CM

## 2025-07-16 PROCEDURE — 1126F AMNT PAIN NOTED NONE PRSNT: CPT | Mod: CPTII,S$GLB,, | Performed by: PHYSICIAN ASSISTANT

## 2025-07-16 PROCEDURE — 3008F BODY MASS INDEX DOCD: CPT | Mod: CPTII,S$GLB,, | Performed by: PHYSICIAN ASSISTANT

## 2025-07-16 PROCEDURE — 92567 TYMPANOMETRY: CPT | Mod: S$GLB,,, | Performed by: AUDIOLOGIST

## 2025-07-16 PROCEDURE — 99213 OFFICE O/P EST LOW 20 MIN: CPT | Mod: S$GLB,,, | Performed by: PHYSICIAN ASSISTANT

## 2025-07-16 PROCEDURE — 99999 PR PBB SHADOW E&M-EST. PATIENT-LVL IV: CPT | Mod: PBBFAC,,, | Performed by: PHYSICIAN ASSISTANT

## 2025-07-16 PROCEDURE — 3288F FALL RISK ASSESSMENT DOCD: CPT | Mod: CPTII,S$GLB,, | Performed by: PHYSICIAN ASSISTANT

## 2025-07-16 PROCEDURE — 99999 PR PBB SHADOW E&M-EST. PATIENT-LVL I: CPT | Mod: PBBFAC,,, | Performed by: AUDIOLOGIST

## 2025-07-16 PROCEDURE — 1160F RVW MEDS BY RX/DR IN RCRD: CPT | Mod: CPTII,S$GLB,, | Performed by: PHYSICIAN ASSISTANT

## 2025-07-16 PROCEDURE — 92557 COMPREHENSIVE HEARING TEST: CPT | Mod: S$GLB,,, | Performed by: AUDIOLOGIST

## 2025-07-16 PROCEDURE — 1101F PT FALLS ASSESS-DOCD LE1/YR: CPT | Mod: CPTII,S$GLB,, | Performed by: PHYSICIAN ASSISTANT

## 2025-07-16 PROCEDURE — 1159F MED LIST DOCD IN RCRD: CPT | Mod: CPTII,S$GLB,, | Performed by: PHYSICIAN ASSISTANT

## 2025-07-16 NOTE — PROGRESS NOTES
Albin Fernandez was seen on 07/16/2025 for an audiological evaluation. Pt was alone during today's visit. Pertinent complaints today include hearing loss. Pt reported hearing is getting worse. Otoscopy revealed no cerumen in both ears. The tympanic membrane was visualized AU prior to proceeding with the hearing testing.     Pure tone threshold test results reveal a severe sensorineural hearing loss for the right ear and  severe sensorineural hearing loss for the left ear.    Speech Reception Thresholds were  75 dBHL for the right ear and 75 dBHL for the left ear.    Word recognition scores were good for the right ear and good for the left ear.   Tympanograms were Type A for the right ear and Type A for the left ear.    Recommendations: 1) Follow up with ENT     2) Annual hearing evaluation     3) Continue wearing hearing aids    Audiogram results were reviewed in detail with patient and all questions were answered. Results will be reviewed by the referring provider at the completion of this note. All complaints were addressed during this visit to the patient's satisfaction.

## 2025-07-16 NOTE — PROGRESS NOTES
[facility]    Subjective:      Patient: Albin Fernandez Patient PCP: Jered Siegel MD         :  1955     Sex:  male      MRN:  [id number]          Date of Visit: 2025      Chief Complaint: Bilateral serous OM f/u    Interval History 2025: Pt treated for bilateral serous OM and ETD with flonase and astelin 1 SEN BID. Pt states that astelin caused urinary retention, so he discontinued. He has continued to use flonase. He has echoing when he wears his hearing aids.     Patient ID [id number]: Albin Fernandez is a 69 y.o. male with PMHx of bilateral SNHL (wears hearing aids) and tinnitus who presents to clinic for evaluation of echoing in his ears. Pt was recently treated at  2025 for acute pansinusitis with 4mg medrol dosepack, nasacort and astelin. Covid19 and influenza testing negative. He was seen by family medicine 2025 and treated for maxillary sinusitis with Augmentin 875mg BID x 10 days. He recently experienced a severe cold with significant sinus problems requiring Augmentin. He subsequently developed a cough requiring additional antibiotics and cough medication. He currently reports bilateral ear pressure and difficulty understanding television audio despite being able to hear it. He states that sounds have been making echoes when he hears them. He denies ear pain or discharge. He has a history of bilateral ear tube placement as a child, initially glass tubes later replaced with temporary ones around age 11. History of tonsillectomy and adenoidectomy in childhood. Currently using Flonase. Oral antihistamines cause urinary retention.        Past Medical History  He has a past medical history of Arthritis, Atrial fibrillation, BPH with obstruction/lower urinary tract symptoms, Cardiomyopathy, dilated, Disorder of kidney and ureter, Hyperlipidemia, Hypertension, Hypothyroidism, and Sleep apnea.    Family History  His family history includes Alcohol abuse in his father; Breast  cancer in his sister; Cancer in his father, mother, and sister; Cirrhosis in his father; Depression in his sister; Diabetes in his brother; Pancreatic cancer in his mother.    Past Surgical History:   Procedure Laterality Date    ARTHROSCOPY OF ELBOW Left 08/09/2019    Procedure: ARTHROSCOPY, ELBOW;  Surgeon: Giovany Marquez II, MD;  Location: [facility] OR;  Service: Orthopedics;  Laterality: Left;    BACK SURGERY      CATARACT EXTRACTION Right     FOOT SURGERY      IRRIGATION AND DEBRIDEMENT OF UPPER EXTREMITY  08/09/2019    Procedure: IRRIGATION AND DEBRIDEMENT, UPPER EXTREMITY;  Surgeon: Giovany Marquez II, MD;  Location: [facility] OR;  Service: Orthopedics;;    KIDNEY SURGERY      one kidney removed    NEPHRECTOMY      TRANSRECTAL ULTRASOUND EXAMINATION N/A 11/23/2020    Procedure: TRANSRECTAL ULTRASOUND;  Surgeon: Roselyn Shrestha MD;  Location: [facility] OR;  Service: Urology;  Laterality: N/A;    TRANSURETHRAL RESECTION OF PROSTATE N/A 01/07/2022    Procedure: TURP (TRANSURETHRAL RESECTION OF PROSTATE);  Surgeon: Cate Sharp MD;  Location: [facility] OR;  Service: Urology;  Laterality: N/A;     Social History     Tobacco Use    Smoking status: Never     Passive exposure: Never    Smokeless tobacco: Never   Substance and Sexual Activity    Alcohol use: No    Drug use: No    Sexual activity: Yes     Partners: Female     Medications  He has a current medication list which includes the following prescription(s): apixaban, atorvastatin, azelastine, fluticasone propionate, hydrocodone-acetaminophen, levothyroxine, metoprolol succinate, pantoprazole, senna-docusate, albuterol, finasteride, fluconazole, gabapentin, sildenafil, tamsulosin, and valacyclovir, and the following Facility-Administered Medications: electrolyte-s (ph 7.4), hydromorphone (pf), lidocaine (pf) 10 mg/ml (1%), lidocaine (pf) 10 mg/ml (1%), and oxycodone.    Review of patient's allergies indicates:   Allergen Reactions    Antihistimine Other (See  "Comments)    Antihistamines - alkylamine Other (See Comments)     Urinary retention     All medications, allergies, and past history have been reviewed.    Objective:      Vitals:      6/13/2025    10:08 AM 7/1/2025     8:09 AM 7/16/2025     8:41 AM   Vitals - 1 value per visit   SYSTOLIC  116    DIASTOLIC  68    Pulse  83    Temp  98 °F (36.7 °C)    SPO2  98 %    Weight (lb) 238.98 239.2 235.89   Weight (kg) 108.4 108.5 107   Height 5' 9" (1.753 m) 5' 9" (1.753 m) 5' 9" (1.753 m)   BMI (Calculated) 35.3 35.3 34.8   Pain Score Zero Seven Zero       Body surface area is 2.28 meters squared.    Physical Exam  Constitutional:       General: He is not in acute distress.     Appearance: Normal appearance. He is not ill-appearing.   HENT:      Head: Normocephalic and atraumatic.      Right Ear: Ear canal and external ear normal. Tympanic membrane is scarred.      Left Ear: Ear canal and external ear normal. Tympanic membrane is scarred.      Nose: Nose normal.      Mouth/Throat:      Lips: Pink. No lesions.      Mouth: Mucous membranes are moist. No oral lesions.      Tongue: No lesions.      Palate: No lesions.      Pharynx: Oropharynx is clear. Uvula midline. No pharyngeal swelling, oropharyngeal exudate, posterior oropharyngeal erythema or uvula swelling.   Eyes:      General:         Right eye: No discharge.         Left eye: No discharge.      Extraocular Movements: Extraocular movements intact.      Conjunctiva/sclera: Conjunctivae normal.   Pulmonary:      Effort: Pulmonary effort is normal.   Neurological:      General: No focal deficit present.      Mental Status: He is alert and oriented to person, place, and time. Mental status is at baseline.   Psychiatric:         Mood and Affect: Mood normal.         Behavior: Behavior normal.         Thought Content: Thought content normal.         Judgment: Judgment normal.       Labs:  WBC   Date Value Ref Range Status   01/29/2025 5.05 3.90 - 12.70 K/uL Final "     Platelets   Date Value Ref Range Status   01/29/2025 182 150 - 450 K/uL Final     Creatinine   Date Value Ref Range Status   06/04/2024 1.1 0.5 - 1.4 mg/dL Final     TSH   Date Value Ref Range Status   01/29/2025 2.181 0.340 - 5.600 uIU/mL Final     Glucose   Date Value Ref Range Status   06/04/2024 145 (H) 70 - 110 mg/dL Final     Hemoglobin A1C   Date Value Ref Range Status   09/25/2023 5.5 4.0 - 5.6 % Final     Comment:     ADA Screening Guidelines:  5.7-6.4%  Consistent with prediabetes  >or=6.5%  Consistent with diabetes    High levels of fetal hemoglobin interfere with the HbA1C  assay. Heterozygous hemoglobin variants (HbS, HgC, etc)do  not significantly interfere with this assay.   However, presence of multiple variants may affect accuracy.         Audiogram Summary:      All lab results and imaging results have been reviewed.    Assessment:        ICD-10-CM ICD-9-CM   1. Sensorineural hearing loss (SNHL) of both ears  H90.3 389.18   2. Wears hearing aid in both ears  Z97.4 [id number]          Plan:      Audiogram reviewed with pt in detail. Pt has severe to moderately-severe SNHL AU. Type A tymp AD and type A tymp AS-normal middle ear function. SRTs 75dB AD and 75dB AS. WRS 76%AD at 100dB and 72%AS at 100dB. Pt advised that he would benefit from hearing aid adjustment to help with echoing as I believe that this is his main issue and his issues happen when wearing hearing aids. We will monitor hearing loss with annual audiograms.

## 2025-07-18 ENCOUNTER — CLINICAL SUPPORT (OUTPATIENT)
Dept: AUDIOLOGY | Facility: CLINIC | Age: 70
End: 2025-07-18
Payer: MEDICARE

## 2025-07-18 DIAGNOSIS — Z97.4 WEARS HEARING AID IN BOTH EARS: Primary | ICD-10-CM

## 2025-07-18 PROCEDURE — 99499 UNLISTED E&M SERVICE: CPT | Mod: S$GLB,,, | Performed by: AUDIOLOGIST

## 2025-07-22 DIAGNOSIS — N40.1 BENIGN PROSTATIC HYPERPLASIA WITH URINARY FREQUENCY: ICD-10-CM

## 2025-07-22 DIAGNOSIS — R35.0 BENIGN PROSTATIC HYPERPLASIA WITH URINARY FREQUENCY: ICD-10-CM

## 2025-07-22 RX ORDER — FINASTERIDE 5 MG/1
5 TABLET, FILM COATED ORAL DAILY
Qty: 90 TABLET | Refills: 3 | Status: CANCELLED | OUTPATIENT
Start: 2025-07-22 | End: 2026-07-22

## 2025-07-22 NOTE — TELEPHONE ENCOUNTER
Spoke with patient, informed he needed an annual appt to obtain med refill. Appt made, patient stated he has been out of his medication for going on three weeks now and he can tell the difference not taking the meds and desperately needs them.  Okay for order of  30 day supply called into pharmacy, patient verbally understood.

## 2025-07-24 ENCOUNTER — OFFICE VISIT (OUTPATIENT)
Dept: UROLOGY | Facility: CLINIC | Age: 70
End: 2025-07-24
Payer: MEDICARE

## 2025-07-24 VITALS — HEIGHT: 69 IN | WEIGHT: 230 LBS | BODY MASS INDEX: 34.07 KG/M2

## 2025-07-24 DIAGNOSIS — N40.1 BENIGN PROSTATIC HYPERPLASIA WITH URINARY FREQUENCY: Primary | ICD-10-CM

## 2025-07-24 DIAGNOSIS — N52.9 ERECTILE DYSFUNCTION, UNSPECIFIED ERECTILE DYSFUNCTION TYPE: ICD-10-CM

## 2025-07-24 DIAGNOSIS — R35.0 BENIGN PROSTATIC HYPERPLASIA WITH URINARY FREQUENCY: Primary | ICD-10-CM

## 2025-07-24 DIAGNOSIS — Z12.5 ENCOUNTER FOR SCREENING FOR MALIGNANT NEOPLASM OF PROSTATE: ICD-10-CM

## 2025-07-24 LAB
BILIRUBIN, UA POC OHS: NEGATIVE
BLOOD, UA POC OHS: NEGATIVE
CLARITY, UA POC OHS: CLEAR
COLOR, UA POC OHS: YELLOW
GLUCOSE, UA POC OHS: NEGATIVE
KETONES, UA POC OHS: NEGATIVE
LEUKOCYTES, UA POC OHS: NEGATIVE
NITRITE, UA POC OHS: NEGATIVE
PH, UA POC OHS: 6
POC RESIDUAL URINE VOLUME: 35 ML (ref 0–100)
PROTEIN, UA POC OHS: 30
SPECIFIC GRAVITY, UA POC OHS: 1.02
UROBILINOGEN, UA POC OHS: 0.2

## 2025-07-24 PROCEDURE — 3008F BODY MASS INDEX DOCD: CPT | Mod: CPTII,S$GLB,, | Performed by: NURSE PRACTITIONER

## 2025-07-24 PROCEDURE — 3288F FALL RISK ASSESSMENT DOCD: CPT | Mod: CPTII,S$GLB,, | Performed by: NURSE PRACTITIONER

## 2025-07-24 PROCEDURE — G2211 COMPLEX E/M VISIT ADD ON: HCPCS | Mod: S$GLB,,, | Performed by: NURSE PRACTITIONER

## 2025-07-24 PROCEDURE — 99214 OFFICE O/P EST MOD 30 MIN: CPT | Mod: S$GLB,,, | Performed by: NURSE PRACTITIONER

## 2025-07-24 PROCEDURE — 1159F MED LIST DOCD IN RCRD: CPT | Mod: CPTII,S$GLB,, | Performed by: NURSE PRACTITIONER

## 2025-07-24 PROCEDURE — 81003 URINALYSIS AUTO W/O SCOPE: CPT | Mod: QW,S$GLB,, | Performed by: NURSE PRACTITIONER

## 2025-07-24 PROCEDURE — 1101F PT FALLS ASSESS-DOCD LE1/YR: CPT | Mod: CPTII,S$GLB,, | Performed by: NURSE PRACTITIONER

## 2025-07-24 PROCEDURE — 51798 US URINE CAPACITY MEASURE: CPT | Mod: S$GLB,,, | Performed by: NURSE PRACTITIONER

## 2025-07-24 PROCEDURE — 99999 PR PBB SHADOW E&M-EST. PATIENT-LVL IV: CPT | Mod: PBBFAC,,, | Performed by: NURSE PRACTITIONER

## 2025-07-24 PROCEDURE — 1160F RVW MEDS BY RX/DR IN RCRD: CPT | Mod: CPTII,S$GLB,, | Performed by: NURSE PRACTITIONER

## 2025-07-24 RX ORDER — SILDENAFIL 100 MG/1
100 TABLET, FILM COATED ORAL DAILY PRN
Qty: 30 TABLET | Refills: 5 | Status: SHIPPED | OUTPATIENT
Start: 2025-07-24 | End: 2026-07-24

## 2025-07-24 RX ORDER — FINASTERIDE 5 MG/1
5 TABLET, FILM COATED ORAL DAILY
Qty: 90 TABLET | Refills: 3 | Status: SHIPPED | OUTPATIENT
Start: 2025-07-24 | End: 2026-07-24

## 2025-07-24 RX ORDER — TAMSULOSIN HYDROCHLORIDE 0.4 MG/1
0.8 CAPSULE ORAL DAILY
Qty: 180 CAPSULE | Refills: 3 | Status: SHIPPED | OUTPATIENT
Start: 2025-07-24 | End: 2025-10-22

## 2025-07-24 NOTE — PROGRESS NOTES
[facility] Note  Staff: ROXI Harden-C    PCP: JADEN Siegel  :  JADEN Sharp    Chief Complaint: BPH F/UP    Subjective:        HPI: Albin Fernandez is a 69 y.o. male who presents to clinic for BPH. He is Established  to our clinic.     Last OV was with Dr. Sharp on 7/8/2024.    Interval Hx w/ ODaiana, NP  7/24/25:  Pt arrives today for annual f/up/med refills.  PT c/o worsening symptoms of increased urgency, frequency and nocturia.  Nocturia is up to 3-4 x nightly at this time.  No dysuria, no gross hematuria  UA today showed 30 protein, 6.0, 1.025.  PVR by bladder scan is 35 mL  Overdue for annual PSA check.    Current  Meds:  Flomax 0.4 mg po daily and Finasteride 5 mg po daily  Takes Viagra 100 mg prn ED.    7/8/24:  PSA 7/1/24: 0.22  Now taking Flomax again. He is also taking Finasteride.   He feels as though he is doing very well with this regimen and has no voiding complaints today.      Recently underwent major back surgery.      PVR 28 cc     3/9/23  Has stopped Flomax.   Nocturia x 1.   Has been using his CPAP machine.   Symptoms are not bothersome during the day.      PSA 11/7/22: 0.64     PVR 64 cc     8/10/22  Stopped Flomax last week for cataract surgery. Feels like he is actually doing better without it.   Nocturia x 2-4. Now wearing a CPAP machine.   During the day he is doing pretty well. No straining. Does have frequency.   No hematuria or dysuria.    10 mg of cialis not working      PVR 78 cc     2/16/22  S/P TURP on 1/7/22. Pathology benign, 16 g.  Doing well from a urinary standpoint.   Nocturia improved to 1-2x.  No hematuria or dysuria.   No bothersome daytime complaints.      11/15/21  Previous patient of Dr. Shrestha. Here to discuss Urolift.   Hx of TURP in 2017. After this he did really well for a while and then his symptoms returned.  Had cysto/trus in Nov 2020 which showed a 22 g prostate with anterior obstructing tissue but otherwise open. He did  undergo UDS which did not show ability to mount a detrusor pressure however, test was somewhat questionable as catheter may not have been all the way in the bladder. He did have a sensation to void.   Last PSA: 1.5 (8/25/20)     Has a solitary left kidney secondary to a traumatic fall when he was a child.      He is currently on Flomax 0.8 mg.   Today he is complaining of frequency, nocturia multiple times a night. He does use a CPAP machine. Stops drinking fluids after 6 pm. No sodas.   No hematuria. No dysuria.   Has a bowel movement every day, but has to take stool softner.      UA today: negative for blood, leuks, nitrite   PVR today: 84 cc     Last urine culture: no documented UTIs           Lab Results   Component Value Date     CREATININE 1.1 06/04/2024      Family  hx: grandfather and uncle had prostate cancer   Hx of COPD, a fib, HTN, HLD     Past medical, family, and social history reviewed as documented in chart with pertinent positive medical, family, and social history detailed in HPI.                     PMHx:  Past Medical History:   Diagnosis Date    Arthritis     Atrial fibrillation     BPH with obstruction/lower urinary tract symptoms     Cardiomyopathy, dilated     Disorder of kidney and ureter     Hyperlipidemia     Hypertension     Hypothyroidism     Sleep apnea        PSHx:  Past Surgical History:   Procedure Laterality Date    ARTHROSCOPY OF ELBOW Left 08/09/2019    Procedure: ARTHROSCOPY, ELBOW;  Surgeon: Giovany Marquez II, MD;  Location: [facility] OR;  Service: Orthopedics;  Laterality: Left;    BACK SURGERY      CATARACT EXTRACTION Right     FOOT SURGERY      IRRIGATION AND DEBRIDEMENT OF UPPER EXTREMITY  08/09/2019    Procedure: IRRIGATION AND DEBRIDEMENT, UPPER EXTREMITY;  Surgeon: Giovany Marquez II, MD;  Location: [facility] OR;  Service: Orthopedics;;    KIDNEY SURGERY      one kidney removed    NEPHRECTOMY      TRANSRECTAL ULTRASOUND EXAMINATION N/A 11/23/2020    Procedure: TRANSRECTAL  ULTRASOUND;  Surgeon: Roselyn Shrestha MD;  Location: [facility] OR;  Service: Urology;  Laterality: N/A;    TRANSURETHRAL RESECTION OF PROSTATE N/A 01/07/2022    Procedure: TURP (TRANSURETHRAL RESECTION OF PROSTATE);  Surgeon: Cate Sharp MD;  Location: [facility] OR;  Service: Urology;  Laterality: N/A;       Allergies:  Antihistimine and Antihistamines - alkylamine    Medications: reviewed     Objective:   There were no vitals filed for this visit.    General:WDWN in NAD  Eyes: PERRLA, normal conjunctiva  Respiratory: no increased work on breathing, clear to auscultation  Cardiovascular: regular rate and rhythm. No obvious extremity edema.  GI: palpation of masses. No tenderness. No hepatosplenomegaly to palpation.  Musculoskeletal: normal range of motion of bilateral upper extremities. Normal muscle strength and tone.  Skin: no obvious rashes or lesions. No tightening of skin noted.  Neurologic: CN grossly normal. Normal sensation.   Psychiatric: awake, alert and oriented x 3. Mood and affect normal. Cooperative.    Assessment:       1. Benign prostatic hyperplasia with urinary frequency    2. Erectile dysfunction, unspecified erectile dysfunction type    3. Encounter for screening for malignant neoplasm of prostate          Plan:     Pt was instructed to increase the Flomax to 0.8 mg po daily at this time to see if gives improvement in current symptoms.  Continue Finasteride 5 mg po daily  Continue the Viagra 100 mg prn ED.    PSA, Total and Free to be scheduled for annual recheck.    I have spent over 30 min with this patient regarding discussion of the following:    -Discussed conservative measures to control urgency and frequency including   1. Avoiding/minimizing bladder irritants (see below), especially in afternoon and evening hours     Discussed bladder irritants include coffee (even decaf), tea, alcohol, soda, spicy foods, acidic juices (orange, tomato), vinegar, and artificial sweeteners/sugary  beverages.     2. Do Timed Voiding - empty on a schedule (approx ~2-3 hours) in spite of need to urinate, to get ahead of urge     3. Do not postponing voiding - dont hold it on purpose      4. Bowel regimen as distended bowel has extrinsic compressive effect on bladder.   - any or all of the following in any combination, titrate to soft daily bowel movement without pushing or straining  - colace/stool softener capsule - once to twice daily  - miralax - 1 capful daily to start, can increase to 2x daily (or decrease to 1/2 cap daily)  - increase dietary fibery  - fiber supplements, such as metamucil  - prunes, prune juice     5. INCREASE water intake     6. Stop fluids 2 hours before bed, and urinate just before bed      F/u with Dr. Sharp for re-evaluation and recheck of symptoms.  Should symptoms not improve on new increase in Flomax, then can discuss further intervention for symptoms.  Pt verbalized understanding upon conclusion of ov today.      ROXI Foreman-C  Visit today is associated with current or anticipated ongoing medical care related to this patient's single serious condition/complex condition.

## 2025-07-24 NOTE — PATIENT INSTRUCTIONS
"Increase Flomax to 2 capsules daily in the evening at least 2-3 hrs prior to bedtime.  Continue the Finasteride 5 mg in the morning.    "START TIMED VOIDING/BLADDER TRAINING" ASAP!!:  WHETHER YOU FEEL AN URGE TO URINATE OR NOT, YOU SHOULD BE FREQUENTING THE TOILET AND ATTEMPT TO URINATE EVERY 3 HOURS!!  NEVER POSTPONE URINATING OR HOLD YOUR URINE.    MAKE SURE YOU ARE HAVING REGULAR/NORMAL BOWEL MOVEMENTS AS THIS ALSO WILL HAVE AN EFFECT ON HOW YOUR BLADDER FUNCTIONS.    NOTHING TO EAT OR DRINK BY MOUTH (THIS INCLUDES WATER) AT LEAST 1-2 HOURS PRIOR TO YOUR BEDTIME ROUTINE.   "

## 2025-08-19 DIAGNOSIS — R35.0 BENIGN PROSTATIC HYPERPLASIA WITH URINARY FREQUENCY: ICD-10-CM

## 2025-08-19 DIAGNOSIS — N40.1 BENIGN PROSTATIC HYPERPLASIA WITH URINARY FREQUENCY: ICD-10-CM

## 2025-08-19 RX ORDER — FINASTERIDE 5 MG/1
5 TABLET, FILM COATED ORAL DAILY
Qty: 30 TABLET | Refills: 3 | Status: SHIPPED | OUTPATIENT
Start: 2025-08-19

## 2025-08-27 ENCOUNTER — OFFICE VISIT (OUTPATIENT)
Dept: FAMILY MEDICINE | Facility: CLINIC | Age: 70
End: 2025-08-27
Payer: MEDICARE

## 2025-08-27 VITALS
HEART RATE: 85 BPM | WEIGHT: 237.19 LBS | SYSTOLIC BLOOD PRESSURE: 140 MMHG | HEIGHT: 69 IN | OXYGEN SATURATION: 97 % | TEMPERATURE: 98 F | DIASTOLIC BLOOD PRESSURE: 80 MMHG | BODY MASS INDEX: 35.13 KG/M2

## 2025-08-27 DIAGNOSIS — K11.20 PAROTIDITIS: Primary | ICD-10-CM

## 2025-08-27 DIAGNOSIS — I48.21 PERMANENT ATRIAL FIBRILLATION: ICD-10-CM

## 2025-08-27 DIAGNOSIS — R03.0 ELEVATED BLOOD PRESSURE READING: ICD-10-CM

## 2025-08-27 DIAGNOSIS — Z79.01 LONG TERM (CURRENT) USE OF ANTICOAGULANTS: ICD-10-CM

## 2025-08-27 PROCEDURE — 1159F MED LIST DOCD IN RCRD: CPT | Mod: CPTII,S$GLB,,

## 2025-08-27 PROCEDURE — 1101F PT FALLS ASSESS-DOCD LE1/YR: CPT | Mod: CPTII,S$GLB,,

## 2025-08-27 PROCEDURE — 1160F RVW MEDS BY RX/DR IN RCRD: CPT | Mod: CPTII,S$GLB,,

## 2025-08-27 PROCEDURE — 3288F FALL RISK ASSESSMENT DOCD: CPT | Mod: CPTII,S$GLB,,

## 2025-08-27 PROCEDURE — G2211 COMPLEX E/M VISIT ADD ON: HCPCS | Mod: S$GLB,,,

## 2025-08-27 PROCEDURE — 99999 PR PBB SHADOW E&M-EST. PATIENT-LVL V: CPT | Mod: PBBFAC,,,

## 2025-08-27 PROCEDURE — 1126F AMNT PAIN NOTED NONE PRSNT: CPT | Mod: CPTII,S$GLB,,

## 2025-08-27 PROCEDURE — 3008F BODY MASS INDEX DOCD: CPT | Mod: CPTII,S$GLB,,

## 2025-08-27 PROCEDURE — 3079F DIAST BP 80-89 MM HG: CPT | Mod: CPTII,S$GLB,,

## 2025-08-27 PROCEDURE — 99214 OFFICE O/P EST MOD 30 MIN: CPT | Mod: S$GLB,,,

## 2025-08-27 PROCEDURE — 3077F SYST BP >= 140 MM HG: CPT | Mod: CPTII,S$GLB,,

## 2025-08-27 RX ORDER — AMOXICILLIN AND CLAVULANATE POTASSIUM 875; 125 MG/1; MG/1
1 TABLET, FILM COATED ORAL 2 TIMES DAILY
Qty: 10 TABLET | Refills: 0 | Status: SHIPPED | OUTPATIENT
Start: 2025-08-27 | End: 2025-09-01

## (undated) DEVICE — Device

## (undated) DEVICE — SYR 10CC LUER LOCK

## (undated) DEVICE — SPONGE BULKEE II ABSRB 6X6.75

## (undated) DEVICE — SPONGE GAUZE 16PLY 4X4

## (undated) DEVICE — PACK ARTHROSCOPY W/ISO BAC

## (undated) DEVICE — NDL ECLIPSE SAFETY 18GX1-1/2IN

## (undated) DEVICE — WATER STERILE INJ 500ML BAG

## (undated) DEVICE — SLEEVE SCD EXPRESS KNEE MEDIUM

## (undated) DEVICE — CONTAINER SPECIMEN STRL 4OZ

## (undated) DEVICE — SYR ONLY LUER LOCK 20CC

## (undated) DEVICE — ELECTRODE RESECTION LOOP LRGE

## (undated) DEVICE — SYR 50ML CATH TIP

## (undated) DEVICE — BAG URINARY DRN 2000ML

## (undated) DEVICE — BANDAGE ESMARK LATEX FREE 4INX

## (undated) DEVICE — SET CYSTO IRRIGATION UNIV SPIK

## (undated) DEVICE — NDL SPINAL 18GX3.5 SPINOCAN

## (undated) DEVICE — DRESSING XEROFORM FOIL PK 1X8

## (undated) DEVICE — TUBING SUC UNIV W/CONN 12FT

## (undated) DEVICE — GLOVE SURGEONS ULTRA TOUCH 6.5

## (undated) DEVICE — SPONGE SUPER KERLIX 6X6.75IN

## (undated) DEVICE — DRAPE STERI INSTRUMENT 1018

## (undated) DEVICE — COVER SURG LIGHT HANDLE

## (undated) DEVICE — GUIDE BIOPSY BIPLANAR 18G

## (undated) DEVICE — SEE MEDLINE ITEM 157131

## (undated) DEVICE — SCRUB HIBICLENS 4% CHG 4OZ

## (undated) DEVICE — SOL IRR NACL .9% 3000ML

## (undated) DEVICE — SYR 50CC LL

## (undated) DEVICE — SEE MEDLINE ITEM 157116

## (undated) DEVICE — JELLY KY LUBRICATING 5G PACKET

## (undated) DEVICE — SYS LABEL CORRECT MED

## (undated) DEVICE — SEE MEDLINE ITEM 157185

## (undated) DEVICE — SEE ITEM #152308

## (undated) DEVICE — COVER TRANSDUCER LATEX N/STERI

## (undated) DEVICE — PAD CAST SPECIALIST STRL 4

## (undated) DEVICE — SEE MEDLINE ITEM 152523

## (undated) DEVICE — SOL 9P NACL IRR PIC IL

## (undated) DEVICE — SEE MEDLINE ITEM 152622

## (undated) DEVICE — DRAPE STERI-DRAPE 1000 17X11IN

## (undated) DEVICE — SEE MEDLINE ITEM 146308

## (undated) DEVICE — ELECTRODE REM PLYHSV RETURN 9

## (undated) DEVICE — APPLICATOR CHLORAPREP ORN 26ML

## (undated) DEVICE — BOWL STERILE LARGE 32OZ

## (undated) DEVICE — SKINMARKER W/RULER DEVON

## (undated) DEVICE — CONTAINER SPECIMEN OR STER 4OZ

## (undated) DEVICE — TUBING ARTHRO IRR 4-LEAD

## (undated) DEVICE — CUTTER FORMULA AGG 2.5MM

## (undated) DEVICE — BAG LINGEMAN DRAIN UROLOGY

## (undated) DEVICE — NDL BOX COUNTER

## (undated) DEVICE — TOURNIQUET SB QC DP 18X4IN